# Patient Record
Sex: FEMALE | Race: WHITE | NOT HISPANIC OR LATINO | Employment: OTHER | URBAN - METROPOLITAN AREA
[De-identification: names, ages, dates, MRNs, and addresses within clinical notes are randomized per-mention and may not be internally consistent; named-entity substitution may affect disease eponyms.]

---

## 2017-01-03 ENCOUNTER — TRANSCRIBE ORDERS (OUTPATIENT)
Dept: ADMINISTRATIVE | Facility: HOSPITAL | Age: 77
End: 2017-01-03

## 2017-01-03 DIAGNOSIS — M81.0 OSTEOPOROSIS: Primary | ICD-10-CM

## 2017-01-13 ENCOUNTER — GENERIC CONVERSION - ENCOUNTER (OUTPATIENT)
Dept: OTHER | Facility: OTHER | Age: 77
End: 2017-01-13

## 2017-01-18 ENCOUNTER — HOSPITAL ENCOUNTER (OUTPATIENT)
Dept: RADIOLOGY | Facility: MEDICAL CENTER | Age: 77
Discharge: HOME | End: 2017-01-18
Payer: COMMERCIAL

## 2017-01-18 DIAGNOSIS — Z12.31 ENCOUNTER FOR SCREENING MAMMOGRAM FOR MALIGNANT NEOPLASM OF BREAST: ICD-10-CM

## 2017-01-18 PROCEDURE — G0202 SCR MAMMO BI INCL CAD: HCPCS

## 2017-01-24 ENCOUNTER — HOSPITAL ENCOUNTER (OUTPATIENT)
Dept: RADIOLOGY | Age: 77
Discharge: HOME | End: 2017-01-24
Payer: COMMERCIAL

## 2017-01-24 DIAGNOSIS — M81.0 OSTEOPOROSIS: ICD-10-CM

## 2017-01-24 PROCEDURE — 77080 DXA BONE DENSITY AXIAL: CPT

## 2017-01-25 ENCOUNTER — GENERIC CONVERSION - ENCOUNTER (OUTPATIENT)
Dept: OTHER | Facility: OTHER | Age: 77
End: 2017-01-25

## 2017-03-03 ENCOUNTER — GENERIC CONVERSION - ENCOUNTER (OUTPATIENT)
Dept: OTHER | Facility: OTHER | Age: 77
End: 2017-03-03

## 2017-03-06 ENCOUNTER — ALLSCRIPTS OFFICE VISIT (OUTPATIENT)
Dept: OTHER | Facility: OTHER | Age: 77
End: 2017-03-06

## 2017-03-09 ENCOUNTER — GENERIC CONVERSION - ENCOUNTER (OUTPATIENT)
Dept: OTHER | Facility: OTHER | Age: 77
End: 2017-03-09

## 2017-04-25 ENCOUNTER — ALLSCRIPTS OFFICE VISIT (OUTPATIENT)
Dept: OTHER | Facility: OTHER | Age: 77
End: 2017-04-25

## 2017-04-25 LAB
BILIRUB UR QL STRIP: NEGATIVE
CLARITY UR: NORMAL
COLOR UR: YELLOW
GLUCOSE (HISTORICAL): NEGATIVE
HGB UR QL STRIP.AUTO: NEGATIVE
KETONES UR STRIP-MCNC: NEGATIVE MG/DL
LEUKOCYTE ESTERASE UR QL STRIP: NORMAL
NITRITE UR QL STRIP: NEGATIVE
PH UR STRIP.AUTO: 6.5 [PH]
PROT UR STRIP-MCNC: 15 MG/DL
SP GR UR STRIP.AUTO: 1.01
UROBILINOGEN UR QL STRIP.AUTO: 0.2

## 2017-05-17 ENCOUNTER — LAB CONVERSION - ENCOUNTER (OUTPATIENT)
Dept: OTHER | Facility: OTHER | Age: 77
End: 2017-05-17

## 2017-05-17 LAB
HBA1C MFR BLD HPLC: 5.9 % OF TOTAL HGB
TSH SERPL DL<=0.05 MIU/L-ACNC: 3.14 MIU/L (ref 0.4–4.5)

## 2017-05-19 ENCOUNTER — GENERIC CONVERSION - ENCOUNTER (OUTPATIENT)
Dept: OTHER | Facility: OTHER | Age: 77
End: 2017-05-19

## 2017-05-21 ENCOUNTER — GENERIC CONVERSION - ENCOUNTER (OUTPATIENT)
Dept: OTHER | Facility: OTHER | Age: 77
End: 2017-05-21

## 2017-05-22 ENCOUNTER — ALLSCRIPTS OFFICE VISIT (OUTPATIENT)
Dept: OTHER | Facility: OTHER | Age: 77
End: 2017-05-22

## 2017-06-12 ENCOUNTER — GENERIC CONVERSION - ENCOUNTER (OUTPATIENT)
Dept: OTHER | Facility: OTHER | Age: 77
End: 2017-06-12

## 2017-06-26 ENCOUNTER — GENERIC CONVERSION - ENCOUNTER (OUTPATIENT)
Dept: OTHER | Facility: OTHER | Age: 77
End: 2017-06-26

## 2017-07-01 ENCOUNTER — APPOINTMENT (EMERGENCY)
Dept: RADIOLOGY | Facility: HOSPITAL | Age: 77
End: 2017-07-01
Payer: COMMERCIAL

## 2017-07-01 ENCOUNTER — HOSPITAL ENCOUNTER (OUTPATIENT)
Facility: HOSPITAL | Age: 77
Setting detail: OBSERVATION
Discharge: HOME | End: 2017-07-02
Attending: EMERGENCY MEDICINE | Admitting: FAMILY MEDICINE
Payer: COMMERCIAL

## 2017-07-01 ENCOUNTER — APPOINTMENT (EMERGENCY)
Dept: CT IMAGING | Facility: HOSPITAL | Age: 77
End: 2017-07-01
Payer: COMMERCIAL

## 2017-07-01 DIAGNOSIS — R10.9 ABDOMINAL PAIN: ICD-10-CM

## 2017-07-01 DIAGNOSIS — K52.9 COLITIS: Primary | ICD-10-CM

## 2017-07-01 PROBLEM — K21.9 GERD (GASTROESOPHAGEAL REFLUX DISEASE): Status: ACTIVE | Noted: 2017-07-01

## 2017-07-01 PROBLEM — E78.5 HLD (HYPERLIPIDEMIA): Status: ACTIVE | Noted: 2017-07-01

## 2017-07-01 PROBLEM — K43.9 VENTRAL HERNIA WITHOUT OBSTRUCTION OR GANGRENE: Status: ACTIVE | Noted: 2017-07-01

## 2017-07-01 LAB
ALBUMIN SERPL BCP-MCNC: 3.7 G/DL (ref 3.5–5)
ALP SERPL-CCNC: 59 U/L (ref 46–116)
ALT SERPL W P-5'-P-CCNC: 40 U/L (ref 12–78)
ANION GAP SERPL CALCULATED.3IONS-SCNC: 10 MMOL/L (ref 4–13)
APTT PPP: 25 SECONDS (ref 23–35)
AST SERPL W P-5'-P-CCNC: 26 U/L (ref 5–45)
ATRIAL RATE: 79 BPM
BASOPHILS # BLD MANUAL: 0 THOUSAND/UL (ref 0–0.1)
BASOPHILS NFR MAR MANUAL: 0 % (ref 0–1)
BILIRUB SERPL-MCNC: 0.4 MG/DL (ref 0.2–1)
BILIRUB UR QL STRIP: NEGATIVE
BUN SERPL-MCNC: 32 MG/DL (ref 5–25)
CALCIUM SERPL-MCNC: 8.8 MG/DL (ref 8.3–10.1)
CHLORIDE SERPL-SCNC: 105 MMOL/L (ref 100–108)
CLARITY UR: CLEAR
CO2 SERPL-SCNC: 26 MMOL/L (ref 21–32)
COLOR UR: YELLOW
CREAT SERPL-MCNC: 1.12 MG/DL (ref 0.6–1.3)
EOSINOPHIL # BLD MANUAL: 0 THOUSAND/UL (ref 0–0.4)
EOSINOPHIL NFR BLD MANUAL: 0 % (ref 0–6)
ERYTHROCYTE [DISTWIDTH] IN BLOOD BY AUTOMATED COUNT: 13.8 % (ref 11.6–15.1)
GFR SERPL CREATININE-BSD FRML MDRD: 47.3 ML/MIN/1.73SQ M
GLUCOSE SERPL-MCNC: 175 MG/DL (ref 65–140)
GLUCOSE UR STRIP-MCNC: NEGATIVE MG/DL
HCT VFR BLD AUTO: 40.9 % (ref 34.8–46.1)
HGB BLD-MCNC: 13 G/DL (ref 11.5–15.4)
HGB UR QL STRIP.AUTO: NEGATIVE
INR PPP: 0.87 (ref 0.86–1.16)
KETONES UR STRIP-MCNC: NEGATIVE MG/DL
LACTATE SERPL-SCNC: 1.8 MMOL/L (ref 0.5–2)
LACTATE SERPL-SCNC: 2.8 MMOL/L (ref 0.5–2)
LEUKOCYTE ESTERASE UR QL STRIP: NEGATIVE
LIPASE SERPL-CCNC: 150 U/L (ref 73–393)
LYMPHOCYTES # BLD AUTO: 15 % (ref 14–44)
LYMPHOCYTES # BLD AUTO: 2.88 THOUSAND/UL (ref 0.6–4.47)
MCH RBC QN AUTO: 29.9 PG (ref 26.8–34.3)
MCHC RBC AUTO-ENTMCNC: 31.8 G/DL (ref 31.4–37.4)
MCV RBC AUTO: 94 FL (ref 82–98)
MONOCYTES # BLD AUTO: 0 THOUSAND/UL (ref 0–1.22)
MONOCYTES NFR BLD: 0 % (ref 4–12)
NEUTROPHILS # BLD MANUAL: 16.31 THOUSAND/UL (ref 1.85–7.62)
NEUTS SEG NFR BLD AUTO: 85 % (ref 43–75)
NITRITE UR QL STRIP: NEGATIVE
PH UR STRIP.AUTO: 6.5 [PH] (ref 4.5–8)
PLATELET # BLD AUTO: 259 THOUSANDS/UL (ref 149–390)
PLATELET BLD QL SMEAR: ADEQUATE
PMV BLD AUTO: 10.4 FL (ref 8.9–12.7)
POTASSIUM SERPL-SCNC: 4.7 MMOL/L (ref 3.5–5.3)
PROT SERPL-MCNC: 7 G/DL (ref 6.4–8.2)
PROT UR STRIP-MCNC: NEGATIVE MG/DL
PROTHROMBIN TIME: 12.1 SECONDS (ref 12.1–14.4)
QRS AXIS: 49 DEGREES
QRSD INTERVAL: 72 MS
QT INTERVAL: 368 MS
QTC INTERVAL: 421 MS
RBC # BLD AUTO: 4.35 MILLION/UL (ref 3.81–5.12)
SODIUM SERPL-SCNC: 141 MMOL/L (ref 136–145)
SP GR UR STRIP.AUTO: 1.01 (ref 1–1.03)
T WAVE AXIS: 70 DEGREES
TOTAL CELLS COUNTED SPEC: 100
UROBILINOGEN UR QL STRIP.AUTO: 0.2 E.U./DL
VENTRICULAR RATE: 79 BPM
WBC # BLD AUTO: 19.19 THOUSAND/UL (ref 4.31–10.16)

## 2017-07-01 PROCEDURE — 83690 ASSAY OF LIPASE: CPT | Performed by: EMERGENCY MEDICINE

## 2017-07-01 PROCEDURE — 87040 BLOOD CULTURE FOR BACTERIA: CPT | Performed by: EMERGENCY MEDICINE

## 2017-07-01 PROCEDURE — 87045 FECES CULTURE AEROBIC BACT: CPT | Performed by: PHYSICIAN ASSISTANT

## 2017-07-01 PROCEDURE — 74177 CT ABD & PELVIS W/CONTRAST: CPT

## 2017-07-01 PROCEDURE — 85730 THROMBOPLASTIN TIME PARTIAL: CPT | Performed by: EMERGENCY MEDICINE

## 2017-07-01 PROCEDURE — 87046 STOOL CULTR AEROBIC BACT EA: CPT | Performed by: PHYSICIAN ASSISTANT

## 2017-07-01 PROCEDURE — 85610 PROTHROMBIN TIME: CPT | Performed by: EMERGENCY MEDICINE

## 2017-07-01 PROCEDURE — 71020 HB CHEST X-RAY 2VW FRONTAL&LATL: CPT

## 2017-07-01 PROCEDURE — 93005 ELECTROCARDIOGRAM TRACING: CPT | Performed by: EMERGENCY MEDICINE

## 2017-07-01 PROCEDURE — 99285 EMERGENCY DEPT VISIT HI MDM: CPT

## 2017-07-01 PROCEDURE — 81003 URINALYSIS AUTO W/O SCOPE: CPT | Performed by: EMERGENCY MEDICINE

## 2017-07-01 PROCEDURE — 96361 HYDRATE IV INFUSION ADD-ON: CPT

## 2017-07-01 PROCEDURE — 96375 TX/PRO/DX INJ NEW DRUG ADDON: CPT

## 2017-07-01 PROCEDURE — 87493 C DIFF AMPLIFIED PROBE: CPT | Performed by: PHYSICIAN ASSISTANT

## 2017-07-01 PROCEDURE — 83605 ASSAY OF LACTIC ACID: CPT | Performed by: EMERGENCY MEDICINE

## 2017-07-01 PROCEDURE — 85027 COMPLETE CBC AUTOMATED: CPT | Performed by: EMERGENCY MEDICINE

## 2017-07-01 PROCEDURE — 87015 SPECIMEN INFECT AGNT CONCNTJ: CPT | Performed by: PHYSICIAN ASSISTANT

## 2017-07-01 PROCEDURE — 96365 THER/PROPH/DIAG IV INF INIT: CPT

## 2017-07-01 PROCEDURE — 36415 COLL VENOUS BLD VENIPUNCTURE: CPT | Performed by: EMERGENCY MEDICINE

## 2017-07-01 PROCEDURE — 85007 BL SMEAR W/DIFF WBC COUNT: CPT | Performed by: EMERGENCY MEDICINE

## 2017-07-01 PROCEDURE — 80053 COMPREHEN METABOLIC PANEL: CPT | Performed by: EMERGENCY MEDICINE

## 2017-07-01 RX ORDER — ESTRADIOL 1 MG/1
0.5 TABLET ORAL DAILY
Status: DISCONTINUED | OUTPATIENT
Start: 2017-07-01 | End: 2017-07-02 | Stop reason: HOSPADM

## 2017-07-01 RX ORDER — CHOLECALCIFEROL (VITAMIN D3) 125 MCG
200 CAPSULE ORAL DAILY
Status: DISCONTINUED | OUTPATIENT
Start: 2017-07-01 | End: 2017-07-02 | Stop reason: HOSPADM

## 2017-07-01 RX ORDER — ACETAMINOPHEN 325 MG/1
650 TABLET ORAL EVERY 6 HOURS PRN
Status: DISCONTINUED | OUTPATIENT
Start: 2017-07-01 | End: 2017-07-02 | Stop reason: HOSPADM

## 2017-07-01 RX ORDER — ATORVASTATIN CALCIUM 20 MG/1
40 TABLET, FILM COATED ORAL EVERY EVENING
Status: DISCONTINUED | OUTPATIENT
Start: 2017-07-01 | End: 2017-07-02 | Stop reason: HOSPADM

## 2017-07-01 RX ORDER — ONDANSETRON 2 MG/ML
4 INJECTION INTRAMUSCULAR; INTRAVENOUS EVERY 6 HOURS PRN
Status: DISCONTINUED | OUTPATIENT
Start: 2017-07-01 | End: 2017-07-02 | Stop reason: HOSPADM

## 2017-07-01 RX ORDER — LISINOPRIL 5 MG/1
5 TABLET ORAL DAILY
Status: DISCONTINUED | OUTPATIENT
Start: 2017-07-01 | End: 2017-07-02 | Stop reason: HOSPADM

## 2017-07-01 RX ORDER — LEVOFLOXACIN 5 MG/ML
750 INJECTION, SOLUTION INTRAVENOUS ONCE
Status: COMPLETED | OUTPATIENT
Start: 2017-07-01 | End: 2017-07-01

## 2017-07-01 RX ORDER — SODIUM CHLORIDE 9 MG/ML
75 INJECTION, SOLUTION INTRAVENOUS CONTINUOUS
Status: DISCONTINUED | OUTPATIENT
Start: 2017-07-01 | End: 2017-07-02 | Stop reason: HOSPADM

## 2017-07-01 RX ORDER — ASPIRIN 81 MG/1
81 TABLET, CHEWABLE ORAL EVERY OTHER DAY
Status: DISCONTINUED | OUTPATIENT
Start: 2017-07-01 | End: 2017-07-02 | Stop reason: HOSPADM

## 2017-07-01 RX ORDER — ACETAMINOPHEN 325 MG/1
650 TABLET ORAL ONCE
Status: COMPLETED | OUTPATIENT
Start: 2017-07-01 | End: 2017-07-01

## 2017-07-01 RX ORDER — SODIUM PHOSPHATE,MONO-DIBASIC 19G-7G/118
1000 ENEMA (ML) RECTAL DAILY
Status: DISCONTINUED | OUTPATIENT
Start: 2017-07-01 | End: 2017-07-02 | Stop reason: HOSPADM

## 2017-07-01 RX ORDER — CIPROFLOXACIN 2 MG/ML
400 INJECTION, SOLUTION INTRAVENOUS EVERY 12 HOURS
Status: DISCONTINUED | OUTPATIENT
Start: 2017-07-01 | End: 2017-07-01

## 2017-07-01 RX ORDER — SODIUM CHLORIDE 9 MG/ML
125 INJECTION, SOLUTION INTRAVENOUS CONTINUOUS
Status: DISCONTINUED | OUTPATIENT
Start: 2017-07-01 | End: 2017-07-01

## 2017-07-01 RX ORDER — ONDANSETRON 2 MG/ML
4 INJECTION INTRAMUSCULAR; INTRAVENOUS ONCE
Status: COMPLETED | OUTPATIENT
Start: 2017-07-01 | End: 2017-07-01

## 2017-07-01 RX ORDER — VIT A/VIT C/VIT E/ZINC/COPPER 4296-226
2 CAPSULE ORAL DAILY
COMMUNITY

## 2017-07-01 RX ORDER — PANTOPRAZOLE SODIUM 40 MG/1
40 TABLET, DELAYED RELEASE ORAL
Status: DISCONTINUED | OUTPATIENT
Start: 2017-07-01 | End: 2017-07-02 | Stop reason: HOSPADM

## 2017-07-01 RX ADMIN — SODIUM CHLORIDE 75 ML/HR: 0.9 INJECTION, SOLUTION INTRAVENOUS at 22:11

## 2017-07-01 RX ADMIN — ENOXAPARIN SODIUM 40 MG: 40 INJECTION SUBCUTANEOUS at 10:06

## 2017-07-01 RX ADMIN — IOHEXOL 50 ML: 240 INJECTION, SOLUTION INTRATHECAL; INTRAVASCULAR; INTRAVENOUS; ORAL at 02:30

## 2017-07-01 RX ADMIN — SODIUM CHLORIDE 500 ML: 0.9 INJECTION, SOLUTION INTRAVENOUS at 02:21

## 2017-07-01 RX ADMIN — LEVOFLOXACIN 750 MG: 5 INJECTION, SOLUTION INTRAVENOUS at 05:01

## 2017-07-01 RX ADMIN — ACETAMINOPHEN 650 MG: 325 TABLET, FILM COATED ORAL at 02:22

## 2017-07-01 RX ADMIN — ASPIRIN 81 MG 81 MG: 81 TABLET ORAL at 09:56

## 2017-07-01 RX ADMIN — SODIUM CHLORIDE 125 ML/HR: 0.9 INJECTION, SOLUTION INTRAVENOUS at 04:25

## 2017-07-01 RX ADMIN — ATORVASTATIN CALCIUM 40 MG: 20 TABLET, FILM COATED ORAL at 17:13

## 2017-07-01 RX ADMIN — IOHEXOL 100 ML: 350 INJECTION, SOLUTION INTRAVENOUS at 03:48

## 2017-07-01 RX ADMIN — PANTOPRAZOLE SODIUM 40 MG: 40 TABLET, DELAYED RELEASE ORAL at 09:56

## 2017-07-01 RX ADMIN — LISINOPRIL 5 MG: 5 TABLET ORAL at 09:56

## 2017-07-01 RX ADMIN — ZINC 1 TABLET: TAB ORAL at 09:56

## 2017-07-01 RX ADMIN — SODIUM CHLORIDE 75 ML/HR: 0.9 INJECTION, SOLUTION INTRAVENOUS at 10:05

## 2017-07-01 RX ADMIN — ESTRADIOL 0.5 MG: 1 TABLET ORAL at 09:56

## 2017-07-01 RX ADMIN — Medication 1000 MG: at 09:56

## 2017-07-01 RX ADMIN — ONDANSETRON 4 MG: 2 INJECTION INTRAMUSCULAR; INTRAVENOUS at 02:22

## 2017-07-01 RX ADMIN — Medication 200 MG: at 09:56

## 2017-07-01 RX ADMIN — METRONIDAZOLE 500 MG: 500 INJECTION, SOLUTION INTRAVENOUS at 07:42

## 2017-07-02 VITALS
TEMPERATURE: 98.4 F | OXYGEN SATURATION: 94 % | RESPIRATION RATE: 18 BRPM | BODY MASS INDEX: 35.94 KG/M2 | SYSTOLIC BLOOD PRESSURE: 140 MMHG | WEIGHT: 210.54 LBS | HEIGHT: 64 IN | HEART RATE: 60 BPM | DIASTOLIC BLOOD PRESSURE: 62 MMHG

## 2017-07-02 LAB
ANION GAP SERPL CALCULATED.3IONS-SCNC: 6 MMOL/L (ref 4–13)
BUN SERPL-MCNC: 13 MG/DL (ref 5–25)
C DIFF TOX GENS STL QL NAA+PROBE: NORMAL
CALCIUM SERPL-MCNC: 8.5 MG/DL (ref 8.3–10.1)
CHLORIDE SERPL-SCNC: 111 MMOL/L (ref 100–108)
CO2 SERPL-SCNC: 27 MMOL/L (ref 21–32)
CREAT SERPL-MCNC: 0.95 MG/DL (ref 0.6–1.3)
ERYTHROCYTE [DISTWIDTH] IN BLOOD BY AUTOMATED COUNT: 14 % (ref 11.6–15.1)
GFR SERPL CREATININE-BSD FRML MDRD: 57.2 ML/MIN/1.73SQ M
GLUCOSE SERPL-MCNC: 93 MG/DL (ref 65–140)
HCT VFR BLD AUTO: 42 % (ref 34.8–46.1)
HGB BLD-MCNC: 12.8 G/DL (ref 11.5–15.4)
MAGNESIUM SERPL-MCNC: 2.1 MG/DL (ref 1.6–2.6)
MCH RBC QN AUTO: 29.4 PG (ref 26.8–34.3)
MCHC RBC AUTO-ENTMCNC: 30.5 G/DL (ref 31.4–37.4)
MCV RBC AUTO: 97 FL (ref 82–98)
PHOSPHATE SERPL-MCNC: 3.2 MG/DL (ref 2.3–4.1)
PLATELET # BLD AUTO: 235 THOUSANDS/UL (ref 149–390)
PMV BLD AUTO: 10.7 FL (ref 8.9–12.7)
POTASSIUM SERPL-SCNC: 4.2 MMOL/L (ref 3.5–5.3)
RBC # BLD AUTO: 4.35 MILLION/UL (ref 3.81–5.12)
SODIUM SERPL-SCNC: 144 MMOL/L (ref 136–145)
WBC # BLD AUTO: 5.8 THOUSAND/UL (ref 4.31–10.16)

## 2017-07-02 PROCEDURE — 80048 BASIC METABOLIC PNL TOTAL CA: CPT | Performed by: PHYSICIAN ASSISTANT

## 2017-07-02 PROCEDURE — 84100 ASSAY OF PHOSPHORUS: CPT | Performed by: PHYSICIAN ASSISTANT

## 2017-07-02 PROCEDURE — 85027 COMPLETE CBC AUTOMATED: CPT | Performed by: PHYSICIAN ASSISTANT

## 2017-07-02 PROCEDURE — 83735 ASSAY OF MAGNESIUM: CPT | Performed by: PHYSICIAN ASSISTANT

## 2017-07-02 RX ADMIN — Medication 200 MG: at 08:27

## 2017-07-02 RX ADMIN — PANTOPRAZOLE SODIUM 40 MG: 40 TABLET, DELAYED RELEASE ORAL at 05:24

## 2017-07-02 RX ADMIN — ZINC 1 TABLET: TAB ORAL at 08:27

## 2017-07-02 RX ADMIN — ENOXAPARIN SODIUM 40 MG: 40 INJECTION SUBCUTANEOUS at 08:27

## 2017-07-02 RX ADMIN — ESTRADIOL 0.5 MG: 1 TABLET ORAL at 08:29

## 2017-07-02 RX ADMIN — LISINOPRIL 5 MG: 5 TABLET ORAL at 08:27

## 2017-07-02 RX ADMIN — Medication 1000 MG: at 08:27

## 2017-07-03 LAB
BACTERIA STL CULT: NORMAL
BACTERIA STL CULT: NORMAL

## 2017-07-06 LAB
BACTERIA BLD CULT: NORMAL
BACTERIA BLD CULT: NORMAL

## 2017-11-13 ENCOUNTER — ALLSCRIPTS OFFICE VISIT (OUTPATIENT)
Dept: OTHER | Facility: OTHER | Age: 77
End: 2017-11-13

## 2017-11-29 ENCOUNTER — ANESTHESIA EVENT (OUTPATIENT)
Dept: PERIOP | Facility: HOSPITAL | Age: 77
End: 2017-11-29
Payer: COMMERCIAL

## 2017-11-30 ENCOUNTER — HOSPITAL ENCOUNTER (OUTPATIENT)
Facility: HOSPITAL | Age: 77
Setting detail: OUTPATIENT SURGERY
Discharge: HOME | End: 2017-11-30
Attending: SURGERY | Admitting: SURGERY
Payer: COMMERCIAL

## 2017-11-30 ENCOUNTER — ANESTHESIA (OUTPATIENT)
Dept: PERIOP | Facility: HOSPITAL | Age: 77
End: 2017-11-30
Payer: COMMERCIAL

## 2017-11-30 VITALS
SYSTOLIC BLOOD PRESSURE: 174 MMHG | TEMPERATURE: 97.1 F | OXYGEN SATURATION: 98 % | HEIGHT: 64 IN | HEART RATE: 59 BPM | DIASTOLIC BLOOD PRESSURE: 74 MMHG | BODY MASS INDEX: 34.15 KG/M2 | WEIGHT: 200 LBS | RESPIRATION RATE: 18 BRPM

## 2017-11-30 PROCEDURE — C1781 MESH (IMPLANTABLE): HCPCS | Performed by: SURGERY

## 2017-11-30 DEVICE — BARD MODIFIED KUGEL HERNIA PATCH
Type: IMPLANTABLE DEVICE | Site: UMBILICAL | Status: FUNCTIONAL
Brand: BARD MODIFIED KUGEL HERNIA PATCH

## 2017-11-30 RX ORDER — ALBUTEROL SULFATE 2.5 MG/3ML
2.5 SOLUTION RESPIRATORY (INHALATION) ONCE AS NEEDED
Status: DISCONTINUED | OUTPATIENT
Start: 2017-11-30 | End: 2017-11-30 | Stop reason: HOSPADM

## 2017-11-30 RX ORDER — BUPIVACAINE HYDROCHLORIDE AND EPINEPHRINE 2.5; 5 MG/ML; UG/ML
INJECTION, SOLUTION INFILTRATION; PERINEURAL AS NEEDED
Status: DISCONTINUED | OUTPATIENT
Start: 2017-11-30 | End: 2017-11-30 | Stop reason: HOSPADM

## 2017-11-30 RX ORDER — FENTANYL CITRATE 50 UG/ML
INJECTION, SOLUTION INTRAMUSCULAR; INTRAVENOUS AS NEEDED
Status: DISCONTINUED | OUTPATIENT
Start: 2017-11-30 | End: 2017-11-30 | Stop reason: SURG

## 2017-11-30 RX ORDER — METOCLOPRAMIDE HYDROCHLORIDE 5 MG/ML
10 INJECTION INTRAMUSCULAR; INTRAVENOUS ONCE AS NEEDED
Status: DISCONTINUED | OUTPATIENT
Start: 2017-11-30 | End: 2017-11-30 | Stop reason: HOSPADM

## 2017-11-30 RX ORDER — SODIUM CHLORIDE 9 MG/ML
INJECTION, SOLUTION INTRAVENOUS CONTINUOUS PRN
Status: DISCONTINUED | OUTPATIENT
Start: 2017-11-30 | End: 2017-11-30 | Stop reason: SURG

## 2017-11-30 RX ORDER — PROPOFOL 10 MG/ML
INJECTION, EMULSION INTRAVENOUS AS NEEDED
Status: DISCONTINUED | OUTPATIENT
Start: 2017-11-30 | End: 2017-11-30 | Stop reason: SURG

## 2017-11-30 RX ORDER — LIDOCAINE HYDROCHLORIDE 10 MG/ML
INJECTION, SOLUTION INFILTRATION; PERINEURAL AS NEEDED
Status: DISCONTINUED | OUTPATIENT
Start: 2017-11-30 | End: 2017-11-30 | Stop reason: SURG

## 2017-11-30 RX ORDER — SODIUM CHLORIDE, SODIUM LACTATE, POTASSIUM CHLORIDE, CALCIUM CHLORIDE 600; 310; 30; 20 MG/100ML; MG/100ML; MG/100ML; MG/100ML
125 INJECTION, SOLUTION INTRAVENOUS CONTINUOUS
Status: DISCONTINUED | OUTPATIENT
Start: 2017-11-30 | End: 2017-11-30 | Stop reason: HOSPADM

## 2017-11-30 RX ORDER — FENTANYL CITRATE/PF 50 MCG/ML
25 SYRINGE (ML) INJECTION
Status: DISCONTINUED | OUTPATIENT
Start: 2017-11-30 | End: 2017-11-30 | Stop reason: HOSPADM

## 2017-11-30 RX ORDER — GLYCOPYRROLATE 0.2 MG/ML
INJECTION INTRAMUSCULAR; INTRAVENOUS AS NEEDED
Status: DISCONTINUED | OUTPATIENT
Start: 2017-11-30 | End: 2017-11-30 | Stop reason: SURG

## 2017-11-30 RX ORDER — SODIUM CHLORIDE 9 MG/ML
75 INJECTION, SOLUTION INTRAVENOUS CONTINUOUS
Status: DISCONTINUED | OUTPATIENT
Start: 2017-11-30 | End: 2017-11-30 | Stop reason: HOSPADM

## 2017-11-30 RX ORDER — ONDANSETRON 2 MG/ML
INJECTION INTRAMUSCULAR; INTRAVENOUS AS NEEDED
Status: DISCONTINUED | OUTPATIENT
Start: 2017-11-30 | End: 2017-11-30 | Stop reason: SURG

## 2017-11-30 RX ORDER — ONDANSETRON 2 MG/ML
4 INJECTION INTRAMUSCULAR; INTRAVENOUS ONCE AS NEEDED
Status: DISCONTINUED | OUTPATIENT
Start: 2017-11-30 | End: 2017-11-30 | Stop reason: HOSPADM

## 2017-11-30 RX ADMIN — PROPOFOL 200 MG: 10 INJECTION, EMULSION INTRAVENOUS at 15:05

## 2017-11-30 RX ADMIN — SODIUM CHLORIDE: 0.9 INJECTION, SOLUTION INTRAVENOUS at 15:53

## 2017-11-30 RX ADMIN — CEFAZOLIN SODIUM 2000 MG: 2 SOLUTION INTRAVENOUS at 15:00

## 2017-11-30 RX ADMIN — GLYCOPYRROLATE 0.2 MG: 0.2 INJECTION, SOLUTION INTRAMUSCULAR; INTRAVENOUS at 15:05

## 2017-11-30 RX ADMIN — ONDANSETRON 4 MG: 2 INJECTION INTRAMUSCULAR; INTRAVENOUS at 15:37

## 2017-11-30 RX ADMIN — FENTANYL CITRATE 50 MCG: 50 INJECTION INTRAMUSCULAR; INTRAVENOUS at 15:20

## 2017-11-30 RX ADMIN — SODIUM CHLORIDE: 0.9 INJECTION, SOLUTION INTRAVENOUS at 13:15

## 2017-11-30 RX ADMIN — FENTANYL CITRATE 50 MCG: 50 INJECTION INTRAMUSCULAR; INTRAVENOUS at 15:31

## 2017-11-30 RX ADMIN — DEXAMETHASONE SODIUM PHOSPHATE 10 MG: 10 INJECTION INTRAMUSCULAR; INTRAVENOUS at 15:07

## 2017-11-30 RX ADMIN — LIDOCAINE HYDROCHLORIDE 50 MG: 10 INJECTION, SOLUTION INFILTRATION; PERINEURAL at 15:05

## 2017-11-30 NOTE — ANESTHESIA POSTPROCEDURE EVALUATION
Post-Op Assessment Note      CV Status:  Stable    Hydration Status:  Euvolemic    PONV Controlled:  Controlled    Airway Patency:  Patent    Post Op Vitals Reviewed: Yes          Staff: CRNA       Comments: SLEEPING VSS REPORT RN          BP     Temp      Pulse     Resp      SpO2

## 2017-11-30 NOTE — OP NOTE
OPERATIVE REPORT  PATIENT NAME: Sangeeta Acuña    :  1940  MRN: [id number]  Pt Location: AN OR ROOM 01    SURGERY DATE: 2017    Surgeon(s) and Role:     Tasneem Nelson MD - Primary    Preop Diagnosis:  Incisional hernia [K43 2]    Post-Op Diagnosis Codes:     * Incisional hernia [K43 2]    Procedure(s) (LRB):  INCISIONAL HERNIA REPAIR (N/A)    Specimen(s):  * No specimens in log *    Estimated Blood Loss:   Minimal    Drains:       Anesthesia Type:   General    Operative Indications:  Incisional hernia [K43 2]        Operative Findings:  Independent, nonsmoker, ASA 2, wound class 1, BMI 34, weight 200, height 64    Cardiovascular  Hyperlipidemia, Hypertension ,   Comment: Neg stress echo with normal LVEF ,  Pulmonary  Negative pulmonary ROS ,    GI/Hepatic  GERD ,    Negative  ROS    Endo/Other  Negative endo/other ROS  GYN  Negative gynecology ROS       Hematology  Negative hematology ROS     Musculoskeletal  Obesity (bmi 34) ,    Neurology  Negative neurology ROS     Psychology   Negative psychology ROS           Complications:   None    Procedure and Technique:  Patient was identified visually and via armband  Placed in the supine position  After general anesthesia the abdomen was prepped and draped in a sterile fashion  0 25% Marcaine with epinephrine was utilized  Incision was made at the umbilicus this carried down through skin subcutaneous tissue  Two hernias were noted at the umbilicus and more superiorly  This is compatible with incisional herniations x2  The hernia sac was dissected free  It was retracted into the abdomen  A 3 inch polypropylene mesh was then inserted  This was interposed between the omentum to protect bowel  Fascia was then closed with interrupted figure-of-eight 1  Vicryl suture Mrs  followed by 3 0 Vicryl subcutaneous and 4 Monocryl sutures  Dermabond was applied  The patient tolerated the procedure well  Sponge and instrument count correct     I was present for the entire procedure    Patient Disposition:  PACU        ###########################    L  Archana Chaves PA-C assisted with surgery as no qualified surgical resident was available  Her assistance was needed for visualization and retraction      SIGNATURE: Alessandra Collier MD  DATE: November 30, 2017  TIME: 4:28 PM

## 2017-11-30 NOTE — ANESTHESIA PREPROCEDURE EVALUATION
Review of Systems/Medical History  Patient summary reviewed  Chart reviewed  History of anesthetic complications (no problem with cholecystectomy) PONV    Cardiovascular  Hyperlipidemia, Hypertension ,   Comment: Neg stress echo with normal LVEF 2016,  Pulmonary  Negative pulmonary ROS ,        GI/Hepatic    GERD ,        Negative  ROS        Endo/Other  Negative endo/other ROS      GYN  Negative gynecology ROS          Hematology  Negative hematology ROS      Musculoskeletal  Obesity (bmi 34) ,        Neurology  Negative neurology ROS      Psychology   Negative psychology ROS          Physical Exam    Airway    Mallampati score: II  TM Distance: >3 FB  Neck ROM: full     Dental   Comment: Upper/lower partials,     Cardiovascular      Pulmonary      Other Findings       CBC, BMP 11/13/17 unremarkable  Hgb 13 4, plt 268, Cr 0 87    Anesthesia Plan  ASA Score- 2       Anesthesia Type- general with ASA Monitors  Additional Monitors:   Airway Plan:     Comment: ETT vs LMA  PONV pps with decadron/zofran  Induction- intravenous  Informed Consent- Anesthetic plan and risks discussed with patient (granddaughter)  I personally reviewed this patient with the CRNA  Discussed and agreed on the Anesthesia Plan with the CRNA  Patricia Bragg

## 2017-11-30 NOTE — DISCHARGE INSTRUCTIONS
Diet and activity as tolerated  May shower  May drive when not taking pain medication      Please call [phone] for a follow-up office visit for 2 weeks

## 2017-12-12 DIAGNOSIS — I10 ESSENTIAL (PRIMARY) HYPERTENSION: ICD-10-CM

## 2017-12-12 DIAGNOSIS — M85.80 OTHER SPECIFIED DISORDERS OF BONE DENSITY AND STRUCTURE, UNSPECIFIED SITE: ICD-10-CM

## 2017-12-12 DIAGNOSIS — E78.5 HYPERLIPIDEMIA: ICD-10-CM

## 2017-12-15 ENCOUNTER — LAB CONVERSION - ENCOUNTER (OUTPATIENT)
Dept: OTHER | Facility: OTHER | Age: 77
End: 2017-12-15

## 2017-12-15 LAB
25(OH)D3 SERPL-MCNC: 39 NG/ML (ref 30–100)
A/G RATIO (HISTORICAL): 1.7 (CALC) (ref 1–2.5)
ALBUMIN SERPL BCP-MCNC: 4.3 G/DL (ref 3.6–5.1)
ALP SERPL-CCNC: 76 U/L (ref 33–130)
ALT SERPL W P-5'-P-CCNC: 30 U/L (ref 6–29)
AST SERPL W P-5'-P-CCNC: 24 U/L (ref 10–35)
BILIRUB SERPL-MCNC: 0.5 MG/DL (ref 0.2–1.2)
BUN SERPL-MCNC: 19 MG/DL (ref 7–25)
BUN/CREA RATIO (HISTORICAL): ABNORMAL (CALC) (ref 6–22)
CALCIUM SERPL-MCNC: 9.8 MG/DL (ref 8.6–10.4)
CHLORIDE SERPL-SCNC: 105 MMOL/L (ref 98–110)
CHOLEST SERPL-MCNC: 201 MG/DL
CHOLEST/HDLC SERPL: 3.5 (CALC)
CO2 SERPL-SCNC: 27 MMOL/L (ref 20–31)
CREAT SERPL-MCNC: 0.9 MG/DL (ref 0.6–0.93)
EGFR AFRICAN AMERICAN (HISTORICAL): 72 ML/MIN/1.73M2
EGFR-AMERICAN CALC (HISTORICAL): 62 ML/MIN/1.73M2
GAMMA GLOBULIN (HISTORICAL): 2.5 G/DL (CALC) (ref 1.9–3.7)
GLUCOSE (HISTORICAL): 96 MG/DL (ref 65–99)
HDLC SERPL-MCNC: 57 MG/DL
LDL CHOLESTEROL (HISTORICAL): 120 MG/DL (CALC)
NON-HDL-CHOL (CHOL-HDL) (HISTORICAL): 144 MG/DL (CALC)
POTASSIUM SERPL-SCNC: 4.5 MMOL/L (ref 3.5–5.3)
SODIUM SERPL-SCNC: 139 MMOL/L (ref 135–146)
TOTAL PROTEIN (HISTORICAL): 6.8 G/DL (ref 6.1–8.1)
TRIGL SERPL-MCNC: 129 MG/DL

## 2017-12-17 ENCOUNTER — GENERIC CONVERSION - ENCOUNTER (OUTPATIENT)
Dept: OTHER | Facility: OTHER | Age: 77
End: 2017-12-17

## 2017-12-18 ENCOUNTER — ALLSCRIPTS OFFICE VISIT (OUTPATIENT)
Dept: OTHER | Facility: OTHER | Age: 77
End: 2017-12-18

## 2018-01-09 NOTE — PROGRESS NOTES
Assessment    1  Encounter for preventive health examination (V70 0) (Z00 00)   2  Essential hypertension (401 9) (I10)    Plan  Encounter for screening mammogram for breast cancer    · * MAMMO SCREENING BILATERAL W CAD; Status:Hold For - Scheduling; Requested  for:41Gtw6365;   Essential hypertension    · Lisinopril 10 MG Oral Tablet; TAKE 1 TABLET BY MOUTH EVERY DAY   · (1) COMPREHENSIVE METABOLIC PANEL; Status:Active; Requested for:81Pco5245;    · (1) LIPID PANEL, FASTING; Status:Active; Requested for:33Ocb8674;    · (1) TSH; Status:Active; Requested for:12Jha8878;   Essential hypertension, Hyperglycemia    · (1) HEMOGLOBIN A1C; Status:Active; Requested for:91Iak8822;     Discussion/Summary    OBTAIN MAMMO  OBTAIN DEXA  IMM UP TO DATE  FLU SHOT TODAY  LABS ARE STABLE- REPEAT IN 6 MONTHS  LIPIDS ARE STABLE;  Impression: Subsequent Annual Wellness Visit, with preventive exam as well as age and risk appropriate counseling completed  Cardiovascular screening and counseling: Dx - V81 2 Screen for CV Disorder  Diabetes screening and counseling: Dx - V77 1 Screen for DM  Colorectal cancer screening and counseling: Dx - V76 51 Screen for CRC  Cervical cancer screening and counseling: screening is current and SEES GYN  Osteoporosis screening and counseling: screening is current, counseling was given on obtaining adequate amounts of calcium and vitamin D on a daily basis, counseling was given on the importance of regular weightbearing exercise and REPEAT DUE 12/2016  Abdominal aortic aneurysm screening and counseling: Dx - V81 2 Screen for CV Disorder  Glaucoma screening and counseling: Dx - V80 1 Screen for Glaucoma  Immunizations: influenza vaccine is due today, the lifetime pneumococcal vaccine has been completed, hepatitis B vaccination series is not indicated at this time due to the patient's low risk of aysha the disease, Zostavax vaccination up to date and Td vaccination up to date  Advance Directive Planning: complete and up to date  Patient Discussion: plan discussed with the patient, follow-up visit needed in one year  Chief Complaint  PATIENT HERE FOR AMW; SHE FEELS WELL  SHE HAS HAD 2 UTI SINCE HAVING [facility]LEY; SHE FEELS WELL  SHE IS DUE FOR MAMMOGRAM IN JANUARY  SHE IS ON PREMARIN AND SHE WOULD LIKE TO WEAN OFF; History of Present Illness  HPI: PATIENT FEELS WELL   SHE OFFERS NO COMPLAINTS   Welcome to Medicare and Wellness Visits: The patient is being seen for the subsequent annual wellness visit  Medicare Screening and Risk Factors   Hospitalizations: no previous hospitalizations  Medicare Screening Tests Risk Questions   Abdominal aortic aneurysm risk assessment: none indicated  Osteoporosis risk assessment:  and female gender  HIV risk assessment: none indicated  Drug and Alcohol Use: The patient has never smoked cigarettes  The patient reports never drinking alcohol  Diet and Physical Activity: Current diet includes well balanced meals  She exercises daily  Exercise: walking  Mood Disorder and Cognitive Impairment Screening: She denies feeling down, depressed, or hopeless over the past two weeks  She denies feeling little interest or pleasure in doing things over the past two weeks  Cognitive impairment screening: denies difficulty learning/retaining new information, denies difficulty handling complex tasks, denies difficulty with reasoning, denies difficulty with spatial ability and orientation, denies difficulty with language and denies difficulty with behavior  Functional Ability/Level of Safety: Hearing is normal bilaterally  The patient is currently able to do activities of daily living without limitations, able to do instrumental activities of daily living without limitations, able to participate in social activities without limitations and able to drive without limitations   Activities of daily living details: does not need help using the phone, no transportation help needed, does not need help shopping, no meal preparation help needed, does not need help doing housework, does not need help doing laundry, does not need help managing medications and does not need help managing money  Fall risk factors:  no polypharmacy, no alcohol use, no mobility impairment, no antidepressant use, no deconditioning, no postural hypotension, no visual impairment, no urinary incontinence, no cognitive impairment, up and go test was normal and no previous fall  Home safety risk factors:  2 RUGS THAT ARE LOOSE - THEY ARE TAPED DOWN, but no unfamiliar surroundings, no loose rugs, no poor household lighting, no uneven floors, no household clutter, grab bars in the bathroom and handrails on the stairs  Advance Directives: Advance directives: living will, durable power of  for health care directives and advance directives  I agree with the patient's decisions  Co-Managers and Medical Equipment/Suppliers: See Patient Care Team   Reviewed Updated [facility]:   Last Medicare Wellness Visit Information was reviewed, patient interviewed, no change since last AW  Preventive Quality Program 65 and Older: Falls Risk: The patient fell 0 times in the past 12 months  The patient is currently asymptomatic Symptoms Include: The patient currently has no urinary incontinence symptoms  Urinary Incontinence Symptoms includes: no urinary incontinence and no incomplete bladder emptying    Date of last glaucoma screen was 2016   HM, Adult Female: The patient is being seen for a health maintenance evaluation  The last health maintenance visit was 12 month(s) ago  Social History: She is   Work status: RETIRED  The patient has never smoked cigarettes  She reports never drinking alcohol  General Health: The patient's health since the last visit is described as good  She has regular dental visits  She denies vision problems  She denies hearing loss   Immunizations status: up to date  Lifestyle:  She consumes a diverse and healthy diet  She does not have any weight concerns  She exercises regularly  She does not use tobacco  She denies alcohol use  She denies drug use  Reproductive health: the patient is postmenopausal    Screening: cancer screening reviewed and current  metabolic screening reviewed and current  risk screening reviewed and current  Welcome to [employer] and Wellness Visits: The patient is being seen for a subsequent annual wellness visit  The patient reports her health to be good  Tobacco use: non-user  Alcohol use: non-user  Illicit drug use: non-user  Current diet includes well balanced meals  She exercises daily  Patient Care Team    Care Team Member Role Specialty Office Number   Jean Juarez MD  Obstetrics/Gynecology [phone]     Review of Systems    Constitutional: negative  Head and Face: no facial pain and no facial pressure  Eyes: WEARS GLASSES, but negative, no eye pain, eyes not red, no watery discharge from the eyes and no purulent discharge from the eyes  ENT: negative, no earache, no hearing loss, no nasal congestion, no nasal discharge, no sore throat, no scratchy throat and no hoarseness  Cardiovascular: negative, the heart is not racing and no lightheadedness  Respiratory: negative, no shortness of breath, no wheezing, not sleeping upright or with extra pillows, no cough, no dry cough, no productive cough, no clear sputum and no colored sputum  Gastrointestinal: negative, no abdominal pain, no abdominal bloating, no abdominal cramps, no nausea, no vomiting, no diarrhea, no constipation and no bright red blood per rectum  Genitourinary: negative, no dysuria, no urinary frequency, no urinary urgency, no flank pain, no suprapubic pain, no pelvic pain and no foul-smelling vaginal discharge     Musculoskeletal: negative, no diffuse joint pain, no generalized muscle aches, no back pain, no joint swelling, no joint stiffness, no pain in other joints and no limping  Integumentary and Breasts: negative, no rashes, no skin lesions, no skin wound and no itching  Neurological: negative, no headache, no confusion, no dizziness, no leg numbness, no leg weakness, no tingling and no difficulty walking  Psychiatric: negative, no insomnia, no anxiety and no depression  Endocrine: negative and no muscle weakness  Hematologic and Lymphatic: a tendency for easy bruising, but negative, as noted in HPI, no swollen glands, no swollen glands in the neck and no tendency for easy bleeding  Yes, the patient is satisfied with Her life  No, the patient has not dropped any activities or interests  No, the patient does not feel that their life is empty  No, the patient does not often get bored  Yes, the patient is hopeful about the future  No, the patient is not bothered by thoughts in their head  Yes, the patient is in good spirits most of the time  No, the patient is not afraid something bad is going to happen to them  Yes, the patient feels happy most of the time  No, the patient does not often feel helpless  No, the patient holder not often get restless and fidgety  Normal 0 - 9, Mild Depression 10 - 19, Severe Depression 20 - 30      Active Problems    1  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)   2  Encounter for preventive health examination (V70 0) (Z00 00)   3  Essential hypertension (401 9) (I10)   4  GERD without esophagitis (530 81) (K21 9)   5  Greater trochanteric bursitis, left (726 5) (M70 62)   6  Hyperglycemia (790 29) (R73 9)   7  Hyperlipidemia (272 4) (E78 5)   8  Osteopenia (733 90) (M85 80)   9  Polyp of sigmoid colon (211 3) (D12 5)    Past Medical History    The active problems and past medical history were reviewed and updated today        Surgical History    · History of Hemorrhoidectomy   · History of Hysterectomy   · History of Nose Surgery   · History of Rotator Cuff Repair    Family History  Mother    · No pertinent family history  Father    · No pertinent family history    The family history was reviewed and updated today  Social History    · Being A Social Drinker   · Caffeine use (V49 89) (F15 90)   · Denied: History of Drug Use   · Moderate Exercising Less Than 3 Times A Week   · Never A Smoker   · Two children  The social history was reviewed and updated today  Current Meds   1  Aspirin 81 MG Oral Tablet Chewable; Therapy: (Recorded:11Apr2013) to Recorded   2  Atorvastatin Calcium 40 MG Oral Tablet; 1/2 tab on a Monday  Requested for:   90IRM9805; Last Rx:53Uvh6116 Ordered   3  B Complex 50 Oral Tablet Recorded   4  CoQ10 CAPS; TAKE 1 CAPSULE DAILY WITH A MEAL Recorded   5  Estradiol 0 5 MG Oral Tablet; TAKE 1 TABLET DAILY; Therapy: 15XVR1556 to (72 414 011)  Requested for: 57Vbq0614; Last   Rx:47Uln7063 Ordered   6  Glucosamine Chondr Complex CAPS; Therapy: (Recorded:11Apr2013) to Recorded   7  Lisinopril 5 MG Oral Tablet; TAKE 1 TABLET TWICE DAILY  Requested for: 27FAK9357;   Last Rx:33Atr3303 Ordered    Allergies    1  Codeine Sulfate TABS   2  Darvocet-N 50 TABS    Immunizations   ** Printed in Appendix #1 below  Vitals  Signs    Systolic: 377  Diastolic: 74  Heart Rate: 76  Respiration: 16  Temperature: 98 2 F  Height: 5 ft 4 in  Weight: 198 lb   BMI Calculated: 33 99  BSA Calculated: 1 96    Physical Exam    Constitutional   General appearance: No acute distress, well appearing and well nourished  Eyes   Conjunctiva and lids: No swelling, erythema or discharge  Pupils and irises: Equal, round, reactive to light  Ophthalmoscopic examination: Normal fundi and optic discs  Ears, Nose, Mouth, and Throat   External inspection of ears and nose: Normal     Otoscopic examination: Tympanic membranes translucent with normal light reflex  Canals patent without erythema      Hearing: Normal     Nasal mucosa, septum, and turbinates: Normal without edema or erythema  Lips, teeth, and gums: Normal, good dentition  Oropharynx: Normal with no erythema, edema, exudate or lesions  Inspection of the oropharynx showed visible hard and soft palate, upper portion of tonsils and uvula (Mallampati class 2)  Oral mucosa was moist, but was normal  The palate examination showed no abnormalities  The tongue was normal  The tonsils were normal    Neck   Neck: Supple, symmetric, trachea midline, no masses  Thyroid: Normal, no thyromegaly  Pulmonary   Respiratory effort: No increased work of breathing or signs of respiratory distress  Percussion of chest: Normal     Auscultation of lungs: Clear to auscultation  Cardiovascular   Palpation of heart: Normal PMI, no thrills  Auscultation of heart: Normal rate and rhythm, normal S1 and S2, no murmurs  The heart rate was normal  The rhythm was regular  Heart sounds: normal S1, normal S2, no S3 and no S4  no murmurs were heard  Carotid pulses: 2+ bilaterally  Abdominal aorta: Normal     Pedal pulses: 2+ bilaterally  Examination of extremities for edema and/or varicosities: Normal     Abdomen   Abdomen: Non-tender, no masses  Bowel sounds were normal  The abdomen was soft and nontender  no masses palpated  Liver and spleen: No hepatomegaly or splenomegaly  Lymphatic   Palpation of lymph nodes in neck: No lymphadenopathy  Musculoskeletal   Gait and station: Normal     Digits and nails: Normal without clubbing or cyanosis  Range of motion: Normal     Muscle strength/tone: Normal     Skin   Skin and subcutaneous tissue: Normal without rashes or lesions  Palpation of skin and subcutaneous tissue: Normal turgor  Neurologic   Cranial nerves: Cranial nerves II-XII intact  Reflexes: 2+ and symmetric  Sensation: No sensory loss      Psychiatric   Judgment and insight: Normal     Orientation to person, place, and time: Normal     Mood and affect: Normal        Results/Data  PHQ-2 Adult Depression Screening 37FKR8595 09:59AM User, Ahs     Test Name Result Flag Reference   PHQ-2 Adult Depression Score 0     Over the last two weeks, how often have you been bothered by any of the following problems? Little interest or pleasure in doing things: Not at all - 0  Feeling down, depressed, or hopeless: Not at all - 0   PHQ-2 Adult Depression Screening Negative         Health Management  Encounter for preventive health examination   Medicare Annual Wellness Visit; every 1 year; Next Due: 38NHU0571; Overdue  History of Other screening mammogram   Digital Bilateral Screening Mammogram With CAD; every 1 year; Last 40BJT1036; Next  Due: 2017; Near Due  History of Screen for colon cancer   COLONOSCOPY; every 5 years; Last 80VID5509; Next Due: 47HWE4333; Active    Future Appointments    Date/Time Provider Specialty Site   2017 09:00 AM Idalmis Howell DO Internal Medicine [address]   2016 11:30 AM Dami Abad MD Obstetrics/Gynecology [facility]     Signatures   Electronically signed by :  Julieta Hobbs DO; 2016  8:08AM EST                       (Author)    Appendix #1     Patient: Bridgette Lin ; : 1940; MRN: [id number]      1 2 3 4 5    Influenza  21-Oct-2011  (71y) 05-Oct-2012  (72y) 14-Oct-2013  (73y) 16-Oct-2014  (74y) 22-Oct-2015  (75y)    PCV  2015  (74y)        PPSV  05-Oct-2012  (72y)        Td/DT  2006  (66y)        Varicella  2012

## 2018-01-10 NOTE — PROGRESS NOTES
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)   7  Hyperglycemia (790 29) (R73 9)   8  Hyperlipidemia (272 4) (E78 5)   9  Osteopenia (733 90) (M85 80)   10  Post-menopausal atrophic vaginitis (627 3) (N95 2)   11  Primary open-angle glaucoma (365 11,365 70) (H40 1190)   12  Screening for genitourinary condition (V81 6) (Z13 89)   13  Screening for neurological condition (V80 09) (Z13 89)   14  Ventral hernia without obstruction or gangrene (553 20) (K43 9)    Current Meds   1  Aspirin 81 MG Oral Tablet Chewable; Therapy: (Recorded:11Apr2013) to Recorded   2  Atorvastatin Calcium 40 MG Oral Tablet; 1/2 tab on a Monday  Requested for:   03DIO4758; Last Rx:03Jan2017 Ordered   3  B Complex 50 Oral Tablet Recorded   4  CoQ10 CAPS; TAKE 1 CAPSULE DAILY WITH A MEAL Recorded   5  Cranberry CAPS; Therapy: (Ruddy Jackson) to Recorded   6  Glucosamine Chondr Complex CAPS; Therapy: (Recorded:84Fnw9633) to Recorded   7  Lisinopril 10 MG Oral Tablet; TAKE 1 TABLET BY MOUTH EVERY DAY  Requested for:   47Cqf2268; Last Rx:00Ikv3656 Ordered   8  Omeprazole 40 MG Oral Capsule Delayed Release; TAKE 1 CAPSULE Daily; Therapy: 32HSZ1294 to (Evaluate:04Jun2017)  Requested for: 17DPB7566; Last   Rx:06Mar2017 Ordered   9  PreserVision AREDS Oral Capsule; Therapy: (Recorded:03Qoi3215) to Recorded   10  Vitamin D3 35960 UNIT Oral Capsule; Therapy: (Recorded:06Mar2017) to Recorded    Allergies    1  Codeine Sulfate TABS   2   Darvocet-N 50 TABS    Vitals  Signs Temperature: 98 F  Heart Rate: 72  Respiration: 16  Systolic: 138  Diastolic: 68  Height: 5 ft 4 in  Weight: 202 lb   BMI Calculated: 34 67  BSA Calculated: 1 96    Future Appointments    Date/Time Provider Specialty Site   12/18/2017 09:00 AM Del Dinh DO Internal Medicine [address]   12/13/2017 11:30 AM Yajaira Sherwood MD Obstetrics/Gynecology [facility] SURGICAL [facility]     Signatures   Electronically signed by : Gely Daniels, ; Nov 13 2017  1:19PM EST                       (Author)    Electronically signed by :  Julieta Hobbs DO; Nov 13 2017  2:32PM EST                       (Author)

## 2018-01-11 NOTE — RESULT NOTES
Verified Results  * MAMMO SCREENING BILATERAL W CAD 42CUF0805 04:08PM Porfirio Holcomb   PRIOR MAMMO 12/23/14 AT [facility]     Test Name Result Flag Reference   MAMMO SCREENING BILATERAL W CAD (Report)     Patient History:   Patient is postmenopausal    No known family history of cancer  Taking estrogen for 26 years  Patient has never smoked  Patient's BMI is 33 5  Reason for exam: screening (asymptomatic)  Mammo Screening Bilateral W CAD: January 11, 2016 - Check In #:    [de-identified]   Bilateral CC and MLO view(s) were taken  Technologist: HARINDER Dean (HARINDER)(M)   Prior study comparison: December 23, 2014, bilateral [facility] digtl    scrn mammo w/CAD performed at [address]  November 27, 2013, bilateral [facility] digtl scrn mammo w/CAD    performed at [address]  The breast tissue is almost entirely fat  No dominant soft tissue mass or suspicious calcifications are    noted  The skin and nipple contours are within normal limits  No mammographic evidence of malignancy  No significant changes when compared with prior studies  ASSESSMENT: BiRad:1 - Negative     Recommendation:   Routine screening mammogram of both breasts in 1 year  A    reminder letter will be scheduled  Analyzed by CAD     8-10% of cancers will be missed on mammography  Management of a    palpable abnormality must be based on clinical grounds  Patients   will be notified of their results via letter from our facility  Accredited by [employer] of Rad[employer] and FDA       Transcription Location: HARINDER Nunez 98: [id number]     Risk Value(s):   Tyrer-Cuzick 10 Year: 2 448%, Tyrer-Cuzick Lifetime: 2 713%,    Myriad Table: 1 5%, ALAYNA 5 Year: 1 4%, NCI Lifetime: 3 2%

## 2018-01-11 NOTE — RESULT NOTES
Verified Results  * MAMMO SCREENING BILATERAL W CAD [id number] 09:49AM Nicole Espinoza Order Number: [id number]    - Patient Instructions: To schedule this appointment, please contact Central Scheduling at [phone]  Do not wear any perfume, powder, lotion or deodorant on breast or underarm area  Please bring your doctors order, referral (if needed) and insurance information with you on the day of the test  Failure to bring this information may result in this test being rescheduled  Arrive 15 minutes prior to your appointment time to register  On the day of your test, please bring any prior mammogram or breast studies with you that were not performed at a [facility]  Failure to bring prior exams may result in your test needing to be rescheduled  Test Name Result Flag Reference   MAMMO SCREENING BILATERAL W CAD (Report)     Patient History:   Patient is postmenopausal    No known family history of cancer  Taking estrogen for 26 years  Patient has never smoked  Patient's BMI is 33 5  Reason for exam: screening (asymptomatic)  Mammo Screening Bilateral W CAD: January 18, 2017 - Check In #:    [de-identified]   Bilateral CC and MLO view(s) were taken  Technologist: HARINDER Rodriguez (HARINDER)(M)   Prior study comparison: January 11, 2016, mammo screening    bilateral W CAD performed at [address] [facility]  December 23, 2014, bilateral [facility] mammo w/CAD    performed at [address] [facility]  The breast tissue is almost entirely fat  No dominant soft tissue mass or suspicious calcifications are    noted  The skin and nipple contours are within normal limits  No mammographic evidence of malignancy  No significant changes when compared with prior studies  ASSESSMENT: BiRad:1 - Negative     Recommendation:   Routine screening mammogram of both breasts in 1 year  A    reminder letter will be scheduled     Analyzed by CAD     8-10% of cancers will be missed on mammography  Management of a    palpable abnormality must be based on clinical grounds  Patients   will be notified of their results via letter from our facility  Accredited by [employer] and FDA       Transcription Location: HARINDER Nunez 98: [id number]     Risk Value(s):   Giovannier-Cuzick 10 Year: 2 500%, Tyrer-Cuzick Lifetime: 2 500%,    Myriad Table: 1 5%, ALAYNA 5 Year: 1 4%, NCI Lifetime: 3 0%   Signed by:   Irving Horta MD   1/18/17

## 2018-01-11 NOTE — RESULT NOTES
osteoporotic fracture being 9 6%, as calculated by the WHO fracture risk assessment tool (FRAX)  The current NOF guidelines recommend treating patients with FRAX 10 year risk score   of >3% for hip fracture and >20% for major osteoporotic fracture        WHO CLASSIFICATION:   Normal (a T-score of -1 0 or higher)   Low bone mineral density (a T-score of less than -1 0 but higher than -2 5)   Osteoporosis (a T-score of -2 5 or less)   Severe osteoporosis (a T-score of -2 5 or less with a fragility fracture)             Workstation performed: [id number]     Signed by:   Mercy Baldwin MD   1/24/17

## 2018-01-11 NOTE — RESULT NOTES
Verified Results  (1) BASIC METABOLIC PROFILE [id number] 07:42AM Chet Mindy     Test Name Result Flag Reference   GLUCOSE 100 mg/dL H 65-99   Fasting reference interval   UREA NITROGEN (BUN) 18 mg/dL  7-25   CREATININE 1 00 mg/dL H 0 60-0 93   For patients >52years of age, the reference limit  for Creatinine is approximately 13% higher for people  identified as -American  eGFR NON-AFR  AMERICAN 55 mL/min/1 73m2 L > OR = 60   eGFR AFRICAN AMERICAN 64 mL/min/1 73m2  > OR = 60   BUN/CREATININE RATIO 18 (calc)  6-22   SODIUM 140 mmol/L  135-146   POTASSIUM 4 9 mmol/L  3 5-5 3   CHLORIDE 103 mmol/L     CARBON DIOXIDE 25 mmol/L  19-30   CALCIUM 9 3 mg/dL  8 6-10 4     (Q) HEMOGLOBIN A1c [id number] 07:42AM Chet Guillen   REPORT COMMENT:  FASTING:YES     Test Name Result Flag Reference   HEMOGLOBIN A1c 6 3 % of total Hgb H <5 7   According to ADA guidelines, hemoglobin A1c <7 0%  represents optimal control in non-pregnant diabetic  patients  Different metrics may apply to specific  patient populations  Standards of Medical Care in    Diabetes Care  2013;36:s11-s66     For the purpose of screening for the presence of  diabetes  <5 7%       Consistent with the absence of diabetes  5 7-6 4%    Consistent with increased risk for diabetes              (prediabetes)  >or=6 5%    Consistent with diabetes     This assay result is consistent with a higher risk  of diabetes  Currently, no consensus exists for use of hemoglobin  A1c for diagnosis of diabetes for children  Plan  Abnormal glucose measurement    · (1) COMPREHENSIVE METABOLIC PANEL; Status:Active; Requested for:02Oct2016;     Discussion/Summary   Blood sugar is 100;  hemoglobin A1C is at 6 3- in diabetic range;  I want you to limit your carbohydrates, limit sweets, get some exercise daily, repeat the level in 2 months and follow up

## 2018-01-12 VITALS
RESPIRATION RATE: 16 BRPM | SYSTOLIC BLOOD PRESSURE: 130 MMHG | HEART RATE: 80 BPM | WEIGHT: 199.8 LBS | BODY MASS INDEX: 34.11 KG/M2 | HEIGHT: 64 IN | TEMPERATURE: 97.3 F | DIASTOLIC BLOOD PRESSURE: 72 MMHG

## 2018-01-12 NOTE — RESULT NOTES
Verified Results  * MAMMO SCREENING BILATERAL W CAD [id number] 04:08PM Carolina Yanes   PRIOR MAMMO 12/23/14 AT [facility]     Test Name Result Flag Reference   MAMMO SCREENING BILATERAL W CAD (Report)     Patient History:   Patient is postmenopausal    No known family history of cancer  Taking estrogen for 26 years  Patient has never smoked  Patient's BMI is 33 5  Reason for exam: screening (asymptomatic)  Mammo Screening Bilateral W CAD: January 11, 2016 - Check In #:    [de-identified]   Bilateral CC and MLO view(s) were taken  Technologist: HARINDER Stein (R)(M)   Prior study comparison: December 23, 2014, bilateral [facility] digtl    scrn mammo w/CAD performed at [address] [facility],[address]  November 27, 2013, bilateral [facility] digtl scrn mammo w/CAD    performed at [address] [facility]  The breast tissue is almost entirely fat  No dominant soft tissue mass or suspicious calcifications are    noted  The skin and nipple contours are within normal limits  No mammographic evidence of malignancy  No significant changes when compared with prior studies  ASSESSMENT: BiRad:1 - Negative     Recommendation:   Routine screening mammogram of both breasts in 1 year  A    reminder letter will be scheduled  Analyzed by CAD     8-10% of cancers will be missed on mammography  Management of a    palpable abnormality must be based on clinical grounds  Patients   will be notified of their results via letter from our facility  Accredited by [employer] and FDA       Transcription Location: HARINDER Nunez 98: [id number]     Risk Value(s):   Tyrer-Cuzick 10 Year: 2 448%, Tyrer-Cuzick Lifetime: 2 713%,    Myriad Table: 1 5%, ALAYNA 5 Year: 1 4%, NCI Lifetime: 3 2%

## 2018-01-12 NOTE — RESULT NOTES
Verified Results  ECHO STRESS TEST W CONTRAST IF INDICATED [id number] 08:20AM Karley Briones Order Number: [id number]    - Patient Instructions: To schedule this appointment, please contact Central Scheduling at [phone]  Test Name Result Flag Reference   ECHO STRESS TEST W CONTRAST IF INDICATED (Report)     [address] [facility]   [phone]     Exercise Stress Echocardiography     Study date: 22-Aug-2016     Patient: Kaur Pratt   MR number: [id number]   Account number: [de-identified]   : 1940   Age: 77 years   Gender: Female   Study date: 22-Aug-2016   Status: Outpatient   Location: Stress lab   Height: 64 in   Weight: 204 lb   BP: 162// 82 mmHg     Indications: Detection of coronary artery disease  Assessment of left   ventricular function  Diagnosis: I10  - Essential (primary) hypertension     Sonographer: FAMILIA Cedeño   Primary Physician: Lavonia Lesch, DO   Group: [facility]   Cardiology Fellow: Rahat Razo MD   Interpreting Physician: Amor Wiggins MD     IMPRESSIONS:   Normal study after maximal exercise without reproduction of symptoms  SUMMARY     STRESS RESULTS:   Duration of exercise was 4 min and 46 sec  Maximal work rate was 6 7 METs  Maximal heart rate during stress was 148 bpm ( 102 % of maximal predicted heart   rate)  Target heart rate was achieved  The heart rate response to stress was exaggerated  There was resting hypertension with a hypertensive blood pressure response to   stress  There was no chest pain during stress  ECG CONCLUSIONS:   The stress ECG was negative for ischemia  BASELINE:   Estimated left ventricular ejection fraction was 60 %   PEAK STRESS:   There were no regional wall motion abnormalities  HISTORY: The patient is a 77year old  female  Chest pain status: no   chest pain   Coronary artery disease risk factors: dyslipidemia and   hypertension  Cardiovascular history: none significant  Co-morbidity: obesity  Medications: aspirin, a lipid lowering agent, and an antihypertensive agent  REST ECG: Normal sinus rhythm with significant baseline artifact  Q waves were   present in leads II, III, and aVF  PROCEDURE: The study was performed in the stress lab  The procedure was   explained to the patient and informed consent was obtained  Treadmill exercise   testing was performed, using the Yoni protocol  Stress and rest   echocardiographic evaluation with 2D imaging, spectral Doppler, and color   Doppler was performed from multiple acoustic windows for evaluation of   ventricular function  YONI PROTOCOL:   HR bpm SBP mmHg DBP mmHg Symptoms   Baseline 78 162 82 none   Stage 1 131 162 82 --   Stage 2 148 170 90 --   Recovery 1 83 150 80 --     STRESS RESULTS: Duration of exercise was 4 min and 46 sec  The patient   exercised to protocol stage 2  Maximal work rate was 6 7 METs  Maximal heart   rate during stress was 148 bpm ( 102 % of maximal predicted heart rate)  Target   heart rate was achieved  The heart rate response to stress was exaggerated  Maximal systolic blood pressure during stress was 170 mmHg  There was resting   hypertension with a hypertensive blood pressure response to stress  The   rate-pressure product for the peak heart rate and blood pressure was 77314  There was no chest pain during stress  The stress test was terminated due to   achievement of target heart rate  After the procedure, the patient was   discharged to home  ECG CONCLUSIONS: The stress ECG was negative for ischemia  Arrhythmia during   stress: isolated atrial premature beats  STRESS 2D ECHO RESULTS:     BASELINE: Left ventricular size was normal  Overall left ventricular systolic   function was normal  Estimated left ventricular ejection fraction was 60 %        PEAK STRESS: There were no regional wall motion abnormalities  There was an   appropriate reduction in left ventricular size  There was an appropriate   augmentation in LV function  SYSTEM MEASUREMENT TABLES     PW   AVC: 421 44 ms   LVOT Env  Ti: 331 11 ms   LVOT VTI: 24 5 cm   LVOT Vmax: 0 98 m/s   LVOT Vmean: 0 74 m/s   LVOT maxPG: 3 87 mmHg   LVOT meanP 37 mmHg     Prepared and electronically signed by     Berna Doran MD   Signed [id number] 13:43:11

## 2018-01-13 VITALS
HEIGHT: 64 IN | RESPIRATION RATE: 16 BRPM | SYSTOLIC BLOOD PRESSURE: 122 MMHG | TEMPERATURE: 98 F | HEART RATE: 72 BPM | WEIGHT: 202 LBS | DIASTOLIC BLOOD PRESSURE: 68 MMHG | BODY MASS INDEX: 34.49 KG/M2

## 2018-01-13 VITALS
SYSTOLIC BLOOD PRESSURE: 134 MMHG | HEART RATE: 60 BPM | TEMPERATURE: 97.7 F | RESPIRATION RATE: 18 BRPM | BODY MASS INDEX: 34.08 KG/M2 | WEIGHT: 199.6 LBS | HEIGHT: 64 IN | DIASTOLIC BLOOD PRESSURE: 64 MMHG

## 2018-01-14 VITALS
TEMPERATURE: 97.4 F | HEIGHT: 64 IN | SYSTOLIC BLOOD PRESSURE: 130 MMHG | WEIGHT: 200 LBS | RESPIRATION RATE: 16 BRPM | DIASTOLIC BLOOD PRESSURE: 64 MMHG | BODY MASS INDEX: 34.15 KG/M2 | HEART RATE: 74 BPM

## 2018-01-15 NOTE — RESULT NOTES
Verified Results  * US ABDOMEN COMPLETE [id number] 08:12AM Lucie Scherer Order Number: [id number]    Order Number: [id number]     Test Name Result Flag Reference   US ABDOMEN COMPLETE (Report)     ABDOMEN ULTRASOUND, COMPLETE     INDICATION: Upper abdominal pain  COMPARISON: None  TECHNIQUE:  Real-time ultrasound of the abdomen was performed with a curvilinear transducer with both volumetric sweeps and still imaging techniques  FINDINGS:     PANCREAS: Visualized portions of the pancreas are within normal limits  AORTA AND IVC: Visualized portions are normal for patient age  LIVER:   Size: Within normal range  The liver measures 15 2 cm in the midclavicular line  Contour: Surface contour is smooth  Parenchyma: There is moderate diffuse increased echogenicity with smooth echotexture and acoustic beam attenuation  Most consistent with moderate hepatic steatosis  No evidence of suspicious mass  The main portal vein is patent and hepatopetal       BILIARY:   No wall thickening or pericholecystic fluid  Gallbladder is completely full of stones  Sonographic Lori Couch sign is negative  No intrahepatic biliary dilatation  CBD measures 7 mm  No choledocholithiasis  KIDNEY:    Right kidney measures 11 6 x 4 5 cm  Within normal limits  Left kidney measures 10 0 x 5 2 cm  Within normal limits  SPLEEN:    Measures 11 1 cm  Within normal limits  ASCITES: None  IMPRESSION:     Gallbladder full of stones  Negative Hewitt's sign  Fatty liver  ##sigslh##sigslh       Workstation performed: [id number]     Signed by:    Belkys Key MD   7/11/16       Plan  Gallbladder calculus without cholecystitis and no obstruction    · 2 - Jessica HODGSON, Matt Smyth  (General Surgery) Physician Referral  Consult  Status: Hold For -  Scheduling  Requested for: 66GXR9257  Care Summary provided  : Yes

## 2018-01-15 NOTE — PROGRESS NOTES
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)   5  Essential hypertension (401 9) (I10)   6  Gallbladder calculus without cholecystitis and no obstruction (574 20) (K80 20)   7  GERD without esophagitis (530 81) (K21 9)   8  Greater trochanteric bursitis, left (726 5) (M70 62)   9  Hyperglycemia (790 29) (R73 9)   10  Hyperlipidemia (272 4) (E78 5)   11  Osteopenia (733 90) (M85 80)   12  Polyp of sigmoid colon (211 3) (D12 5)    Current Meds   1  Aspirin 81 MG Oral Tablet Chewable; Therapy: (Recorded:11Apr2013) to Recorded   2  Atorvastatin Calcium 40 MG Oral Tablet; 1/2 tab on a Monday  Requested for:   02BEE2426; Last Rx:10Xrf7004 Ordered   3  B Complex 50 Oral Tablet Recorded   4  CoQ10 CAPS; TAKE 1 CAPSULE DAILY WITH A MEAL Recorded   5  Estradiol 0 5 MG Oral Tablet; TAKE 1 TABLET DAILY; Therapy: 50HSU0288 to (Panfilo Mckoy)  Requested for: 08Apr2016; Last   Rx:08Apr2016 Ordered   6  Glucosamine Chondr Complex CAPS; Therapy: (Recorded:68Ecg8043) to Recorded   7  Lisinopril 5 MG Oral Tablet; TAKE 1 TABLET TWICE DAILY  Requested for: 62GES5997;   Last Rx:32Zfs9019 Ordered   8  Omeprazole 40 MG Oral Capsule Delayed Release; TAKE 1 CAPSULE Daily; Therapy: 94HJF1760 to (Shawn Ochoa)  Requested for: 30Jun2016; Last   Rx:30Jun2016 Ordered    Allergies    1  Codeine Sulfate TABS   2   Darvocet-N 50 TABS    Vitals  Signs    Systolic: 930  Diastolic: 62  Heart Rate: 80  Respiration: 16  Temperature: 97 7 F  Height: 5 ft 4 in  Weight: 202 lb 2 08 oz  BMI Calculated: 34 7  BSA Calculated: 1 97    Plan   EKG/ECG- POC; Status:Resulted - Requires Verification;   Done: 02VDG5961 12:00AM  TPQ:30YOK5049;EBENXIU; For:Pre-operative clearance; Ordered By:Tegan Cowan;       Future Appointments    Date/Time Provider Specialty Site   11/15/2016 10:00 AM Garcia Howell,  Internal Medicine [address] [facility]   12/07/2016 11:30 AM Emily Nevarez MD Obstetrics/Gynecology [facility]     Signatures   Electronically signed by : Daniel Summers, ; Aug  9 2016  1:39PM EST                       (Author)    Electronically signed by : CHECO Fairbanks ; Aug  9 2016  6:17PM EST                       (Author)

## 2018-01-18 NOTE — PROGRESS NOTES
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)  4  Essential hypertension (401 9) (I10)  5  GERD without esophagitis (530 81) (K21 9)  6  Greater trochanteric bursitis, left (726 5) (M70 62)  7  Hyperglycemia (790 29) (R73 9)  8  Hyperlipidemia (272 4) (E78 5)  9  Osteopenia (733 90) (M85 80)  10  Polyp of sigmoid colon (211 3) (D12 5)  11  Urination frequency (788 41) (R35 0)    Current Meds  1  Aspirin 81 MG Oral Tablet Chewable; Therapy: (Recorded:14Ewc1194) to Recorded  2  Atorvastatin Calcium 40 MG Oral Tablet; 1/2 tab on a Monday  Requested for:   85OZK3125; Last Rx:15Pky7055 Ordered  3  B Complex 50 Oral Tablet Recorded  4  CoQ10 CAPS; TAKE 1 CAPSULE DAILY WITH A MEAL Recorded  5  Estradiol 0 5 MG Oral Tablet; TAKE 1 TABLET DAILY; Therapy: 88XRQ7810 to ([facility])  Requested for: 08Apr2016; Last   Rx:29Dfd2831 Ordered  6  Glucosamine Chondr Complex CAPS; Therapy: (Recorded:46Gzo3375) to Recorded  7  Lisinopril 5 MG Oral Tablet; TAKE 1 TABLET TWICE DAILY  Requested for: 88HQQ8847;   Last Rx:62Fzd5865 Ordered  8  Omeprazole 40 MG Oral Capsule Delayed Release; TAKE 1 CAPSULE Daily; Therapy: 35XDH7817 to (Lorayne Manual)  Requested for: 30Jun2016; Last   Rx:30Jun2016 Ordered    Allergies   1  Codeine Sulfate TABS  2   Darvocet-N 50 TABS    Vitals  Signs    Systolic: 320  Diastolic: 68  Heart Rate: 80  Respiration: 16  Temperature: 97 4 F  Height: 5 ft 4 in  Weight: 201 lb 4 00 oz  BMI Calculated: 34 54  BSA Calculated: 1 97    Results/Data  Urine Dip Non-Automated- POC 04VFU1985 10:12AM Elfrieda Other     Test Name Result Flag Reference   Color Yellow     Clarity Hazy     Leukocytes 70+     Nitrite -     Blood 50     Bilirubin -     Urobilinogen 0 2     Protein 100++     Ph 6 0     Specific Gravity 1 000     Ketone -     Glucose - Plan  Urination frequency    · (Q) CULTURE, VIRAL, BODY FLUIDS, TISSUES; Status:Active; Requested  for:01Nov2016 10:14AM;   Client Bill : No   · Urine Dip Non-Automated- POC; Status:Resulted - Requires Verification;   Done:  72SFW9657 10:12AM    Future Appointments    Date/Time Provider Specialty Site   11/15/2016 10:00 AM Brionna Luis, DO Internal Medicine [address]   12/07/2016 11:30 AM Dione Ramsey MD Obstetrics/Gynecology [facility]     Message   Recorded as Task   Date: 11/01/2016 08:27 AM, Created By: Nick Sheets   Task Name: Medical Complaint Callback   Assigned To: Loetta Claude   Regarding Patient: Niru George, Status: Active   CommentOlive Tallapoosa - 01 Nov 2016 8:27 AM     TASK CREATED  Patient is coming at 10am for nurse visit urine dip  Xs 4 days she has cloudy urine,freaquency and pressure  Wesley Basstha - 01 Nov 2016 10:18 AM     TASK EDITED    Patient arrived for urine dip and reults reviewed by Dr Wally Tidwell  Patient informed, per Dr Wally Tidwell, specimen will be sent out for culture and will not treat until we receive results       Signatures   Electronically signed by : Mohamud Stanley, ; Nov 1 2016 10:19AM EST                       (Author)    Electronically signed by : CHECO Velez ; Nov 1 2016  3:38PM EST                       (Author)

## 2018-01-18 NOTE — RESULT NOTES
Verified Results  * MAMMO SCREENING BILATERAL W CAD [id number] 04:08PM Alicia Esparza   PRIOR MAMMO 12/23/14 AT [facility]     Test Name Result Flag Reference   MAMMO SCREENING BILATERAL W CAD (Report)     Patient History:   Patient is postmenopausal    No known family history of cancer  Taking estrogen for 26 years  Patient has never smoked  Patient's BMI is 33 5  Reason for exam: screening (asymptomatic)  Mammo Screening Bilateral W CAD: January 11, 2016 - Check In #:    [de-identified]   Bilateral CC and MLO view(s) were taken  Technologist: HARINDER Banda (HARINDER)(M)   Prior study comparison: December 23, 2014, bilateral [facility] digtl    scrn mammo w/CAD performed at [address] [facility]  November 27, 2013, bilateral [facility] digtl scrn mammo w/CAD    performed at [address] [facility]  The breast tissue is almost entirely fat  No dominant soft tissue mass or suspicious calcifications are    noted  The skin and nipple contours are within normal limits  No mammographic evidence of malignancy  No significant changes when compared with prior studies  ASSESSMENT: BiRad:1 - Negative     Recommendation:   Routine screening mammogram of both breasts in 1 year  A    reminder letter will be scheduled  Analyzed by CAD     8-10% of cancers will be missed on mammography  Management of a    palpable abnormality must be based on clinical grounds  Patients   will be notified of their results via letter from our facility  Accredited by [employer] and FDA       Transcription Location: HARINDER Nunez 98: [id number]     Risk Value(s):   Tyrer-Cuzick 10 Year: 2 448%, Tyrer-Cuzick Lifetime: 2 713%,    Myriad Table: 1 5%, ALAYNA 5 Year: 1 4%, NCI Lifetime: 3 2%

## 2018-01-18 NOTE — RESULT NOTES
Discussion/Summary   Labs are very good!!!!     Verified Results  (1) COMPREHENSIVE METABOLIC PANEL 14EDO2857 90:12CE Bernadette Guevara     Test Name Result Flag Reference   GLUCOSE 100 mg/dL H 65-99   Fasting reference interval     For someone without known diabetes, a glucose value  between 100 and 125 mg/dL is consistent with  prediabetes and should be confirmed with a  follow-up test    UREA NITROGEN (BUN) 20 mg/dL  7-25   CREATININE 0 92 mg/dL  0 60-0 93   For patients >52years of age, the reference limit  for Creatinine is approximately 13% higher for people  identified as -American  eGFR NON-AFR  AMERICAN 60 mL/min/1 73m2  > OR = 60   eGFR AFRICAN AMERICAN 70 mL/min/1 73m2  > OR = 60   BUN/CREATININE RATIO   6-18   NOT APPLICABLE (calc)   SODIUM 142 mmol/L  135-146   POTASSIUM 5 3 mmol/L  3 5-5 3   CHLORIDE 106 mmol/L     CARBON DIOXIDE 28 mmol/L  20-31   CALCIUM 9 5 mg/dL  8 6-10 4   PROTEIN, TOTAL 6 5 g/dL  6 1-8 1   ALBUMIN 4 0 g/dL  3 6-5 1   GLOBULIN 2 5 g/dL (calc)  1 9-3 7   ALBUMIN/GLOBULIN RATIO 1 6 (calc)  1 0-2 5   BILIRUBIN, TOTAL 0 4 mg/dL  0 2-1 2   ALKALINE PHOSPHATASE 61 U/L     AST 24 U/L  10-35   ALT 26 U/L  6-29     (1) LIPID PANEL, FASTING 82MMW5901 07:18AM Maedineshphyllis Mannzeina     Test Name Result Flag Reference   CHOLESTEROL, TOTAL 179 mg/dL  125-200   HDL CHOLESTEROL 58 mg/dL  > OR = 46   TRIGLICERIDES 870 mg/dL H <150   LDL-CHOLESTEROL 82 mg/dL (calc)  <130   Desirable range <100 mg/dL for patients with CHD or  diabetes and <70 mg/dL for diabetic patients with  known heart disease  CHOL/HDLC RATIO 3 1 (calc)  < OR = 5 0   NON HDL CHOLESTEROL 121 mg/dL (calc)     Target for non-HDL cholesterol is 30 mg/dL higher than   LDL cholesterol target       (Q) TSH, 3RD GENERATION 52PEP9352 07:18AM Bernadette Guevara     Test Name Result Flag Reference   TSH 3 14 mIU/L  0 40-4 50     (Q) HEMOGLOBIN A1c 17RPQ6033 07:18AM Bernadette Guevara   REPORT COMMENT:  FASTING:YES     Test Name Result Flag Reference   HEMOGLOBIN A1c 5 9 % of total Hgb H <5 7   For someone without known diabetes, a hemoglobin   A1c value between 5 7% and 6 4% is consistent with  prediabetes and should be confirmed with a   follow-up test      For someone with known diabetes, a value <7%  indicates that their diabetes is well controlled  A1c  targets should be individualized based on duration of  diabetes, age, comorbid conditions, and other  considerations  This assay result is consistent with an increased risk  of diabetes  Currently, no consensus exists regarding use of  hemoglobin A1c for diagnosis of diabetes for children

## 2018-01-19 DIAGNOSIS — Z12.31 ENCOUNTER FOR SCREENING MAMMOGRAM FOR MALIGNANT NEOPLASM OF BREAST: ICD-10-CM

## 2018-01-23 VITALS
HEIGHT: 64 IN | DIASTOLIC BLOOD PRESSURE: 68 MMHG | WEIGHT: 199 LBS | HEART RATE: 68 BPM | TEMPERATURE: 97.1 F | SYSTOLIC BLOOD PRESSURE: 126 MMHG | RESPIRATION RATE: 16 BRPM | BODY MASS INDEX: 33.97 KG/M2

## 2018-01-23 NOTE — RESULT NOTES
Discussion/Summary   LABS ARE VERY GOOD!! Verified Results  (1) COMPREHENSIVE METABOLIC PANEL [id number] 88:98ET Rufino Roa     Test Name Result Flag Reference   GLUCOSE 96 mg/dL  65-99   Fasting reference interval   UREA NITROGEN (BUN) 19 mg/dL  7-25   CREATININE 0 90 mg/dL  0 60-0 93   For patients >52years of age, the reference limit  for Creatinine is approximately 13% higher for people  identified as -American  eGFR NON-AFR  AMERICAN 62 mL/min/1 73m2  > OR = 60   eGFR AFRICAN AMERICAN 72 mL/min/1 73m2  > OR = 60   BUN/CREATININE RATIO   1-59   NOT APPLICABLE (calc)   SODIUM 139 mmol/L  135-146   POTASSIUM 4 5 mmol/L  3 5-5 3   CHLORIDE 105 mmol/L     CARBON DIOXIDE 27 mmol/L  20-31   CALCIUM 9 8 mg/dL  8 6-10 4   PROTEIN, TOTAL 6 8 g/dL  6 1-8 1   ALBUMIN 4 3 g/dL  3 6-5 1   GLOBULIN 2 5 g/dL (calc)  1 9-3 7   ALBUMIN/GLOBULIN RATIO 1 7 (calc)  1 0-2 5   BILIRUBIN, TOTAL 0 5 mg/dL  0 2-1 2   ALKALINE PHOSPHATASE 76 U/L     AST 24 U/L  10-35   ALT 30 U/L H 6-29     (1) LIPID PANEL, FASTING [id number] 10:53AM Rufino Roa     Test Name Result Flag Reference   CHOLESTEROL, TOTAL 201 mg/dL H <200   HDL CHOLESTEROL 57 mg/dL  >25   TRIGLICERIDES 640 mg/dL  <150   LDL-CHOLESTEROL 120 mg/dL (calc) H    Reference range: <100     Desirable range <100 mg/dL for patients with CHD or  diabetes and <70 mg/dL for diabetic patients with  known heart disease  LDL-C is now calculated using the Chapo-Trevizo   calculation, which is a validated novel method providing   better accuracy than the Friedewald equation in the   estimation of LDL-C  Justo Dickerson  [facility] Darlene  [phone]([phone]   (http://[employer]/faq/KAU192)   CHOL/HDLC RATIO 3 5 (calc)  <5 0   NON HDL CHOLESTEROL 144 mg/dL (calc) H <130   For patients with diabetes plus 1 major ASCVD risk   factor, treating to a non-HDL-C goal of <100 mg/dL   (LDL-C of <70 mg/dL) is considered a therapeutic option  *(Q) VITAMIN D, 25-HYDROXY, LC/MS/MS [id number] 10:53AM Octavio Howell   REPORT COMMENT:  FASTING:YES     Test Name Result Flag Reference   VITAMIN D, 25-OH, TOTAL 39 ng/mL     Vitamin D Status         25-OH Vitamin D:     Deficiency:                    <20 ng/mL  Insufficiency:             20 - 29 ng/mL  Optimal:                 > or = 30 ng/mL     For 25-OH Vitamin D testing on patients on   D2-supplementation and patients for whom quantitation   of D2 and D3 fractions is required, the QuestAssureD(TM)  25-OH VIT D, (D2,D3), LC/MS/MS is recommended: order   code 06473 (patients >2yrs)  For more information on this test, go to:  http://[employer]/faq/HDA373  (This link is being provided for   informational/educational purposes only )

## 2018-01-23 NOTE — PROGRESS NOTES
Assessment    1  Medicare annual wellness visit, subsequent (V70 0) (Z00 00)   2  Never smoked cigarettes (V49 89) (Z78 9)   3  Encounter for screening mammogram for breast cancer (V76 12) (Z12 31)   4  Trigger finger of right thumb (727 03) (M65 311)   5  Osteoarthritis of right thumb (715 34) (M18 11)    Plan  Encounter for screening mammogram for breast cancer    · * MAMMO SCREENING BILATERAL W CAD; Status:Hold For - Scheduling; Requested  for:19Jan2018; Discussion/Summary    LABS STABLE  PT TO SEE DR BAUER- TOMER FIT TEST   INJECTION RIGHT THUMB X 2- IF NO BETTER REFER TO ORTHO FOR SURGERY FOR TRIGGER THUMB RIGHT;   ICE OT AREA  Impression: Subsequent Annual Wellness Visit, with preventive exam as well as age and risk appropriate counseling completed  Cardiovascular screening and counseling: counseling was given on maintaining a healthy diet, counseling was given on maintaining a healthy weight and Dx - V81 2 Screen for CV Disorder  Diabetes screening and counseling: screening is current and Dx - V77 1 Screen for DM  Colorectal cancer screening and counseling: screening is current and Dx - V76 51 Screen for CRC  Abdominal aortic aneurysm screening and counseling: Dx - V81 2 Screen for CV Disorder  Glaucoma screening and counseling: Dx - V80 1 Screen for Glaucoma  Hepatitis C Screening:  The patient declines Hepatitis C screening  Immunizations: influenza vaccine is due today, the lifetime pneumococcal vaccine has been completed, hepatitis B vaccination series is not indicated at this time due to the patient's low risk of aysha the disease, Zostavax vaccination up to date and Td vaccination up to date  Advance Directive Planning: complete and up to date  Patient Discussion: plan discussed with the patient, follow-up visit needed in one year        Chief Complaint  PATIENT HERE FOR AMW  SHE FEELS WELL       History of Present Illness  HPI: PATIENT HERE FOR FOLLOW UP;   SHE HAS RIGHT TRIGGER THUMB;   HER BP IS STABLE;   SHE IS ON ASA AND A STATIN  HER BP IS STABLE OFF OF MEDS    Welcome to Medicare and Wellness Visits: The patient is being seen for the subsequent annual wellness visit  Medicare Screening and Risk Factors   Hospitalizations: no previous hospitalizations  Medicare Screening Tests Risk Questions   Abdominal aortic aneurysm risk assessment: none indicated  Osteoporosis risk assessment:  and female gender  HIV risk assessment: none indicated  Drug and Alcohol Use: The patient has never smoked cigarettes  The patient reports never drinking alcohol  Diet and Physical Activity: Current diet includes well balanced meals  She exercises daily  Exercise: walking  Mood Disorder and Cognitive Impairment Screening: She denies feeling down, depressed, or hopeless over the past two weeks  She denies feeling little interest or pleasure in doing things over the past two weeks  Cognitive impairment screening: denies difficulty learning/retaining new information, denies difficulty handling complex tasks, denies difficulty with reasoning, denies difficulty with spatial ability and orientation, denies difficulty with language and denies difficulty with behavior  Advance Directives: Advance directives: living will, durable power of  for health care directives and advance directives  I agree with the patient's decisions  Co-Managers and Medical Equipment/Suppliers: See Patient Care Team   Reviewed Updated [facility]:   Last Medicare Wellness Visit Information was reviewed, patient interviewed, no change since last AWV  Preventive Quality Program 65 and Older: Falls Risk: The patient fell 0 times in the past 12 months  The patient is currently asymptomatic Symptoms Include: no confusion, no lightheadedness, no vertigo and no dizziness  Associated symptoms:  No associated symptoms are reported  The patient currently has no urinary incontinence symptoms   Urinary Incontinence Symptoms includes: no urinary incontinence, no incomplete bladder emptying and no urinary frequency    [facility]      Patient Care Team    Care Team Member Role Specialty Office Number   Jane Camelia HODGSON  Obstetrics/Gynecology [phone]   Jayne Smallwood MD  Ophthalmology [phone]     Review of Systems    Constitutional: negative  Head and Face: no facial pain and no facial pressure  Eyes: WEARS GLASSES, but negative, no eye pain, eyes not red, no watery discharge from the eyes and no purulent discharge from the eyes  ENT: negative, no earache, no hearing loss, no nasal congestion, no nasal discharge, no sore throat, no scratchy throat and no hoarseness  Cardiovascular: negative, the heart is not racing and no lightheadedness  Respiratory: negative, no shortness of breath, no wheezing, not sleeping upright or with extra pillows, no cough, no dry cough, no productive cough, no clear sputum and no colored sputum  Gastrointestinal: negative, no abdominal pain, no abdominal bloating, no abdominal cramps, no nausea, no vomiting, no diarrhea, no constipation and no bright red blood per rectum  Genitourinary: negative, no dysuria, no urinary frequency, no urinary urgency, no flank pain, no suprapubic pain, no pelvic pain and no foul-smelling vaginal discharge  Musculoskeletal: RIGHT THUMB TRIGGER FINGER;, but negative, as noted in HPI, no diffuse joint pain, no generalized muscle aches, no back pain, no joint swelling, no joint stiffness, no pain in other joints and no limping  Integumentary and Breasts: negative, no rashes, no skin lesions, no skin wound and no itching  Neurological: negative, no headache, no confusion, no dizziness, no leg numbness, no leg weakness, no tingling and no difficulty walking  Psychiatric: negative, no insomnia, no anxiety and no depression  Endocrine: negative and no muscle weakness     Hematologic and Lymphatic: a tendency for easy bruising, but negative, as noted in HPI, no swollen glands, no swollen glands in the neck and no tendency for easy bleeding  Over the past 2 weeks, how often have you been bothered by the following problems? 1 ) Little interest or pleasure in doing things? Not at all    2 ) Feeling down, depressed or hopeless? Not at all    3 ) Trouble falling asleep or sleeping too much? Not at all    4 ) Feeling tired or having little energy? Not at all    5 ) Poor appetite or overeating? Not at all    6 ) Feeling bad about yourself, or that you are a failure, or have let yourself or your family down? Not at all    7 ) Trouble concentrating on things, such as reading a newspaper or watching television? Not at all    8 ) Moving or speaking so slowly that other people could have noticed, or the opposite, moving or speaking faster than usual? Not at all  How difficult have these problems made it for you to do your work, take care of things at home, or get along with people? 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)   7  Hyperglycemia (790 29) (R73 9)   8  Hyperlipidemia (272 4) (E78 5)   9  Medicare annual wellness visit, subsequent (V70 0) (Z00 00)   10  Osteopenia (733 90) (M85 80)   11  Post-menopausal atrophic vaginitis (627 3) (N95 2)   12  Primary open-angle glaucoma (365 11,365 70) (H40 1190)   13  Ventral hernia without obstruction or gangrene (553 20) (K43 9)    Past Medical History    · History of Screening for genitourinary condition (V81 6) (Z13 89)   · History of Screening for neurological condition (V80 09) (Z13 89)    The active problems and past medical history were reviewed and updated today  Surgical History    · History of Hemorrhoidectomy   · History of Hysterectomy   · History of Nose Surgery   · History of Rotator Cuff Repair    The surgical history was reviewed and updated today  Family History  Mother    · No pertinent family history  Father    · No pertinent family history    The family history was reviewed and updated today  Social History    · Being A Social Drinker   · Caffeine use (V49 89) (F15 90)   · Denied: History of Drug Use   · Moderate Exercising Less Than 3 Times A Week   · Never A Smoker   · Never smoked cigarettes (V49 89) (Z78 9)   · Two children  The social history was reviewed and updated today  Current Meds   1  Aspirin 81 MG Oral Tablet Chewable; Therapy: (Recorded:11Apr2013) to Recorded   2  Atorvastatin Calcium 40 MG Oral Tablet; 1/2 tab on a Monday  Requested for:   24Mqs8845; Last Rx:[id number] Ordered   3  B Complex 50 Oral Tablet Recorded   4  CoQ10 CAPS; TAKE 1 CAPSULE DAILY WITH A MEAL Recorded   5  Cranberry CAPS; Therapy: (Loly Purinegro) to Recorded   6  Glucosamine Chondr Complex CAPS; Therapy: (Recorded:11Apr2013) to Recorded   7  Omeprazole 40 MG Oral Capsule Delayed Release; TAKE 1 CAPSULE Daily PRN; Therapy: 64VTK4034 to (Evaluate:18Mar2018)  Requested for: 84GFS5477 Recorded   8  PreserVision AREDS Oral Capsule; Therapy: (Loly Braden) to Recorded   9  Vitamin D3 78932 UNIT Oral Capsule; Therapy: (Recorded:06Mar2017) to Recorded    The medication list was reviewed and updated today  Allergies    1  Codeine Sulfate TABS   2  Darvocet-N 50 TABS    Immunizations   ** Printed in Appendix #1 below  Vitals  Signs    Temperature: 97 1 F  Heart Rate: 68  Respiration: 16  Systolic: 198  Diastolic: 68  Height: 5 ft 4 in  Weight: 199 lb   BMI Calculated: 34 16  BSA Calculated: 1 95    Physical Exam    Constitutional   General appearance: No acute distress, well appearing and well nourished      Eyes   Conjunctiva and lids: No swelling, erythema or discharge  Pupils and irises: Equal, round, reactive to light  Ophthalmoscopic examination: Normal fundi and optic discs  Ears, Nose, Mouth, and Throat   External inspection of ears and nose: Normal     Otoscopic examination: Tympanic membranes translucent with normal light reflex  Canals patent without erythema  Hearing: Normal     Nasal mucosa, septum, and turbinates: Normal without edema or erythema  Lips, teeth, and gums: Normal, good dentition  Oropharynx: Normal with no erythema, edema, exudate or lesions  Inspection of the oropharynx showed visible hard and soft palate, upper portion of tonsils and uvula (Mallampati class 2)  Oral mucosa was moist, but was normal  The palate examination showed no abnormalities  The tongue was normal  The tonsils were normal    Neck   Neck: Supple, symmetric, trachea midline, no masses  Thyroid: Normal, no thyromegaly  Pulmonary   Respiratory effort: No increased work of breathing or signs of respiratory distress  Percussion of chest: Normal     Auscultation of lungs: Clear to auscultation  Cardiovascular   Palpation of heart: Normal PMI, no thrills  Auscultation of heart: Normal rate and rhythm, normal S1 and S2, no murmurs  The heart rate was normal  The rhythm was regular  Heart sounds: normal S1, normal S2, no S3 and no S4  no murmurs were heard  Carotid pulses: 2+ bilaterally  Abdominal aorta: Normal     Pedal pulses: 2+ bilaterally  Examination of extremities for edema and/or varicosities: Normal     Abdomen   Abdomen: Non-tender, no masses  Bowel sounds were normal  The abdomen was soft and nontender  no masses palpated  Liver and spleen: No hepatomegaly or splenomegaly  Lymphatic   Palpation of lymph nodes in neck: No lymphadenopathy      Musculoskeletal   Gait and station: Normal     Digits and nails: Abnormal   TRIGGER THUMB RIGHT HAND;    Range of motion: Normal     Muscle strength/tone: Normal  Skin   Skin and subcutaneous tissue: Normal without rashes or lesions  Palpation of skin and subcutaneous tissue: Normal turgor  Neurologic   Cranial nerves: Cranial nerves II-XII intact  Reflexes: 2+ and symmetric  Sensation: No sensory loss  Psychiatric   Judgment and insight: Normal     Orientation to person, place, and time: Normal     Mood and affect: Normal        Results/Data  PHQ-2 Adult Depression Screening 53Kle3544 09:07AM User, Ahs     Test Name Result Flag Reference   PHQ-2 Adult Depression Score 0     Over the last two weeks, how often have you been bothered by any of the following problems? Little interest or pleasure in doing things: Not at all - 0  Feeling down, depressed, or hopeless: Not at all - 0   PHQ-2 Adult Depression Screening Negative         Procedure    Procedure: Injection of the THUMB PIP JOINT AND DIP JOINT joint on the right   Indication: inflammation, osteoarthritis and diagnostic  Risk, benefits, bleeding risk and infection risk were discussed with the patient  Verbal consent was obtained prior to the procedure  Preparation: alcohol was used to prep the area and betadine was used to prep the area  ethyl chloride spray was used as a topical anesthetic  Procedure Note: A 25-gauge was used to inject 0 2 mL 0 25% Bupivacaine, 10 MG TOTAL mL 40mg/mL methylprednisolone and INJECTED DIP AND PIP JOINT DUE TO PAIN AND DISTAL TRIGGER FINGER;  A bandage was applied  Post-Procedure: the patient tolerated the procedure well  Complications: None  Health Management  History of Encounter for preventive health examination   Medicare Annual Wellness Visit; every 1 year; Next Due: 70ULZ2878; Overdue  History of Other screening mammogram   Digital Bilateral Screening Mammogram With CAD; every 1 year; Last 69JWR1401; Next  Due: 20VCX3735; Overdue  History of Screen for colon cancer   COLONOSCOPY; every 5 years; Last 85AAN6872; Next Due: 00LGR2299;  Overdue    Future Appointments    Date/Time Provider Specialty Site   2018 02:30 PM Aliza Madrid MD Obstetrics/Gynecology [facility]     Signatures   Electronically signed by :  Julieta [address] [place] [address]; Dec 18 2017  9:58AM EST                       (Author)    Appendix #1     Patient: Tasha Nascimento ; : 1940; MRN: [id number]      1 2 3 4 5 6    Influenza  21-Oct-2011  (71y) 05-Oct-2012  (72y) 14-Oct-2013  (73y) 16-Oct-2014  (74y) 22-Oct-2015  (75y) 15-Nov-2016  (76y)    PCV  2015  (74y)         PPSV  05-Oct-2012  (72y)         Td/DT  2006  (66y)         Varicella  2012

## 2018-03-14 ENCOUNTER — OFFICE VISIT (OUTPATIENT)
Dept: OBGYN CLINIC | Facility: MEDICAL CENTER | Age: 78
End: 2018-03-14
Payer: COMMERCIAL

## 2018-03-14 VITALS
SYSTOLIC BLOOD PRESSURE: 148 MMHG | WEIGHT: 205 LBS | DIASTOLIC BLOOD PRESSURE: 78 MMHG | HEIGHT: 64 IN | BODY MASS INDEX: 35 KG/M2

## 2018-03-14 DIAGNOSIS — Z12.31 ENCOUNTER FOR SCREENING MAMMOGRAM FOR MALIGNANT NEOPLASM OF BREAST: ICD-10-CM

## 2018-03-14 DIAGNOSIS — Z01.419 ENCOUNTER FOR GYNECOLOGICAL EXAMINATION (GENERAL) (ROUTINE) WITHOUT ABNORMAL FINDINGS: Primary | ICD-10-CM

## 2018-03-14 PROCEDURE — G0145 SCR C/V CYTO,THINLAYER,RESCR: HCPCS | Performed by: OBSTETRICS & GYNECOLOGY

## 2018-03-14 PROCEDURE — S0612 ANNUAL GYNECOLOGICAL EXAMINA: HCPCS | Performed by: OBSTETRICS & GYNECOLOGY

## 2018-03-14 NOTE — PATIENT INSTRUCTIONS
The patient was told that she has normal pelvic and bowel breast exam    She will call if she sees any evidence oft vaginal infection over the next year

## 2018-03-14 NOTE — PROGRESS NOTES
Assessment/Plan: This 21-year-old patient is seen today for gyn evaluation  She is no longer on hormone therapy  She has no complaints at this time  No problem-specific Assessment & Plan notes found for this encounter  Subjective:      Patient ID: Charlee Garrido is a 69 y o  female  This 21-year-old patient has had no vaginal bleeding or episodes of vaginitis over the past year  She has normal bowel bladder function  She did discontinue her hormone therapy over the past year and has been noticed tolerable hot flashes since then  A she did have a DEXA scan January 24, 2017 which showed osteopenia  Mammogram January 18, 2017 was normal  She has had some discomfort and movement of her neck over the past 3 weeks  I did advise to  report this to her PCP  Review of Systems   Constitutional: Negative  HENT: Negative  Eyes: Negative  Respiratory: Negative  Cardiovascular: Negative  Gastrointestinal: Negative  Endocrine: Negative  Genitourinary: Negative  Musculoskeletal: Negative  Skin: Negative  Allergic/Immunologic: Negative  Neurological: Negative  Hematological: Negative  Psychiatric/Behavioral: Negative  Objective:      /78 (BP Location: Left arm, Patient Position: Sitting, Cuff Size: Standard)   Ht 5' 4" (1 626 m)   Wt 93 kg (205 lb)   BMI 35 19 kg/m²          Physical Exam   Constitutional: She is oriented to person, place, and time  She appears well-developed and well-nourished  HENT:   Head: Normocephalic  Neck: Normal range of motion  Neck supple  Cardiovascular: Normal rate, regular rhythm, normal heart sounds and intact distal pulses  Pulmonary/Chest: Effort normal and breath sounds normal    Abdominal: Soft  Bowel sounds are normal    Genitourinary: Uterus normal    Musculoskeletal: Normal range of motion  Neurological: She is alert and oriented to person, place, and time  Skin: Skin is warm and dry  Psychiatric: She has a normal mood and affect  Nursing note and vitals reviewed  pelvic exam reveals no masses in the pelvis  The vaginal cuff is well supported  There is no blood or discharge in the vagina  The vulva is normal  Rectal exam shows no bladder masses in the rectum and no nodularity in the cul-de-sac   Breast exam is normal

## 2018-03-19 LAB
LAB AP GYN PRIMARY INTERPRETATION: NORMAL
Lab: NORMAL

## 2018-04-10 ENCOUNTER — HOSPITAL ENCOUNTER (OUTPATIENT)
Dept: RADIOLOGY | Facility: MEDICAL CENTER | Age: 78
Discharge: HOME | End: 2018-04-10
Payer: COMMERCIAL

## 2018-04-10 DIAGNOSIS — Z12.31 ENCOUNTER FOR SCREENING MAMMOGRAM FOR MALIGNANT NEOPLASM OF BREAST: ICD-10-CM

## 2018-04-10 PROCEDURE — 77067 SCR MAMMO BI INCL CAD: CPT

## 2018-05-16 DIAGNOSIS — I10 ESSENTIAL HYPERTENSION: Primary | ICD-10-CM

## 2018-05-17 RX ORDER — LISINOPRIL 10 MG/1
10 TABLET ORAL DAILY
Qty: 90 TABLET | Refills: 1 | Status: SHIPPED | OUTPATIENT
Start: 2018-05-17 | End: 2018-11-14 | Stop reason: SDUPTHER

## 2018-06-04 ENCOUNTER — TELEPHONE (OUTPATIENT)
Dept: FAMILY MEDICINE CLINIC | Facility: CLINIC | Age: 78
End: 2018-06-04

## 2018-06-04 DIAGNOSIS — K21.9 CHRONIC GERD: ICD-10-CM

## 2018-06-04 DIAGNOSIS — I10 ESSENTIAL HYPERTENSION: Primary | ICD-10-CM

## 2018-06-04 DIAGNOSIS — E78.01 FAMILIAL HYPERCHOLESTEROLEMIA: ICD-10-CM

## 2018-06-13 LAB
ALBUMIN SERPL-MCNC: 4.1 G/DL (ref 3.6–5.1)
ALBUMIN/GLOB SERPL: 1.5 (CALC) (ref 1–2.5)
ALP SERPL-CCNC: 68 U/L (ref 33–130)
ALT SERPL-CCNC: 23 U/L (ref 6–29)
AST SERPL-CCNC: 20 U/L (ref 10–35)
BASOPHILS # BLD AUTO: 41 CELLS/UL (ref 0–200)
BASOPHILS NFR BLD AUTO: 0.7 %
BILIRUB SERPL-MCNC: 0.5 MG/DL (ref 0.2–1.2)
BUN SERPL-MCNC: 18 MG/DL (ref 7–25)
BUN/CREAT SERPL: 19 (CALC) (ref 6–22)
CALCIUM SERPL-MCNC: 9.6 MG/DL (ref 8.6–10.4)
CHLORIDE SERPL-SCNC: 102 MMOL/L (ref 98–110)
CHOLEST SERPL-MCNC: 199 MG/DL
CHOLEST/HDLC SERPL: 3.6 (CALC)
CO2 SERPL-SCNC: 27 MMOL/L (ref 20–31)
CREAT SERPL-MCNC: 0.96 MG/DL (ref 0.6–0.93)
EOSINOPHIL # BLD AUTO: 151 CELLS/UL (ref 15–500)
EOSINOPHIL NFR BLD AUTO: 2.6 %
ERYTHROCYTE [DISTWIDTH] IN BLOOD BY AUTOMATED COUNT: 12.3 % (ref 11–15)
GLOBULIN SER CALC-MCNC: 2.8 G/DL (CALC) (ref 1.9–3.7)
GLUCOSE SERPL-MCNC: 92 MG/DL (ref 65–99)
HCT VFR BLD AUTO: 39.3 % (ref 35–45)
HDLC SERPL-MCNC: 56 MG/DL
HGB BLD-MCNC: 13.4 G/DL (ref 11.7–15.5)
LDLC SERPL CALC-MCNC: 113 MG/DL (CALC)
LYMPHOCYTES # BLD AUTO: 1873 CELLS/UL (ref 850–3900)
LYMPHOCYTES NFR BLD AUTO: 32.3 %
MCH RBC QN AUTO: 31.4 PG (ref 27–33)
MCHC RBC AUTO-ENTMCNC: 34.1 G/DL (ref 32–36)
MCV RBC AUTO: 92 FL (ref 80–100)
MONOCYTES # BLD AUTO: 516 CELLS/UL (ref 200–950)
MONOCYTES NFR BLD AUTO: 8.9 %
NEUTROPHILS # BLD AUTO: 3219 CELLS/UL (ref 1500–7800)
NEUTROPHILS NFR BLD AUTO: 55.5 %
NONHDLC SERPL-MCNC: 143 MG/DL (CALC)
PLATELET # BLD AUTO: 263 THOUSAND/UL (ref 140–400)
PMV BLD REES-ECKER: 11.3 FL (ref 7.5–12.5)
POTASSIUM SERPL-SCNC: 4.5 MMOL/L (ref 3.5–5.3)
PROT SERPL-MCNC: 6.9 G/DL (ref 6.1–8.1)
RBC # BLD AUTO: 4.27 MILLION/UL (ref 3.8–5.1)
SL AMB EGFR AFRICAN AMERICAN: 66 ML/MIN/1.73M2
SL AMB EGFR NON AFRICAN AMERICAN: 57 ML/MIN/1.73M2
SODIUM SERPL-SCNC: 139 MMOL/L (ref 135–146)
TRIGL SERPL-MCNC: 188 MG/DL
WBC # BLD AUTO: 5.8 THOUSAND/UL (ref 3.8–10.8)

## 2018-06-18 ENCOUNTER — OFFICE VISIT (OUTPATIENT)
Dept: FAMILY MEDICINE CLINIC | Facility: CLINIC | Age: 78
End: 2018-06-18
Payer: COMMERCIAL

## 2018-06-18 VITALS
DIASTOLIC BLOOD PRESSURE: 76 MMHG | TEMPERATURE: 97.1 F | HEART RATE: 64 BPM | SYSTOLIC BLOOD PRESSURE: 132 MMHG | BODY MASS INDEX: 33.97 KG/M2 | WEIGHT: 199 LBS | HEIGHT: 64 IN | RESPIRATION RATE: 16 BRPM

## 2018-06-18 DIAGNOSIS — Z23 NEED FOR SHINGLES VACCINE: ICD-10-CM

## 2018-06-18 DIAGNOSIS — K21.9 GASTROESOPHAGEAL REFLUX DISEASE WITHOUT ESOPHAGITIS: ICD-10-CM

## 2018-06-18 DIAGNOSIS — E78.01 FAMILIAL HYPERCHOLESTEROLEMIA: ICD-10-CM

## 2018-06-18 DIAGNOSIS — I10 ESSENTIAL HYPERTENSION: Primary | ICD-10-CM

## 2018-06-18 DIAGNOSIS — Z12.11 SCREEN FOR COLON CANCER: ICD-10-CM

## 2018-06-18 PROCEDURE — 3075F SYST BP GE 130 - 139MM HG: CPT | Performed by: INTERNAL MEDICINE

## 2018-06-18 PROCEDURE — 3725F SCREEN DEPRESSION PERFORMED: CPT | Performed by: INTERNAL MEDICINE

## 2018-06-18 PROCEDURE — 99214 OFFICE O/P EST MOD 30 MIN: CPT | Performed by: INTERNAL MEDICINE

## 2018-06-18 PROCEDURE — 3078F DIAST BP <80 MM HG: CPT | Performed by: INTERNAL MEDICINE

## 2018-06-18 PROCEDURE — 1036F TOBACCO NON-USER: CPT | Performed by: INTERNAL MEDICINE

## 2018-06-18 PROCEDURE — 4040F PNEUMOC VAC/ADMIN/RCVD: CPT | Performed by: INTERNAL MEDICINE

## 2018-06-18 PROCEDURE — 1101F PT FALLS ASSESS-DOCD LE1/YR: CPT | Performed by: INTERNAL MEDICINE

## 2018-06-18 RX ORDER — LATANOPROST 50 UG/ML
1 SOLUTION/ DROPS OPHTHALMIC DAILY
Refills: 3 | COMMUNITY
Start: 2018-04-19

## 2018-06-18 RX ORDER — MELATONIN
2000 DAILY
COMMUNITY

## 2018-06-18 NOTE — PROGRESS NOTES
ASSESSMENT and PLAN:  Marilu Sanders is a 69 y o  female with:   Problem List Items Addressed This Visit     Hypertension - Primary     Stable blood pressure   Currently on lisinopirl 10 mg  Check labs and follow up in 6 months         Relevant Orders    Comprehensive metabolic panel    Lipid panel    GERD (gastroesophageal reflux disease)     Pt uses omeprazole 40 mg prn only   currenly taking it approximately 1-2 x per month         HLD (hyperlipidemia)     Stable  Repeat labs in 6 months  Continue lipitor 40 mg daily          Relevant Orders    Comprehensive metabolic panel    Lipid panel    Screen for colon cancer    Relevant Orders    Cologuard    Need for shingles vaccine    Relevant Medications    Zoster Vac Recomb Adjuvanted 48 MCG SUSR        Pt is due for colon cancer screening; she has seen dr Alfie Wheatley  For her hernia  She will do cologuard- quest     SUBJECTIVE:  Marilu Sanders is a 69 y o  female who presents today with a chief complaint of Follow-up (blood work done)    Patient here for follow up; she had labs done  Review of Systems   Constitutional: Negative  HENT: Negative  Eyes: Negative  Respiratory: Negative  Cardiovascular: Negative  Gastrointestinal: Negative  Endocrine: Negative  Genitourinary: Negative  Musculoskeletal: Negative  Skin: Negative  Allergic/Immunologic: Negative  Neurological: Negative  Psychiatric/Behavioral: Negative        Recent Results (from the past 1008 hour(s))   Lipid panel    Collection Time: 06/12/18  9:27 AM   Result Value Ref Range    Total Cholesterol 199 <200 mg/dL    SL AMB HDL CHOLESTEROL 56 >50 mg/dL    Triglycerides 188 (H) <150 mg/dL    SL AMB LDL-CHOLESTEROL 113 (H) mg/dL (calc)    SL AMB CHOL/HDLC RATIO 3 6 <5 0 (calc)    SL AMB NON HDL CHOLESTEROL 143 (H) <130 mg/dL (calc)   Comprehensive metabolic panel    Collection Time: 06/12/18  9:27 AM   Result Value Ref Range    SL AMB GLUCOSE 92 65 - 99 mg/dL    BUN 18 7 - 25 mg/dL    Creatinine, Serum 0 96 (H) 0 60 - 0 93 mg/dL    eGFR Non  57 (L) > OR = 60 mL/min/1 73m2    SL AMB EGFR  66 > OR = 60 mL/min/1 73m2    SL AMB BUN/CREATININE RATIO 19 6 - 22 (calc)    SL AMB SODIUM 139 135 - 146 mmol/L    SL AMB POTASSIUM 4 5 3 5 - 5 3 mmol/L    SL AMB CHLORIDE 102 98 - 110 mmol/L    SL AMB CARBON DIOXIDE 27 20 - 31 mmol/L    SL AMB CALCIUM 9 6 8 6 - 10 4 mg/dL    SL AMB PROTEIN, TOTAL 6 9 6 1 - 8 1 g/dL    Serum Albumin 4 1 3 6 - 5 1 g/dL    SL AMB GLOBULIN 2 8 1 9 - 3 7 g/dL (calc)    SL AMB ALBUMIN/GLOBULIN RATIO 1 5 1 0 - 2 5 (calc)    SL AMB BILIRUBIN, TOTAL 0 5 0 2 - 1 2 mg/dL    SL AMB ALKALINE PHOSPHATASE 68 33 - 130 U/L    SL AMB AST 20 10 - 35 U/L    SL AMB ALT 23 6 - 29 U/L   CBC and differential    Collection Time: 06/12/18  9:27 AM   Result Value Ref Range    SL AMB LAB WHITE BLOOD CELL COUNT 5 8 3 8 - 10 8 Thousand/uL    SL AMB LAB RED BLOOD CELLS 4 27 3 80 - 5 10 Million/uL    Hemoglobin 13 4 11 7 - 15 5 g/dL    Hematocrit 39 3 35 0 - 45 0 %    MCV 92 0 80 0 - 100 0 fL    MCH 31 4 27 0 - 33 0 pg    MCHC 34 1 32 0 - 36 0 g/dL    RDW 12 3 11 0 - 15 0 %    Platelet Count 194 979 - 400 Thousand/uL    SL AMB MPV 11 3 7 5 - 12 5 fL    Neutrophils (Absolute) 3,219 1,500 - 7,800 cells/uL    Lymphocytes (Absolute) 1,873 850 - 3,900 cells/uL    Monocytes (Absolute) 516 200 - 950 cells/uL    Eosinophils (Absolute) 151 15 - 500 cells/uL    Basophils (Absolute) 41 0 - 200 cells/uL    Neutrophils 55 5 %    Lymphocytes 32 3 %    Monocytes 8 9 %    Eosinophils 2 6 %    Basophils 0 7 %         I have reviewed the patient's PMH, Social History, Medication List and Allergies  OBJECTIVE:  /76   Pulse 64   Temp (!) 97 1 °F (36 2 °C)   Resp 16   Ht 5' 3 5" (1 613 m)   Wt 90 3 kg (199 lb)   BMI 34 70 kg/m²   Physical Exam   Constitutional: She is oriented to person, place, and time  She appears well-developed and well-nourished     HENT:   Head: Normocephalic and atraumatic  Right Ear: External ear normal    Left Ear: External ear normal    Nose: Nose normal    Mouth/Throat: Oropharynx is clear and moist    Eyes: Conjunctivae and EOM are normal  Pupils are equal, round, and reactive to light  Neck: Normal range of motion  Neck supple  No JVD present  No tracheal deviation present  No thyromegaly present  Cardiovascular: Normal rate, regular rhythm, normal heart sounds and intact distal pulses  Pulmonary/Chest: Effort normal and breath sounds normal  No respiratory distress  She has no wheezes  She has no rales  Abdominal: Soft  Bowel sounds are normal  There is no tenderness  Musculoskeletal: Normal range of motion  She exhibits no edema  Neurological: She is alert and oriented to person, place, and time  No cranial nerve deficit  Skin: Skin is warm and dry  Psychiatric: She has a normal mood and affect  Her behavior is normal  Judgment and thought content normal    Nursing note and vitals reviewed

## 2018-07-29 ENCOUNTER — TELEPHONE (OUTPATIENT)
Dept: FAMILY MEDICINE CLINIC | Facility: CLINIC | Age: 78
End: 2018-07-29

## 2018-07-30 ENCOUNTER — TELEPHONE (OUTPATIENT)
Dept: FAMILY MEDICINE CLINIC | Facility: CLINIC | Age: 78
End: 2018-07-30

## 2018-07-30 NOTE — TELEPHONE ENCOUNTER
----- Message from Kat Munguia DO sent at 7/29/2018  9:35 AM EDT -----  Regarding: cologard    Please call pt-  Her cologard was positive- I would like her to see either gi  Or colo rectal for further evaluation-  Who does she prefer to see? ? Just le tme know  And I can facilitate the referral- ty

## 2018-11-01 DIAGNOSIS — E78.5 HYPERLIPIDEMIA: Primary | ICD-10-CM

## 2018-11-01 RX ORDER — ATORVASTATIN CALCIUM 40 MG/1
TABLET, FILM COATED ORAL
Qty: 30 TABLET | Refills: 1 | Status: SHIPPED | OUTPATIENT
Start: 2018-11-01 | End: 2019-01-08 | Stop reason: SDUPTHER

## 2018-11-14 DIAGNOSIS — I10 ESSENTIAL HYPERTENSION: ICD-10-CM

## 2018-11-14 RX ORDER — LISINOPRIL 10 MG/1
10 TABLET ORAL DAILY
Qty: 90 TABLET | Refills: 1 | Status: SHIPPED | OUTPATIENT
Start: 2018-11-14 | End: 2019-04-23 | Stop reason: SDUPTHER

## 2019-01-04 ENCOUNTER — OFFICE VISIT (OUTPATIENT)
Dept: FAMILY MEDICINE CLINIC | Facility: CLINIC | Age: 79
End: 2019-01-04
Payer: COMMERCIAL

## 2019-01-04 VITALS
TEMPERATURE: 97.8 F | HEART RATE: 76 BPM | RESPIRATION RATE: 15 BRPM | SYSTOLIC BLOOD PRESSURE: 140 MMHG | WEIGHT: 204 LBS | DIASTOLIC BLOOD PRESSURE: 62 MMHG | HEIGHT: 64 IN | BODY MASS INDEX: 34.83 KG/M2

## 2019-01-04 DIAGNOSIS — M81.0 AGE-RELATED OSTEOPOROSIS WITHOUT CURRENT PATHOLOGICAL FRACTURE: ICD-10-CM

## 2019-01-04 DIAGNOSIS — E55.9 VITAMIN D DEFICIENCY: ICD-10-CM

## 2019-01-04 DIAGNOSIS — E66.01 CLASS 2 SEVERE OBESITY DUE TO EXCESS CALORIES WITH SERIOUS COMORBIDITY AND BODY MASS INDEX (BMI) OF 35.0 TO 35.9 IN ADULT (HCC): ICD-10-CM

## 2019-01-04 DIAGNOSIS — Z00.00 MEDICARE ANNUAL WELLNESS VISIT, SUBSEQUENT: Primary | ICD-10-CM

## 2019-01-04 DIAGNOSIS — I10 ESSENTIAL HYPERTENSION: ICD-10-CM

## 2019-01-04 DIAGNOSIS — K21.9 GERD WITHOUT ESOPHAGITIS: ICD-10-CM

## 2019-01-04 DIAGNOSIS — E78.49 OTHER HYPERLIPIDEMIA: ICD-10-CM

## 2019-01-04 PROBLEM — K52.9 GASTROENTERITIS: Status: RESOLVED | Noted: 2017-07-01 | Resolved: 2019-01-04

## 2019-01-04 PROBLEM — K43.9 VENTRAL HERNIA WITHOUT OBSTRUCTION OR GANGRENE: Status: RESOLVED | Noted: 2017-07-01 | Resolved: 2019-01-04

## 2019-01-04 PROBLEM — Z23 NEED FOR SHINGLES VACCINE: Status: RESOLVED | Noted: 2018-06-18 | Resolved: 2019-01-04

## 2019-01-04 PROBLEM — Z12.11 SCREEN FOR COLON CANCER: Status: RESOLVED | Noted: 2018-06-18 | Resolved: 2019-01-04

## 2019-01-04 PROBLEM — E66.812 CLASS 2 SEVERE OBESITY DUE TO EXCESS CALORIES WITH SERIOUS COMORBIDITY AND BODY MASS INDEX (BMI) OF 35.0 TO 35.9 IN ADULT (HCC): Status: ACTIVE | Noted: 2019-01-04

## 2019-01-04 PROCEDURE — G0439 PPPS, SUBSEQ VISIT: HCPCS | Performed by: FAMILY MEDICINE

## 2019-01-04 PROCEDURE — 1160F RVW MEDS BY RX/DR IN RCRD: CPT | Performed by: FAMILY MEDICINE

## 2019-01-04 PROCEDURE — 1170F FXNL STATUS ASSESSED: CPT | Performed by: FAMILY MEDICINE

## 2019-01-04 PROCEDURE — 1036F TOBACCO NON-USER: CPT | Performed by: FAMILY MEDICINE

## 2019-01-04 PROCEDURE — 1125F AMNT PAIN NOTED PAIN PRSNT: CPT | Performed by: FAMILY MEDICINE

## 2019-01-04 PROCEDURE — 99214 OFFICE O/P EST MOD 30 MIN: CPT | Performed by: FAMILY MEDICINE

## 2019-01-04 RX ORDER — FAMOTIDINE 20 MG/1
20 TABLET, FILM COATED ORAL 2 TIMES DAILY PRN
Qty: 60 TABLET | Refills: 0 | Status: SHIPPED | OUTPATIENT
Start: 2019-01-04 | End: 2019-07-08

## 2019-01-04 NOTE — PATIENT INSTRUCTIONS
I recommend that you take either Ergocalciferol (Vit D) 800-1000 units daily with Calcium carbonate 1200mg daily or Calcium citrate [id number] daily    Calcium and Osteoporosis   WHAT YOU NEED TO KNOW:   Calcium is important for osteoporosis because calcium helps build bone mass  Osteoporosis is a long-term medical condition that causes your body to break down more bone than it makes  Your bones become weak, brittle, and more likely to fracture  DISCHARGE INSTRUCTIONS:   Follow up with your healthcare provider or dietitian as directed:  Write down your questions so you remember to ask them during your visits  Your calcium needs:   · Women:      ¨ 19 to 50 years: 1,000 mg    ¨ Over 50: 1,200 mg    ¨ Pregnant or breastfeeding, 19 to 50 years: 1,000 mg    · Men:      ¨ 19 to 70: 1,000 mg    ¨ Over 70: 1,200 mg  Foods that are high in calcium: The following list shows the number of calcium milligrams (mg) per serving  Your dietitian or healthcare provider can help you create a balanced meal plan for your calcium needs  · Dairy:      ¨ 1 cup of low-fat plain yogurt (415 mg) or low-fat fruit yogurt (245 to 384 mg)    ¨ 1½ ounces of shredded cheddar cheese (306 mg) or part skim mozzarella cheese (275 mg)    ¨ 1 cup of skim, 2%, or whole milk (300 mg)    ¨ 1 cup of cottage cheese made with 2% milk fat (138 mg)    ¨ ½ cup of frozen yogurt (103 mg)    · Other foods:      ¨ 1 cup of calcium-fortified orange juice (300 mg)    ¨ ½ cup of cooked abimael greens (220 mg)    ¨ 4 canned sardines, with bones (242 mg)    ¨ ½ cup of tofu (with added calcium) (204 mg)  How to get extra calcium:   · Add powdered milk to puddings, cocoa, custard, or hot cereal     · Sift powdered milk into flour when you make cakes, cookies, or breads  · Use low-fat or fat-free milk instead of water in pancake mix, mashed potatoes, pudding, or hot breakfast cereal     · Add low-fat or fat-free cheese to salad, soup, or pasta      · Add tofu (with added calcium) to vegetable stir-fleming  · Take calcium supplements if you cannot get enough calcium from the foods you eat  Your body can absorb the most calcium from supplements when you take 500 mg or less at one time  Do not take more than 2,500 mg of calcium supplements each day  © 2017 [address] Information is for End User's use only and may not be sold, redistributed or otherwise used for commercial purposes  All illustrations and images included in CareNotes® are the copyrighted property of [employer] , [employer]  or Javed Pearson  The above information is an  only  It is not intended as medical advice for individual conditions or treatments  Talk to your doctor, nurse or pharmacist before following any medical regimen to see if it is safe and effective for you  Obesity   AMBULATORY CARE:   Obesity  is when your body mass index (BMI) is greater than 30  Your healthcare provider will use your height and weight to measure your BMI  The risks of obesity include  many health problems, such as injuries or physical disability  You may need tests to check for the following:  · Diabetes     · High blood pressure or high cholesterol     · Heart disease     · Gallbladder or liver disease     · Cancer of the colon, breast, prostate, liver, or kidney     · Sleep apnea     · Arthritis or gout  Seek care immediately if:   · You have a severe headache, confusion, or difficulty speaking  · You have weakness on one side of your body  · You have chest pain, sweating, or shortness of breath  Contact your healthcare provider if:   · You have symptoms of gallbladder or liver disease, such as pain in your upper abdomen  · You have knee or hip pain and discomfort while walking  · You have symptoms of diabetes, such as intense hunger and thirst, and frequent urination  · You have symptoms of sleep apnea, such as snoring or daytime sleepiness       · You have questions or concerns about your condition or care  Treatment for obesity  focuses on helping you lose weight to improve your health  Even a small decrease in BMI can reduce the risk for many health problems  Your healthcare provider will help you set a weight-loss goal   · Lifestyle changes  are the first step in treating obesity  These include making healthy food choices and getting regular physical activity  Your healthcare provider may suggest a weight-loss program that involves coaching, education, and therapy  · Medicine  may help you lose weight when it is used with a healthy diet and physical activity  · Surgery  can help you lose weight if you are very obese and have other health problems  There are several types of weight-loss surgery  Ask your healthcare provider for more information  Be successful losing weight:   · Set small, realistic goals  An example of a small goal is to walk for 20 minutes 5 days a week  Anther goal is to lose 5% of your body weight  · Tell friends, family members, and coworkers about your goals  and ask for their support  Ask a friend to lose weight with you, or join a weight-loss support group  · Identify foods or triggers that may cause you to overeat , and find ways to avoid them  Remove tempting high-calorie foods from your home and workplace  Place a bowl of fresh fruit on your kitchen counter  If stress causes you to eat, then find other ways to cope with stress  · Keep a diary to track what you eat and drink  Also write down how many minutes of physical activity you do each day  Weigh yourself once a week and record it in your diary  Eating changes: You will need to eat 500 to 1,000 fewer calories each day than you currently eat to lose 1 to 2 pounds a week  The following changes will help you cut calories:  · Eat smaller portions  Use small plates, no larger than 9 inches in diameter  Fill your plate half full of fruits and vegetables   Measure your food using measuring cups until you know what a serving size looks like  · Eat 3 meals and 1 or 2 snacks each day  Plan your meals in advance  [employer] and eat at home most of the time  Eat slowly  · Eat fruits and vegetables at every meal   They are low in calories and high in fiber, which makes you feel full  Do not add butter, margarine, or cream sauce to vegetables  Use herbs to season steamed vegetables  · Eat less fat and fewer fried foods  Eat more baked or grilled chicken and fish  These protein sources are lower in calories and fat than red meat  Limit fast food  Dress your salads with olive oil and vinegar instead of bottled dressing  · Limit the amount of sugar you eat  Do not drink sugary beverages  Limit alcohol  Activity changes:  Physical activity is good for your body in many ways  It helps you burn calories and build strong muscles  It decreases stress and depression, and improves your mood  It can also help you sleep better  Talk to your healthcare provider before you begin an exercise program   · Exercise for at least 30 minutes 5 days a week  Start slowly  Set aside time each day for physical activity that you enjoy and that is convenient for you  It is best to do both weight training and an activity that increases your heart rate, such as walking, bicycling, or swimming  · Find ways to be more active  Do yard work and housecleaning  Walk up the stairs instead of using elevators  Spend your leisure time going to events that require walking, such as outdoor festivals or fairs  This extra physical activity can help you lose weight and keep it off  Follow up with your healthcare provider as directed: You may need to meet with a dietitian  Write down your questions so you remember to ask them during your visits  © 2017 [address] Information is for End User's use only and may not be sold, redistributed or otherwise used for commercial purposes   All illustrations and images included in CareNotes® are the copyrighted property of A D A M [employer]  or Javed Pearson  The above information is an  only  It is not intended as medical advice for individual conditions or treatments  Talk to your doctor, nurse or pharmacist before following any medical regimen to see if it is safe and effective for you  Urinary Incontinence   WHAT YOU NEED TO KNOW:   What is urinary incontinence? Urinary incontinence (UI) is when you lose control of your bladder  What causes UI? UI occurs because your bladder cannot store or empty urine properly  The following are the most common types of UI:  · Stress incontinence  is when you leak urine due to increased bladder pressure  This may happen when you cough, sneeze, or exercise  · Urge incontinence  is when you feel the need to urinate right away and leak urine accidentally  · Mixed incontinence  is when you have both stress and urge UI  What are the signs and symptoms of UI?   · You feel like your bladder does not empty completely when you urinate  · You urinate often and need to urinate immediately  · You leak urine when you sleep, or you wake up with the urge to urinate  · You leak urine when you cough, sneeze, exercise, or laugh  How is UI diagnosed? Your healthcare provider will ask how often you leak urine and whether you have stress or urge symptoms  Tell him which medicines you take, how often you urinate, and how much liquid you drink each day  You may need any of the following tests:  · Urine tests  may show infection or kidney function  · A pelvic exam  may be done to check for blockages  A pelvic exam will also show if your bladder, uterus, or other organs have moved out of place  · An x-ray, ultrasound, or CT  may show problems with parts of your urinary system  You may be given contrast liquid to help your organs show up better in the pictures   Tell the healthcare provider if you have ever had an allergic reaction to contrast liquid  Do not enter the MRI room with anything metal  Metal can cause serious injury  Tell the healthcare provider if you have any metal in or on your body  · A bladder scan  will show how much urine is left in your bladder after you urinate  You will be asked to urinate and then healthcare providers will use a small ultrasound machine to check the urine left in your bladder  · Cystometry  is used to check the function of your urinary system  Your healthcare provider checks the pressure in your bladder while filling it with fluid  Your bladder pressure may also be tested when your bladder is full and while you urinate  How is UI treated? · Medicines  can help strengthen your bladder control  · Electrical stimulation  is used to send a small amount of electrical energy to your pelvic floor muscles  This helps control your bladder function  Electrodes may be placed outside your body or in your rectum  For women, the electrodes may be placed in the vagina  · A bulking agent  may be injected into the wall of your urethra to make it thicker  This helps keep your urethra closed and decreases urine leakage  · Devices  such as a clamp, pessary, or tampon may help stop urine leaks  Ask your healthcare provider for more information about these and other devices  · Surgery  may be needed if other treatments do not work  Several types of surgery can help improve your bladder control  Ask your healthcare provider for more information about the surgery you may need  How can I manage my symptoms? · Do pelvic muscle exercises often  Your pelvic muscles help you stop urinating  Squeeze these muscles tight for 5 seconds, then relax for 5 seconds  Gradually work up to squeezing for 10 seconds  Do 3 sets of 15 repetitions a day, or as directed  This will help strengthen your pelvic muscles and improve bladder control      · A catheter  may be used to help empty your bladder  A catheter is a tiny, plastic tube that is put into your bladder to drain your urine  Your healthcare provider may tell you to use a catheter to prevent your bladder from getting too full and leaking urine  · Keep a UI record  Write down how often you leak urine and how much you leak  Make a note of what you were doing when you leaked urine  · Train your bladder  Go to the bathroom at set times, such as every 2 hours, even if you do not feel the urge to go  You can also try to hold your urine when you feel the urge to go  For example, hold your urine for 5 minutes when you feel the urge to go  As that becomes easier, hold your urine for 10 minutes  · Drink liquids as directed  Ask your healthcare provider how much liquid to drink each day and which liquids are best for you  You may need to limit the amount of liquid you drink to help control your urine leakage  Limit or do not have drinks that contain caffeine or alcohol  Do not drink any liquid right before you go to bed  · Prevent constipation  Eat a variety of high-fiber foods  Good examples are high-fiber cereals, beans, vegetables, and whole-grain breads  Prune juice may help make your bowel movement softer  Walking is the best way to trigger your intestines to have a bowel movement  · Exercise regularly and maintain a healthy weight  Ask your healthcare provider how much you should weigh and about the best exercise plan for you  Weight loss and exercise will decrease pressure on your bladder and help you control your leakage  Ask him to help you create a weight loss plan if you are overweight  When should I seek immediate care? · You have severe pain  · You are confused or cannot think clearly  When should I contact my healthcare provider? · You have a fever  · You see blood in your urine  · You have pain when you urinate  · You have new or worse pain, even after treatment      · Your mouth feels dry or you have vision changes  · Your urine is cloudy or smells bad  · You have questions or concerns about your condition or care  CARE AGREEMENT:   You have the right to help plan your care  Learn about your health condition and how it may be treated  Discuss treatment options with your caregivers to decide what care you want to receive  You always have the right to refuse treatment  The above information is an  only  It is not intended as medical advice for individual conditions or treatments  Talk to your doctor, nurse or pharmacist before following any medical regimen to see if it is safe and effective for you  © 2017 [address] Information is for End User's use only and may not be sold, redistributed or otherwise used for commercial purposes  All illustrations and images included in CareNotes® are the copyrighted property of A D A M [employer]  or Javed Pearson  Cigarette Smoking and Your Health   AMBULATORY CARE:   Risks to your health if you smoke:  Nicotine and other chemicals found in tobacco damage every cell in your body  Even if you are a light smoker, you have an increased risk for cancer, heart disease, and lung disease  If you are pregnant or have diabetes, smoking increases your risk for complications  Benefits to your health if you stop smoking:   · You decrease respiratory symptoms such as coughing, wheezing, and shortness of breath  · You reduce your risk for cancers of the lung, mouth, throat, kidney, bladder, pancreas, stomach, and cervix  If you already have cancer, you increase the benefits of chemotherapy  You also reduce your risk for cancer returning or a second cancer from developing  · You reduce your risk for heart disease, blood clots, heart attack, and stroke  · You reduce your risk for lung infections, and diseases such as pneumonia, asthma, chronic bronchitis, and emphysema  · Your circulation improves   More oxygen can be delivered to your body  If you have diabetes, you lower your risk for complications, such as kidney, artery, and eye diseases  You also lower your risk for nerve damage  Nerve damage can lead to amputations, poor vision, and blindness  · You improve your body's ability to heal and to fight infections  Benefits to the health of others if you stop smoking:  Tobacco is harmful to nonsmokers who breathe in your secondhand smoke  The following are ways the health of others around you may improve when you stop smoking:  · You lower the risks for lung cancer and heart disease in nonsmoking adults  · If you are pregnant, you lower the risk for miscarriage, early delivery, low birth weight, and stillbirth  You also lower your baby's risk for SIDS, obesity, developmental delay, and neurobehavioral problems, such as ADHD  · If you have children, you lower their risk for ear infections, colds, pneumonia, bronchitis, and asthma  For more information and support to stop smoking:   · Smokefree  gov  Phone: 4- 880 - 485-9395  Web Address: www [employer]  Follow up with your healthcare provider as directed:  Write down your questions so you remember to ask them during your visits  © 2017 [address] Information is for End User's use only and may not be sold, redistributed or otherwise used for commercial purposes  All illustrations and images included in CareNotes® are the copyrighted property of [employer] , [employer]  or Javed Pearson  The above information is an  only  It is not intended as medical advice for individual conditions or treatments  Talk to your doctor, nurse or pharmacist before following any medical regimen to see if it is safe and effective for you  Fall Prevention   AMBULATORY CARE:   Fall prevention  includes ways to make your home and other areas safer  It also includes ways you can move more carefully to prevent a fall   Health conditions that cause changes in your blood pressure, vision, or muscle strength and coordination may increase your risk for falls  Medicines may also increase your risk for falls if they make you dizzy, weak, or sleepy  Call 911 or have someone else call if:   · You have fallen and are unconscious  · You have fallen and cannot move part of your body  Contact your healthcare provider if:   · You have fallen and have pain or a headache  · You have questions or concerns about your condition or care  Fall prevention tips:   · Stand or sit up slowly  This may help you keep your balance and prevent falls  · Use assistive devices as directed  Your healthcare provider may suggest that you use a cane or walker to help you keep your balance  You may need to have grab bars put in your bathroom near the toilet or in the shower  · Wear shoes that fit well and have soles that   Wear shoes both inside and outside  Use slippers with good   Do not wear shoes with high heels  · Wear a personal alarm  This is a device that allows you to call 911 if you fall and need help  Ask your healthcare provider for more information  · Stay active  Exercise can help strengthen your muscles and improve your balance  Your healthcare provider may recommend water aerobics or walking  He or she may also recommend physical therapy to improve your coordination  Never start an exercise program without talking to your healthcare provider first      · Manage your medical conditions  Keep all appointments with your healthcare providers  Visit your eye doctor as directed  Home safety tips:   · Add items to prevent falls in the bathroom  Put nonslip strips on your bath or shower floor to prevent you from slipping  Use a bath mat if you do not have carpet in the bathroom  This will prevent you from falling when you step out of the bath or shower  Use a shower seat so you do not need to stand while you shower   Sit on the toilet or a chair in your bathroom to dry yourself and put on clothing  This will prevent you from losing your balance from drying or dressing yourself while you are standing  · Keep paths clear  Remove books, shoes, and other objects from walkways and stairs  Place cords for telephones and lamps out of the way so that you do not need to walk over them  Tape them down if you cannot move them  Remove small rugs  If you cannot remove a rug, secure it with double-sided tape  This will prevent you from tripping  · Install bright lights in your home  Use night lights to help light paths to the bathroom or kitchen  Always turn on the light before you start walking  · Keep items you use often on shelves within reach  Do not use a step stool to help you reach an item  · Paint or place reflective tape on the edges of your stairs  This will help you see the stairs better  Follow up with your healthcare provider as directed:  Write down your questions so you remember to ask them during your visits  © 2017 [address] [facility] Information is for End User's use only and may not be sold, redistributed or otherwise used for commercial purposes  All illustrations and images included in CareNotes® are the copyrighted property of A D A M [employer] Inc  or [employer]  The above information is an  only  It is not intended as medical advice for individual conditions or treatments  Talk to your doctor, nurse or pharmacist before following any medical regimen to see if it is safe and effective for you  Advance Directives   WHAT YOU NEED TO KNOW:   What are advance directives? Advance directives are legal documents that state your wishes and plans for medical care  These plans are made ahead of time in case you lose your ability to make decisions for yourself  Advance directives can apply to any medical decision, such as the treatments you want, and if you want to donate organs     What are the types of advance directives? There are many types of advance directives, and each state has rules about how to use them  You may choose a combination of any of the following:  · Living will: This is a written record of the treatment you want  You can also choose which treatments you do not want, which to limit, and which to stop at a certain time  This includes surgery, medicine, IV fluid, and tube feedings  · Durable power of  for healthcare [facility] [place]): This is a written record that states who you want to make healthcare choices for you when you are unable to make them for yourself  This person, called a proxy, is usually a family member or a friend  You may choose more than 1 proxy  · Do not resuscitate (DNR) order:  A DNR order is used in case your heart stops beating or you stop breathing  It is a request not to have certain forms of treatment, such as CPR  A DNR order may be included in other types of advance directives  · Medical directive: This covers the care that you want if you are in a coma, near death, or unable to make decisions for yourself  You can list the treatments you want for each condition  Treatment may include pain medicine, surgery, blood transfusions, dialysis, IV or tube feedings, and a ventilator (breathing machine)  · Values history: This document has questions about your views, beliefs, and how you feel and think about life  This information can help others choose the care that you would choose  Why are advance directives important? An advance directive helps you control your care  Although spoken wishes may be used, it is better to have your wishes written down  Spoken wishes can be misunderstood, or not followed  Treatments may be given even if you do not want them  An advance directive may make it easier for your family to make difficult choices about your care  How do I decide what to put in my advance directives?    · Make informed decisions:  Make sure you fully understand treatments or care you may receive  Think about the benefits and problems your decisions could cause for you or your family  Talk to healthcare providers if you have concerns or questions before you write down your wishes  You may also want to talk with your Zoroastrianism or , or a   Check your state laws to make sure that what you put in your advance directive is legal      · Sign all forms:  Sign and date your advance directive when you have finished  You may also need 2 witnesses to sign the forms  Witnesses cannot be your doctor or his staff, your spouse, heirs or beneficiaries, people you owe money to, or your chosen proxy  Talk to your family, proxy, and healthcare providers about your advance directive  Give each person a copy, and keep one for yourself in a place you can get to easily  Do not keep it hidden or locked away  · Review and revise your plans: You can revise your advance directive at any time, as long as you are able to make decisions  Review your plan every year, and when there are changes in your life, or your health  When you make changes, let your family, proxy, and healthcare providers know  Give each a new copy  Where can I find more information? · [employer]  [address] [place] , [address]  Phone: [phone]  Phone: [phone]  Web Address: http://www  aafp org  · [address] [facility])  [address] [facility], [address]  Phone: [phone]  Phone: [phone]  Web Address: Mahi reyes  CARE AGREEMENT:   You have the right to help plan your care  To help with this plan, you must learn about your health condition and treatment options  You must also learn about advance directives and how they are used  Work with your healthcare providers to decide what care will be used to treat you   You always have the right to refuse treatment  The above information is an  only  It is not intended as medical advice for individual conditions or treatments  Talk to your doctor, nurse or pharmacist before following any medical regimen to see if it is safe and effective for you  © 2017 [facility] Information is for End User's use only and may not be sold, redistributed or otherwise used for commercial purposes  All illustrations and images included in CareNotes® are the copyrighted property of A D A M , Inc  or Javed Pearson

## 2019-01-04 NOTE — ASSESSMENT & PLAN NOTE
Last Lipid Panel:  Lab Results   Component Value Date    CHOLESTEROL 199 06/12/2018    HDL 56 06/12/2018    LDLC 113 (H) 06/12/2018    TRIG 188 (H) 06/12/2018    NONHDLC 144 (H) 12/14/2017     The 10-year ASCVD risk score (Vanesa Velazquez et al , 2013) is: 30%    Values used to calculate the score:      Age: 69 years      Sex: Female      Is Non- : No      Diabetic: No      Tobacco smoker: No      Systolic Blood Pressure: 285 mmHg      Is BP treated: Yes      HDL Cholesterol: 56 mg/dL      Total Cholesterol: 199 mg/dL    Continue Atorvastatin 40mg, will do it every day instead of weekly

## 2019-01-04 NOTE — PROGRESS NOTES
FAMILY MEDICINE PROGRESS NOTE  Sujit Puckett 69 y o  female   DATE: January 4, 2019     ASSESSMENT and PLAN:  Sujit Puckett is a 69 y o  female with:     Age-related osteoporosis without current pathological fracture  DEXA in January 2017- T score of 2 6, no fractures  Cont Vit D/Calcium  Check DEXA    GERD without esophagitis  Only uses Omeprazole PRN, will try Pepcid PRN    Other hyperlipidemia  Last Lipid Panel:  Lab Results   Component Value Date    CHOLESTEROL 199 06/12/2018    HDL 56 06/12/2018    LDLC 113 (H) 06/12/2018    TRIG 188 (H) 06/12/2018    NONHDLC 144 (H) 12/14/2017     The 10-year ASCVD risk score (Mitchell Larson et al , 2013) is: 30%    Values used to calculate the score:      Age: 69 years      Sex: Female      Is Non- : No      Diabetic: No      Tobacco smoker: No      Systolic Blood Pressure: 366 mmHg      Is BP treated: Yes      HDL Cholesterol: 56 mg/dL      Total Cholesterol: 199 mg/dL    Continue Atorvastatin 40mg, will do it every day instead of weekly      Essential hypertension  BP Readings from Last 3 Encounters:   01/04/19 140/62   06/18/18 132/76   03/14/18 148/78   Controlled on current regimen:  -Lisinopril 10mg    Recheck BMP    Vitamin D deficiency  Currently on Vit D3 1000 IU daily, recheck level    Class 2 severe obesity due to excess calories with serious comorbidity and body mass index (BMI) of 35 0 to 35 9 in adult ([facility] )  Wt Readings from Last 3 Encounters:   01/04/19 92 5 kg (204 lb)   06/18/18 90 3 kg (199 lb)   03/14/18 93 kg (205 lb)     BMI Counseling: Body mass index is 35 02 kg/m²  Discussed the patient's BMI with her  The BMI is above average  BMI counseling and education was provided to the patient   Nutrition recommendations include reducing portion sizes, decreasing overall calorie intake, 3-5 servings of fruits/vegetables daily, consuming healthier snacks, decreasing soda and/or juice intake, moderation in carbohydrate intake and reducing intake of cholesterol  Exercise recommendations include moderate aerobic physical activity for 150 minutes/week and exercising 3-5 times per week  SUBJECTIVE:  Fernie Sousa is a 69 y o  female who presents today with a chief complaint of Medicare Wellness Visit and Hypertension (6 month follow up)  Fernie Sousa is here for a 6 month follow-up and to establish with me as a new PCP  She is also here for a MAWV  The active chronic medical problems and medications are as below:   1  HTN- controlled on Lisinopril  2  HLD- on statin, but only taking 1/2 tab once/week  3  GERD- only does Omeprazole PRN, knows her trigger foods  4  Osteoporosis- never been on Rx meds, f/w Gyn  5  Glaucoma- f/w Dr Arlene Alcaraz      Review of Systems   Constitutional: Negative for fatigue  Respiratory: Negative for shortness of breath  Cardiovascular: Negative for chest pain and palpitations  Gastrointestinal: Negative for bowel incontinence  Endocrine: Negative for polyuria  Genitourinary: Negative for difficulty urinating  Musculoskeletal: Negative for gait problem  Psychiatric/Behavioral: The patient is not nervous/anxious  I have reviewed the patient's PMH, Social History, Medication List and Allergies as appropriate  OBJECTIVE:  /62 (BP Location: Left arm, Patient Position: Sitting, Cuff Size: Standard)   Pulse 76   Temp 97 8 °F (36 6 °C)   Resp 15   Ht 5' 4" (1 626 m)   Wt 92 5 kg (204 lb)   Breastfeeding? No   BMI 35 02 kg/m²    Physical Exam   Constitutional: She appears well-developed and well-nourished  No distress  obese   Cardiovascular: Normal rate, regular rhythm and normal heart sounds  Pulmonary/Chest: Effort normal and breath sounds normal  No respiratory distress  She has no wheezes  She has no rales  Skin: She is not diaphoretic  Vitals reviewed  Karyn Grace MD    Note: Portions of the record may have been created with voice recognition software    Occasional wrong word or "sound a like" substitutions may have occurred due to the inherent limitations of voice recognition software  Read the chart carefully and recognize, using context, where substitutions have occurred    Assessment and Plan:    Problem List Items Addressed This Visit        Digestive    GERD without esophagitis     Only uses Omeprazole PRN, will try Pepcid PRN         Relevant Medications    famotidine (PEPCID) 20 mg tablet       Cardiovascular and Mediastinum    Essential hypertension     BP Readings from Last 3 Encounters:   01/04/19 140/62   06/18/18 132/76   03/14/18 148/78   Controlled on current regimen:  -Lisinopril 10mg    Recheck BMP         Relevant Orders    Basic metabolic panel       Musculoskeletal and Integument    Age-related osteoporosis without current pathological fracture     DEXA in January 2017- T score of 2 6, no fractures  Cont Vit D/Calcium  Check DEXA            Other    Other hyperlipidemia     Last Lipid Panel:  Lab Results   Component Value Date    CHOLESTEROL 199 06/12/2018    HDL 56 06/12/2018    LDLC 113 (H) 06/12/2018    TRIG 188 (H) 06/12/2018    NONHDLC 144 (H) 12/14/2017     The 10-year ASCVD risk score (Scott Boo et al , 2013) is: 30%    Values used to calculate the score:      Age: 69 years      Sex: Female      Is Non- : No      Diabetic: No      Tobacco smoker: No      Systolic Blood Pressure: 319 mmHg      Is BP treated: Yes      HDL Cholesterol: 56 mg/dL      Total Cholesterol: 199 mg/dL    Continue Atorvastatin 40mg, will do it every day instead of weekly           Relevant Orders    Lipid Panel with Direct LDL reflex    Vitamin D deficiency     Currently on Vit D3 1000 IU daily, recheck level         Relevant Orders    Vitamin D 25 hydroxy    Class 2 severe obesity due to excess calories with serious comorbidity and body mass index (BMI) of 35 0 to 35 9 in adult (HCC)     Wt Readings from Last 3 Encounters:   01/04/19 92 5 kg (204 lb) 06/18/18 90 3 kg (199 lb)   03/14/18 93 kg (205 lb)     BMI Counseling: Body mass index is 35 02 kg/m²  Discussed the patient's BMI with her  The BMI is above average  BMI counseling and education was provided to the patient  Nutrition recommendations include reducing portion sizes, decreasing overall calorie intake, 3-5 servings of fruits/vegetables daily, consuming healthier snacks, decreasing soda and/or juice intake, moderation in carbohydrate intake and reducing intake of cholesterol  Exercise recommendations include moderate aerobic physical activity for 150 minutes/week and exercising 3-5 times per week  Other Visit Diagnoses     Medicare annual wellness visit, subsequent    -  Primary        Health Maintenance Due   Topic Date Due    Medicare Annual Wellness Visit (AWV)  1940    SLP PLAN OF CARE  1940       HPI:  Shawn Quach is a 69 y o  female here for her Subsequent Wellness Visit      Patient Active Problem List   Diagnosis    Chronic calculous cholecystitis    Essential hypertension    GERD without esophagitis    Other hyperlipidemia    Age-related osteoporosis without current pathological fracture    Vitamin D deficiency    Class 2 severe obesity due to excess calories with serious comorbidity and body mass index (BMI) of 35 0 to 35 9 in [facility])     Past Medical History:   Diagnosis Date    GERD (gastroesophageal reflux disease) 7/1/2017    HLD (hyperlipidemia) 7/1/2017    Hypertension     Ventral hernia without obstruction or gangrene 7/1/2017     Past Surgical History:   Procedure Laterality Date    GALLBLADDER SURGERY      HEMORRHOID SURGERY      HYSTERECTOMY      NOSE SURGERY      basal cell    SD LAP,CHOLECYSTECTOMY N/A 8/25/2016    Procedure: LAPAROSCOPIC CHOLECYSTECTOMY ;  Surgeon: Janet Adan MD;  [facility];  Service: [address] N/A 11/30/2017    Procedure: INCISIONAL HERNIA REPAIR;  Surgeon: Angelika Pressley MD Jessica;  [facility];  Service: General    ROTATOR CUFF REPAIR Right      Family History   Problem Relation Age of Onset    No Known Problems Mother     No Known Problems Father      History   Smoking Status    Never Smoker   Smokeless Tobacco    Never Used     History   Alcohol Use    Yes     Comment: socially      History   Drug Use No       Current Outpatient Prescriptions   Medication Sig Dispense Refill    aspirin 81 MG tablet Take 81 mg by mouth every other day   atorvastatin (LIPITOR) 40 mg tablet Takes 1/2 tablet once a week 30 tablet 1    b complex vitamins tablet Take 1 tablet by mouth daily   cholecalciferol (VITAMIN D3) 1,000 units tablet Take 1,000 Units by mouth daily      Co-Enzyme Q-10 100 MG CAPS Take 200 mg by mouth daily   latanoprost (XALATAN) 0 005 % ophthalmic solution Administer 1 drop to both eyes daily  3    lisinopril (ZESTRIL) 10 mg tablet Take 1 tablet (10 mg total) by mouth daily 90 tablet 1    Multiple Vitamins-Minerals (PRESERVISION AREDS) CAPS Take 2 capsules by mouth daily      PX GLUCOSAMINE-CHONDROITIN PO Take 2 tablets by mouth daily   famotidine (PEPCID) 20 mg tablet Take 1 tablet (20 mg total) by mouth 2 (two) times a day as needed for heartburn for up to 30 days 60 tablet 0    Zoster Vac Recomb Adjuvanted 50 MCG SUSR Inject 50 mcg into the shoulder, thigh, or buttocks as needed (imm) for up to 1 dose 1 each 0     No current facility-administered medications for this visit        Allergies   Allergen Reactions    Codeine      Reaction Date: 19Jul2011;     Other      darvocet     Immunization History   Administered Date(s) Administered    Influenza 10/05/2017, 10/24/2018    Influenza Split High Dose Preservative Free IM 10/05/2012, 10/14/2013, 10/16/2014, 10/22/2015, 11/15/2016    Influenza TIV (IM) 10/21/2011, 10/05/2017    Pneumococcal Conjugate 13-Valent 04/22/2015    Pneumococcal Polysaccharide PPV23 10/05/2012    Td (adult), adsorbed 11/01/2006    Varicella 01/01/2012     Patient Care Team:  Ramin Bernstein MD as PCP - General (Family Medicine)  Maynor Wong MD    Medicare Screening Tests and Risk Assessments:  Jeffy Perez is here for her Subsequent Wellness visit  Last Medicare Wellness visit information reviewed, patient interviewed, no change since last AWV  Health Risk Assessment:  Patient rates overall health as very good  Patient feels that their physical health rating is Same  Eyesight was rated as Slightly worse  Hearing was rated as Same  Patient feels that their emotional and mental health rating is Same  Pain experienced by patient in the last 7 days has been Some  Patient's pain rating has been 6/10  Patient states that she has experienced no weight loss or gain in last 6 months  Emotional/Mental Health:  Patient has been feeling nervous/anxious  PHQ-9 Depression Screening:    Frequency of the following problems over the past two weeks:      1  Little interest or pleasure in doing things: 0 - not at all      2  Feeling down, depressed, or hopeless: 0 - not at all  PHQ-2 Score: 0          Broken Bones/Falls: Fall Risk Assessment:    In the past year, patient has experienced: No history of falling in past year          Bladder/Bowel:  Patient has not leaked urine accidently in the last six months  Patient reports no loss of bowel control  Immunizations:  Patient has had a flu vaccination within the last year  Patient has received a pneumonia shot  Patient has received a shingles shot  Patient has received tetanus/diphtheria shot  Home Safety:  Patient does not have trouble with stairs inside or outside of their home  Patient currently reports that there are no safety hazards present in home, working smoke alarms, working carbon monoxide detectors        Preventative Screenings:   Breast cancer screening performed, colon cancer screen completed, cholesterol screen completed, glaucoma eye exam completed,     Nutrition:  Current diet: Regular, Limited junk food and No Added Salt with servings of the following:    Medications:  Patient is currently taking over-the-counter supplements  Patient is able to manage medications  Lifestyle Choices:  Patient reports no tobacco use  Patient has not smoked or used tobacco in the past   Patient reports alcohol use  Alcohol use per week: social  Patient drives a vehicle  Patient wears seat belt  Current level of exercise of physical activity described by patient as: walk  Activities of Daily Living:  Can get out of bed by his or her self, able to dress self, able to make own meals, able to do own shopping, able to bathe self, can do own laundry/housekeeping, can manage own money, pay bills and track expenses    Previous Hospitalizations:  No hospitalization or ED visit in past 12 months        Advanced Directives:  Patient has decided on a power of   Patient has spoken to designated power of   Patient has completed advanced directive    Additional Comments: Chata Hammond ([facility], [address] [place] [address]) [phone]    Preventative Screening/Counseling:      Cardiovascular:      General: Risks and Benefits Discussed and Screening Current      Counseling: Healthy Diet and Healthy Weight          Diabetes:      General: Risks and Benefits Discussed and Screening Current      Counseling: Healthy Diet and Healthy Weight          Colorectal Cancer:      General: Risks and Benefits Discussed, Screening Not Indicated and Screening Current      Comments: cologuard in July 2018 was positive, f/u colonoscopy with Dr Whit Barrera in September 2018 was normal, no more needed        Breast Cancer:      General: Risks and Benefits Discussed and Screening Current      Comments: Seeronaldo Jerez, normal in April 2018        Cervical Cancer:      General: Risks and Benefits Discussed and Screening Not Indicated      Comments: Dallas Jerez Osteoporosis:      General: Risks and Benefits Discussed      Counseling: Calcium and Vitamin D Intake and Regular Weightbearing Exercise      Due for studies: Bone Density CT Axial      Comments: Osteoporosis in January 2017        AAA:      General: Screening Not Indicated          Glaucoma:      General: Risks and Benefits Discussed and Screening Current      Comments: Dr Pepito Cheng and Dr Myers Speak for glaucoma        HIV:      General: Screening Not Indicated          Hepatitis C:      General: Screening Not Indicated        Advanced Directives:   Patient has living will for healthcare, has durable POA for healthcare, patient has an advanced directive  End of life assessment reviewed with patient  Provider agrees with end of life decisions   Additional Comments: Daughter and son- POA    Immunizations:  Patient reviewed and up to date      Influenza: Risks & Benefits Discussed and Influenza UTD This Year      Pneumococcal: Risks & Benefits Discussed and Lifetime Vaccine Completed      Shingrix: Shingrix Vaccine Needed Today      Zostavax: Zostavax Vaccine UTD      TD: Td Vaccine Needed Today      Other Preventative Counseling (Non-Medicare):   Fall Prevention and Increase physical activity

## 2019-01-04 NOTE — ASSESSMENT & PLAN NOTE
BP Readings from Last 3 Encounters:   01/04/19 140/62   06/18/18 132/76   03/14/18 148/78   Controlled on current regimen:  -Lisinopril 10mg    Recheck BMP

## 2019-01-04 NOTE — ASSESSMENT & PLAN NOTE
Wt Readings from Last 3 Encounters:   01/04/19 92 5 kg (204 lb)   06/18/18 90 3 kg (199 lb)   03/14/18 93 kg (205 lb)     BMI Counseling: Body mass index is 35 02 kg/m²  Discussed the patient's BMI with her  The BMI is above average  BMI counseling and education was provided to the patient  Nutrition recommendations include reducing portion sizes, decreasing overall calorie intake, 3-5 servings of fruits/vegetables daily, consuming healthier snacks, decreasing soda and/or juice intake, moderation in carbohydrate intake and reducing intake of cholesterol  Exercise recommendations include moderate aerobic physical activity for 150 minutes/week and exercising 3-5 times per week

## 2019-01-08 DIAGNOSIS — E78.5 HYPERLIPIDEMIA: ICD-10-CM

## 2019-01-08 RX ORDER — ATORVASTATIN CALCIUM 40 MG/1
40 TABLET, FILM COATED ORAL DAILY
Qty: 90 TABLET | Refills: 1 | Status: SHIPPED | OUTPATIENT
Start: 2019-01-08 | End: 2019-07-08 | Stop reason: SDUPTHER

## 2019-01-11 ENCOUNTER — TELEPHONE (OUTPATIENT)
Dept: FAMILY MEDICINE CLINIC | Facility: CLINIC | Age: 79
End: 2019-01-11

## 2019-01-11 PROBLEM — N18.30 CKD (CHRONIC KIDNEY DISEASE) STAGE 3, GFR 30-59 ML/MIN (HCC): Status: ACTIVE | Noted: 2019-01-11

## 2019-01-11 LAB
25(OH)D3 SERPL-MCNC: 35 NG/ML (ref 30–100)
BUN SERPL-MCNC: 24 MG/DL (ref 7–25)
BUN/CREAT SERPL: 23 (CALC) (ref 6–22)
CALCIUM SERPL-MCNC: 9.9 MG/DL (ref 8.6–10.4)
CHLORIDE SERPL-SCNC: 104 MMOL/L (ref 98–110)
CHOLEST SERPL-MCNC: 200 MG/DL
CHOLEST/HDLC SERPL: 3.1 (CALC)
CO2 SERPL-SCNC: 29 MMOL/L (ref 20–32)
CREAT SERPL-MCNC: 1.06 MG/DL (ref 0.6–0.93)
GLUCOSE SERPL-MCNC: 111 MG/DL (ref 65–99)
HDLC SERPL-MCNC: 64 MG/DL
LDLC SERPL CALC-MCNC: 108 MG/DL (CALC)
NONHDLC SERPL-MCNC: 136 MG/DL (CALC)
POTASSIUM SERPL-SCNC: 4.7 MMOL/L (ref 3.5–5.3)
SL AMB EGFR AFRICAN AMERICAN: 59 ML/MIN/1.73M2
SL AMB EGFR NON AFRICAN AMERICAN: 51 ML/MIN/1.73M2
SODIUM SERPL-SCNC: 142 MMOL/L (ref 135–146)
TRIGL SERPL-MCNC: 164 MG/DL

## 2019-01-11 NOTE — TELEPHONE ENCOUNTER
Please call Jaqueline Alcala and let her know that her labs showed: her labs were all normal, except her cholesterol is just slightly high, she can continue her cholesterol medication and try to follow a healthier diet, if it is still high when we recheck it, we may consider increasing it at that time  Her kidney numbers were slightly lower this time, it puts her in the range of CKD, this can be due to the BP, her age, dehydration or some her medications  We'll just repeat labs before her 6 month f/u and discuss at that time  Thank you! Orders Only on 01/10/2019   Component Date Value Ref Range Status    Total Cholesterol 01/10/2019 200* <200 mg/dL Final    HDL 01/10/2019 64  >50 mg/dL Final    Triglycerides 01/10/2019 164* <150 mg/dL Final    LDL Direct 01/10/2019 108* mg/dL (calc) Final    Comment: Reference range: <100     Desirable range <100 mg/dL for primary prevention;    <70 mg/dL for patients with CHD or diabetic patients   with > or = 2 CHD risk factors  LDL-C is now calculated using the Chapo-Trevizo   calculation, which is a validated novel method providing   better accuracy than the Friedewald equation in the   estimation of LDL-C  Alley Grayson  Adrianne Mcginnis  1219;996(81): [phone]   (http://BuyPlayWin/faq/ZEK386)      Chol HDLC Ratio 01/10/2019 3 1  <5 0 (calc) Final    Non-HDL Cholesterol 01/10/2019 136* <130 mg/dL (calc) Final    Comment: For patients with diabetes plus 1 major ASCVD risk   factor, treating to a non-HDL-C goal of <100 mg/dL   (LDL-C of <70 mg/dL) is considered a therapeutic   option        Glucose, Random 01/10/2019 111* 65 - 99 mg/dL Final    Comment:               Fasting reference interval     For someone without known diabetes, a glucose value  between 100 and 125 mg/dL is consistent with  prediabetes and should be confirmed with a  follow-up test          BUN 01/10/2019 24  7 - 25 mg/dL Final    Creatinine 01/10/2019 1 06* 0 60 - 0 93 mg/dL Final    Comment: For patients >52years of age, the reference limit  for Creatinine is approximately 13% higher for people  identified as -American   eGFR Non African American 01/10/2019 51* > OR = 60 mL/min/1 73m2 Final    SL AMB EGFR  01/10/2019 59* > OR = 60 mL/min/1 73m2 Final    SL AMB BUN/CREATININE RATIO 01/10/2019 23* 6 - 22 (calc) Final    Sodium 01/10/2019 142  135 - 146 mmol/L Final    Potassium 01/10/2019 4 7  3 5 - 5 3 mmol/L Final    Chloride 01/10/2019 104  98 - 110 mmol/L Final    CO2 01/10/2019 29  20 - 32 mmol/L Final    SL AMB CALCIUM 01/10/2019 9 9  8 6 - 10 4 mg/dL Final    Vitamin D, 25-Hydroxy, Serum 01/10/2019 35  30 - 100 ng/mL Final    Comment: Vitamin D Status         25-OH Vitamin D:     Deficiency:                    <20 ng/mL  Insufficiency:             20 - 29 ng/mL  Optimal:                 > or = 30 ng/mL     For 25-OH Vitamin D testing on patients on   D2-supplementation and patients for whom quantitation   of D2 and D3 fractions is required, the QuestAssureD(TM)  25-OH VIT D, (D2,D3), LC/MS/MS is recommended: order   code 81561 (patients >2yrs)  For more information on this test, go to:  http://[place]/faq/PCR446  (This link is being provided for   informational/educational purposes only )

## 2019-04-03 ENCOUNTER — TELEPHONE (OUTPATIENT)
Dept: FAMILY MEDICINE CLINIC | Facility: CLINIC | Age: 79
End: 2019-04-03

## 2019-04-03 DIAGNOSIS — N39.0 ACUTE UTI: Primary | ICD-10-CM

## 2019-04-03 RX ORDER — NITROFURANTOIN 25; 75 MG/1; MG/1
100 CAPSULE ORAL 2 TIMES DAILY
Qty: 10 CAPSULE | Refills: 0 | Status: SHIPPED | OUTPATIENT
Start: 2019-04-03 | End: 2019-04-08

## 2019-04-04 ENCOUNTER — APPOINTMENT (OUTPATIENT)
Dept: LAB | Facility: MEDICAL CENTER | Age: 79
End: 2019-04-04
Payer: COMMERCIAL

## 2019-04-04 PROCEDURE — 87086 URINE CULTURE/COLONY COUNT: CPT

## 2019-04-05 ENCOUNTER — TELEPHONE (OUTPATIENT)
Dept: FAMILY MEDICINE CLINIC | Facility: CLINIC | Age: 79
End: 2019-04-05

## 2019-04-05 LAB — BACTERIA UR CULT: NORMAL

## 2019-04-23 DIAGNOSIS — I10 ESSENTIAL HYPERTENSION: ICD-10-CM

## 2019-04-23 RX ORDER — LISINOPRIL 10 MG/1
10 TABLET ORAL DAILY
Qty: 90 TABLET | Refills: 1 | Status: SHIPPED | OUTPATIENT
Start: 2019-04-23 | End: 2019-07-08 | Stop reason: SDUPTHER

## 2019-06-03 ENCOUNTER — TRANSCRIBE ORDERS (OUTPATIENT)
Dept: ADMINISTRATIVE | Facility: HOSPITAL | Age: 79
End: 2019-06-03

## 2019-06-03 DIAGNOSIS — Z12.39 BREAST SCREENING: Primary | ICD-10-CM

## 2019-07-01 ENCOUNTER — TELEPHONE (OUTPATIENT)
Dept: FAMILY MEDICINE CLINIC | Facility: CLINIC | Age: 79
End: 2019-07-01

## 2019-07-01 ENCOUNTER — HOSPITAL ENCOUNTER (OUTPATIENT)
Dept: RADIOLOGY | Age: 79
Discharge: HOME | End: 2019-07-01
Payer: COMMERCIAL

## 2019-07-01 VITALS — BODY MASS INDEX: 36.19 KG/M2 | HEIGHT: 64 IN | WEIGHT: 212 LBS

## 2019-07-01 DIAGNOSIS — M81.0 AGE-RELATED OSTEOPOROSIS WITHOUT CURRENT PATHOLOGICAL FRACTURE: ICD-10-CM

## 2019-07-01 DIAGNOSIS — Z12.39 BREAST SCREENING: ICD-10-CM

## 2019-07-01 PROCEDURE — 77080 DXA BONE DENSITY AXIAL: CPT

## 2019-07-01 PROCEDURE — 77067 SCR MAMMO BI INCL CAD: CPT

## 2019-07-01 NOTE — TELEPHONE ENCOUNTER
There are labs already ordered in chart from her last visit 01/04/2019  Please print for patient    Thank you

## 2019-07-01 NOTE — TELEPHONE ENCOUNTER
Patient notified and will check with insurance to confirm labs will be covered prior to testing  Patient will  scripts in box

## 2019-07-01 NOTE — TELEPHONE ENCOUNTER
Patient is scheduled for FUP on 7/8/19 & is wondering is she needs to compelete any labs prior to appointment? If so print out orders   Patient goes to [facility] [phone]

## 2019-07-03 ENCOUNTER — ANNUAL EXAM (OUTPATIENT)
Dept: OBGYN CLINIC | Facility: MEDICAL CENTER | Age: 79
End: 2019-07-03
Payer: COMMERCIAL

## 2019-07-03 VITALS
SYSTOLIC BLOOD PRESSURE: 140 MMHG | BODY MASS INDEX: 36.02 KG/M2 | HEIGHT: 64 IN | WEIGHT: 211 LBS | DIASTOLIC BLOOD PRESSURE: 66 MMHG

## 2019-07-03 DIAGNOSIS — Z01.419 ENCOUNTER FOR GYNECOLOGICAL EXAMINATION (GENERAL) (ROUTINE) WITHOUT ABNORMAL FINDINGS: Primary | ICD-10-CM

## 2019-07-03 DIAGNOSIS — Z12.31 ENCOUNTER FOR SCREENING MAMMOGRAM FOR MALIGNANT NEOPLASM OF BREAST: ICD-10-CM

## 2019-07-03 PROCEDURE — G0101 CA SCREEN;PELVIC/BREAST EXAM: HCPCS | Performed by: OBSTETRICS & GYNECOLOGY

## 2019-07-03 NOTE — PROGRESS NOTES
Assessment/Plan: This 67-year-old patient is seen for her gyn evaluation  She has been off the oral or moans for about a year  Her hot flashes have increased since ending her oral hormone medication  Subjective:      Patient ID: Kendal Soulier is a 67 y o  female  This 67-year-old patient has had no vaginal bleeding or episodes of vaginitis over the past year  Since she has discontinued her oral hormone medication her hot flashes have flared up but only in the daytime  She sleeps about 7 hours at night  She has no chronic headaches or fainting spells  Her bowel function is normal and she does not use laxatives routinely or bleed with the bowels  She does not wear liners or pads due to urine stress incontinence and has had no urinary tract infections over the past year  Review of Systems   Constitutional: Negative  HENT: Negative  Eyes: Negative  Respiratory: Negative  Cardiovascular: Negative  Gastrointestinal: Negative  Endocrine: Negative  Genitourinary: Negative  Musculoskeletal: Negative  Skin: Negative  Allergic/Immunologic: Negative  Neurological: Negative  Hematological: Negative  Psychiatric/Behavioral: Negative  Objective:      /66 (BP Location: Left arm, Patient Position: Sitting, Cuff Size: Standard)   Ht 5' 4" (1 626 m)   Wt 95 7 kg (211 lb)   BMI 36 22 kg/m²          Physical Exam   Constitutional: She is oriented to person, place, and time  She appears well-developed and well-nourished  HENT:   Head: Normocephalic  Neck: Normal range of motion  Neck supple  Cardiovascular: Normal rate, regular rhythm, normal heart sounds and intact distal pulses  Pulmonary/Chest: Effort normal and breath sounds normal    Abdominal: Soft  Bowel sounds are normal    Genitourinary:   Genitourinary Comments: There is no uterus due to previous hysterectomy  Musculoskeletal: Normal range of motion     Neurological: She is alert and oriented to person, place, and time  Skin: Skin is warm and dry  Psychiatric: She has a normal mood and affect  Nursing note and vitals reviewed  breast exam is normal   Pelvic exam reveals the vaginal cuff to be well supported  No masses in the pelvis are identified  There is no blood or discharge in the vagina  The vulva is normal  Rectal exam shows no masses or blood in the rectum and no nodularity in the cul-de-sac

## 2019-07-03 NOTE — PATIENT INSTRUCTIONS
This 52-year-old patient was told that her breast and pelvic exam are normal  If hot flashes become overwhelming she will call about restarting oral arm own medication

## 2019-07-05 ENCOUNTER — APPOINTMENT (OUTPATIENT)
Dept: LAB | Facility: MEDICAL CENTER | Age: 79
End: 2019-07-05
Payer: COMMERCIAL

## 2019-07-05 DIAGNOSIS — E78.49 OTHER HYPERLIPIDEMIA: ICD-10-CM

## 2019-07-05 DIAGNOSIS — E55.9 VITAMIN D DEFICIENCY: ICD-10-CM

## 2019-07-05 DIAGNOSIS — I10 ESSENTIAL HYPERTENSION: ICD-10-CM

## 2019-07-05 LAB
25(OH)D3 SERPL-MCNC: 27.4 NG/ML (ref 30–100)
ANION GAP SERPL CALCULATED.3IONS-SCNC: 7 MMOL/L (ref 4–13)
BUN SERPL-MCNC: 20 MG/DL (ref 5–25)
CALCIUM SERPL-MCNC: 9.4 MG/DL (ref 8.3–10.1)
CHLORIDE SERPL-SCNC: 105 MMOL/L (ref 100–108)
CHOLEST SERPL-MCNC: 148 MG/DL (ref 50–200)
CO2 SERPL-SCNC: 24 MMOL/L (ref 21–32)
CREAT SERPL-MCNC: 0.96 MG/DL (ref 0.6–1.3)
GFR SERPL CREATININE-BSD FRML MDRD: 57 ML/MIN/1.73SQ M
GLUCOSE SERPL-MCNC: 97 MG/DL (ref 65–140)
HDLC SERPL-MCNC: 58 MG/DL (ref 40–60)
LDLC SERPL CALC-MCNC: 60 MG/DL (ref 0–100)
POTASSIUM SERPL-SCNC: 4.6 MMOL/L (ref 3.5–5.3)
SODIUM SERPL-SCNC: 136 MMOL/L (ref 136–145)
TRIGL SERPL-MCNC: 148 MG/DL

## 2019-07-05 PROCEDURE — 36415 COLL VENOUS BLD VENIPUNCTURE: CPT

## 2019-07-05 PROCEDURE — 80048 BASIC METABOLIC PNL TOTAL CA: CPT

## 2019-07-05 PROCEDURE — 80061 LIPID PANEL: CPT

## 2019-07-05 PROCEDURE — 82306 VITAMIN D 25 HYDROXY: CPT

## 2019-07-08 ENCOUNTER — OFFICE VISIT (OUTPATIENT)
Dept: FAMILY MEDICINE CLINIC | Facility: CLINIC | Age: 79
End: 2019-07-08
Payer: COMMERCIAL

## 2019-07-08 VITALS
HEART RATE: 77 BPM | OXYGEN SATURATION: 95 % | RESPIRATION RATE: 16 BRPM | TEMPERATURE: 98.2 F | DIASTOLIC BLOOD PRESSURE: 60 MMHG | HEIGHT: 65 IN | BODY MASS INDEX: 34.99 KG/M2 | WEIGHT: 210 LBS | SYSTOLIC BLOOD PRESSURE: 142 MMHG

## 2019-07-08 DIAGNOSIS — M81.0 AGE-RELATED OSTEOPOROSIS WITHOUT CURRENT PATHOLOGICAL FRACTURE: ICD-10-CM

## 2019-07-08 DIAGNOSIS — K21.9 GERD WITHOUT ESOPHAGITIS: ICD-10-CM

## 2019-07-08 DIAGNOSIS — E66.01 CLASS 2 SEVERE OBESITY DUE TO EXCESS CALORIES WITH SERIOUS COMORBIDITY AND BODY MASS INDEX (BMI) OF 35.0 TO 35.9 IN ADULT (HCC): ICD-10-CM

## 2019-07-08 DIAGNOSIS — I10 ESSENTIAL HYPERTENSION: Primary | ICD-10-CM

## 2019-07-08 DIAGNOSIS — M70.62 TROCHANTERIC BURSITIS OF LEFT HIP: ICD-10-CM

## 2019-07-08 DIAGNOSIS — N18.30 CKD (CHRONIC KIDNEY DISEASE) STAGE 3, GFR 30-59 ML/MIN (HCC): ICD-10-CM

## 2019-07-08 DIAGNOSIS — E55.9 VITAMIN D DEFICIENCY: ICD-10-CM

## 2019-07-08 DIAGNOSIS — E78.49 OTHER HYPERLIPIDEMIA: ICD-10-CM

## 2019-07-08 PROCEDURE — 99214 OFFICE O/P EST MOD 30 MIN: CPT | Performed by: FAMILY MEDICINE

## 2019-07-08 PROCEDURE — 20610 DRAIN/INJ JOINT/BURSA W/O US: CPT | Performed by: FAMILY MEDICINE

## 2019-07-08 RX ORDER — LISINOPRIL 20 MG/1
20 TABLET ORAL DAILY
Qty: 90 TABLET | Refills: 1 | Status: SHIPPED | OUTPATIENT
Start: 2019-07-08 | End: 2020-02-07

## 2019-07-08 RX ORDER — METHYLPREDNISOLONE ACETATE 40 MG/ML
40 INJECTION, SUSPENSION INTRA-ARTICULAR; INTRALESIONAL; INTRAMUSCULAR; SOFT TISSUE ONCE
Status: COMPLETED | OUTPATIENT
Start: 2019-07-08 | End: 2019-07-08

## 2019-07-08 RX ORDER — RANITIDINE 300 MG/1
300 CAPSULE ORAL EVERY EVENING
Qty: 90 CAPSULE | Refills: 0 | Status: SHIPPED | OUTPATIENT
Start: 2019-07-08 | End: 2019-10-01

## 2019-07-08 RX ORDER — ATORVASTATIN CALCIUM 40 MG/1
40 TABLET, FILM COATED ORAL DAILY
Qty: 90 TABLET | Refills: 1 | Status: SHIPPED | OUTPATIENT
Start: 2019-07-08 | End: 2020-01-13 | Stop reason: SDUPTHER

## 2019-07-08 RX ADMIN — METHYLPREDNISOLONE ACETATE 40 MG: 40 INJECTION, SUSPENSION INTRA-ARTICULAR; INTRALESIONAL; INTRAMUSCULAR; SOFT TISSUE at 10:02

## 2019-07-08 NOTE — PROGRESS NOTES
FAMILY MEDICINE PROGRESS NOTE  Jacklyn Daley 67 y o  female   DATE: July 8, 2019     ASSESSMENT and PLAN:  Jacklyn Dlaey is a 67 y o  female with:     Essential hypertension  BP Readings from Last 3 Encounters:   07/08/19 142/60   07/03/19 140/66   01/04/19 140/62     Lab Results   Component Value Date    CREATININE 0 96 07/05/2019    EGFR 57 07/05/2019     Controlled on current regimen: Lisinopril 10mg, increase to 20mg given borderline control and CKD3    CKD (chronic kidney disease) stage 3, GFR 30-59 ml/min ([facility])  Results from last 6 Months   Lab Units 07/05/19  0814 01/10/19  0813   BUN mg/dL 20 24   CREATININE mg/dL 0 96 1 06*   EGFR ml/min/1 73sq m 57  --      Avoid nephrotoxins, increase Lisinopril to 20mg    Other hyperlipidemia  Lab Results   Component Value Date    CHOLESTEROL 148 07/05/2019    HDL 58 07/05/2019    LDLC 108 (H) 01/10/2019    LDLCALC 60 07/05/2019    TRIG 148 07/05/2019     The 10-year ASCVD risk score (Efren Locke et al , 2013) is: 34%    Values used to calculate the score:      Age: 67 years      Sex: Female      Is Non- : No      Diabetic: No      Tobacco smoker: No      Systolic Blood Pressure: 638 mmHg      Is BP treated: Yes      HDL Cholesterol: 58 mg/dL      Total Cholesterol: 148 mg/dL  Reviewed healthy, low cholesterol diet, improved with daily statin  Continue Atorvastatin 40mg daily and ASA 81mg every other day    GERD without esophagitis  Only uses Omeprazole PRN, didn't do well with Pepcid 20mg BID PRN    Class 2 severe obesity due to excess calories with serious comorbidity and body mass index (BMI) of 35 0 to 35 9 in adult ([facility] [address] )  Wt Readings from Last 3 Encounters:   07/08/19 95 3 kg (210 lb)   07/03/19 95 7 kg (211 lb)   07/01/19 96 2 kg (212 lb)     BMI Counseling: Body mass index is 35 49 kg/m²  Discussed the patient's BMI with her  The BMI is above average  BMI counseling and education was provided to the patient   Nutrition recommendations include reducing portion sizes and consuming healthier snacks  Vitamin D deficiency  Vit D, 25-Hydroxy   Date Value Ref Range Status   07/05/2019 27 4 (L) 30 0 - 100 0 ng/mL Final     Increase from 1000 IU to 2000 IU      Age-related osteoporosis without current pathological fracture  7/1/19- normal DEXA, does not have osteoporosis    Trochanteric bursitis of left hip  CS injection as per procedure note below  Likely bursitis, but if doesn't improve, then check hip Xray    Large joint arthrocentesis: L greater trochanteric bursa  Date/Time: 7/8/2019 8:24 AM  Consent given by: patient  Site marked: site marked  Timeout: Immediately prior to procedure a time out was called to verify the correct patient, procedure, equipment, support staff and site/side marked as required   Supporting Documentation  Indications: joint swelling   Procedure Details  Location: hip - L greater trochanteric bursa  Preparation: Patient was prepped and draped in the usual sterile fashion  Needle size: 22 G  Ultrasound guidance: no  Approach: lateral    Patient tolerance: patient tolerated the procedure well with no immediate complications        RTC 4 months for lab and BP f/u    SUBJECTIVE:  Paula Akers is a 67 y o  female who presents today with a chief complaint of Follow-up  Paula Akers is here for a 6 month follow-up and to review labs    The active chronic medical problems and medications are as below:   1  HLD- started on statin every day 6 months ago and LDL and cholesterol have improved as below  No NOAH with daily dosing  2  Osteoporosis- normal DEXA recently  3  HTN- borderline controlled, borderline kidney function  4  Vit D def- low, currently taking PO supplements  5  GERD- didn't do well with pepcid PRN, still gets rebound symptoms    Acute concerns: left hip pain, mostly in her left thigh, she feels it more when she lays on it  It feels like bad toothache, but improves with movement      Review of Systems   Eyes: Negative for visual disturbance  Respiratory: Negative for cough and shortness of breath  Cardiovascular: Negative for chest pain and palpitations  Neurological: Negative for dizziness and light-headedness  I have reviewed the patient's Past Medical History  OBJECTIVE:  /60   Pulse 77   Temp 98 2 °F (36 8 °C)   Resp 16   Ht 5' 4 5" (1 638 m)   Wt 95 3 kg (210 lb)   SpO2 95%   BMI 35 49 kg/m²    Physical Exam   Constitutional: She appears well-developed and well-nourished  No distress  obese   Cardiovascular: Normal rate, regular rhythm and normal heart sounds  Pulmonary/Chest: Effort normal and breath sounds normal  No respiratory distress  She has no wheezes  She has no rales  Musculoskeletal: She exhibits tenderness  She exhibits no edema  Left hip: She exhibits tenderness  She exhibits normal range of motion and normal strength  Legs:  Skin: She is not diaphoretic  Vitals reviewed  Appointment on 07/05/2019   Component Date Value Ref Range Status    Sodium 07/05/2019 136  136 - 145 mmol/L Final    Potassium 07/05/2019 4 6  3 5 - 5 3 mmol/L Final    Chloride 07/05/2019 105  100 - 108 mmol/L Final    CO2 07/05/2019 24  21 - 32 mmol/L Final    ANION GAP 07/05/2019 7  4 - 13 mmol/L Final    BUN 07/05/2019 20  5 - 25 mg/dL Final    Creatinine 07/05/2019 0 96  0 60 - 1 30 mg/dL Final    Standardized to IDMS reference method    Glucose 07/05/2019 97  65 - 140 mg/dL Final      If the patient is fasting, the ADA then defines impaired fasting glucose as > 100 mg/dL and diabetes as > or equal to 123 mg/dL  Specimen collection should occur prior to Sulfasalazine administration due to the potential for falsely depressed results  Specimen collection should occur prior to Sulfapyridine administration due to the potential for falsely elevated results      Calcium 07/05/2019 9 4  8 3 - 10 1 mg/dL Final    eGFR 07/05/2019 57  ml/min/1 73sq m Final    Cholesterol 07/05/2019 148  50 - 200 mg/dL Final      Cholesterol:       Desirable         <200 mg/dl       Borderline         200-239 mg/dl       High              >239           Triglycerides 07/05/2019 148  <=150 mg/dL Final      Triglyceride:     Normal          <150 mg/dl     Borderline High 150-199 mg/dl     High            200-499 mg/dl        Very High       >499 mg/dl    Specimen collection should occur prior to N-Acetylcysteine or Metamizole administration due to the potential for falsely depressed results   HDL, Direct 07/05/2019 58  40 - 60 mg/dL Final      HDL Cholesterol:       High    >60 mg/dL       Low     <41 mg/dL  Specimen collection should occur prior to Metamizole administration due to the potential for falsley depressed results   LDL Calculated 07/05/2019 60  0 - 100 mg/dL Final      LDL Cholesterol:     Optimal           <100 mg/dl     Near Optimal      100-129 mg/dl     Above Optimal       Borderline High 130-159 mg/dl       High            160-189 mg/dl       Very High       >189 mg/dl         This screening LDL is a calculated result  It does not have the accuracy of the Direct Measured LDL in the monitoring of patients with hyperlipidemia and/or statin therapy  Direct Measure LDL (XHR794) must be ordered separately in these patients   Vit D, 25-Hydroxy 07/05/2019 27 4* 30 0 - 100 0 ng/mL Final       Yung Luu MD    Note: Portions of the record have been created with voice recognition software  Occasional wrong word or "sound a like" substitutions may have occurred due to the inherent limitations of voice recognition software  Read the chart carefully and recognize, using context, where substitutions have occurred

## 2019-07-08 NOTE — ASSESSMENT & PLAN NOTE
Wt Readings from Last 3 Encounters:   07/08/19 95 3 kg (210 lb)   07/03/19 95 7 kg (211 lb)   07/01/19 96 2 kg (212 lb)     BMI Counseling: Body mass index is 35 49 kg/m²  Discussed the patient's BMI with her  The BMI is above average  BMI counseling and education was provided to the patient  Nutrition recommendations include reducing portion sizes and consuming healthier snacks

## 2019-07-08 NOTE — ASSESSMENT & PLAN NOTE
Vit D, 25-Hydroxy   Date Value Ref Range Status   07/05/2019 27 4 (L) 30 0 - 100 0 ng/mL Final     Increase from 1000 IU to 2000 IU

## 2019-07-08 NOTE — ASSESSMENT & PLAN NOTE
Lab Results   Component Value Date    CHOLESTEROL 148 07/05/2019    HDL 58 07/05/2019    LDLC 108 (H) 01/10/2019    LDLCALC 60 07/05/2019    TRIG 148 07/05/2019     The 10-year ASCVD risk score (Romel Taveras et al , 2013) is: 34%    Values used to calculate the score:      Age: 67 years      Sex: Female      Is Non- : No      Diabetic: No      Tobacco smoker: No      Systolic Blood Pressure: 771 mmHg      Is BP treated: Yes      HDL Cholesterol: 58 mg/dL      Total Cholesterol: 148 mg/dL  Reviewed healthy, low cholesterol diet, improved with daily statin  Continue Atorvastatin 40mg daily and ASA 81mg every other day

## 2019-07-08 NOTE — PATIENT INSTRUCTIONS
I recommend that you take either Ergocalciferol (Vit D) 2000 units daily with Calcium carbonate 1200mg daily or Calcium citrate 1200mg daily

## 2019-07-08 NOTE — ASSESSMENT & PLAN NOTE
Results from last 6 Months   Lab Units 07/05/19  0814 01/10/19  0813   BUN mg/dL 20 24   CREATININE mg/dL 0 96 1 06*   EGFR ml/min/1 73sq m 57  --      Avoid nephrotoxins, increase Lisinopril to 20mg

## 2019-07-09 ENCOUNTER — TELEPHONE (OUTPATIENT)
Dept: FAMILY MEDICINE CLINIC | Facility: CLINIC | Age: 79
End: 2019-07-09

## 2019-07-09 NOTE — TELEPHONE ENCOUNTER
Patient was seen yesterday and got a steroid injection in her hip  She is scheduled to give platelets on Thursday  She is wondering if her getting the injection will effect her platelets?  Please advise    Grecia Gold [phone]

## 2019-07-25 ENCOUNTER — OFFICE VISIT (OUTPATIENT)
Dept: FAMILY MEDICINE CLINIC | Facility: CLINIC | Age: 79
End: 2019-07-25
Payer: COMMERCIAL

## 2019-07-25 VITALS
OXYGEN SATURATION: 97 % | RESPIRATION RATE: 16 BRPM | DIASTOLIC BLOOD PRESSURE: 64 MMHG | SYSTOLIC BLOOD PRESSURE: 130 MMHG | BODY MASS INDEX: 35.32 KG/M2 | HEART RATE: 70 BPM | TEMPERATURE: 98.1 F | WEIGHT: 209 LBS

## 2019-07-25 DIAGNOSIS — R07.89 CHEST WALL DISCOMFORT: Primary | ICD-10-CM

## 2019-07-25 DIAGNOSIS — L91.8 SKIN TAG: ICD-10-CM

## 2019-07-25 PROCEDURE — 99214 OFFICE O/P EST MOD 30 MIN: CPT | Performed by: FAMILY MEDICINE

## 2019-07-25 PROCEDURE — 17110 DESTRUCTION B9 LES UP TO 14: CPT | Performed by: FAMILY MEDICINE

## 2019-07-25 NOTE — PROGRESS NOTES
FAMILY MEDICINE PROGRESS NOTE  Rick Gonsalves 67 y o  female   DATE: July 25, 2019     ASSESSMENT and PLAN:  Rick Gonsalves is a 67 y o  female with:     1  Chest wall discomfort  Likely MSK in origin, reviewed supportive care  But given family history of CAD and possible angina, check NM Stress test  Pt unable to do treadmill stress test    - NM myocardial perfusion spect (rx stress and/or rest); Future    2  Skin tag  Lesion Destruction  Date/Time: 7/25/2019 2:57 PM  Performed by: Semaj Ibanez MD  Authorized by: Tony Ibanez MD     Procedure Details - Lesion Destruction:     Number of Lesions:  6    7 through 14    Lesion 1:     Body area:  Head/neck    Head/neck location:  Neck    Initial size (mm):  2    Final defect size (mm):  2    Malignancy: benign lesion      Destruction method: cryotherapy    Lesion 2:     Body area:  Head/neck    Head/neck location:  Neck    Initial size (mm):  1    Final defect size (mm):  1    Malignancy: benign lesion      Destruction method: cryotherapy    Lesion 3:     Body area:  Head/neck    Head/neck location:  Neck    Initial size (mm):  1    Final defect size (mm):  1    Malignancy: benign lesion      Destruction method: cryotherapy    Lesion 4:     Body area:  Head/neck    Head/neck location:  Neck    Initial size (mm):  2    Final defect size (mm):  2    Malignancy: benign lesion      Destruction method: cryotherapy    Lesion 5:     Body area:  Head/neck    Head/neck location:  Neck    Inital size (mm):  1    Final defect size (mm):  1    Malignancy: benign lesion      Destruction method: cryotherapy    Lesion 6:     Body area:  Head/neck    Head/neck location:  Neck    Initial size (mm):  1    Final defect size (mm):  1    Malignancy: benign lesion      Destruction method: cryotherapy       Lesion 7 Body Area: neck  Initial Size (mm): 1  Final Defect size (mm): 1  Malignancy type: benign  Skin cancer type: benign  Destruction Method: Cryotherapy (liquid nitrogen)  Cosmetic: No     Lesion 8 Body Area: neck  Initial Size (mm): 1  Final Defect size (mm): 1  Malignancy type: benign  Skin cancer type: benign  Destruction Method: Cryotherapy (liquid nitrogen)  Cosmetic: No     Lesion 9 Body Area: neck  Initial Size (mm): 1  Final Defect size (mm): 1  Malignancy type: benign  Skin cancer type: benign  Destruction Method: Cryotherapy (liquid nitrogen)  Cosmetic: No     Lesion 10 Body Area: neck  Initial Size (mm): 1  Final Defect size (mm): 1  Malignancy type: benign  Skin cancer type: benign  Destruction Method: Cryotherapy (liquid nitrogen)  Cosmetic: No     Lesion 11 Body Area: neck  Initial Size (mm): 1  Final Defect size (mm): 1  Malignancy type: benign  Skin cancer type: benign  Destruction Method: Cryotherapy (liquid nitrogen)  Cosmetic: No     Lesion 12 Body Area: neck  Initial Size (mm): 1  Final Defect size (mm): 1  Malignancy type: benign  Skin cancer type: benign  Destruction Method: Cryotherapy (liquid nitrogen)  Cosmetic: No           Patient agreeable with the plan and expressed understanding  I discucssed signs and symptoms for which to RTC, go to ER or seek urgent medical care  SUBJECTIVE:  Sangeeta Acuña is a 67 y o  female who presents today with a chief complaint of Chest Pain (no pain, is a burning sensation)  Yesterday, pt had an episode of "burning" in her chest when she was picking up sticks in her yard  The sensation was in her midsternum, non-radiating  She says it is not a pain or pressure  It lasted 15 minutes  It resolves with sitting and a glass of water  Then again, it happened a few times today when she made her bed and then again when she went up one flight of stairs  These episodes lasted <5 minutes and self-resolved  Denies LH, dizziness, HA, blurry vision, SOB, GI complaints  Normal stress test in 2016  She also has irritated skin lesions on her neck that keep getting caught on her necklace and her clothes and it gets sore  Review of Systems   Constitutional: Negative for diaphoresis, fatigue and fever  Respiratory: Positive for chest tightness  Negative for cough and shortness of breath  Cardiovascular: Negative for chest pain and palpitations  I have reviewed the patient's Past Medical History  OBJECTIVE:  /64   Pulse 70   Temp 98 1 °F (36 7 °C)   Resp 16   Wt 94 8 kg (209 lb)   SpO2 97%   BMI 35 32 kg/m²    Physical Exam   Constitutional: She appears well-developed and well-nourished  No distress  obese   Neck:       Cardiovascular: Normal rate, regular rhythm and normal heart sounds  Pulmonary/Chest: Effort normal and breath sounds normal  No respiratory distress  She has no wheezes  She has no rales  Skin: She is not diaphoretic  Vitals reviewed  Keven Emily Gurrola MD    Note: Portions of the record have been created with voice recognition software  Occasional wrong word or "sound a like" substitutions may have occurred due to the inherent limitations of voice recognition software  Read the chart carefully and recognize, using context, where substitutions have occurred

## 2019-07-31 ENCOUNTER — APPOINTMENT (EMERGENCY)
Dept: RADIOLOGY | Facility: HOSPITAL | Age: 79
DRG: 281 | End: 2019-07-31
Payer: COMMERCIAL

## 2019-07-31 ENCOUNTER — HOSPITAL ENCOUNTER (INPATIENT)
Facility: HOSPITAL | Age: 79
LOS: 1 days | DRG: 281 | End: 2019-08-01
Attending: EMERGENCY MEDICINE | Admitting: INTERNAL MEDICINE
Payer: COMMERCIAL

## 2019-07-31 ENCOUNTER — APPOINTMENT (OUTPATIENT)
Dept: NON INVASIVE DIAGNOSTICS | Facility: HOSPITAL | Age: 79
DRG: 281 | End: 2019-07-31
Payer: COMMERCIAL

## 2019-07-31 DIAGNOSIS — R77.8 ELEVATED TROPONIN: Primary | ICD-10-CM

## 2019-07-31 DIAGNOSIS — R07.9 CHEST PAIN: ICD-10-CM

## 2019-07-31 PROBLEM — I21.4 NSTEMI, INITIAL EPISODE OF CARE (HCC): Status: ACTIVE | Noted: 2019-07-31

## 2019-07-31 PROBLEM — I16.1 HYPERTENSIVE EMERGENCY: Status: ACTIVE | Noted: 2019-07-31

## 2019-07-31 LAB
ALBUMIN SERPL BCP-MCNC: 3.7 G/DL (ref 3.5–5)
ALP SERPL-CCNC: 65 U/L (ref 46–116)
ALT SERPL W P-5'-P-CCNC: 36 U/L (ref 12–78)
ANION GAP SERPL CALCULATED.3IONS-SCNC: 10 MMOL/L (ref 4–13)
APTT PPP: 31 SECONDS (ref 23–37)
APTT PPP: 75 SECONDS (ref 23–37)
AST SERPL W P-5'-P-CCNC: 28 U/L (ref 5–45)
ATRIAL RATE: 72 BPM
BASOPHILS # BLD AUTO: 0.04 THOUSANDS/ΜL (ref 0–0.1)
BASOPHILS NFR BLD AUTO: 1 % (ref 0–1)
BILIRUB SERPL-MCNC: 0.6 MG/DL (ref 0.2–1)
BUN SERPL-MCNC: 19 MG/DL (ref 5–25)
CALCIUM SERPL-MCNC: 9 MG/DL (ref 8.3–10.1)
CHLORIDE SERPL-SCNC: 106 MMOL/L (ref 100–108)
CO2 SERPL-SCNC: 26 MMOL/L (ref 21–32)
CREAT SERPL-MCNC: 0.95 MG/DL (ref 0.6–1.3)
EOSINOPHIL # BLD AUTO: 0.16 THOUSAND/ΜL (ref 0–0.61)
EOSINOPHIL NFR BLD AUTO: 2 % (ref 0–6)
ERYTHROCYTE [DISTWIDTH] IN BLOOD BY AUTOMATED COUNT: 14 % (ref 11.6–15.1)
GFR SERPL CREATININE-BSD FRML MDRD: 58 ML/MIN/1.73SQ M
GLUCOSE SERPL-MCNC: 113 MG/DL (ref 65–140)
HCT VFR BLD AUTO: 37.5 % (ref 34.8–46.1)
HGB BLD-MCNC: 11.9 G/DL (ref 11.5–15.4)
IMM GRANULOCYTES # BLD AUTO: 0.02 THOUSAND/UL (ref 0–0.2)
IMM GRANULOCYTES NFR BLD AUTO: 0 % (ref 0–2)
INR PPP: 1.03 (ref 0.84–1.19)
LIPASE SERPL-CCNC: 110 U/L (ref 73–393)
LYMPHOCYTES # BLD AUTO: 1.67 THOUSANDS/ΜL (ref 0.6–4.47)
LYMPHOCYTES NFR BLD AUTO: 25 % (ref 14–44)
MCH RBC QN AUTO: 30.3 PG (ref 26.8–34.3)
MCHC RBC AUTO-ENTMCNC: 31.7 G/DL (ref 31.4–37.4)
MCV RBC AUTO: 95 FL (ref 82–98)
MONOCYTES # BLD AUTO: 0.84 THOUSAND/ΜL (ref 0.17–1.22)
MONOCYTES NFR BLD AUTO: 12 % (ref 4–12)
NEUTROPHILS # BLD AUTO: 4.03 THOUSANDS/ΜL (ref 1.85–7.62)
NEUTS SEG NFR BLD AUTO: 60 % (ref 43–75)
NRBC BLD AUTO-RTO: 0 /100 WBCS
P AXIS: 53 DEGREES
PLATELET # BLD AUTO: 228 THOUSANDS/UL (ref 149–390)
PMV BLD AUTO: 10.6 FL (ref 8.9–12.7)
POTASSIUM SERPL-SCNC: 4.5 MMOL/L (ref 3.5–5.3)
PR INTERVAL: 272 MS
PROT SERPL-MCNC: 7.1 G/DL (ref 6.4–8.2)
PROTHROMBIN TIME: 12.9 SECONDS (ref 11.6–14.5)
QRS AXIS: -5 DEGREES
QRSD INTERVAL: 86 MS
QT INTERVAL: 370 MS
QTC INTERVAL: 405 MS
RBC # BLD AUTO: 3.93 MILLION/UL (ref 3.81–5.12)
SODIUM SERPL-SCNC: 142 MMOL/L (ref 136–145)
T WAVE AXIS: 65 DEGREES
TROPONIN I SERPL-MCNC: 0.22 NG/ML
TROPONIN I SERPL-MCNC: 0.27 NG/ML
TROPONIN I SERPL-MCNC: 0.28 NG/ML
TROPONIN I SERPL-MCNC: 0.32 NG/ML
TROPONIN I SERPL-MCNC: 0.35 NG/ML
TSH SERPL DL<=0.05 MIU/L-ACNC: 1.92 UIU/ML (ref 0.36–3.74)
VENTRICULAR RATE: 72 BPM
WBC # BLD AUTO: 6.76 THOUSAND/UL (ref 4.31–10.16)

## 2019-07-31 PROCEDURE — 80053 COMPREHEN METABOLIC PANEL: CPT | Performed by: EMERGENCY MEDICINE

## 2019-07-31 PROCEDURE — 99203 OFFICE O/P NEW LOW 30 MIN: CPT | Performed by: INTERNAL MEDICINE

## 2019-07-31 PROCEDURE — 84484 ASSAY OF TROPONIN QUANT: CPT | Performed by: EMERGENCY MEDICINE

## 2019-07-31 PROCEDURE — 85730 THROMBOPLASTIN TIME PARTIAL: CPT | Performed by: INTERNAL MEDICINE

## 2019-07-31 PROCEDURE — 99285 EMERGENCY DEPT VISIT HI MDM: CPT

## 2019-07-31 PROCEDURE — 99226 PR SBSQ OBSERVATION CARE/DAY 35 MINUTES: CPT | Performed by: INTERNAL MEDICINE

## 2019-07-31 PROCEDURE — 93306 TTE W/DOPPLER COMPLETE: CPT | Performed by: INTERNAL MEDICINE

## 2019-07-31 PROCEDURE — 84443 ASSAY THYROID STIM HORMONE: CPT | Performed by: EMERGENCY MEDICINE

## 2019-07-31 PROCEDURE — 36415 COLL VENOUS BLD VENIPUNCTURE: CPT | Performed by: EMERGENCY MEDICINE

## 2019-07-31 PROCEDURE — 93005 ELECTROCARDIOGRAM TRACING: CPT

## 2019-07-31 PROCEDURE — 84484 ASSAY OF TROPONIN QUANT: CPT | Performed by: INTERNAL MEDICINE

## 2019-07-31 PROCEDURE — 71046 X-RAY EXAM CHEST 2 VIEWS: CPT

## 2019-07-31 PROCEDURE — 85025 COMPLETE CBC W/AUTO DIFF WBC: CPT | Performed by: EMERGENCY MEDICINE

## 2019-07-31 PROCEDURE — 83690 ASSAY OF LIPASE: CPT | Performed by: EMERGENCY MEDICINE

## 2019-07-31 PROCEDURE — 93306 TTE W/DOPPLER COMPLETE: CPT

## 2019-07-31 PROCEDURE — 93010 ELECTROCARDIOGRAM REPORT: CPT | Performed by: INTERNAL MEDICINE

## 2019-07-31 PROCEDURE — 85610 PROTHROMBIN TIME: CPT | Performed by: INTERNAL MEDICINE

## 2019-07-31 PROCEDURE — 99285 EMERGENCY DEPT VISIT HI MDM: CPT | Performed by: EMERGENCY MEDICINE

## 2019-07-31 RX ORDER — HEPARIN SODIUM 10000 [USP'U]/100ML
3-20 INJECTION, SOLUTION INTRAVENOUS
Status: DISCONTINUED | OUTPATIENT
Start: 2019-07-31 | End: 2019-08-01 | Stop reason: HOSPADM

## 2019-07-31 RX ORDER — FAMOTIDINE 20 MG/1
40 TABLET, FILM COATED ORAL
Status: DISCONTINUED | OUTPATIENT
Start: 2019-07-31 | End: 2019-08-01 | Stop reason: HOSPADM

## 2019-07-31 RX ORDER — ATORVASTATIN CALCIUM 40 MG/1
40 TABLET, FILM COATED ORAL EVERY EVENING
Status: DISCONTINUED | OUTPATIENT
Start: 2019-07-31 | End: 2019-07-31

## 2019-07-31 RX ORDER — LATANOPROST 50 UG/ML
1 SOLUTION/ DROPS OPHTHALMIC DAILY
Status: DISCONTINUED | OUTPATIENT
Start: 2019-07-31 | End: 2019-08-01 | Stop reason: HOSPADM

## 2019-07-31 RX ORDER — HEPARIN SODIUM 1000 [USP'U]/ML
4000 INJECTION, SOLUTION INTRAVENOUS; SUBCUTANEOUS AS NEEDED
Status: DISCONTINUED | OUTPATIENT
Start: 2019-07-31 | End: 2019-07-31 | Stop reason: SDUPTHER

## 2019-07-31 RX ORDER — HEPARIN SODIUM 1000 [USP'U]/ML
4000 INJECTION, SOLUTION INTRAVENOUS; SUBCUTANEOUS ONCE
Status: DISCONTINUED | OUTPATIENT
Start: 2019-07-31 | End: 2019-07-31 | Stop reason: SDUPTHER

## 2019-07-31 RX ORDER — MELATONIN
1000 DAILY
Status: DISCONTINUED | OUTPATIENT
Start: 2019-07-31 | End: 2019-08-01 | Stop reason: HOSPADM

## 2019-07-31 RX ORDER — ASPIRIN 81 MG/1
81 TABLET ORAL EVERY OTHER DAY
Status: DISCONTINUED | OUTPATIENT
Start: 2019-07-31 | End: 2019-08-01 | Stop reason: HOSPADM

## 2019-07-31 RX ORDER — HEPARIN SODIUM 1000 [USP'U]/ML
2000 INJECTION, SOLUTION INTRAVENOUS; SUBCUTANEOUS AS NEEDED
Status: DISCONTINUED | OUTPATIENT
Start: 2019-07-31 | End: 2019-08-01 | Stop reason: HOSPADM

## 2019-07-31 RX ORDER — LISINOPRIL 20 MG/1
20 TABLET ORAL DAILY
Status: DISCONTINUED | OUTPATIENT
Start: 2019-07-31 | End: 2019-08-01 | Stop reason: HOSPADM

## 2019-07-31 RX ORDER — HEPARIN SODIUM 1000 [USP'U]/ML
4000 INJECTION, SOLUTION INTRAVENOUS; SUBCUTANEOUS AS NEEDED
Status: DISCONTINUED | OUTPATIENT
Start: 2019-07-31 | End: 2019-08-01 | Stop reason: HOSPADM

## 2019-07-31 RX ORDER — CLOPIDOGREL BISULFATE 75 MG/1
600 TABLET ORAL ONCE
Status: COMPLETED | OUTPATIENT
Start: 2019-07-31 | End: 2019-07-31

## 2019-07-31 RX ORDER — HEPARIN SODIUM 1000 [USP'U]/ML
2000 INJECTION, SOLUTION INTRAVENOUS; SUBCUTANEOUS AS NEEDED
Status: DISCONTINUED | OUTPATIENT
Start: 2019-07-31 | End: 2019-07-31 | Stop reason: SDUPTHER

## 2019-07-31 RX ORDER — HEPARIN SODIUM 10000 [USP'U]/100ML
3-20 INJECTION, SOLUTION INTRAVENOUS
Status: DISCONTINUED | OUTPATIENT
Start: 2019-07-31 | End: 2019-07-31 | Stop reason: SDUPTHER

## 2019-07-31 RX ORDER — ATORVASTATIN CALCIUM 40 MG/1
40 TABLET, FILM COATED ORAL
Status: DISCONTINUED | OUTPATIENT
Start: 2019-07-31 | End: 2019-08-01 | Stop reason: HOSPADM

## 2019-07-31 RX ORDER — ATORVASTATIN CALCIUM 40 MG/1
40 TABLET, FILM COATED ORAL DAILY
Status: DISCONTINUED | OUTPATIENT
Start: 2019-07-31 | End: 2019-07-31

## 2019-07-31 RX ORDER — VIT A/VIT C/VIT E/ZINC/COPPER 4296-226
2 CAPSULE ORAL DAILY
Status: DISCONTINUED | OUTPATIENT
Start: 2019-07-31 | End: 2019-07-31

## 2019-07-31 RX ORDER — CLOPIDOGREL BISULFATE 75 MG/1
75 TABLET ORAL DAILY
Status: DISCONTINUED | OUTPATIENT
Start: 2019-08-01 | End: 2019-08-01 | Stop reason: HOSPADM

## 2019-07-31 RX ORDER — LABETALOL 20 MG/4 ML (5 MG/ML) INTRAVENOUS SYRINGE
10 EVERY 6 HOURS PRN
Status: DISCONTINUED | OUTPATIENT
Start: 2019-07-31 | End: 2019-08-01 | Stop reason: HOSPADM

## 2019-07-31 RX ORDER — ASPIRIN 81 MG/1
324 TABLET, CHEWABLE ORAL ONCE
Status: COMPLETED | OUTPATIENT
Start: 2019-07-31 | End: 2019-07-31

## 2019-07-31 RX ADMIN — ASPIRIN 81 MG 324 MG: 81 TABLET ORAL at 09:27

## 2019-07-31 RX ADMIN — METOPROLOL TARTRATE 25 MG: 25 TABLET, FILM COATED ORAL at 21:36

## 2019-07-31 RX ADMIN — FAMOTIDINE 40 MG: 20 TABLET ORAL at 21:36

## 2019-07-31 RX ADMIN — CLOPIDOGREL BISULFATE 600 MG: 75 TABLET ORAL at 13:22

## 2019-07-31 RX ADMIN — HEPARIN SODIUM AND DEXTROSE 11.1 UNITS/KG/HR: 10000; 5 INJECTION INTRAVENOUS at 15:08

## 2019-07-31 RX ADMIN — ENOXAPARIN SODIUM 40 MG: 40 INJECTION SUBCUTANEOUS at 13:35

## 2019-07-31 RX ADMIN — METOPROLOL TARTRATE 25 MG: 25 TABLET, FILM COATED ORAL at 13:22

## 2019-07-31 NOTE — PLAN OF CARE
Problem: CARDIOVASCULAR - ADULT  Goal: Maintains optimal cardiac output and hemodynamic stability  Description  INTERVENTIONS:  - Monitor I/O, vital signs and rhythm  - Monitor for S/S and trends of decreased cardiac output i e  bleeding, hypotension  - Administer and titrate ordered vasoactive medications to optimize hemodynamic stability  - Assess quality of pulses, skin color and temperature  - Assess for signs of decreased coronary artery perfusion - ex  Angina  - Instruct patient to report change in severity of symptoms  Outcome: Progressing  Goal: Absence of cardiac dysrhythmias or at baseline rhythm  Description  INTERVENTIONS:  - Continuous cardiac monitoring, monitor vital signs, obtain 12 lead EKG if indicated  - Administer antiarrhythmic and heart rate control medications as ordered  - Monitor electrolytes and administer replacement therapy as ordered  Outcome: Progressing     Problem: PAIN - ADULT  Goal: Verbalizes/displays adequate comfort level or baseline comfort level  Description  Interventions:  - Encourage patient to monitor pain and request assistance  - Assess pain using appropriate pain scale  - Administer analgesics based on type and severity of pain and evaluate response  - Implement non-pharmacological measures as appropriate and evaluate response  - Consider cultural and social influences on pain and pain management  - Notify physician/advanced practitioner if interventions unsuccessful or patient reports new pain  Outcome: Progressing     Problem: SAFETY ADULT  Goal: Patient will remain free of falls  Description  INTERVENTIONS:  - Assess patient frequently for physical needs  -  Identify cognitive and physical deficits and behaviors that affect risk of falls    -  [place] fall precautions as indicated by assessment   - Educate patient/family on patient safety including physical limitations  - Instruct patient to call for assistance with activity based on assessment  - Modify environment to reduce risk of injury  - Consider OT/PT consult to assist with strengthening/mobility  Outcome: Progressing  Goal: Maintain or return to baseline ADL function  Description  INTERVENTIONS:  -  Assess patient's ability to carry out ADLs; assess patient's baseline for ADL function and identify physical deficits which impact ability to perform ADLs (bathing, care of mouth/teeth, toileting, grooming, dressing, etc )  - Assess/evaluate cause of self-care deficits   - Assess range of motion  - Assess patient's mobility; develop plan if impaired  - Assess patient's need for assistive devices and provide as appropriate  - Encourage maximum independence but intervene and supervise when necessary  ¯ Involve family in performance of ADLs  ¯ Assess for home care needs following discharge   ¯ Request OT consult to assist with ADL evaluation and planning for discharge  ¯ Provide patient education as appropriate  Outcome: Progressing  Goal: Maintain or return mobility status to optimal level  Description  INTERVENTIONS:  - Assess patient's baseline mobility status (ambulation, transfers, stairs, etc )    - Identify cognitive and physical deficits and behaviors that affect mobility  - Identify mobility aids required to assist with transfers and/or ambulation (gait belt, sit-to-stand, lift, walker, cane, etc )  - [place] fall precautions as indicated by assessment  - Record patient progress and toleration of activity level on Mobility SBAR; progress patient to next Phase/Stage  - Instruct patient to call for assistance with activity based on assessment  - Request Rehabilitation consult to assist with strengthening/weightbearing, etc   Outcome: Progressing     Problem: DISCHARGE PLANNING  Goal: Discharge to home or other facility with appropriate resources  Description  INTERVENTIONS:  - Identify barriers to discharge w/patient and caregiver  - Arrange for needed discharge resources and transportation as appropriate  - Identify discharge learning needs (meds, wound care, etc )  - Arrange for interpretive services to assist at discharge as needed  - Refer to Case Management Department for coordinating discharge planning if the patient needs post-hospital services based on physician/advanced practitioner order or complex needs related to functional status, cognitive ability, or social support system  Outcome: Progressing     Problem: Knowledge Deficit  Goal: Patient/family/caregiver demonstrates understanding of disease process, treatment plan, medications, and discharge instructions  Description  Complete learning assessment and assess knowledge base    Interventions:  - Provide teaching at level of understanding  - Provide teaching via preferred learning methods  Outcome: Progressing

## 2019-07-31 NOTE — ASSESSMENT & PLAN NOTE
· POA, several weeks history intermittent episodes of exertional burning chest sensation  · Relieved with rest troponin elevated on arrival at 0 22 -> 0 32  · No known cardiac history  Scheduled as outpatient for stress test on 8/7/19 due to symptoms  · Admit telemetry, continue serial troponin, Plavix load ordered, heparin infusion, continue statin, add metoprolol, continue lisinopril for blood pressure  · Patient will likely require cardiac catheterization    Cardiology consulted, discussed with Dr Paige Forbes  · Echocardiogram ordered, check hemoglobin A1c

## 2019-07-31 NOTE — H&P
H&P- Donald Mcfarland 1940, 67 y o  female MRN: [id number]    Unit/Bed#: -01 Encounter: [id number]    Primary Care Provider: [place] DEDRA Cárdenas MD   Date and time admitted to hospital: 7/31/2019  8:09 AM        * NSTEMI, initial episode of care [facility])  Assessment & Plan  · POA, several weeks history intermittent episodes of exertional burning chest sensation  · Relieved with rest troponin elevated on arrival at 0 22 -> 0 32  · No known cardiac history  Scheduled as outpatient for stress test on 8/7/19 due to symptoms  · Admit telemetry, continue serial troponin, Plavix load ordered, heparin infusion, continue statin, add metoprolol, continue lisinopril for blood pressure  · Patient will likely require cardiac catheterization  Cardiology consulted, discussed with Dr Baldemar Carrasco  · Echocardiogram ordered, check hemoglobin A1c    Hypertensive emergency  Assessment & Plan  · Known history of essential hypertension on lisinopril at home  Elevated blood pressure also likely contributing to above  · Continue home dose lisinopril, add metoprolol    Class 2 severe obesity due to excess calories with serious comorbidity and body mass index (BMI) of 35 0 to 35 9 in adult [facility])  Assessment & Plan  · Recommend therapeutic lifestyle changes to promote weight loss      VTE Prophylaxis: Heparin Drip      Code Status:  Full code    POLST: POLST form is not discussed and not completed at this time  Anticipated Length of Stay:  Patient will be admitted on an Observation basis with an anticipated length of stay of  > 2 midnights  Justification for Hospital Stay:  Non ST elevation MI    Chief Complaint:     Burning sensation in the chest    History of Present Illness:  Donald Mcfarland is a 67 y o  female who presents with what she describes as burning in her upper chest   Patient reports over the last several weeks she has had episodes of burning in the upper anterior chest area    Symptoms typically occur on exertion and is relieved with rest   She saw her primary care physician and was told symptoms were possibly musculoskeletal in nature  Patient has continued to have symptoms  She denies any radiation, no associated shortness of breath, no palpitations, no nausea, vomiting or diaphoresis  She was scheduled to have an outpatient stress test done on 8/7/19  Symptoms however got worse today during routine activity  This prompted her presentation to the ED  In the ED, initial blood work showed mildly elevated troponin at 0 22  At the time of my evaluation, she denies any chest pain  She denies any prior history of heart disease, no diabetes  She is on lisinopril for essential hypertension  Review of Systems   Constitutional: Negative  HENT: Negative  Respiratory: Negative  Cardiovascular: Positive for chest pain  Negative for palpitations and leg swelling  Gastrointestinal: Negative  Genitourinary: Negative  Musculoskeletal: Negative  Neurological: Negative for dizziness, syncope and light-headedness  Psychiatric/Behavioral: Negative  All other systems reviewed and are negative        Past Medical History:   Diagnosis Date    GERD (gastroesophageal reflux disease) 7/1/2017    HLD (hyperlipidemia) 7/1/2017    Hypertension     Ventral hernia without obstruction or gangrene 7/1/2017       Past Surgical History:   Procedure Laterality Date    GALLBLADDER SURGERY      HEMORRHOID SURGERY      HYSTERECTOMY  1987    age 56 w/ left oopherectomy    NOSE SURGERY      basal cell    OOPHORECTOMY Left 1987    age 56    OR LAP,CHOLECYSTECTOMY N/A 8/25/2016    Procedure: LAPAROSCOPIC CHOLECYSTECTOMY ;  Surgeon: Fay Samuel MD;  [facility];  Service: General    [address] [place] [address] N/A 11/30/2017    Procedure: INCISIONAL HERNIA REPAIR;  Surgeon: Fay Samuel MD;  [facility];  Service: General    ROTATOR CUFF REPAIR Right        Family History:  Family History   Problem Relation Age of Onset    No Known Problems Mother     No Known Problems Father     No Known Problems Daughter     No Known Problems Maternal Grandmother     No Known Problems Maternal Grandfather     No Known Problems Paternal Grandmother     No Known Problems Paternal Grandfather     No Known Problems Half-Sister     No Known Problems Maternal Aunt     Squamous cell carcinoma Brother     No Known Problems Son        Home Meds:  all medications and allergies reviewed    Allergies: Allergies   Allergen Reactions    Codeine      Reaction Date: 19Jul2011;     Other      darvocet         Marital Status:      Social History     Substance and Sexual Activity   Alcohol Use Yes    Comment: socially     Social History     Tobacco Use   Smoking Status Never Smoker   Smokeless Tobacco Never Used     Social History     Substance and Sexual Activity   Drug Use No           Physical Exam:   Vitals:   Blood Pressure: 160/68 (07/31/19 1432)  Pulse: 60 (07/31/19 1214)  Temperature: 98 1 °F (36 7 °C) (07/31/19 1214)  Temp Source: Oral (07/31/19 1214)  Respirations: 18 (07/31/19 1214)  Height: 5' 4" (162 6 cm) (07/31/19 1214)  Weight - Scale: 94 9 kg (209 lb 3 2 oz) (07/31/19 1214)  SpO2: 98 % (07/31/19 1214)    Physical Exam   Constitutional: She is oriented to person, place, and time  She appears well-developed and well-nourished  No distress  HENT:   Head: Normocephalic and atraumatic  Eyes: EOM are normal  Right eye exhibits no discharge  Left eye exhibits no discharge  No scleral icterus  Neck: Normal range of motion  Neck supple  Cardiovascular: Normal rate, regular rhythm and normal heart sounds  No murmur heard  Pulmonary/Chest: Effort normal and breath sounds normal  No respiratory distress  She has no wheezes  She has no rales  Abdominal: Soft  Bowel sounds are normal  She exhibits no distension and no mass  There is no tenderness  Musculoskeletal: Normal range of motion   She exhibits no edema, tenderness or deformity  Neurological: She is alert and oriented to person, place, and time  Skin: Skin is warm and dry  She is not diaphoretic  Psychiatric: She has a normal mood and affect  Her behavior is normal    Vitals reviewed  Lab Results: I have personally reviewed pertinent reports  Results from last 7 days   Lab Units 07/31/19  0829   WBC Thousand/uL 6 76   HEMOGLOBIN g/dL 11 9   HEMATOCRIT % 37 5   PLATELETS Thousands/uL 228   NEUTROS PCT % 60   LYMPHS PCT % 25   MONOS PCT % 12   EOS PCT % 2     Results from last 7 days   Lab Units 07/31/19  0829   POTASSIUM mmol/L 4 5   CHLORIDE mmol/L 106   CO2 mmol/L 26   BUN mg/dL 19   CREATININE mg/dL 0 95   CALCIUM mg/dL 9 0   ALK PHOS U/L 65   ALT U/L 36   AST U/L 28           Imaging: I have personally reviewed pertinent reports  Xr Chest 2 Views    Result Date: 7/31/2019  Narrative: CHEST INDICATION:   Chest pain  COMPARISON:  Chest x-ray 7/1/2017 EXAM PERFORMED/VIEWS:  XR CHEST PA & LATERAL FINDINGS: Cardiomediastinal silhouette appears unremarkable  There is aortic knob calcification  The lungs are clear  No pneumothorax or pleural effusion  There are degenerative changes of the spine  Impression: No acute cardiopulmonary disease  Workstation performed: [id number]       EKG, Pathology, and Other Studies Reviewed on Admission:   · Normal sinus rhythm, nonspecific ST changes inferior leads    ** Please Note: Dragon 360 Dictation voice to text software may have been used in the creation of this document   **

## 2019-07-31 NOTE — PLAN OF CARE
Problem: CARDIOVASCULAR - ADULT  Goal: Maintains optimal cardiac output and hemodynamic stability  Description  INTERVENTIONS:  - Monitor I/O, vital signs and rhythm  - Monitor for S/S and trends of decreased cardiac output i e  bleeding, hypotension  - Administer and titrate ordered vasoactive medications to optimize hemodynamic stability  - Assess quality of pulses, skin color and temperature  - Assess for signs of decreased coronary artery perfusion - ex  Angina  - Instruct patient to report change in severity of symptoms  Outcome: Progressing  Goal: Absence of cardiac dysrhythmias or at baseline rhythm  Description  INTERVENTIONS:  - Continuous cardiac monitoring, monitor vital signs, obtain 12 lead EKG if indicated  - Administer antiarrhythmic and heart rate control medications as ordered  - Monitor electrolytes and administer replacement therapy as ordered  Outcome: Progressing     Problem: PAIN - ADULT  Goal: Verbalizes/displays adequate comfort level or baseline comfort level  Description  Interventions:  - Encourage patient to monitor pain and request assistance  - Assess pain using appropriate pain scale  - Administer analgesics based on type and severity of pain and evaluate response  - Implement non-pharmacological measures as appropriate and evaluate response  - Consider cultural and social influences on pain and pain management  - Notify physician/advanced practitioner if interventions unsuccessful or patient reports new pain  Outcome: Progressing     Problem: SAFETY ADULT  Goal: Patient will remain free of falls  Description  INTERVENTIONS:  - Assess patient frequently for physical needs  -  Identify cognitive and physical deficits and behaviors that affect risk of falls    -  [place] fall precautions as indicated by assessment   - Educate patient/family on patient safety including physical limitations  - Instruct patient to call for assistance with activity based on assessment  - Modify environment to reduce risk of injury  - Consider OT/PT consult to assist with strengthening/mobility  Outcome: Progressing  Goal: Maintain or return to baseline ADL function  Description  INTERVENTIONS:  -  Assess patient's ability to carry out ADLs; assess patient's baseline for ADL function and identify physical deficits which impact ability to perform ADLs (bathing, care of mouth/teeth, toileting, grooming, dressing, etc )  - Assess/evaluate cause of self-care deficits   - Assess range of motion  - Assess patient's mobility; develop plan if impaired  - Assess patient's need for assistive devices and provide as appropriate  - Encourage maximum independence but intervene and supervise when necessary  ¯ Involve family in performance of ADLs  ¯ Assess for home care needs following discharge   ¯ Request OT consult to assist with ADL evaluation and planning for discharge  ¯ Provide patient education as appropriate  Outcome: Progressing  Goal: Maintain or return mobility status to optimal level  Description  INTERVENTIONS:  - Assess patient's baseline mobility status (ambulation, transfers, stairs, etc )    - Identify cognitive and physical deficits and behaviors that affect mobility  - Identify mobility aids required to assist with transfers and/or ambulation (gait belt, sit-to-stand, lift, walker, cane, etc )  - [place] fall precautions as indicated by assessment  - Record patient progress and toleration of activity level on Mobility SBAR; progress patient to next Phase/Stage  - Instruct patient to call for assistance with activity based on assessment  - Request Rehabilitation consult to assist with strengthening/weightbearing, etc   Outcome: Progressing     Problem: DISCHARGE PLANNING  Goal: Discharge to home or other facility with appropriate resources  Description  INTERVENTIONS:  - Identify barriers to discharge w/patient and caregiver  - Arrange for needed discharge resources and transportation as appropriate  - Identify discharge learning needs (meds, wound care, etc )  - Arrange for interpretive services to assist at discharge as needed  - Refer to Case Management Department for coordinating discharge planning if the patient needs post-hospital services based on physician/advanced practitioner order or complex needs related to functional status, cognitive ability, or social support system  Outcome: Progressing     Problem: Knowledge Deficit  Goal: Patient/family/caregiver demonstrates understanding of disease process, treatment plan, medications, and discharge instructions  Description  Complete learning assessment and assess knowledge base    Interventions:  - Provide teaching at level of understanding  - Provide teaching via preferred learning methods  Outcome: Progressing

## 2019-07-31 NOTE — DISCHARGE INSTRUCTIONS
Do not lift more than 10 lbs for one week after your catheterization  No tub baths for one week after your catheterization  You may shower, keeping your incision site clean and dry

## 2019-07-31 NOTE — ASSESSMENT & PLAN NOTE
· Known history of essential hypertension on lisinopril at home    Elevated blood pressure also likely contributing to above  · Continue home dose lisinopril, add metoprolol

## 2019-07-31 NOTE — ED PROVIDER NOTES
History  Chief Complaint   Patient presents with    Chest Pain       History provided by:  Patient   used: No    Chest Pain   Pain location:  R chest and L chest  Pain quality: burning    Pain radiates to:  Does not radiate  Pain radiates to the back: no    Pain severity:  Moderate  Onset quality:  Gradual  Timing:  Intermittent  Progression:  Resolved  Chronicity:  Recurrent  Context: movement and at rest    Relieved by:  None tried  Worsened by:  Nothing tried  Ineffective treatments:  None tried  Associated symptoms: no abdominal pain, no back pain, no diaphoresis, no dizziness, no dysphagia, no fatigue, no fever, no nausea, no palpitations, no shortness of breath, not vomiting and no weakness    Risk factors: high cholesterol and hypertension        Prior to Admission Medications   Prescriptions Last Dose Informant Patient Reported? Taking? Co-Enzyme Q-10 100 MG CAPS 7/31/2019 at Unknown time Self Yes Yes   Sig: Take 200 mg by mouth daily  Multiple Vitamins-Minerals (PRESERVISION AREDS) CAPS 7/31/2019 at Unknown time Self Yes Yes   Sig: Take 2 capsules by mouth daily   PX GLUCOSAMINE-CHONDROITIN PO 7/31/2019 at Unknown time Self Yes Yes   Sig: Take 2 tablets by mouth daily  Zoster Vac Recomb Adjuvanted 50 MCG SUSR  Self No No   Sig: Inject 50 mcg into the shoulder, thigh, or buttocks as needed (imm) for up to 1 dose   aspirin 81 MG tablet 7/31/2019 at Unknown time Self Yes Yes   Sig: Take 81 mg by mouth every other day  atorvastatin (LIPITOR) 40 mg tablet 7/31/2019 at Unknown time  No Yes   Sig: Take 1 tablet (40 mg total) by mouth daily   b complex vitamins tablet 7/31/2019 at Unknown time Self Yes Yes   Sig: Take 1 tablet by mouth daily     cholecalciferol (VITAMIN D3) 1,000 units tablet 7/31/2019 at Unknown time Self Yes Yes   Sig: Take 1,000 Units by mouth daily   latanoprost (XALATAN) 0 005 % ophthalmic solution 7/31/2019 at Unknown time Self Yes Yes   Sig: Administer 1 drop to both eyes daily   lisinopril (ZESTRIL) 20 mg tablet 7/31/2019 at Unknown time  No Yes   Sig: Take 1 tablet (20 mg total) by mouth daily   ranitidine (ZANTAC) 300 MG capsule 7/31/2019 at Unknown time  No Yes   Sig: Take 1 capsule (300 mg total) by mouth every evening      Facility-Administered Medications: None       Past Medical History:   Diagnosis Date    GERD (gastroesophageal reflux disease) 7/1/2017    HLD (hyperlipidemia) 7/1/2017    Hypertension     Ventral hernia without obstruction or gangrene 7/1/2017       Past Surgical History:   Procedure Laterality Date    GALLBLADDER SURGERY      HEMORRHOID SURGERY      HYSTERECTOMY  1987    age 56 w/ left oopherectomy    NOSE SURGERY      basal cell    OOPHORECTOMY Left 1987    age 56    [place] LAP,CHOLECYSTECTOMY N/A 8/25/2016    Procedure: LAPAROSCOPIC CHOLECYSTECTOMY ;  Surgeon: Connie Zhou MD;  [facility];  Service: General    [address] [place] [address] N/A 11/30/2017    Procedure: Janna Britton;  Surgeon: Connie Zhou MD;  [facility];  Service: General    ROTATOR CUFF REPAIR Right        Family History   Problem Relation Age of Onset    No Known Problems Mother     No Known Problems Father     No Known Problems Daughter     No Known Problems Maternal Grandmother     No Known Problems Maternal Grandfather     No Known Problems Paternal Grandmother     No Known Problems Paternal Grandfather     No Known Problems Half-Sister     No Known Problems Maternal Aunt     Squamous cell carcinoma Brother     No Known Problems Son      I have reviewed and agree with the history as documented  Social History     Tobacco Use    Smoking status: Never Smoker    Smokeless tobacco: Never Used   Substance Use Topics    Alcohol use: Yes     Comment: socially    Drug use: No        Review of Systems   Constitutional: Negative for activity change, appetite change, chills, diaphoresis, fatigue and fever     HENT: Negative for congestion, dental problem, ear discharge, facial swelling, nosebleeds, rhinorrhea, sinus pressure and trouble swallowing  Eyes: Negative for photophobia, discharge, itching and visual disturbance  Respiratory: Negative for choking, chest tightness and shortness of breath  Cardiovascular: Positive for chest pain  Negative for palpitations and leg swelling  Gastrointestinal: Negative for abdominal distention, abdominal pain, constipation, diarrhea, nausea and vomiting  Endocrine: Negative for polydipsia and polyphagia  Genitourinary: Negative for decreased urine volume, difficulty urinating, dysuria, flank pain, frequency and hematuria  Musculoskeletal: Negative for back pain, gait problem, joint swelling, neck pain and neck stiffness  Skin: Negative for color change and rash  Neurological: Negative for dizziness, facial asymmetry, speech difficulty, weakness and light-headedness  Psychiatric/Behavioral: Negative for agitation and behavioral problems  The patient is not nervous/anxious and is not hyperactive  All other systems reviewed and are negative  Physical Exam  Physical Exam   Constitutional: She is oriented to person, place, and time  She appears well-developed and well-nourished  No distress  HENT:   Head: Normocephalic and atraumatic  Eyes: Pupils are equal, round, and reactive to light  EOM are normal    Neck: Normal range of motion  Neck supple  Cardiovascular: Normal rate, regular rhythm and normal heart sounds  No murmur heard  Pulmonary/Chest: Effort normal and breath sounds normal  No respiratory distress  She has no wheezes  She has no rales  Abdominal: Soft  Bowel sounds are normal  She exhibits no distension  There is no tenderness  There is no rebound and no guarding  Musculoskeletal: Normal range of motion  She exhibits no edema or deformity  Lymphadenopathy:     She has no cervical adenopathy     Neurological: She is alert and oriented to person, place, and time  No cranial nerve deficit  She exhibits normal muscle tone  Coordination normal    Skin: Capillary refill takes less than 2 seconds  No rash noted  No erythema  Psychiatric: She has a normal mood and affect  Her behavior is normal    Nursing note and vitals reviewed        Vital Signs  ED Triage Vitals   Temperature Pulse Respirations Blood Pressure SpO2   07/31/19 0813 07/31/19 0810 07/31/19 0810 07/31/19 0810 07/31/19 0810   98 1 °F (36 7 °C) 77 16 (!) 184/80 99 %      Temp Source Heart Rate Source Patient Position - Orthostatic VS BP Location FiO2 (%)   07/31/19 0813 07/31/19 0810 07/31/19 0810 07/31/19 0810 --   Oral Monitor Sitting Right arm       Pain Score       07/31/19 0835       7           Vitals:    07/31/19 1152 07/31/19 1214 07/31/19 1432 07/31/19 1453   BP: (!) 177/77 (!) 216/76 160/68 152/50   Pulse: 62 60  59   Patient Position - Orthostatic VS: Lying Sitting Lying Lying         Visual Acuity      ED Medications  Medications   aspirin (ECOTRIN LOW STRENGTH) EC tablet 81 mg (81 mg Oral Refused 7/31/19 1249)   latanoprost (XALATAN) 0 005 % ophthalmic solution 1 drop (0 drops Both Eyes Hold 7/31/19 1251)   cholecalciferol (VITAMIN D3) tablet 1,000 Units (1,000 Units Oral Refused 7/31/19 1250)   famotidine (PEPCID) tablet 40 mg (has no administration in time range)   lisinopril (ZESTRIL) tablet 20 mg (20 mg Oral Refused 7/31/19 1251)   clopidogrel (PLAVIX) tablet 600 mg (600 mg Oral Given 7/31/19 1322)     And   clopidogrel (PLAVIX) tablet 75 mg (has no administration in time range)   metoprolol tartrate (LOPRESSOR) tablet 25 mg (25 mg Oral Given 7/31/19 1322)   Labetalol HCl (NORMODYNE) injection 10 mg (has no administration in time range)   atorvastatin (LIPITOR) tablet 40 mg (has no administration in time range)   multivitamin stress formula tablet 1 tablet (1 tablet Oral Refused 7/31/19 1336)   heparin (porcine) 25,000 units in 250 mL infusion (premix) (11 1 Units/kg/hr × 90 kg (Order-Specific) Intravenous New Bag 7/31/19 1508)   heparin (porcine) injection 4,000 Units (has no administration in time range)   heparin (porcine) injection 2,000 Units (has no administration in time range)   aspirin chewable tablet 324 mg (324 mg Oral Given 7/31/19 0927)       Diagnostic Studies  Results Reviewed     Procedure Component Value Units Date/Time    Troponin I [[id number]]  (Abnormal) Collected:  07/31/19 1140    Lab Status:  Final result Specimen:  Blood from Arm, Right Updated:  07/31/19 1206     Troponin I 0 32 ng/mL     TSH [[id number]]  (Normal) Collected:  07/31/19 0829    Lab Status:  Final result Specimen:  Blood from Hand, Right Updated:  07/31/19 0903     TSH 3RD GENERATON 1 919 uIU/mL     Narrative:       Patients undergoing fluorescein dye angiography may retain small amounts of fluorescein in the body for 48-72 hours post procedure  Samples containing fluorescein can produce falsely depressed TSH values  If the patient had this procedure,a specimen should be resubmitted post fluorescein clearance        Lipase [[id number]]  (Normal) Collected:  07/31/19 0829    Lab Status:  Final result Specimen:  Blood from Hand, Right Updated:  07/31/19 0903     Lipase 110 u/L     Troponin I [[id number]]  (Abnormal) Collected:  07/31/19 0829    Lab Status:  Final result Specimen:  Blood from Arm, Right Updated:  07/31/19 0856     Troponin I 0 22 ng/mL     Comprehensive metabolic panel [[id number]] Collected:  07/31/19 0829    Lab Status:  Final result Specimen:  Blood from Hand, Right Updated:  07/31/19 0855     Sodium 142 mmol/L      Potassium 4 5 mmol/L      Chloride 106 mmol/L      CO2 26 mmol/L      ANION GAP 10 mmol/L      BUN 19 mg/dL      Creatinine 0 95 mg/dL      Glucose 113 mg/dL      Calcium 9 0 mg/dL      AST 28 U/L      ALT 36 U/L      Alkaline Phosphatase 65 U/L      Total Protein 7 1 g/dL      Albumin 3 7 g/dL      Total Bilirubin 0 60 mg/dL      eGFR 58 ml/min/1 73sq m     Narrative: National Kid[employer] Foundation guidelines for Chronic Kidney Disease (CKD):     Stage 1 with normal or high GFR (GFR > 90 mL/min/1 73 square meters)    Stage 2 Mild CKD (GFR = 60-89 mL/min/1 73 square meters)    Stage 3A Moderate CKD (GFR = 45-59 mL/min/1 73 square meters)    Stage 3B Moderate CKD (GFR = 30-44 mL/min/1 73 square meters)    Stage 4 Severe CKD (GFR = 15-29 mL/min/1 73 square meters)    Stage 5 End Stage CKD (GFR <15 mL/min/1 73 square meters)  Note: GFR calculation is accurate only with a steady state creatinine    CBC and differential [[id number]] Collected:  07/31/19 0829    Lab Status:  Final result Specimen:  Blood from Hand, Right Updated:  07/31/19 0839     WBC 6 76 Thousand/uL      RBC 3 93 Million/uL      Hemoglobin 11 9 g/dL      Hematocrit 37 5 %      MCV 95 fL      MCH 30 3 pg      MCHC 31 7 g/dL      RDW 14 0 %      MPV 10 6 fL      Platelets 027 Thousands/uL      nRBC 0 /100 WBCs      Neutrophils Relative 60 %      Immat GRANS % 0 %      Lymphocytes Relative 25 %      Monocytes Relative 12 %      Eosinophils Relative 2 %      Basophils Relative 1 %      Neutrophils Absolute 4 03 Thousands/µL      Immature Grans Absolute 0 02 Thousand/uL      Lymphocytes Absolute 1 67 Thousands/µL      Monocytes Absolute 0 84 Thousand/µL      Eosinophils Absolute 0 16 Thousand/µL      Basophils Absolute 0 04 Thousands/µL                  XR chest 2 views   ED Interpretation by Etienne Santos MD (07/31 6055)   No acute disease      Final Result by Ina Ardon MD (07/31 3305)      No acute cardiopulmonary disease              Workstation performed: [id number]                    Procedures  ECG 12 Lead Documentation Only  Date/Time: 7/31/2019 8:13 AM  Performed by: Etienne Santos MD  Authorized by: Etienne Santos MD     Indications / Diagnosis:  Chest pain  Patient location:  ED  Previous ECG:     Previous ECG:  Compared to current    Comparison to cardiac monitor: No    Interpretation: Interpretation: non-specific    Rate:     ECG rate:  72  Rhythm:     Rhythm: sinus rhythm    Ectopy:     Ectopy: none    QRS:     QRS axis:  Left    QRS intervals:  Normal  Conduction:     Conduction: abnormal      Abnormal conduction: 1st degree    ST segments:     ST segments:  Normal  T waves:     T waves: flattening      Flattening:  V2 and V3           ED Course                               MDM  Number of Diagnoses or Management Options  Chest pain: new and requires workup  Elevated troponin: new and requires workup  Diagnosis management comments: Patient with intermittent burning chest pain which has been present over past several days  Saw her PCP and was scheduled to undergo a stress test in roughly 1 week  Burning chest pain lasted for longer today the patient came to the ER  Upon arrival patient mildly hypertensive  She overall appears well  No diaphoresis  EKG with normal sinus rhythm  T-wave flattening in V2/V3 as compared to previous  Initial troponin 0 22  Patient re-evaluated  No additional chest pain  Aspirin given  No indication for heparin at this time as patient has no ongoing ischemic chest pain  Chest x-ray with no acute findings  Patient admitted to hospitalist service for chest pain observation in setting of chest pain and elevated troponin         Amount and/or Complexity of Data Reviewed  Clinical lab tests: ordered and reviewed  Tests in the radiology section of CPT®: ordered and reviewed  Tests in the medicine section of CPT®: reviewed and ordered  Discuss the patient with other providers: yes    Risk of Complications, Morbidity, and/or Mortality  Presenting problems: high  Diagnostic procedures: moderate  Management options: high    Patient Progress  Patient progress: improved      Disposition  Final diagnoses:   Elevated troponin   Chest pain     Time reflects when diagnosis was documented in both MDM as applicable and the Disposition within this note     Time User Action Codes Description Comment    7/31/2019 10:58 AM Gamaliel Carr Add [R74 8] Elevated troponin     7/31/2019 10:58 AM Gamaliel Carr Add [R07 9] Chest pain     7/31/2019 12:37 PM Titus Burn A Modify [R07 9] Chest pain       ED Disposition     ED Disposition Condition Date/Time Comment    Admit Stable Wed Jul 31, 2019 10:58 AM Case was discussed with Dr Fleet Jeans and the patient's admission status was agreed to be Admission Status: observation status to the service of Dr Fleet Jeans   Follow-up Information    None         Current Discharge Medication List      CONTINUE these medications which have NOT CHANGED    Details   aspirin 81 MG tablet Take 81 mg by mouth every other day  atorvastatin (LIPITOR) 40 mg tablet Take 1 tablet (40 mg total) by mouth daily  Qty: 90 tablet, Refills: 1    Associated Diagnoses: Other hyperlipidemia      b complex vitamins tablet Take 1 tablet by mouth daily  cholecalciferol (VITAMIN D3) 1,000 units tablet Take 1,000 Units by mouth daily      Co-Enzyme Q-10 100 MG CAPS Take 200 mg by mouth daily  latanoprost (XALATAN) 0 005 % ophthalmic solution Administer 1 drop to both eyes daily  Refills: 3      lisinopril (ZESTRIL) 20 mg tablet Take 1 tablet (20 mg total) by mouth daily  Qty: 90 tablet, Refills: 1    Associated Diagnoses: Essential hypertension      Multiple Vitamins-Minerals (PRESERVISION AREDS) CAPS Take 2 capsules by mouth daily      PX GLUCOSAMINE-CHONDROITIN PO Take 2 tablets by mouth daily  ranitidine (ZANTAC) 300 MG capsule Take 1 capsule (300 mg total) by mouth every evening  Qty: 90 capsule, Refills: 0    Associated Diagnoses: GERD without esophagitis      Zoster Vac Recomb Adjuvanted 50 MCG SUSR Inject 50 mcg into the shoulder, thigh, or buttocks as needed (imm) for up to 1 dose  Qty: 1 each, Refills: 0    Associated Diagnoses: Need for shingles vaccine           No discharge procedures on file      ED Provider  Electronically Signed by           Shaista Pulido Noelle Monte MD  07/31/19 0894

## 2019-07-31 NOTE — CONSULTS
Consultation - Cardiology Team One  Ariel Saxena 67 y o  female MRN: [id number]  Unit/Bed#: -01 Encounter: [id number]    Inpatient consult to Cardiology  Consult performed by: KYLE Short  Consult ordered by: Masha Horn MD      Physician Requesting Consult: Masha Horn MD  Reason for Consult / Principal Problem:  Chest pain      Assessment/ Plan    1  Chest pain with elevated troponin likely type I MI   Troponin 0 22/0 32, continue to trend  Reviewed ECG showing normal sinus rhythm with no acute ischemic changes  Echocardiogram pending  Start heparin drip  Patient was loaded Plavix 600 mg  Continue aspirin, beta-blocker and statin  Plan for cardiac catheterization tomorrow    2  Accelerated hypertension-  BP as elevated as 216/76  BP taken manually in room and was 160/72  Patient on lisinopril 20 mg PO daily and metoprolol 25 mg PO BID at home  Will continue  Continue to monitor  If BP elevated can change metoprolol to coreg    History of Present Illness   HPI: Ariel Saxena is a 67y o  year old female who has a history of hypertension, hyperlipidemia and GERD  She does not follow with a cardiologist   She has no history known CAD, heart failure or dysrhythmias  She presents to [facility] 2019 with complaint chest pain  Patient reports for the past several weeks having intermittent chest discomfort described as burning sensation in her anterior chest wall without radiation or associated symptoms  She saw her PCP last week and was scheduled for an outpatient nuclear stress test  Patient reports this morning her symptoms worsened  She reports increased burning in her chest with minimal exertion  Again no associated symptoms  She denies recent illness  No fever or chills  She lives at home independently  She denies tobacco abuse or alcohol use  She is fairly active   She has a family history of father  from sudden cardiac arrest in his 62s, brother had CAD s/p CABG in his early 76s and mother had hypertension  EKG reviewed personally:  Normal sinus rhythm  Ventricular rate 72 beats per minute  RI interval 272 milliseconds  QRS D interval 86 milliseconds  QT interval 370 milliseconds  QTC interval 405 milliseconds    Telemetry reviewed personally:   Normal sinus rhythm HR 70s    Review of Systems   Constitution: Negative for chills and fever  Cardiovascular: Negative for chest pain, dyspnea on exertion, leg swelling, orthopnea and palpitations  Respiratory: Negative for shortness of breath  Musculoskeletal: Negative for falls  Gastrointestinal: Negative for bloating, nausea and vomiting  Neurological: Negative for dizziness and light-headedness  Psychiatric/Behavioral: Negative for altered mental status       Historical Information   Past Medical History:   Diagnosis Date    GERD (gastroesophageal reflux disease) 7/1/2017    HLD (hyperlipidemia) 7/1/2017    Hypertension     Ventral hernia without obstruction or gangrene 7/1/2017     Past Surgical History:   Procedure Laterality Date    GALLBLADDER SURGERY      HEMORRHOID SURGERY      HYSTERECTOMY  1987    age 56 w/ left oopherectomy    NOSE SURGERY      basal cell    OOPHORECTOMY Left 1987    age 56    RI LAP,CHOLECYSTECTOMY N/A 8/25/2016    Procedure: LAPAROSCOPIC CHOLECYSTECTOMY ;  Surgeon: Brigido Goldman MD;  [facility];  Service: General    RI REPAIR INCISIONAL HERNIA,REDUCIBLE N/A 11/30/2017    Procedure: Heather Natarajan;  Surgeon: Brigido Goldman MD;  [facility];  Service: General    ROTATOR CUFF REPAIR Right      Social History     Substance and Sexual Activity   Alcohol Use Yes    Comment: socially     Social History     Substance and Sexual Activity   Drug Use No     Social History     Tobacco Use   Smoking Status Never Smoker   Smokeless Tobacco Never Used     Family History:   Family History   Problem Relation Age of Onset    No Known Problems Mother     No Known Problems Father     No Known Problems Daughter     No Known Problems Maternal Grandmother     No Known Problems Maternal Grandfather     No Known Problems Paternal Grandmother     No Known Problems Paternal Grandfather     No Known Problems Half-Sister     No Known Problems Maternal Aunt     Squamous cell carcinoma Brother     No Known Problems Son        Meds/Allergies   all current active meds have been reviewed and current meds:   Current Facility-Administered Medications   Medication Dose Route Frequency    aspirin (ECOTRIN LOW STRENGTH) EC tablet 81 mg  81 mg Oral Every Other Day    atorvastatin (LIPITOR) tablet 40 mg  40 mg Oral Daily With Dinner    cholecalciferol (VITAMIN D3) tablet 1,000 Units  1,000 Units Oral Daily    [START ON 2019] clopidogrel (PLAVIX) tablet 75 mg  75 mg Oral Daily    enoxaparin (LOVENOX) subcutaneous injection 40 mg  40 mg Subcutaneous Daily    famotidine (PEPCID) tablet 40 mg  40 mg Oral HS    Labetalol HCl (NORMODYNE) injection 10 mg  10 mg Intravenous Q6H PRN    latanoprost (XALATAN) 0 005 % ophthalmic solution 1 drop  1 drop Both Eyes Daily    lisinopril (ZESTRIL) tablet 20 mg  20 mg Oral Daily    metoprolol tartrate (LOPRESSOR) tablet 25 mg  25 mg Oral Q12H [address]    multivitamin stress formula tablet 1 tablet  1 tablet Oral Daily          Allergies   Allergen Reactions    Codeine      Reaction Date: 2011;     Other      darvocet       Objective   Vitals: Blood pressure (!) 216/76, pulse 60, temperature 98 1 °F (36 7 °C), temperature source Oral, resp  rate 18, height 5' 4" (1 626 m), weight 94 9 kg (209 lb 3 2 oz), SpO2 98 %, not currently breastfeeding ,     Body mass index is 35 91 kg/m²  ,     Systolic (62VQG), UOB:348 , Min:174 , IS     Diastolic (98KKD), EQU:94, Min:72, Max:80    No intake or output data in the 24 hours ending 19 1350  Weight (last 2 days)     Date/Time   Weight    19 1214   94 9 (209 2)    19 0810   95 8 (211 2) Invasive Devices     Peripheral Intravenous Line            Peripheral IV 07/31/19 Right Hand less than 1 day              Physical Exam   Constitutional: She is oriented to person, place, and time  No distress  HENT:   Head: Normocephalic  Neck: Neck supple  Cardiovascular: Normal rate, regular rhythm, normal heart sounds and intact distal pulses  Pulmonary/Chest: Effort normal and breath sounds normal  No stridor  No respiratory distress  She exhibits no tenderness  Abdominal: Soft  Bowel sounds are normal    Musculoskeletal: She exhibits no edema  Neurological: She is alert and oriented to person, place, and time  Skin: Skin is warm and dry  She is not diaphoretic  Psychiatric: She has a normal mood and affect   Her behavior is normal        LABORATORY RESULTS:  Results from last 7 days   Lab Units 07/31/19  1140 07/31/19  0829   TROPONIN I ng/mL 0 32* 0 22*     CBC with diff:   Results from last 7 days   Lab Units 07/31/19  0829   WBC Thousand/uL 6 76   HEMOGLOBIN g/dL 11 9   HEMATOCRIT % 37 5   MCV fL 95   PLATELETS Thousands/uL 228   MCH pg 30 3   MCHC g/dL 31 7   RDW % 14 0   MPV fL 10 6   NRBC AUTO /100 WBCs 0       CMP:  Results from last 7 days   Lab Units 07/31/19  0829   POTASSIUM mmol/L 4 5   CHLORIDE mmol/L 106   CO2 mmol/L 26   BUN mg/dL 19   CREATININE mg/dL 0 95   CALCIUM mg/dL 9 0   AST U/L 28   ALT U/L 36   ALK PHOS U/L 65   EGFR ml/min/1 73sq m 58     BMP:  Results from last 7 days   Lab Units 07/31/19  0829   POTASSIUM mmol/L 4 5   CHLORIDE mmol/L 106   CO2 mmol/L 26   BUN mg/dL 19   CREATININE mg/dL 0 95   CALCIUM mg/dL 9 0     Results from last 7 days   Lab Units 07/31/19  0829   TSH 3RD GENERATON uIU/mL 1 919     Lipid Profile:   Lab Results   Component Value Date    CHOL 201 (H) 12/14/2017    CHOL 179 05/16/2017    CHOL 168 11/10/2016     Lab Results   Component Value Date    HDL 58 07/05/2019    HDL 64 01/10/2019    HDL 56 06/12/2018     Lab Results   Component Value Date    LDLCALC 60 07/05/2019     Lab Results   Component Value Date    TRIG 148 07/05/2019    TRIG 164 (H) 01/10/2019    TRIG 188 (H) 06/12/2018     Cardiac testing:   No results found for this or any previous visit  No results found for this or any previous visit  No procedure found  No results found for this or any previous visit  Imaging:   Xr Chest 2 Views    Result Date: 7/31/2019  Narrative: CHEST INDICATION:   Chest pain  COMPARISON:  Chest x-ray 7/1/2017 EXAM PERFORMED/VIEWS:  XR CHEST PA & LATERAL FINDINGS: Cardiomediastinal silhouette appears unremarkable  There is aortic knob calcification  The lungs are clear  No pneumothorax or pleural effusion  There are degenerative changes of the spine  Impression: No acute cardiopulmonary disease  Workstation performed: [id number]     Thank you for allowing us to participate in this patient's care  Counseling / Coordination of Care  Total floor / unit time spent today 45 minutes  Greater than 50% of total time was spent with the patient and / or family counseling and / or coordination of care  A description of the counseling / coordination of care: Review of history, current assessment, development of a plan  Code Status: Level 1 - Full Code    ** Please Note: Dragon 360 Dictation voice to text software may have been used in the creation of this document   **

## 2019-08-01 ENCOUNTER — APPOINTMENT (OUTPATIENT)
Dept: NON INVASIVE DIAGNOSTICS | Facility: HOSPITAL | Age: 79
DRG: 281 | End: 2019-08-01
Payer: COMMERCIAL

## 2019-08-01 ENCOUNTER — HOSPITAL ENCOUNTER (INPATIENT)
Facility: HOSPITAL | Age: 79
LOS: 2 days | Discharge: HOME | DRG: 247 | End: 2019-08-03
Attending: FAMILY MEDICINE | Admitting: FAMILY MEDICINE
Payer: COMMERCIAL

## 2019-08-01 VITALS
WEIGHT: 209.2 LBS | SYSTOLIC BLOOD PRESSURE: 149 MMHG | OXYGEN SATURATION: 97 % | RESPIRATION RATE: 18 BRPM | TEMPERATURE: 98.2 F | BODY MASS INDEX: 35.71 KG/M2 | HEIGHT: 64 IN | DIASTOLIC BLOOD PRESSURE: 65 MMHG | HEART RATE: 60 BPM

## 2019-08-01 DIAGNOSIS — I21.4 NSTEMI, INITIAL EPISODE OF CARE (HCC): Primary | ICD-10-CM

## 2019-08-01 LAB
ANION GAP SERPL CALCULATED.3IONS-SCNC: 12 MMOL/L (ref 4–13)
APTT PPP: 105 SECONDS (ref 23–37)
APTT PPP: 78 SECONDS (ref 23–37)
APTT PPP: 92 SECONDS (ref 23–37)
ATRIAL RATE: 54 BPM
BUN SERPL-MCNC: 22 MG/DL (ref 5–25)
CALCIUM SERPL-MCNC: 9.4 MG/DL (ref 8.3–10.1)
CHLORIDE SERPL-SCNC: 107 MMOL/L (ref 100–108)
CHOLEST SERPL-MCNC: 137 MG/DL (ref 50–200)
CO2 SERPL-SCNC: 25 MMOL/L (ref 21–32)
CREAT SERPL-MCNC: 1.01 MG/DL (ref 0.6–1.3)
ERYTHROCYTE [DISTWIDTH] IN BLOOD BY AUTOMATED COUNT: 14.1 % (ref 11.6–15.1)
GFR SERPL CREATININE-BSD FRML MDRD: 53 ML/MIN/1.73SQ M
GLUCOSE P FAST SERPL-MCNC: 101 MG/DL (ref 65–99)
GLUCOSE SERPL-MCNC: 101 MG/DL (ref 65–140)
HCT VFR BLD AUTO: 42.7 % (ref 34.8–46.1)
HDLC SERPL-MCNC: 61 MG/DL (ref 40–60)
HGB BLD-MCNC: 12.8 G/DL (ref 11.5–15.4)
LDLC SERPL CALC-MCNC: 64 MG/DL (ref 0–100)
MCH RBC QN AUTO: 29.5 PG (ref 26.8–34.3)
MCHC RBC AUTO-ENTMCNC: 30 G/DL (ref 31.4–37.4)
MCV RBC AUTO: 98 FL (ref 82–98)
P AXIS: 51 DEGREES
PLATELET # BLD AUTO: 244 THOUSANDS/UL (ref 149–390)
PMV BLD AUTO: 10.3 FL (ref 8.9–12.7)
POTASSIUM SERPL-SCNC: 5 MMOL/L (ref 3.5–5.3)
PR INTERVAL: 260 MS
QRS AXIS: 18 DEGREES
QRSD INTERVAL: 88 MS
QT INTERVAL: 438 MS
QTC INTERVAL: 415 MS
RBC # BLD AUTO: 4.34 MILLION/UL (ref 3.81–5.12)
SODIUM SERPL-SCNC: 144 MMOL/L (ref 136–145)
T WAVE AXIS: 60 DEGREES
TRIGL SERPL-MCNC: 60 MG/DL
TROPONIN I SERPL-MCNC: 0.11 NG/ML
VENTRICULAR RATE: 54 BPM
WBC # BLD AUTO: 7.92 THOUSAND/UL (ref 4.31–10.16)

## 2019-08-01 PROCEDURE — 93454 CORONARY ARTERY ANGIO S&I: CPT | Performed by: INTERNAL MEDICINE

## 2019-08-01 PROCEDURE — C1894 INTRO/SHEATH, NON-LASER: HCPCS | Performed by: NURSE PRACTITIONER

## 2019-08-01 PROCEDURE — 85027 COMPLETE CBC AUTOMATED: CPT | Performed by: NURSE PRACTITIONER

## 2019-08-01 PROCEDURE — 85730 THROMBOPLASTIN TIME PARTIAL: CPT | Performed by: FAMILY MEDICINE

## 2019-08-01 PROCEDURE — 99232 SBSQ HOSP IP/OBS MODERATE 35: CPT | Performed by: INTERNAL MEDICINE

## 2019-08-01 PROCEDURE — 93454 CORONARY ARTERY ANGIO S&I: CPT | Performed by: NURSE PRACTITIONER

## 2019-08-01 PROCEDURE — 93005 ELECTROCARDIOGRAM TRACING: CPT

## 2019-08-01 PROCEDURE — 99152 MOD SED SAME PHYS/QHP 5/>YRS: CPT | Performed by: NURSE PRACTITIONER

## 2019-08-01 PROCEDURE — 80061 LIPID PANEL: CPT | Performed by: INTERNAL MEDICINE

## 2019-08-01 PROCEDURE — NC001 PR NO CHARGE: Performed by: INTERNAL MEDICINE

## 2019-08-01 PROCEDURE — 84484 ASSAY OF TROPONIN QUANT: CPT | Performed by: FAMILY MEDICINE

## 2019-08-01 PROCEDURE — 99239 HOSP IP/OBS DSCHRG MGMT >30: CPT | Performed by: INTERNAL MEDICINE

## 2019-08-01 PROCEDURE — C1769 GUIDE WIRE: HCPCS | Performed by: NURSE PRACTITIONER

## 2019-08-01 PROCEDURE — 85730 THROMBOPLASTIN TIME PARTIAL: CPT | Performed by: INTERNAL MEDICINE

## 2019-08-01 PROCEDURE — B2111ZZ FLUOROSCOPY OF MULTIPLE CORONARY ARTERIES USING LOW OSMOLAR CONTRAST: ICD-10-PCS | Performed by: INTERNAL MEDICINE

## 2019-08-01 PROCEDURE — 93010 ELECTROCARDIOGRAM REPORT: CPT | Performed by: INTERNAL MEDICINE

## 2019-08-01 PROCEDURE — 99152 MOD SED SAME PHYS/QHP 5/>YRS: CPT | Performed by: INTERNAL MEDICINE

## 2019-08-01 PROCEDURE — 80048 BASIC METABOLIC PNL TOTAL CA: CPT | Performed by: NURSE PRACTITIONER

## 2019-08-01 PROCEDURE — 99223 1ST HOSP IP/OBS HIGH 75: CPT | Performed by: FAMILY MEDICINE

## 2019-08-01 RX ORDER — LISINOPRIL 20 MG/1
20 TABLET ORAL DAILY
Status: DISCONTINUED | OUTPATIENT
Start: 2019-08-02 | End: 2019-08-03 | Stop reason: HOSPADM

## 2019-08-01 RX ORDER — LATANOPROST 50 UG/ML
1 SOLUTION/ DROPS OPHTHALMIC DAILY
Status: DISCONTINUED | OUTPATIENT
Start: 2019-08-02 | End: 2019-08-03 | Stop reason: HOSPADM

## 2019-08-01 RX ORDER — NITROGLYCERIN 20 MG/100ML
INJECTION INTRAVENOUS CODE/TRAUMA/SEDATION MEDICATION
Status: COMPLETED | OUTPATIENT
Start: 2019-08-01 | End: 2019-08-01

## 2019-08-01 RX ORDER — SODIUM CHLORIDE 9 MG/ML
100 INJECTION, SOLUTION INTRAVENOUS CONTINUOUS
Status: DISCONTINUED | OUTPATIENT
Start: 2019-08-01 | End: 2019-08-01 | Stop reason: HOSPADM

## 2019-08-01 RX ORDER — CARVEDILOL 6.25 MG/1
6.25 TABLET ORAL 2 TIMES DAILY WITH MEALS
Status: DISCONTINUED | OUTPATIENT
Start: 2019-08-01 | End: 2019-08-01 | Stop reason: HOSPADM

## 2019-08-01 RX ORDER — HEPARIN SODIUM 1000 [USP'U]/ML
2000 INJECTION, SOLUTION INTRAVENOUS; SUBCUTANEOUS AS NEEDED
Status: CANCELLED | OUTPATIENT
Start: 2019-08-01

## 2019-08-01 RX ORDER — MELATONIN
1000 DAILY
Status: DISCONTINUED | OUTPATIENT
Start: 2019-08-02 | End: 2019-08-03 | Stop reason: HOSPADM

## 2019-08-01 RX ORDER — FAMOTIDINE 20 MG/1
40 TABLET, FILM COATED ORAL
Status: DISCONTINUED | OUTPATIENT
Start: 2019-08-01 | End: 2019-08-03 | Stop reason: HOSPADM

## 2019-08-01 RX ORDER — SODIUM CHLORIDE 9 MG/ML
100 INJECTION, SOLUTION INTRAVENOUS CONTINUOUS
Status: CANCELLED | OUTPATIENT
Start: 2019-08-01

## 2019-08-01 RX ORDER — ATORVASTATIN CALCIUM 40 MG/1
40 TABLET, FILM COATED ORAL
Status: DISCONTINUED | OUTPATIENT
Start: 2019-08-02 | End: 2019-08-03 | Stop reason: HOSPADM

## 2019-08-01 RX ORDER — FAMOTIDINE 20 MG/1
40 TABLET, FILM COATED ORAL
Status: CANCELLED | OUTPATIENT
Start: 2019-08-01

## 2019-08-01 RX ORDER — MIDAZOLAM HYDROCHLORIDE 1 MG/ML
INJECTION INTRAMUSCULAR; INTRAVENOUS CODE/TRAUMA/SEDATION MEDICATION
Status: COMPLETED | OUTPATIENT
Start: 2019-08-01 | End: 2019-08-01

## 2019-08-01 RX ORDER — LABETALOL 20 MG/4 ML (5 MG/ML) INTRAVENOUS SYRINGE
10 EVERY 6 HOURS PRN
Status: CANCELLED | OUTPATIENT
Start: 2019-08-01

## 2019-08-01 RX ORDER — ASPIRIN 81 MG/1
81 TABLET ORAL EVERY OTHER DAY
Status: DISCONTINUED | OUTPATIENT
Start: 2019-08-02 | End: 2019-08-03 | Stop reason: HOSPADM

## 2019-08-01 RX ORDER — ATORVASTATIN CALCIUM 40 MG/1
40 TABLET, FILM COATED ORAL
Status: CANCELLED | OUTPATIENT
Start: 2019-08-01

## 2019-08-01 RX ORDER — CLOPIDOGREL BISULFATE 75 MG/1
75 TABLET ORAL DAILY
Status: CANCELLED | OUTPATIENT
Start: 2019-08-02

## 2019-08-01 RX ORDER — CARVEDILOL 6.25 MG/1
6.25 TABLET ORAL 2 TIMES DAILY WITH MEALS
Status: CANCELLED | OUTPATIENT
Start: 2019-08-01

## 2019-08-01 RX ORDER — HEPARIN SODIUM 1000 [USP'U]/ML
4000 INJECTION, SOLUTION INTRAVENOUS; SUBCUTANEOUS AS NEEDED
Status: CANCELLED | OUTPATIENT
Start: 2019-08-01

## 2019-08-01 RX ORDER — CARVEDILOL 6.25 MG/1
6.25 TABLET ORAL 2 TIMES DAILY WITH MEALS
Status: DISCONTINUED | OUTPATIENT
Start: 2019-08-02 | End: 2019-08-03 | Stop reason: HOSPADM

## 2019-08-01 RX ORDER — LISINOPRIL 20 MG/1
20 TABLET ORAL DAILY
Status: CANCELLED | OUTPATIENT
Start: 2019-08-02

## 2019-08-01 RX ORDER — MELATONIN
1000 DAILY
Status: CANCELLED | OUTPATIENT
Start: 2019-08-02

## 2019-08-01 RX ORDER — HEPARIN SODIUM 1000 [USP'U]/ML
4000 INJECTION, SOLUTION INTRAVENOUS; SUBCUTANEOUS AS NEEDED
Status: DISCONTINUED | OUTPATIENT
Start: 2019-08-01 | End: 2019-08-03

## 2019-08-01 RX ORDER — SODIUM CHLORIDE 9 MG/ML
100 INJECTION, SOLUTION INTRAVENOUS CONTINUOUS
Status: DISCONTINUED | OUTPATIENT
Start: 2019-08-01 | End: 2019-08-02

## 2019-08-01 RX ORDER — ASPIRIN 81 MG/1
324 TABLET, CHEWABLE ORAL ONCE
Status: COMPLETED | OUTPATIENT
Start: 2019-08-01 | End: 2019-08-01

## 2019-08-01 RX ORDER — LIDOCAINE HYDROCHLORIDE 10 MG/ML
INJECTION, SOLUTION INFILTRATION; PERINEURAL CODE/TRAUMA/SEDATION MEDICATION
Status: COMPLETED | OUTPATIENT
Start: 2019-08-01 | End: 2019-08-01

## 2019-08-01 RX ORDER — SODIUM CHLORIDE 9 MG/ML
100 INJECTION, SOLUTION INTRAVENOUS CONTINUOUS
Status: CANCELLED | OUTPATIENT
Start: 2019-08-01 | End: 2019-08-01

## 2019-08-01 RX ORDER — SODIUM CHLORIDE 9 MG/ML
75 INJECTION, SOLUTION INTRAVENOUS CONTINUOUS
Status: DISCONTINUED | OUTPATIENT
Start: 2019-08-01 | End: 2019-08-02

## 2019-08-01 RX ORDER — ASPIRIN 81 MG/1
81 TABLET ORAL EVERY OTHER DAY
Status: CANCELLED | OUTPATIENT
Start: 2019-08-02

## 2019-08-01 RX ORDER — CLOPIDOGREL BISULFATE 75 MG/1
75 TABLET ORAL DAILY
Status: DISCONTINUED | OUTPATIENT
Start: 2019-08-02 | End: 2019-08-02

## 2019-08-01 RX ORDER — SODIUM CHLORIDE 9 MG/ML
100 INJECTION, SOLUTION INTRAVENOUS CONTINUOUS
Status: DISCONTINUED | OUTPATIENT
Start: 2019-08-01 | End: 2019-08-01

## 2019-08-01 RX ORDER — HEPARIN SODIUM 1000 [USP'U]/ML
INJECTION, SOLUTION INTRAVENOUS; SUBCUTANEOUS CODE/TRAUMA/SEDATION MEDICATION
Status: COMPLETED | OUTPATIENT
Start: 2019-08-01 | End: 2019-08-01

## 2019-08-01 RX ORDER — LABETALOL 20 MG/4 ML (5 MG/ML) INTRAVENOUS SYRINGE
10 EVERY 6 HOURS PRN
Status: DISCONTINUED | OUTPATIENT
Start: 2019-08-01 | End: 2019-08-03 | Stop reason: HOSPADM

## 2019-08-01 RX ORDER — HEPARIN SODIUM 10000 [USP'U]/100ML
3-20 INJECTION, SOLUTION INTRAVENOUS
Status: CANCELLED | OUTPATIENT
Start: 2019-08-01

## 2019-08-01 RX ORDER — NITROGLYCERIN 0.4 MG/1
0.4 TABLET SUBLINGUAL
Status: DISCONTINUED | OUTPATIENT
Start: 2019-08-01 | End: 2019-08-03 | Stop reason: HOSPADM

## 2019-08-01 RX ORDER — LATANOPROST 50 UG/ML
1 SOLUTION/ DROPS OPHTHALMIC DAILY
Status: CANCELLED | OUTPATIENT
Start: 2019-08-02

## 2019-08-01 RX ORDER — FENTANYL CITRATE 50 UG/ML
INJECTION, SOLUTION INTRAMUSCULAR; INTRAVENOUS CODE/TRAUMA/SEDATION MEDICATION
Status: COMPLETED | OUTPATIENT
Start: 2019-08-01 | End: 2019-08-01

## 2019-08-01 RX ORDER — HEPARIN SODIUM 1000 [USP'U]/ML
2000 INJECTION, SOLUTION INTRAVENOUS; SUBCUTANEOUS AS NEEDED
Status: DISCONTINUED | OUTPATIENT
Start: 2019-08-01 | End: 2019-08-03

## 2019-08-01 RX ORDER — HEPARIN SODIUM 10000 [USP'U]/100ML
3-20 INJECTION, SOLUTION INTRAVENOUS
Status: DISCONTINUED | OUTPATIENT
Start: 2019-08-01 | End: 2019-08-03

## 2019-08-01 RX ADMIN — HEPARIN SODIUM 4000 UNITS: 1000 INJECTION INTRAVENOUS; SUBCUTANEOUS at 11:32

## 2019-08-01 RX ADMIN — NITROGLYCERIN 200 MCG: 20 INJECTION INTRAVENOUS at 11:32

## 2019-08-01 RX ADMIN — ASPIRIN 81 MG 324 MG: 81 TABLET ORAL at 05:07

## 2019-08-01 RX ADMIN — METOPROLOL TARTRATE 25 MG: 25 TABLET, FILM COATED ORAL at 08:48

## 2019-08-01 RX ADMIN — ATORVASTATIN CALCIUM 40 MG: 40 TABLET, FILM COATED ORAL at 16:15

## 2019-08-01 RX ADMIN — FAMOTIDINE 40 MG: 20 TABLET ORAL at 22:52

## 2019-08-01 RX ADMIN — VITAMIN D, TAB 1000IU (100/BT) 1000 UNITS: 25 TAB at 08:48

## 2019-08-01 RX ADMIN — SODIUM CHLORIDE 100 ML/HR: 0.9 INJECTION, SOLUTION INTRAVENOUS at 12:20

## 2019-08-01 RX ADMIN — MIDAZOLAM HYDROCHLORIDE 2 MG: 1 INJECTION, SOLUTION INTRAMUSCULAR; INTRAVENOUS at 11:25

## 2019-08-01 RX ADMIN — FENTANYL CITRATE 25 MCG: 50 INJECTION INTRAMUSCULAR; INTRAVENOUS at 11:25

## 2019-08-01 RX ADMIN — LIDOCAINE HYDROCHLORIDE ANHYDROUS 1 ML: 10 INJECTION, SOLUTION INFILTRATION at 11:30

## 2019-08-01 RX ADMIN — CARVEDILOL 6.25 MG: 6.25 TABLET, FILM COATED ORAL at 16:15

## 2019-08-01 RX ADMIN — CLOPIDOGREL BISULFATE 75 MG: 75 TABLET ORAL at 08:48

## 2019-08-01 RX ADMIN — LISINOPRIL 20 MG: 20 TABLET ORAL at 08:48

## 2019-08-01 RX ADMIN — B-COMPLEX W/ C & FOLIC ACID TAB 1 TABLET: TAB at 08:48

## 2019-08-01 RX ADMIN — SODIUM CHLORIDE 100 ML/HR: 0.9 INJECTION, SOLUTION INTRAVENOUS at 05:04

## 2019-08-01 RX ADMIN — IOHEXOL 45 ML: 350 INJECTION, SOLUTION INTRAVENOUS at 11:39

## 2019-08-01 RX ADMIN — SODIUM CHLORIDE 75 ML/HR: 0.9 INJECTION, SOLUTION INTRAVENOUS at 20:45

## 2019-08-01 RX ADMIN — HEPARIN SODIUM AND DEXTROSE 11.1 UNITS/KG/HR: 10000; 5 INJECTION INTRAVENOUS at 22:04

## 2019-08-01 NOTE — PLAN OF CARE
Problem: CARDIOVASCULAR - ADULT  Goal: Maintains optimal cardiac output and hemodynamic stability  Description  INTERVENTIONS:  - Monitor I/O, vital signs and rhythm  - Monitor for S/S and trends of decreased cardiac output i e  bleeding, hypotension  - Administer and titrate ordered vasoactive medications to optimize hemodynamic stability  - Assess quality of pulses, skin color and temperature  - Assess for signs of decreased coronary artery perfusion - ex  Angina  - Instruct patient to report change in severity of symptoms  Outcome: Progressing  Goal: Absence of cardiac dysrhythmias or at baseline rhythm  Description  INTERVENTIONS:  - Continuous cardiac monitoring, monitor vital signs, obtain 12 lead EKG if indicated  - Administer antiarrhythmic and heart rate control medications as ordered  - Monitor electrolytes and administer replacement therapy as ordered  Outcome: Progressing     Problem: PAIN - ADULT  Goal: Verbalizes/displays adequate comfort level or baseline comfort level  Description  Interventions:  - Encourage patient to monitor pain and request assistance  - Assess pain using appropriate pain scale  - Administer analgesics based on type and severity of pain and evaluate response  - Implement non-pharmacological measures as appropriate and evaluate response  - Consider cultural and social influences on pain and pain management  - Notify physician/advanced practitioner if interventions unsuccessful or patient reports new pain  Outcome: Progressing     Problem: SAFETY ADULT  Goal: Patient will remain free of falls  Description  INTERVENTIONS:  - Assess patient frequently for physical needs  -  Identify cognitive and physical deficits and behaviors that affect risk of falls    -  [place] fall precautions as indicated by assessment   - Educate patient/family on patient safety including physical limitations  - Instruct patient to call for assistance with activity based on assessment  - Modify environment to reduce risk of injury  - Consider OT/PT consult to assist with strengthening/mobility  Outcome: Progressing  Goal: Maintain or return to baseline ADL function  Description  INTERVENTIONS:  -  Assess patient's ability to carry out ADLs; assess patient's baseline for ADL function and identify physical deficits which impact ability to perform ADLs (bathing, care of mouth/teeth, toileting, grooming, dressing, etc )  - Assess/evaluate cause of self-care deficits   - Assess range of motion  - Assess patient's mobility; develop plan if impaired  - Assess patient's need for assistive devices and provide as appropriate  - Encourage maximum independence but intervene and supervise when necessary  ¯ Involve family in performance of ADLs  ¯ Assess for home care needs following discharge   ¯ Request OT consult to assist with ADL evaluation and planning for discharge  ¯ Provide patient education as appropriate  Outcome: Progressing  Goal: Maintain or return mobility status to optimal level  Description  INTERVENTIONS:  - Assess patient's baseline mobility status (ambulation, transfers, stairs, etc )    - Identify cognitive and physical deficits and behaviors that affect mobility  - Identify mobility aids required to assist with transfers and/or ambulation (gait belt, sit-to-stand, lift, walker, cane, etc )  - [place] fall precautions as indicated by assessment  - Record patient progress and toleration of activity level on Mobility SBAR; progress patient to next Phase/Stage  - Instruct patient to call for assistance with activity based on assessment  - Request Rehabilitation consult to assist with strengthening/weightbearing, etc   Outcome: Progressing     Problem: DISCHARGE PLANNING  Goal: Discharge to home or other facility with appropriate resources  Description  INTERVENTIONS:  - Identify barriers to discharge w/patient and caregiver  - Arrange for needed discharge resources and transportation as appropriate  - Identify discharge learning needs (meds, wound care, etc )  - Arrange for interpretive services to assist at discharge as needed  - Refer to Case Management Department for coordinating discharge planning if the patient needs post-hospital services based on physician/advanced practitioner order or complex needs related to functional status, cognitive ability, or social support system  Outcome: Progressing     Problem: Knowledge Deficit  Goal: Patient/family/caregiver demonstrates understanding of disease process, treatment plan, medications, and discharge instructions  Description  Complete learning assessment and assess knowledge base    Interventions:  - Provide teaching at level of understanding  - Provide teaching via preferred learning methods  Outcome: Progressing

## 2019-08-01 NOTE — PROGRESS NOTES
Progress Note - Cardiology   Shania Schulz 67 y o  female MRN: [id number]  Encounter: [id number]  08/01/19  9:20 AM        Assessment/Plan:    1  Accelerating/unstable angina  For cardiac cath today  Echo showed preserved LV function  On aspirin, Plavix, statin, beta blocker, and IV heparin  2  HTN  BP slightly above goal with Metoprolol 25 bid and Lisinopril 20  Will switch Metoprolol to Coreg 6 25 bid  Was on only Lisinopril 20 at home  3  HL  Was on Atorvastatin 40 mg daily prior to admission, lipid panel 8/1/19 shows total cholesterol 137, TG 60, HDL 61, LDL 64  LDL at goal on current regimen  4  IFG  Hgb A1c 5 9% 5/17, currently diet controlled  Subjective/Objective   Chief Complaint:   Chief Complaint   Patient presents with    Chest Pain         Subjective: 67 y o  woman with a history of HTN, HL, IFG, admitted with exertional CP that has been worsening over last 1-2 weeks  Troponins peaked at 0 35, EKG unremarkable for ischemia  Symptoms concerning for accelerating angina, so referred for cardiac cath for 8/1  Echo showed preserved LV function, LVH, without any significant RWMA  She denies any current CP or SOB at rest  No dizziness or lightheadedness  No LE edema  Awaiting cath later today         Patient Active Problem List   Diagnosis    Chronic calculous cholecystitis    Essential hypertension    GERD without esophagitis    Other hyperlipidemia    Vitamin D deficiency    Class 2 severe obesity due to excess calories with serious comorbidity and body mass index (BMI) of 35 0 to 35 9 in adult [facility])    CKD (chronic kidney disease) stage 3, GFR 30-59 ml/min ([facility])    NSTEMI, initial episode of care [facility])    Hypertensive emergency     Past Medical History:   Diagnosis Date    GERD (gastroesophageal reflux disease) 7/1/2017    HLD (hyperlipidemia) 7/1/2017    Hypertension     Ventral hernia without obstruction or gangrene 7/1/2017       Allergies   Allergen Reactions    Codeine Reaction Date: 19Jul2011;     Other      darvocet       Current Facility-Administered Medications   Medication Dose Route Frequency Provider Last Rate Last Dose    aspirin (ECOTRIN LOW STRENGTH) EC tablet 81 mg  81 mg Oral Every Other Day Kassie Amato MD        atorvastatin (LIPITOR) tablet 40 mg  40 mg Oral Daily With Jb Smith MD        cholecalciferol (VITAMIN D3) tablet 1,000 Units  1,000 Units Oral Daily Kassie Amato MD   1,000 Units at 08/01/19 0848    clopidogrel (PLAVIX) tablet 75 mg  75 mg Oral Daily Kassie Amato MD   75 mg at 08/01/19 0848    famotidine (PEPCID) tablet 40 mg  40 mg Oral HS Kassie Amato MD   40 mg at 07/31/19 2136    heparin (porcine) 25,000 units in 250 mL infusion (premix)  3-20 Units/kg/hr (Order-Specific) Intravenous Titrated KYLE Werner 10 mL/hr at 07/31/19 1508 11 1 Units/kg/hr at 07/31/19 1508    heparin (porcine) injection 2,000 Units  2,000 Units Intravenous PRN KYLE Werner        heparin (porcine) injection 4,000 Units  4,000 Units Intravenous PRN KYLE Werner        Labetalol HCl (NORMODYNE) injection 10 mg  10 mg Intravenous Q6H PRN Kassie Amato MD        latanoprost (XALATAN) 0 005 % ophthalmic solution 1 drop  1 drop Both Eyes Daily Kassie Amato MD   Stopped at 07/31/19 1251    lisinopril (ZESTRIL) tablet 20 mg  20 mg Oral Daily Kassie Amato MD   20 mg at 08/01/19 0848    metoprolol tartrate (LOPRESSOR) tablet 25 mg  25 mg Oral Q12H [facility] Kassie Amato MD   25 mg at 08/01/19 0848    multivitamin stress formula tablet 1 tablet  1 tablet Oral Daily Kassie Amato MD   1 tablet at 08/01/19 0848    sodium chloride 0 9 % infusion  100 mL/hr Intravenous Continuous KYLE Werner 100 mL/hr at 08/01/19 0504 100 mL/hr at 08/01/19 0504       Vitals: /74 (BP Location: Right arm)   Pulse (!) 51   Temp 98 2 °F (36 8 °C) (Oral)   Resp 18   Ht 5' 4" (1 626 m)   Wt 94 9 kg (209 lb 3 2 oz)   SpO2 97%   BMI 35 91 kg/m²     Intake/Output Summary (Last 24 hours) at 8/1/2019 0920  Last data filed at 7/31/2019 1900  Gross per 24 hour   Intake 150 ml   Output    Net 150 ml     Wt Readings from Last 3 Encounters:   07/31/19 94 9 kg (209 lb 3 2 oz)   07/25/19 94 8 kg (209 lb)   07/08/19 95 3 kg (210 lb)       Body mass index is 35 91 kg/m²  ,     Vitals:    07/31/19 1900 07/31/19 2300 08/01/19 0300 08/01/19 0700   BP: 147/65 150/56 150/60 168/74   Pulse: 64 60 59 (!) 51   Patient Position - Orthostatic VS: Lying Lying Lying Lying       Physical Exam:     GEN: Alert and oriented x 3, in no acute distress  HEENT: Sclera anicteric, conjunctivae pink, mucous membranes moist   NECK: Supple, no carotid bruits, no significant JVD  HEART: Regular rhythm, normal S1 and S2, no murmurs, clicks, gallops or rubs  LUNGS: Clear to auscultation bilaterally; no wheezes, rales, or rhonchi   ABDOMEN: Soft, nontender, nondistended, normoactive bowel sounds  EXTREMITIES: Skin warm and well perfused, no clubbing, cyanosis, or edema  NEURO: No focal findings  SKIN: Normal without suspicious lesions on exposed skin        Lab Results:     BMP:  Results from last 7 days   Lab Units 08/01/19  0635 07/31/19  0829   POTASSIUM mmol/L 5 0 4 5   CHLORIDE mmol/L 107 106   CO2 mmol/L 25 26   BUN mg/dL 22 19   CREATININE mg/dL 1 01 0 95   CALCIUM mg/dL 9 4 9 0       CBC:   Results from last 7 days   Lab Units 08/01/19  0635 07/31/19  0829   WBC Thousand/uL 7 92 6 76   HEMOGLOBIN g/dL 12 8 11 9   HEMATOCRIT % 42 7 37 5   MCV fL 98 95   PLATELETS Thousands/uL 244 228   MCH pg 29 5 30 3   MCHC g/dL 30 0* 31 7   RDW % 14 1 14 0   MPV fL 10 3 10 6   NRBC AUTO /100 WBCs  --  0       Results from last 7 days   Lab Units 07/31/19  2124 07/31/19  1804 07/31/19  1506   TROPONIN I ng/mL 0 28* 0 35* 0 27*     INR:   Results from last 7 days   Lab Units 07/31/19  1506   INR  1 03       Lipid Profile:   Lab Results   Component Value Date    CHOL 201 (H) 12/14/2017 CHOL 179 05/16/2017    CHOL 168 11/10/2016     Lab Results   Component Value Date    HDL 61 (H) 08/01/2019    HDL 58 07/05/2019    HDL 64 01/10/2019     Lab Results   Component Value Date    LDLCALC 64 08/01/2019    LDLCALC 60 07/05/2019     Lab Results   Component Value Date    TRIG 60 08/01/2019    TRIG 148 07/05/2019    TRIG 164 (H) 01/10/2019     Hgb A1c:         Telemetry: personally reviewed, sinus rhythm and sinus bradycardia to 45 BPM overnight, no significant arrhythmias

## 2019-08-01 NOTE — ASSESSMENT & PLAN NOTE
Intermittent episodes of worsening chest pain at rest with an onset of 1 week prior to admission  PCP scheduled outpatient stress test on 08/07/2019  No previous cardiac history  · Continue troponins-0 22, 0 32, 0 27, 0 35 and 0 28  · Continue telemetry  · Continue IV heparin infusion  · Metoprolol initiated in the ED  · Cardiology consult appreciated-cardiac catheterization demonstrated right coronary artery blockage 99%; transfer the [place] for likely CABG

## 2019-08-01 NOTE — UTILIZATION REVIEW
Initial Clinical Review    Admission: Date/Time/Statement: obs 07/31/19 1058 converted to Inpatient admission 8/1/2019 1109 due to further cardiac workup of NSTEMI    08/01/19 1110  Inpatient Admission Once     Transfer Service: General Medicine    Expected Discharge Date: 08/02/19       Question Answer Comment   Admitting Physician DINORAH GUSMAN    Level of Care Med Surg    Estimated length of stay More than 2 Midnights    Certification I certify that inpatient services are medically necessary for this patient for a duration of greater than two midnights  See H&P and MD Progress Notes for additional information about the patient's course of treatment  08/01/19 1109       ED Arrival Information     Expected Arrival Acuity Means of Arrival Escorted By Service Admission Type    - 7/31/2019 08:04 Urgent Walk-In Family Member Hospitalist Urgent    Arrival Complaint    chest burning        Chief Complaint   Patient presents with    Chest Pain     Assessment/Plan: 67 y o  Female presents to ED from home admitted as Observation for burning in Upper chest  She discloses over last several weeks she has episodes of burning in the upper anterior chest area typically on exertion is relieved with rest  Sought care with PCP-advised symptoms were musculoskeletal  Scheduled for outpatient stress 8/7 however symptoms worsened  In ED, trops elevated & peaked=0 35, EKG done & unremarkable for ischemia  So far no chest pain while in ED  Trend telemetry, heparin drip/add beta blocker, continue Lisinopril; obtain ECHO; consult Cardiology    8/1 Cardiology AM note:  Assessment/Plan:  1  Accelerating/unstable angina  For cardiac cath today  Echo showed preserved LV function  On aspirin, Plavix, statin, beta blocker, and IV heparin  2  HTN  BP slightly above goal with Metoprolol 25 bid and Lisinopril 20  Will switch Metoprolol to Coreg 6 25 bid  Was on only Lisinopril 20 at home  3  HL   Was on Atorvastatin 40 mg daily prior to admission, lipid panel 8/1/19 shows total cholesterol 137, TG 60, HDL 61, LDL 64  LDL at goal on current regimen  4  IFG  Hgb A1c 5 9% 5/17, currently diet controlled  8/1 Cardiology note:  Cardiac cath performed via the R radial artery  99% ostial RCA stenosis  The rest of the coronary anatomy is patent  High risk PCI    Transfer to [facility] for PCI / consideration for CABG    ED Triage Vitals   Temperature Pulse Respirations Blood Pressure SpO2   07/31/19 0813 07/31/19 0810 07/31/19 0810 07/31/19 0810 07/31/19 0810   98 1 °F (36 7 °C) 77 16 (!) 184/80 99 %      Temp Source Heart Rate Source Patient Position - Orthostatic VS BP Location FiO2 (%)   07/31/19 0813 07/31/19 0810 07/31/19 0810 07/31/19 0810 --   Oral Monitor Sitting Right arm       Pain Score       07/31/19 0835       7        Wt Readings from Last 1 Encounters:   07/31/19 94 9 kg (209 lb 3 2 oz)     Additional Vital Signs:   08/01/19 0700  98 2 °F (36 8 °C)  51Abnormal   18  168/74  97 %  None (Room air)  Lying   08/01/19 0300    59  18  150/60  99 %  None (Room air)  Lying   07/31/19 2300  98 1 °F (36 7 °C)  60  18  150/56  96 %  None (Room air)  Lying   07/31/19 1900  98 3 °F (36 8 °C)  64  18  147/65  96 %  None (Room air)  Lying   07/31/19 1453  98 2 °F (36 8 °C)  59  18  152/50  92 %  None (Room air)  Lying   07/31/19 1432        160/68      Lying   07/31/19 1214  98 1 °F (36 7 °C)  60  18  216/76Abnormal   98 %  None (Room air)  Sitting   07/31/19 1152    62  18  177/77Abnormal   98 %  None (Room air)  Lying   07/31/19 0929    67  16  174/72Abnormal   98 %  None (Room air)  Sitting   07/31/19 0810    77  16  184/80Abnormal   99 %  None (Room air)  Sitting      Weights (last 14 days)     Date/Time  Weight  Height   07/31/19 1214  94 9 kg (209 lb 3 2 oz)  5' 4" (1 626 m)   07/31/19 0810  95 8 kg (211 lb 3 2 oz)         Pertinent Labs/Diagnostic Test Results:   7/31 Normal sinus rhythm, nonspecific ST changes inferior leads  7/31 ECHO LEFT VENTRICLE:  Systolic function was normal  Ejection fraction was estimated to be 65 %    There were no regional wall motion abnormalities  CXR: no acute abn   Results from last 7 days   Lab Units 08/01/19  0635 07/31/19  0829   WBC Thousand/uL 7 92 6 76   HEMOGLOBIN g/dL 12 8 11 9   HEMATOCRIT % 42 7 37 5   PLATELETS Thousands/uL 244 228   NEUTROS ABS Thousands/µL  --  4 03         Results from last 7 days   Lab Units 08/01/19  0635 07/31/19  0829   SODIUM mmol/L 144 142   POTASSIUM mmol/L 5 0 4 5   CHLORIDE mmol/L 107 106   CO2 mmol/L 25 26   ANION GAP mmol/L 12 10   BUN mg/dL 22 19   CREATININE mg/dL 1 01 0 95   EGFR ml/min/1 73sq m 53 58   CALCIUM mg/dL 9 4 9 0     Results from last 7 days   Lab Units 07/31/19  0829   AST U/L 28   ALT U/L 36   ALK PHOS U/L 65   TOTAL PROTEIN g/dL 7 1   ALBUMIN g/dL 3 7   TOTAL BILIRUBIN mg/dL 0 60         Results from last 7 days   Lab Units 08/01/19  0635 07/31/19  0829   GLUCOSE RANDOM mg/dL 101 113     Results from last 7 days   Lab Units 07/31/19  2124 07/31/19  1804 07/31/19  1506 07/31/19  1140 07/31/19  0829   TROPONIN I ng/mL 0 28* 0 35* 0 27* 0 32* 0 22*         Results from last 7 days   Lab Units 08/01/19  0932 08/01/19  0307 07/31/19  2124 07/31/19  1506   PROTIME seconds  --   --   --  12 9   INR   --   --   --  1 03   PTT seconds 92* 105* 75* 31     Results from last 7 days   Lab Units 07/31/19  0829   TSH 3RD GENERATON uIU/mL 1 919       Results from last 7 days   Lab Units 07/31/19  0829   LIPASE u/L 110     ED Treatment:   Medication Administration from 07/31/2019 0803 to 07/31/2019 1206       Date/Time Order Dose Route Action     07/31/2019 0927 aspirin chewable tablet 324 mg 324 mg Oral Given        Past Medical History:   Diagnosis Date    GERD (gastroesophageal reflux disease) 7/1/2017    HLD (hyperlipidemia) 7/1/2017    Hypertension     Ventral hernia without obstruction or gangrene 7/1/2017     Present on Admission:   Hypertensive emergency    Admitting Diagnosis: Chest pain [R07 9]  Burning chest pain [R07 89]  Elevated troponin [R74 8]  Age/Sex: 67 y o  female  Admission Orders:  48 hr telemetry  Cardiac cath  npo  Echo  IP CONSULT TO CARDIOLOGY  Current Facility-Administered Medications:  aspirin 81 mg Oral Every Other Day   atorvastatin 40 mg Oral Daily With Dinner   carvedilol 6 25 mg Oral BID With Meals   cholecalciferol 1,000 Units Oral Daily   clopidogrel 75 mg Oral Daily   famotidine 40 mg Oral HS   heparin (porcine) 3-20 Units/kg/hr (Order-Specific) Intravenous Titrated   heparin (porcine) 2,000 Units Intravenous PRN   heparin (porcine) 4,000 Units Intravenous PRN   Labetalol HCl 10 mg Intravenous Q6H PRN   latanoprost 1 drop Both Eyes Daily   lisinopril 20 mg Oral Daily   multivitamin stress formula 1 tablet Oral Daily   sodium chloride 100 mL/hr Intravenous Continuous     Network Utilization Review Department  Phone: [phone]; Fax [phone]  [email or web address]  ATTENTION: Please call with any questions or concerns to [phone]  and carefully listen to the prompts so that you are directed to the right person  Send all requests for admission clinical reviews, approved or denied determinations and any other requests to fax [phone]   All voicemails are confidential

## 2019-08-01 NOTE — ASSESSMENT & PLAN NOTE
· Patient initially presented to the hospital with burning chest pain for 2-3 weeks mainly with exertion  Elevated troponin  Had a cardiac catheterization done in [facility]  Found to have RCA occlusion  Transferred over here for high-risk PCI versus CABG  · Continue with the heparin  · Consult Cardiology  · Monitor creatinine  · Continue with aspirin and Plavix  · Patient reported that she had chest pain right before she was transferred over here after going to the bathroom  Will repeat the troponin x1    If the patient continues to have recurrent pain may need to start on the nitro drip

## 2019-08-01 NOTE — ASSESSMENT & PLAN NOTE
History significant for essential hypertension  Initial blood pressure in the ED of 184/80  · Continue previously prescribed lisinopril in addition to initiated metoprolol

## 2019-08-01 NOTE — H&P
H&P- Niko Rosado 1940, 67 y o  female MRN: [id number]    Unit/Bed#: [facility] 428-01 Encounter: [id number]    Primary Care Provider: Ngozi Jacinto MD   Date and time admitted to hospital: 8/1/2019  7:35 PM        * NSTEMI, initial episode of care [facility])  Assessment & Plan  · Patient initially presented to the hospital with burning chest pain for 2-3 weeks mainly with exertion  Elevated troponin  Had a cardiac catheterization done in [facility]  Found to have RCA occlusion  Transferred over here for high-risk PCI versus CABG  · Continue with the heparin  · Consult Cardiology  · Monitor creatinine  · Continue with aspirin and Plavix  · Patient reported that she had chest pain right before she was transferred over here after going to the bathroom  Will repeat the troponin x1  If the patient continues to have recurrent pain may need to start on the nitro drip    CKD (chronic kidney disease) stage 3, GFR 30-59 ml/min ([facility])  Assessment & Plan  · Status post cardiac catheterization  · Currently patient is on lisinopril  · Monitor creatinine dye exposure    Other hyperlipidemia  Assessment & Plan  · Continue statin    Essential hypertension  Assessment & Plan  · Patient was on lisinopril as an outpatient  · Started on Coreg  · Monitor creatinine    VTE Prophylaxis: Heparin Drip  / sequential compression device   Code Status: full code  POLST: POLST form is not discussed and not completed at this time  Discussion with family:  Family in the room    Anticipated Length of Stay:  Patient will be admitted on an Inpatient basis with an anticipated length of stay of  >2 midnights  Justification for Hospital Stay:     Total Time for Visit, including Counseling / Coordination of Care: 70 minutes  Greater than 50% of this total time spent on direct patient counseling and coordination of care      Chief Complaint:   Chest pain    History of Present Illness:    Niko Rosado is a 67 y o  female who presents with intermittent exertional chest pain to [facility]  Her troponin was elevated at the time of presentation which was trended and Cardiology was consulted  Patient with NSTEMI and had cardiac catheterization done today  Patient found to have 99 percent ostial RCA stenosis and the rest of the coronary anatomy is patent  It appears that the patient is a high-risk PCI and transferred over here for PCI or consideration of CABG  Patient reported that right before she was transferred over here she went to the bathroom and she had a minor episode of chest pain which was very transient  Currently patient is chest pain-free  Family had questions regarding when she is going for the catheterization tomorrow  Tried to explain to them that she needs cardiology evaluation and will make the plan after that  Will keep NPO after midnight chest in case    Review of Systems:    Review of Systems   Constitutional: Negative for activity change and appetite change  HENT: Negative  Cardiovascular: Positive for chest pain  Gastrointestinal: Negative  Endocrine: Negative  Genitourinary: Negative  Musculoskeletal: Negative  Allergic/Immunologic: Negative  Neurological: Negative  Hematological: Negative  Psychiatric/Behavioral: Negative          Past Medical and Surgical History:     Past Medical History:   Diagnosis Date    AVB (atrioventricular block)     first degree    CAD (coronary artery disease)     GERD (gastroesophageal reflux disease) 7/1/2017    HLD (hyperlipidemia) 7/1/2017    Hypertension     LVH (left ventricular hypertrophy)     Osteopenia     Ventral hernia without obstruction or gangrene 7/1/2017       Past Surgical History:   Procedure Laterality Date    CARDIAC CATHETERIZATION      GALLBLADDER SURGERY      HEMORRHOID SURGERY      HYSTERECTOMY  1987    age 56 w/ left oopherectomy    NOSE SURGERY      basal cell    OOPHORECTOMY Left 1987    age 56   Kiersten Kamara [place] LAP,CHOLECYSTECTOMY N/A 8/25/2016    Procedure: LAPAROSCOPIC CHOLECYSTECTOMY ;  Surgeon: Brittany Morley MD;  [facility];  Service: [address] [place] [address] N/A 11/30/2017    Procedure: INCISIONAL HERNIA REPAIR;  Surgeon: Brittany Morley MD;  [facility];  Service: General    ROTATOR CUFF REPAIR Right        Meds/Allergies:    Prior to Admission medications    Medication Sig Start Date End Date Taking? Authorizing Provider   aspirin 81 MG tablet Take 81 mg by mouth every other day  Historical Provider, MD   atorvastatin (LIPITOR) 40 mg tablet Take 1 tablet (40 mg total) by mouth daily 7/8/19   Nat Butler MD   b complex vitamins tablet Take 1 tablet by mouth daily  Historical Provider, MD   cholecalciferol (VITAMIN D3) 1,000 units tablet Take 1,000 Units by mouth daily    Historical Provider, MD   Co-Enzyme Q-10 100 MG CAPS Take 200 mg by mouth daily  Historical Provider, MD   latanoprost (XALATAN) 0 005 % ophthalmic solution Administer 1 drop to both eyes daily 4/19/18   Historical Provider, MD   lisinopril (ZESTRIL) 20 mg tablet Take 1 tablet (20 mg total) by mouth daily 7/8/19   Nat Butler MD   Multiple Vitamins-Minerals (PRESERVISION AREDS) CAPS Take 2 capsules by mouth daily    Historical Provider, MD   PX GLUCOSAMINE-CHONDROITIN PO Take 2 tablets by mouth daily  Historical Provider, MD   ranitidine (ZANTAC) 300 MG capsule Take 1 capsule (300 mg total) by mouth every evening 7/8/19   Nat Butler MD   Zoster Vac Recomb Adjuvanted 50 MCG SUSR Inject 50 mcg into the shoulder, thigh, or buttocks as needed (imm) for up to 1 dose 6/18/18   Mustapha Bolivar DO     I have reviewed home medications with patient personally  Allergies: Allergies   Allergen Reactions    Codeine      Reaction Date: 19Jul2011;     Other      darvocet       Social History:     Marital Status:     Occupation:   Patient Pre-hospital Living Situation:  Lives at home  Patient Pre-hospital Level of Mobility:   Patient Pre-hospital Diet Restrictions:   Substance Use History:   Social History     Substance and Sexual Activity   Alcohol Use Yes    Comment: socially     Social History     Tobacco Use   Smoking Status Never Smoker   Smokeless Tobacco Never Used     Social History     Substance and Sexual Activity   Drug Use No       Family History:    Family History   Problem Relation Age of Onset    No Known Problems Mother     No Known Problems Father     No Known Problems Daughter     No Known Problems Maternal Grandmother     No Known Problems Maternal Grandfather     No Known Problems Paternal Grandmother     No Known Problems Paternal Grandfather     No Known Problems Half-Sister     No Known Problems Maternal Aunt     Squamous cell carcinoma Brother     No Known Problems Son        Physical Exam:     Vitals:        Physical Exam   Constitutional: She appears well-developed  No distress  HENT:   Head: Normocephalic and atraumatic  Eyes: Pupils are equal, round, and reactive to light  Neck: Normal range of motion  Cardiovascular: Normal rate and regular rhythm  No murmur heard  Pulmonary/Chest: Effort normal  No stridor  No respiratory distress  Abdominal: Soft  Bowel sounds are normal  She exhibits no mass  Neurological: She is alert  No cranial nerve deficit  Coordination normal            Additional Data:     Lab Results: I have personally reviewed pertinent reports        Results from last 7 days   Lab Units 08/01/19  0635 07/31/19  0829   WBC Thousand/uL 7 92 6 76   HEMOGLOBIN g/dL 12 8 11 9   HEMATOCRIT % 42 7 37 5   PLATELETS Thousands/uL 244 228   NEUTROS PCT %  --  60   LYMPHS PCT %  --  25   MONOS PCT %  --  12   EOS PCT %  --  2     Results from last 7 days   Lab Units 08/01/19  0635 07/31/19  0829   SODIUM mmol/L 144 142   POTASSIUM mmol/L 5 0 4 5   CHLORIDE mmol/L 107 106   CO2 mmol/L 25 26   BUN mg/dL 22 19   CREATININE mg/dL 1 01 0 95 ANION GAP mmol/L 12 10   CALCIUM mg/dL 9 4 9 0   ALBUMIN g/dL  --  3 7   TOTAL BILIRUBIN mg/dL  --  0 60   ALK PHOS U/L  --  65   ALT U/L  --  36   AST U/L  --  28   GLUCOSE RANDOM mg/dL 101 113     Results from last 7 days   Lab Units 07/31/19  1506   INR  1 03                   Imaging: I have personally reviewed pertinent reports  No orders to display       EKG, Pathology, and Other Studies Reviewed on Admission:   · EKG:  First-degree AV block    Allscripts / Epic Records Reviewed: Yes     ** Please Note: This note has been constructed using a voice recognition system   **

## 2019-08-01 NOTE — ASSESSMENT & PLAN NOTE
· Status post cardiac catheterization  · Currently patient is on lisinopril  · Monitor creatinine dye exposure

## 2019-08-01 NOTE — DISCHARGE SUMMARY
Discharge- Yolande Blackwell 1940, 67 y o  female MRN: [id number]    Unit/Bed#: -01 Encounter: [id number]    Primary Care Provider: John Obando MD   Date and time admitted to hospital: 7/31/2019  8:09 AM        Hypertensive emergency  Assessment & Plan  History significant for essential hypertension  Initial blood pressure in the ED of 184/80  · Continue previously prescribed lisinopril in addition to initiated metoprolol  Class 2 severe obesity due to excess calories with serious comorbidity and body mass index (BMI) of 35 0 to 35 9 in adult [facility])  Assessment & Plan  · Lifestyle modification recommended  * NSTEMI, initial episode of care [facility])  Assessment & Plan  Intermittent episodes of worsening chest pain at rest with an onset of 1 week prior to admission  PCP scheduled outpatient stress test on 08/07/2019  No previous cardiac history  · Continue troponins-0 22, 0 32, 0 27, 0 35 and 0 28  · Continue telemetry  · Continue IV heparin infusion  · Metoprolol initiated in the ED  · Cardiology consult appreciated-cardiac catheterization demonstrated right coronary artery blockage 99%; transfer the [place] for likely CABG  Discharging Physician / Practitioner: Desiree Coles DO  PCP: John Obando MD  Admission Date:   Admission Orders (From admission, onward)     Ordered        08/01/19 1109  Inpatient Admission  Once         07/31/19 1058  Place in Observation  Once                   Discharge Date: 08/01/19    Resolved Problems  Date Reviewed: 8/1/2019    None          Consultations During Hospital Stay:  · Cardiology  Procedures Performed:   · Cardiac catheterization  Significant Findings / Test Results:   · Cardiac catheterization-99% ostial right coronary artery stenosis; high risk PCI  · Troponin I-See above  · XR chest-no acute cardiopulmonary disease  · EKG-normal sinus rhythm heart rate of 72 with left axis deviation and a first-degree AV block      Incidental Findings:   · None  Test Results Pending at Discharge (will require follow up): · None  Outpatient Tests Requested:  · None  Complications:  Right coronary artery stenosis noted on cardiac catheterization  Reason for Admission:  Chest pain  Hospital Course:     Jacklyn Daley is a 67 y o  female patient that originally presented [address] [facility]note 3[facility] ED on 7/31/2019 due to intermittent, nonradiating chest pain with an onset of approximately 1 week prior to admission  Initial vitals in the ED were blood pressure 184/80, heart rate 77, respirations 16 and SpO2 99%  Initial troponin elevation at 0 22  See above for trending troponin levels  X-ray chest unremarkable  In the ED, patient received 325 mg aspirin, Plavix 600 mg, metoprolol 25 mg, labetalol 10 mg, heparin injection 4000 units  Heparin infusion initiated  Patient admitted [address] inpatient Medicine  Cardiology consulted  Troponin levels trended  Vitals monitored  CBC and CMP were unremarkable  Cardiology recommended echocardiogram and cardiac catheter[facility] in the [facility] [place] See above for cardiac catheterization findings  Plavix continued  Lisinopril as needed for blood pressure  Patient subsequently transferred to [facility] for possible CABG due to high risk PCI  Discussed patient admission with inpatient Medicine physician at [address] [place] [address]  The patient, initially admitted to the hospital as inpatient, was discharged earlier than expected given the following: Transfer to [address] [place] [address] for PCI  [facility] Please see above list of diagnoses and related plan for additional information  Condition at Discharge: stable     Discharge Day Visit / Exam:     Subjective:  No complaints stated by the patient time of exam   Patient transferred the [address] [place] [address] due to cardiology recommendation on the basis of cardiac catheterization findings    Patient amenable to transfer  Vitals: Blood Pressure: 154/67 (08/01/19 1445)  Pulse: 55 (08/01/19 1445)  Temperature: 98 2 °F (36 8 °C) (08/01/19 0700)  Temp Source: Oral (08/01/19 0700)  Respirations: 18 (08/01/19 0700)  Height: 5' 4" (162 6 cm) (07/31/19 1214)  Weight - Scale: 94 9 kg (209 lb 3 2 oz) (07/31/19 1214)  SpO2: 97 % (08/01/19 0700)  Exam:   Physical Exam   Constitutional: She is oriented to person, place, and time  Vital signs are normal  She appears well-developed and well-nourished  She is cooperative  She does not appear ill  No distress  HENT:   Head: Normocephalic and atraumatic  Eyes: Pupils are equal, round, and reactive to light  EOM are normal    Neck: Normal range of motion  Neck supple  Cardiovascular: Normal rate, regular rhythm, normal heart sounds and intact distal pulses  Exam reveals no friction rub  No murmur heard  Pulmonary/Chest: Effort normal and breath sounds normal  No stridor  No respiratory distress  Abdominal: Soft  Bowel sounds are normal  She exhibits no distension  There is no tenderness  Musculoskeletal: Normal range of motion  Neurological: She is alert and oriented to person, place, and time  Skin: Skin is warm and dry  She is not diaphoretic  Psychiatric: She has a normal mood and affect  Her behavior is normal  Judgment and thought content normal    Vitals reviewed  Discussion with Family:  None  Discharge instructions/Information to patient and family:   See after visit summary for information provided to patient and family  Provisions for Follow-Up Care:  See after visit summary for information related to follow-up care and any pertinent home health orders  Disposition:     [address] Transfer to [facility]  For Discharges to [address]:   · Not Applicable to this Patient - Not Applicable to this Patient    Planned Readmission:  None       Discharge Statement:  I spent 30 minutes discharging the patient  This time was spent on the day of discharge  I had direct contact with the patient on the day of discharge  Greater than 50% of the total time was spent examining patient, answering all patient questions, arranging and discussing plan of care with patient as well as directly providing post-discharge instructions  Additional time then spent on discharge activities  Discharge Medications:  See after visit summary for reconciled discharge medications provided to patient and family        ** Please Note: This note has been constructed using a voice recognition system **

## 2019-08-01 NOTE — PROGRESS NOTES
Cardiac cath performed via the R radial artery  99% ostial RCA stenosis  The rest of the coronary anatomy is patent  High risk PCI  Transfer to [facility] for PCI / consideration for CABG

## 2019-08-02 ENCOUNTER — TRANSITIONAL CARE MANAGEMENT (OUTPATIENT)
Dept: FAMILY MEDICINE CLINIC | Facility: CLINIC | Age: 79
End: 2019-08-02

## 2019-08-02 ENCOUNTER — APPOINTMENT (INPATIENT)
Dept: NON INVASIVE DIAGNOSTICS | Facility: HOSPITAL | Age: 79
DRG: 247 | End: 2019-08-02
Payer: COMMERCIAL

## 2019-08-02 LAB
ANION GAP SERPL CALCULATED.3IONS-SCNC: 7 MMOL/L (ref 4–13)
APTT PPP: 105 SECONDS (ref 23–37)
BUN SERPL-MCNC: 20 MG/DL (ref 5–25)
CALCIUM SERPL-MCNC: 8.5 MG/DL (ref 8.3–10.1)
CHLORIDE SERPL-SCNC: 114 MMOL/L (ref 100–108)
CO2 SERPL-SCNC: 24 MMOL/L (ref 21–32)
CREAT SERPL-MCNC: 0.83 MG/DL (ref 0.6–1.3)
GFR SERPL CREATININE-BSD FRML MDRD: 68 ML/MIN/1.73SQ M
GLUCOSE SERPL-MCNC: 103 MG/DL (ref 65–140)
KCT BLD-ACNC: 297 SEC (ref 89–137)
POTASSIUM SERPL-SCNC: 4.3 MMOL/L (ref 3.5–5.3)
SODIUM SERPL-SCNC: 145 MMOL/L (ref 136–145)
SPECIMEN SOURCE: ABNORMAL
TROPONIN I SERPL-MCNC: 0.08 NG/ML

## 2019-08-02 PROCEDURE — 92978 ENDOLUMINL IVUS OCT C 1ST: CPT | Performed by: INTERNAL MEDICINE

## 2019-08-02 PROCEDURE — C1769 GUIDE WIRE: HCPCS | Performed by: INTERNAL MEDICINE

## 2019-08-02 PROCEDURE — NC001 PR NO CHARGE: Performed by: INTERNAL MEDICINE

## 2019-08-02 PROCEDURE — B2101ZZ FLUOROSCOPY OF SINGLE CORONARY ARTERY USING LOW OSMOLAR CONTRAST: ICD-10-PCS | Performed by: INTERNAL MEDICINE

## 2019-08-02 PROCEDURE — 93571 IV DOP VEL&/PRESS C FLO 1ST: CPT | Performed by: INTERNAL MEDICINE

## 2019-08-02 PROCEDURE — C1753 CATH, INTRAVAS ULTRASOUND: HCPCS | Performed by: INTERNAL MEDICINE

## 2019-08-02 PROCEDURE — C1887 CATHETER, GUIDING: HCPCS | Performed by: INTERNAL MEDICINE

## 2019-08-02 PROCEDURE — 92928 PRQ TCAT PLMT NTRAC ST 1 LES: CPT | Performed by: INTERNAL MEDICINE

## 2019-08-02 PROCEDURE — C1760 CLOSURE DEV, VASC: HCPCS | Performed by: INTERNAL MEDICINE

## 2019-08-02 PROCEDURE — 85347 COAGULATION TIME ACTIVATED: CPT

## 2019-08-02 PROCEDURE — 99152 MOD SED SAME PHYS/QHP 5/>YRS: CPT | Performed by: INTERNAL MEDICINE

## 2019-08-02 PROCEDURE — 84484 ASSAY OF TROPONIN QUANT: CPT | Performed by: FAMILY MEDICINE

## 2019-08-02 PROCEDURE — C9600 PERC DRUG-EL COR STENT SING: HCPCS | Performed by: INTERNAL MEDICINE

## 2019-08-02 PROCEDURE — C1894 INTRO/SHEATH, NON-LASER: HCPCS | Performed by: INTERNAL MEDICINE

## 2019-08-02 PROCEDURE — C1874 STENT, COATED/COV W/DEL SYS: HCPCS

## 2019-08-02 PROCEDURE — C1725 CATH, TRANSLUMIN NON-LASER: HCPCS | Performed by: INTERNAL MEDICINE

## 2019-08-02 PROCEDURE — 85730 THROMBOPLASTIN TIME PARTIAL: CPT | Performed by: FAMILY MEDICINE

## 2019-08-02 PROCEDURE — 99232 SBSQ HOSP IP/OBS MODERATE 35: CPT | Performed by: INTERNAL MEDICINE

## 2019-08-02 PROCEDURE — 027034Z DILATION OF CORONARY ARTERY, ONE ARTERY WITH DRUG-ELUTING INTRALUMINAL DEVICE, PERCUTANEOUS APPROACH: ICD-10-PCS | Performed by: INTERNAL MEDICINE

## 2019-08-02 PROCEDURE — 92979 ENDOLUMINL IVUS OCT C EA: CPT | Performed by: INTERNAL MEDICINE

## 2019-08-02 PROCEDURE — 80048 BASIC METABOLIC PNL TOTAL CA: CPT | Performed by: FAMILY MEDICINE

## 2019-08-02 PROCEDURE — 93454 CORONARY ARTERY ANGIO S&I: CPT | Performed by: INTERNAL MEDICINE

## 2019-08-02 PROCEDURE — 99153 MOD SED SAME PHYS/QHP EA: CPT | Performed by: INTERNAL MEDICINE

## 2019-08-02 RX ORDER — HEPARIN SODIUM 1000 [USP'U]/ML
INJECTION, SOLUTION INTRAVENOUS; SUBCUTANEOUS CODE/TRAUMA/SEDATION MEDICATION
Status: COMPLETED | OUTPATIENT
Start: 2019-08-02 | End: 2019-08-02

## 2019-08-02 RX ORDER — SODIUM CHLORIDE 9 MG/ML
125 INJECTION, SOLUTION INTRAVENOUS CONTINUOUS
Status: CANCELLED | OUTPATIENT
Start: 2019-08-02

## 2019-08-02 RX ORDER — MIDAZOLAM HYDROCHLORIDE 1 MG/ML
INJECTION INTRAMUSCULAR; INTRAVENOUS CODE/TRAUMA/SEDATION MEDICATION
Status: COMPLETED | OUTPATIENT
Start: 2019-08-02 | End: 2019-08-02

## 2019-08-02 RX ORDER — SODIUM CHLORIDE 9 MG/ML
125 INJECTION, SOLUTION INTRAVENOUS CONTINUOUS
Status: DISPENSED | OUTPATIENT
Start: 2019-08-02 | End: 2019-08-02

## 2019-08-02 RX ORDER — ACETAMINOPHEN 325 MG/1
650 TABLET ORAL EVERY 4 HOURS PRN
Status: DISCONTINUED | OUTPATIENT
Start: 2019-08-02 | End: 2019-08-03 | Stop reason: HOSPADM

## 2019-08-02 RX ORDER — LIDOCAINE HYDROCHLORIDE 10 MG/ML
INJECTION, SOLUTION INFILTRATION; PERINEURAL CODE/TRAUMA/SEDATION MEDICATION
Status: COMPLETED | OUTPATIENT
Start: 2019-08-02 | End: 2019-08-02

## 2019-08-02 RX ORDER — ONDANSETRON 2 MG/ML
4 INJECTION INTRAMUSCULAR; INTRAVENOUS EVERY 6 HOURS PRN
Status: DISCONTINUED | OUTPATIENT
Start: 2019-08-02 | End: 2019-08-03 | Stop reason: HOSPADM

## 2019-08-02 RX ORDER — ASPIRIN 81 MG/1
324 TABLET, CHEWABLE ORAL ONCE
Status: CANCELLED | OUTPATIENT
Start: 2019-08-02 | End: 2019-08-02

## 2019-08-02 RX ORDER — FENTANYL CITRATE 50 UG/ML
INJECTION, SOLUTION INTRAMUSCULAR; INTRAVENOUS CODE/TRAUMA/SEDATION MEDICATION
Status: COMPLETED | OUTPATIENT
Start: 2019-08-02 | End: 2019-08-02

## 2019-08-02 RX ADMIN — VITAMIN D, TAB 1000IU (100/BT) 1000 UNITS: 25 TAB at 08:32

## 2019-08-02 RX ADMIN — CARVEDILOL 6.25 MG: 6.25 TABLET, FILM COATED ORAL at 17:11

## 2019-08-02 RX ADMIN — IOHEXOL 35 ML: 350 INJECTION, SOLUTION INTRAVENOUS at 14:54

## 2019-08-02 RX ADMIN — MIDAZOLAM 1 MG: 1 INJECTION INTRAMUSCULAR; INTRAVENOUS at 14:35

## 2019-08-02 RX ADMIN — IOHEXOL 150 ML: 350 INJECTION, SOLUTION INTRAVENOUS at 14:26

## 2019-08-02 RX ADMIN — TICAGRELOR 180 MG: 90 TABLET ORAL at 21:03

## 2019-08-02 RX ADMIN — MIDAZOLAM 1 MG: 1 INJECTION INTRAMUSCULAR; INTRAVENOUS at 14:08

## 2019-08-02 RX ADMIN — HEPARIN SODIUM 10000 UNITS: 1000 INJECTION INTRAVENOUS; SUBCUTANEOUS at 13:50

## 2019-08-02 RX ADMIN — ATORVASTATIN CALCIUM 40 MG: 40 TABLET, FILM COATED ORAL at 17:11

## 2019-08-02 RX ADMIN — FENTANYL CITRATE 50 MCG: 50 INJECTION INTRAMUSCULAR; INTRAVENOUS at 13:41

## 2019-08-02 RX ADMIN — CARVEDILOL 6.25 MG: 6.25 TABLET, FILM COATED ORAL at 08:32

## 2019-08-02 RX ADMIN — LISINOPRIL 20 MG: 20 TABLET ORAL at 08:32

## 2019-08-02 RX ADMIN — MIDAZOLAM 2 MG: 1 INJECTION INTRAMUSCULAR; INTRAVENOUS at 13:42

## 2019-08-02 RX ADMIN — FENTANYL CITRATE 50 MCG: 50 INJECTION INTRAMUSCULAR; INTRAVENOUS at 14:08

## 2019-08-02 RX ADMIN — LIDOCAINE HYDROCHLORIDE 7 ML: 10 INJECTION, SOLUTION INFILTRATION; PERINEURAL at 13:46

## 2019-08-02 RX ADMIN — FAMOTIDINE 40 MG: 20 TABLET ORAL at 21:03

## 2019-08-02 RX ADMIN — FENTANYL CITRATE 50 MCG: 50 INJECTION INTRAMUSCULAR; INTRAVENOUS at 14:35

## 2019-08-02 RX ADMIN — ZINC 1 TABLET: TAB ORAL at 08:32

## 2019-08-02 RX ADMIN — NITROGLYCERIN 1 INCH: 20 OINTMENT TOPICAL at 13:54

## 2019-08-02 RX ADMIN — ASPIRIN 81 MG: 81 TABLET, COATED ORAL at 08:32

## 2019-08-02 RX ADMIN — CLOPIDOGREL BISULFATE 75 MG: 75 TABLET ORAL at 08:32

## 2019-08-02 RX ADMIN — SODIUM CHLORIDE 125 ML/HR: 0.9 INJECTION, SOLUTION INTRAVENOUS at 15:50

## 2019-08-02 NOTE — ASSESSMENT & PLAN NOTE
· Patient initially presented to the hospital with burning chest pain for 2-3 weeks mainly with exertion  Elevated troponin  Had a cardiac catheterization done in [facility]  Found to have  critical lesion of ostial LAD   Transferred over here for high-risk PCI versus CABG  · Continue with aspirin, Plavix and beta-blocker  · Continue heparin drip  · Awaiting cardiology evaluation

## 2019-08-02 NOTE — PROGRESS NOTES
Cardiology   Claribel Oneill 67 y o  female MRN: [id number]  Unit/Bed#: [facility] 428-01 Encounter: [id number]        Assessment/Plan:    >> Proximal LAD stenosis NOT RCA presenting as non ST elevation MI type 1    >> NSTEMI Type 1  >> HTN      Plan:      Continue dual anti-platelet therapy with aspirin and Plavix, continue lisinopril and carvedilol for blood pressure  Consult CT surgery for possible CABG versus high-risk PCI  Continue atorvastatin 40 mg daily  Continue heparin drip for now      Subjective: No complaints, no events  Did have an episode of CP prior to transfer from OSH   For complex PCI today    Denies ongoing chest pain, shortness of breath, palpitations, orthopnea, PND, pedal edema, syncope, presyncope, diaphoresis, nausea/vomiting     Remainder of ROS done and negative            Historical Information   Past Medical History:   Diagnosis Date    AVB (atrioventricular block)     first degree    CAD (coronary artery disease)     GERD (gastroesophageal reflux disease) 7/1/2017    HLD (hyperlipidemia) 7/1/2017    Hypertension     LVH (left ventricular hypertrophy)     Osteopenia     Ventral hernia without obstruction or gangrene 7/1/2017     Past Surgical History:   Procedure Laterality Date    CARDIAC CATHETERIZATION      GALLBLADDER SURGERY      HEMORRHOID SURGERY      HYSTERECTOMY  1987    age 56 w/ left oopherectomy    NOSE SURGERY      basal cell    OOPHORECTOMY Left 1987    age 56    VA LAP,CHOLECYSTECTOMY N/A 8/25/2016    Procedure: LAPAROSCOPIC CHOLECYSTECTOMY ;  Surgeon: Faustino Canales MD;  [facility];  Service: General    VA REPAIR INCISIONAL HERNIA,REDUCIBLE N/A 11/30/2017    Procedure: INCISIONAL HERNIA REPAIR;  Surgeon: Faustino Canales MD;  [facility];  Service: General    ROTATOR CUFF REPAIR Right      Social History   Social History     Substance and Sexual Activity   Alcohol Use Yes    Frequency: Monthly or less    Comment: socially     Social History Substance and Sexual Activity   Drug Use No     Social History     Tobacco Use   Smoking Status Never Smoker   Smokeless Tobacco Never Used     Family History:   Family History   Problem Relation Age of Onset    No Known Problems Mother     No Known Problems Father     No Known Problems Daughter     No Known Problems Maternal Grandmother     No Known Problems Maternal Grandfather     No Known Problems Paternal Grandmother     No Known Problems Paternal Grandfather     No Known Problems Half-Sister     No Known Problems Maternal Aunt     Squamous cell carcinoma Brother     No Known Problems Son            Scheduled Meds:  Current Facility-Administered Medications:  aspirin 81 mg Oral Every Other Day Alan Zapata MD    atorvastatin 40 mg Oral Daily With Roserahel Vega MD    carvedilol 6 25 mg Oral BID With Meals Alan Zapata MD    cholecalciferol 1,000 Units Oral Daily Alan Zapata MD    clopidogrel 75 mg Oral Daily Alan Zapata MD    famotidine 40 mg Oral HS Alan Zapata MD    heparin (porcine) 3-20 Units/kg/hr (Order-Specific) Intravenous Titrated Alan Zapata MD Last Rate: 9 1 Units/kg/hr (08/02/19 0603)   heparin (porcine) 2,000 Units Intravenous PRN Alan Zapata MD    heparin (porcine) 4,000 Units Intravenous PRN Alan Zapata MD    Labetalol HCl 10 mg Intravenous Q6H PRN Alan Zapata MD    latanoprost 1 drop Both Eyes Daily Alan Zapata MD    lisinopril 20 mg Oral Daily Alan Zapata MD    morphine injection 1 mg Intravenous Q4H PRN Alan Zapata MD    multivitamin stress formula 1 tablet Oral Daily Alan Zapata MD    nitroglycerin 0 4 mg Sublingual Q5 Min PRN Alan Zapata MD    sodium chloride 75 mL/hr Intravenous Continuous Alan Zapata MD Last Rate: 75 mL/hr (08/01/19 2200)     Continuous Infusions:  heparin (porcine) 3-20 Units/kg/hr (Order-Specific) Last Rate: 9 1 Units/kg/hr (08/02/19 0603)   sodium chloride 75 mL/hr Last Rate: 75 mL/hr (08/01/19 2200)     PRN Meds: heparin (porcine)   heparin (porcine)    Labetalol HCl    morphine injection    nitroglycerin  current meds:   Current Facility-Administered Medications   Medication Dose Route Frequency    aspirin (ECOTRIN LOW STRENGTH) EC tablet 81 mg  81 mg Oral Every Other Day    atorvastatin (LIPITOR) tablet 40 mg  40 mg Oral Daily With Dinner    carvedilol (COREG) tablet 6 25 mg  6 25 mg Oral BID With Meals    cholecalciferol (VITAMIN D3) tablet 1,000 Units  1,000 Units Oral Daily    clopidogrel (PLAVIX) tablet 75 mg  75 mg Oral Daily    famotidine (PEPCID) tablet 40 mg  40 mg Oral HS    heparin (porcine) 25,000 units in 250 mL infusion (premix)  3-20 Units/kg/hr (Order-Specific) Intravenous Titrated    heparin (porcine) injection 2,000 Units  2,000 Units Intravenous PRN    heparin (porcine) injection 4,000 Units  4,000 Units Intravenous PRN    Labetalol HCl (NORMODYNE) injection 10 mg  10 mg Intravenous Q6H PRN    latanoprost (XALATAN) 0 005 % ophthalmic solution 1 drop  1 drop Both Eyes Daily    lisinopril (ZESTRIL) tablet 20 mg  20 mg Oral Daily    morphine injection 1 mg  1 mg Intravenous Q4H PRN    multivitamin stress formula tablet 1 tablet  1 tablet Oral Daily    nitroglycerin (NITROSTAT) SL tablet 0 4 mg  0 4 mg Sublingual Q5 Min PRN    sodium chloride 0 9 % infusion  75 mL/hr Intravenous Continuous    and PTA meds:   Prior to Admission Medications   Prescriptions Last Dose Informant Patient Reported? Taking? Co-Enzyme Q-10 100 MG CAPS  Self Yes No   Sig: Take 200 mg by mouth daily  Multiple Vitamins-Minerals (PRESERVISION AREDS) CAPS  Self Yes No   Sig: Take 2 capsules by mouth daily   PX GLUCOSAMINE-CHONDROITIN PO  Self Yes No   Sig: Take 2 tablets by mouth daily  Zoster Vac Recomb Adjuvanted 50 MCG SUSR  Self No No   Sig: Inject 50 mcg into the shoulder, thigh, or buttocks as needed (imm) for up to 1 dose   aspirin 81 MG tablet  Self Yes No   Sig: Take 81 mg by mouth every other day     atorvastatin (LIPITOR) 40 mg tablet   No No   Sig: Take 1 tablet (40 mg total) by mouth daily   b complex vitamins tablet  Self Yes No   Sig: Take 1 tablet by mouth daily  cholecalciferol (VITAMIN D3) 1,000 units tablet  Self Yes No   Sig: Take 1,000 Units by mouth daily   latanoprost (XALATAN) 0 005 % ophthalmic solution  Self Yes No   Sig: Administer 1 drop to both eyes daily   lisinopril (ZESTRIL) 20 mg tablet   No No   Sig: Take 1 tablet (20 mg total) by mouth daily   ranitidine (ZANTAC) 300 MG capsule   No No   Sig: Take 1 capsule (300 mg total) by mouth every evening      Facility-Administered Medications: None       Allergies   Allergen Reactions    Codeine      Reaction Date: 19Jul2011;     Other      darvocet       Objective   Vitals: Blood pressure 170/78, pulse 55, temperature 98 °F (36 7 °C), temperature source Oral, resp  rate 20, height 5' 4" (1 626 m), weight 95 8 kg (211 lb 3 2 oz), SpO2 97 %, not currently breastfeeding , Body mass index is 36 25 kg/m² , Orthostatic Blood Pressures      Most Recent Value   Blood Pressure  170/78 filed at 08/02/2019 0700   Patient Position - Orthostatic VS  Sitting filed at 08/02/2019 0700            Intake/Output Summary (Last 24 hours) at 8/2/2019 0856  Last data filed at 8/2/2019 1213  Gross per 24 hour   Intake 93 75 ml   Output 850 ml   Net -756 25 ml       Invasive Devices     Peripheral Intravenous Line            Peripheral IV 07/31/19 Right Hand 2 days                Physical Exam:    General:  AO x3, no acute distress  Cardiac:  S1-S2 normal  No murmurs, rubs or gallops, JVP:not seen  Lungs:  Clear to auscultation bilaterally, no wheezing or crackles    Abdomen:  Soft nontender nondistended, positive bowel sounds  Extremities:  Warm, well perfused, pulses palpable, no ulcers or rashes  Neuro: Grossly nonfocal  Psych:  Normal affect      Lab Results:   Recent Results (from the past 24 hour(s))   APTT    Collection Time: 08/01/19  9:32 AM   Result Value Ref Range    PTT 92 (H) 23 - 37 seconds   Troponin I    Collection Time: 08/01/19 10:31 PM   Result Value Ref Range    Troponin I 0 11 (H) <=0 04 ng/mL   APTT    Collection Time: 08/01/19 10:31 PM   Result Value Ref Range    PTT 78 (H) 23 - 37 seconds   Troponin I    Collection Time: 08/02/19  4:31 AM   Result Value Ref Range    Troponin I 0 08 (H) <=0 04 ng/mL   Basic metabolic panel    Collection Time: 08/02/19  5:25 AM   Result Value Ref Range    Sodium 145 136 - 145 mmol/L    Potassium 4 3 3 5 - 5 3 mmol/L    Chloride 114 (H) 100 - 108 mmol/L    CO2 24 21 - 32 mmol/L    ANION GAP 7 4 - 13 mmol/L    BUN 20 5 - 25 mg/dL    Creatinine 0 83 0 60 - 1 30 mg/dL    Glucose 103 65 - 140 mg/dL    Calcium 8 5 8 3 - 10 1 mg/dL    eGFR 68 ml/min/1 73sq m   APTT    Collection Time: 08/02/19  5:30 AM   Result Value Ref Range     (H) 23 - 37 seconds       Imaging: I have personally reviewed pertinent reports

## 2019-08-02 NOTE — CONSULTS
Consultation - Cardiology   Yolande Blackwell 67 y o  female MRN: [id number]  Unit/Bed#: [facility] 428-01 Encounter: [id number]    Assessment/Plan   NSTEMI w/  LAD occlusion  - Solitary lesion with 95% blockage of ostial LAD with no other visualized blockages  Lesion could potentially be treated with PCI; however, due to location at ostium, there is increased risk and CABG may be necessary   - PCI scheduled for today, if unsuccessful, will go for CABG  - Continue patient on heparin drip until PCI today after which can be maintained on dual anti-platelet therapy  - Continue dual anti-platelet therapy as currently prescribed  - Continue statin, Coreg, and lisinopril as prescribed  Hypertension  - Patient had beta blocker added since admission and switched to Coreg yesterday from lopressor   - On lisinopril at home  - Hypertensive this AM and will continue to follow pressures  - Patient with episodes of bradycardia so would prefer to increase ACEI over BB   - If patient continues to be hypertensive, can increase lisinopril to 40 mg  Hyperlipidemia  - Lipid panel showed adequate control on current statin  - Continue lipitor 40 mg  History of Present Illness   Physician Requesting Consult: Rajeev Morales DO  Reason for Consult / Principal Problem: NSTEMI with ostial LAD disease requiring PCI  HPI: Yolande Blackwell is a 67y o  year old female with a history of HTN, HLD, and GERD who presented with several weeks of exertional burning chest pain to [facility] on 7/31/2019  Pain had become prolonged on 7/31 and patient went to ED where patient was noted to be hypertensive and had elevated troponins which peaked at 0 35  She received ACS workup/protocol and was  started on a heparin infusion and loaded with plavix as well as being started on metoprolol for her BP  On 8/1, she underwent a cardiac cath where she was noted to have a 95% blockage of the ostial LAD   Due to the location and higher risk of intervention to the lesion, she was transferred to [facility] for PCI and/or possible CABG if unable to treat with PCI  Denies any SOB, palpitations, lightheadedness, syncope, or diaphoresis with episodes  Patient saw her PCP about a week ago for her burning chest pain and was scheduled for a stress test on 8/7  Today, patient has no chest pain or other complaints  States last episode was while going to the bathroom just prior to transfer from [address] which was brief and resolved when she laid down  Inpatient consult to Cardiology  Consult performed by: Roverto Barajas  Consult ordered by: Alesia Green PA-C          Review of Systems   Constitutional: Negative for activity change, appetite change, diaphoresis and fever  HENT: Negative for sore throat and trouble swallowing  Eyes: Negative for photophobia, pain and visual disturbance  Respiratory: Negative for cough, choking, chest tightness and shortness of breath  Cardiovascular: Positive for chest pain (burning)  Negative for palpitations and leg swelling  Gastrointestinal: Negative for abdominal pain, constipation, diarrhea and nausea  Endocrine: Negative for polydipsia and polyuria  Musculoskeletal: Negative for back pain, myalgias and neck pain  Skin: Negative for color change and rash  Neurological: Negative for dizziness, tremors, syncope, light-headedness, numbness and headaches         Historical Information   Past Medical History:   Diagnosis Date    AVB (atrioventricular block)     first degree    CAD (coronary artery disease)     GERD (gastroesophageal reflux disease) 7/1/2017    HLD (hyperlipidemia) 7/1/2017    Hypertension     LVH (left ventricular hypertrophy)     Osteopenia     Ventral hernia without obstruction or gangrene 7/1/2017     Past Surgical History:   Procedure Laterality Date    CARDIAC CATHETERIZATION      GALLBLADDER SURGERY      HEMORRHOID SURGERY      HYSTERECTOMY  1987    age 56 w/ left oopherectomy    NOSE SURGERY      basal cell    OOPHORECTOMY Left 1987    age 56   [facility] N/A 8/25/2016    Procedure: LAPAROSCOPIC CHOLECYSTECTOMY ;  Surgeon: Mercedes Johnson MD;  [facility];  Service: [facility]    [id number] [place] [address] N/A 11/30/2017    Procedure: [address];  Surgeon: Mercedes Johnson MD;  [facility];  Service: [facility]    [address] Right      Social History     Substance and Sexual Activity   Alcohol Use Yes    Frequency: Monthly or less    Comment: socially     Social History     Substance and Sexual Activity   Drug Use No     Social History     Tobacco Use   Smoking Status Never Smoker   Smokeless Tobacco Never Used     Family History: CAD in father, unspecified heart disease in mother and grandparents    Meds/Allergies   all current active meds have been reviewed     Scheduled Meds:  Current Facility-Administered Medications:  aspirin 81 mg Oral Every Other Day Shelby Limb, MD    atorvastatin 40 mg Oral Daily With Yves Falling, MD    carvedilol 6 25 mg Oral BID With Meals Krebs Limb, MD    cholecalciferol 1,000 Units Oral Daily Krebs Limb, MD    clopidogrel 75 mg Oral Daily Shelby Limb, MD    famotidine 40 mg Oral HS Krebs Limb, MD    heparin (porcine) 3-20 Units/kg/hr (Order-Specific) Intravenous Titrated Shelby Limb, MD Last Rate: 9 1 Units/kg/hr (08/02/19 0603)   heparin (porcine) 2,000 Units Intravenous PRN Shelby Limb, MD    heparin (porcine) 4,000 Units Intravenous PRN Shelby Limb, MD    Labetalol HCl 10 mg Intravenous Q6H PRN Krebs Limb, MD    latanoprost 1 drop Both Eyes Daily Krebs Limb, MD    lisinopril 20 mg Oral Daily Krebs Limb, MD    morphine injection 1 mg Intravenous Q4H PRN Shelby Limb, MD    multivitamin stress formula 1 tablet Oral Daily Shelby Limb, MD    nitroglycerin 0 4 mg Sublingual Q5 Min PRN Shelby Limb, MD      Continuous Infusions:  heparin (porcine) 3-20 Units/kg/hr (Order-Specific) Last Rate: 9 1 Units/kg/hr (08/02/19 Procula Maze)     PRN Meds: heparin (porcine)    heparin (porcine)    Labetalol HCl    morphine injection    nitroglycerin    Allergies   Allergen Reactions    Codeine      Reaction Date: 19Jul2011;     Other      darvocet       Objective   Vitals: Blood pressure 170/78, pulse 55, temperature 98 °F (36 7 °C), temperature source Oral, resp  rate 20, height 5' 4" (1 626 m), weight 95 8 kg (211 lb 3 2 oz), SpO2 97 %, not currently breastfeeding  Orthostatic Blood Pressures      Most Recent Value   Blood Pressure  170/78 filed at 08/02/2019 0700   Patient Position - Orthostatic VS  Sitting filed at 08/02/2019 0700            Intake/Output Summary (Last 24 hours) at 8/2/2019 1014  Last data filed at 8/2/2019 1975  Gross per 24 hour   Intake 940 24 ml   Output 850 ml   Net 90 24 ml       Invasive Devices     Peripheral Intravenous Line            Peripheral IV 07/31/19 Right Hand 2 days                Physical Exam   Constitutional: She is oriented to person, place, and time  She appears well-developed and well-nourished  HENT:   Head: Normocephalic  Eyes: Pupils are equal, round, and reactive to light  Neck: Normal range of motion  Neck supple  No JVD present  Cardiovascular: Regular rhythm and normal heart sounds  Bradycardia present  Exam reveals no gallop and no friction rub  Pulmonary/Chest: Effort normal and breath sounds normal  She has no rales  Abdominal: Soft  Bowel sounds are normal    Musculoskeletal: Normal range of motion  She exhibits no edema  Neurological: She is alert and oriented to person, place, and time  Skin: Skin is warm and dry  Lab Results: I have personally reviewed pertinent lab results  Troponins - peaked at 0 35    Imaging: I have personally reviewed pertinent reports  CXR - No acute cardiopulmonary disease    ECHO - LEFT VENTRICLE:  Systolic function was normal  Ejection fraction was estimated to be 65 %    There were no regional wall motion abnormalities      AORTIC VALVE:  There was trace regurgitation      EKG: Sinus bradycardia with 1st degree A-V block, nonspecific T-wave abnormality  VTE Prophylaxis: Heparin    Code Status: Level 1 - Full Code

## 2019-08-02 NOTE — RESTORATIVE TECHNICIAN NOTE
Restorative Specialist Mobility Note       Activity: Bathroom privileges, Ambulate in galloway(with daughter)     Assistive Device: None

## 2019-08-02 NOTE — PROGRESS NOTES
Progress Note - Kendal Soulier 1940, 67 y o  female MRN: [id number]    Unit/Bed#: [facility] 428-01 Encounter: [id number]    Primary Care Provider: Aileen Washington MD   Date and time admitted to hospital: 2019  7:35 PM        * NSTEMI, initial episode of care [facility])  Assessment & Plan  · Patient initially presented to the hospital with burning chest pain for 2-3 weeks mainly with exertion  Elevated troponin  Had a cardiac catheterization done in [facility]  Found to have  critical lesion of ostial LAD  Transferred over here for high-risk PCI versus CABG  · Continue with aspirin, Plavix and beta-blocker  · Continue heparin drip  · Awaiting cardiology evaluation      CKD (chronic kidney disease) stage 3, GFR 30-59 ml/min ([facility])  Assessment & Plan  · Status post cardiac catheterization  · Monitor creatinine dye exposure    Essential hypertension  Assessment & Plan  · Patient was on lisinopril as an outpatient  · Started on Coreg  · Monitor creatinine           VTE Pharmacologic Prophylaxis:   Pharmacologic: Heparin Drip  Mechanical VTE Prophylaxis in Place: Yes    Patient Centered Rounds: I have performed bedside rounds with nursing staff today  Discussions with Specialists or Other Care Team Provider:     Education and Discussions with Family / Patient:  Daughter    Time Spent for Care: 30 minutes  More than 50% of total time spent on counseling and coordination of care as described above      Current Length of Stay: 1 day(s)    Current Patient Status: Inpatient   Certification Statement: The patient will continue to require additional inpatient hospital stay due to Above    Discharge Plan / Estimated Discharge Date: TBD    Code Status: Level 1 - Full Code      Subjective:   Patient seen and examined  Comfortable sitting in chair  Denied chest pain or shortness of breath    Objective:     Vitals:   Temp (24hrs), Av 8 °F (36 6 °C), Min:97 5 °F (36 4 °C), Max:98 °F (36 7 °C)    Temp:  [97 5 °F (36 4 °C)-98 °F (36 7 °C)] 98 °F (36 7 °C)  HR:  [50-68] 55  Resp:  [15-20] 20  BP: (127-170)/(56-78) 170/78  SpO2:  [95 %-99 %] 97 %  Body mass index is 36 25 kg/m²  Input and Output Summary (last 24 hours): Intake/Output Summary (Last 24 hours) at 8/2/2019 1154  Last data filed at 8/2/2019 0828  Gross per 24 hour   Intake 940 24 ml   Output 850 ml   Net 90 24 ml       Physical Exam:     Physical Exam  Patient is awake alert oriented no acute distress  Lung clear to auscultation bilateral  Heart positive S1-S2 no murmur  Abdomen soft nontender  Lower extremities no edema    Additional Data:     Labs:    Results from last 7 days   Lab Units 08/01/19  0635 07/31/19  0829   WBC Thousand/uL 7 92 6 76   HEMOGLOBIN g/dL 12 8 11 9   HEMATOCRIT % 42 7 37 5   PLATELETS Thousands/uL 244 228   NEUTROS PCT %  --  60   LYMPHS PCT %  --  25   MONOS PCT %  --  12   EOS PCT %  --  2     Results from last 7 days   Lab Units 08/02/19  0525  07/31/19  0829   POTASSIUM mmol/L 4 3   < > 4 5   CHLORIDE mmol/L 114*   < > 106   CO2 mmol/L 24   < > 26   BUN mg/dL 20   < > 19   CREATININE mg/dL 0 83   < > 0 95   CALCIUM mg/dL 8 5   < > 9 0   ALK PHOS U/L  --   --  65   ALT U/L  --   --  36   AST U/L  --   --  28    < > = values in this interval not displayed  Results from last 7 days   Lab Units 07/31/19  1506   INR  1 03       * I Have Reviewed All Lab Data Listed Above  * Additional Pertinent Lab Tests Reviewed:  Marjorie 67 Admission Reviewed    Imaging:    Imaging Reports Reviewed Today Include:   Imaging Personally Reviewed by Myself Includes:      Recent Cultures (last 7 days):           Last 24 Hours Medication List:     Current Facility-Administered Medications:  aspirin 81 mg Oral Every Other Day Glendy Hunt MD    atorvastatin 40 mg Oral Daily With Anirudh Field MD    carvedilol 6 25 mg Oral BID With Meals Glendy Hunt MD    cholecalciferol 1,000 Units Oral Daily Glendy Hunt MD    clopidogrel 75 mg Oral Daily Chandrika Felix MD    famotidine 40 mg Oral HS Chandrika Felix MD    heparin (porcine) 3-20 Units/kg/hr (Order-Specific) Intravenous Titrated Chandrika Felix MD Last Rate: 9 1 Units/kg/hr (08/02/19 0603)   heparin (porcine) 2,000 Units Intravenous PRN Chandrika Felix MD    heparin (porcine) 4,000 Units Intravenous PRN Chandrika Felix MD    Labetalol HCl 10 mg Intravenous Q6H PRN Chandrika Felix MD    latanoprost 1 drop Both Eyes Daily Chandrika Felix MD    lisinopril 20 mg Oral Daily Chandrika Felix MD    morphine injection 1 mg Intravenous Q4H PRN Chandrika Felix MD    multivitamin stress formula 1 tablet Oral Daily Chandrika Felix MD    nitroglycerin 0 4 mg Sublingual Q5 Min PRN Chandrika Felix MD         Today, Patient Was Seen By: Lucía Marin DO    ** Please Note: This note has been constructed using a voice recognition system   **

## 2019-08-02 NOTE — SOCIAL WORK
CM obtained all the following info about the pt  HOME: Pt resides in a 1-story house w/ 12 steps down to basement and 4 steps to enter house at front door  LIVES W/: Herself only  : Pt's son Ottis Hashimoto / c: [phone]  INDEPENDENCE: Pt reported she is fully independent at baseline w/ ambulating and performing her ADLS  DME: Pt's only DME is an unused cane  HHC: No hx reported  I/P REHAB: No hx reported  MENTAL HEALTH ISSUES: No hx reported  D&A ISSUES: No hx reported  PCP: Dr Vargas Coe through [facility]  PHARMACY: [facility] located on [address] [place] [address] in [address] [place] [address]  INCOME SOURCE: Pension and Social Security benefits  MEDICAL POA: No one is pre-designated  TRANSPORTATION AT D/C: Pt's family members  CM reviewed d/c planning process including the following: identifying help at home, patient preference for d/c planning needs, Discharge Lounge, [facility] Meds to Bed program, availability of treatment team to discuss questions or concerns patient and/or family may have regarding understanding medications and recognizing signs and symptoms once discharged  CM also encouraged patient to follow up with all recommended appointments after discharge  Patient advised of importance for patient and family to participate in managing patients medical well being  Patient/caregiver received discharge checklist  Content reviewed  Patient/caregiver encouraged to participate in discharge plan of care prior to discharge home

## 2019-08-02 NOTE — UTILIZATION REVIEW
Initial Clinical Review    Admission: Date/Time/Statement: 8/1/19 @ 1945  Orders Placed This Encounter   Procedures    Inpatient Admission     Standing Status:   Standing     Number of Occurrences:   1     Order Specific Question:   Admitting Physician     Answer:   Whit Sales     Order Specific Question:   Level of Care     Answer:   Med Surg [16]     Order Specific Question:   Estimated length of stay     Answer:   More than 2 Midnights     Order Specific Question:   Certification     Answer:   I certify that inpatient services are medically necessary for this patient for a duration of greater than two midnights  See H&P and MD Progress Notes for additional information about the patient's course of treatment  Assessment/Plan: 67year old female, presented to the ED @ [facility] (Admitted), Transferred to [facility] via EMS, higher level of care  Admitted as Inpatient due to NSTEMI  Patient with NSTEMI and had cardiac catheterization done today  Patient found to have 99 percent ostial LAD and the rest of the coronary anatomy is patent  It appears that the patient is a high-risk PCI and transferred over here for PCI or consideration of CABG  NSTEMI, initial episode of care [facility]):  Continue with aspirin, Plavix and beta-blocker    Continue heparin drip       08/02/2019 Progress Note Cardio: Poss PCI of LAD    Triage Vitals   Temperature Pulse Respirations Blood Pressure SpO2   08/01/19 2000 08/01/19 2000 08/01/19 2000 08/01/19 2000 08/01/19 2000   97 7 °F (36 5 °C) 58 18 152/69 99 %      Temp Source Heart Rate Source Patient Position - Orthostatic VS BP Location FiO2 (%)   08/01/19 2000 08/01/19 2334 08/01/19 2000 08/01/19 2000 --   Oral Monitor Sitting Left arm       Pain Score       08/01/19 2000       No Pain        Wt Readings from Last 1 Encounters:   08/01/19 95 8 kg (211 lb 3 2 oz)     Additional Vital Signs:   Date/Time  Temp  Pulse  Resp  BP  MAP (mmHg)  SpO2  O2 Device  Patient Position - Orthostatic VS   08/02/19 0700  98 °F (36 7 °C)  55  20  170/78  --  --  --  Sitting   08/02/19 0355  98 °F (36 7 °C)  58  18  139/63  91  97 %  None (Room air)  Lying   08/01/19 2334  97 5 °F (36 4 °C)  60  15  127/58   --  95 %  None (Room air)  Lying   BP: Map 84 at 08/01/19 2334   08/01/19 2000  97 7 °F (36 5 °C)  58  18  152/69  99  99 %  None (Room air)  Sitting       Pertinent Labs/Diagnostic Test Results:   SUMMARY:  CORONARY CIRCULATION:  Left main: Normal  very short  LAD: The vessel was large sized  Ostial LAD: There was a tubular 95 % stenosis  The lesion was without evidence of thrombus  There was SHEBA grade 3 flow through the vessel (brisk flow)  This is a likely culprit for the patient's clinical presentation  It appears amenable  to percutaneous intervention  Circumflex: The vessel was large sized  Angiography showed minor luminal irregularities  1st obtuse marginal: The vessel was medium sized  The artery bifurcated into two medium sized vessels  2nd obtuse marginal: The vessel was small sized  Angiography showed no evidence of disease  RCA: The vessel was medium to large sized  Angiography showed no evidence of disease      Results from last 7 days   Lab Units 08/01/19  0635 07/31/19  0829   WBC Thousand/uL 7 92 6 76   HEMOGLOBIN g/dL 12 8 11 9   HEMATOCRIT % 42 7 37 5   PLATELETS Thousands/uL 244 228   NEUTROS ABS Thousands/µL  --  4 03     Results from last 7 days   Lab Units 08/02/19  0525 08/01/19  0635 07/31/19  0829   SODIUM mmol/L 145 144 142   POTASSIUM mmol/L 4 3 5 0 4 5   CHLORIDE mmol/L 114* 107 106   CO2 mmol/L 24 25 26   ANION GAP mmol/L 7 12 10   BUN mg/dL 20 22 19   CREATININE mg/dL 0 83 1 01 0 95   EGFR ml/min/1 73sq m 68 53 58   CALCIUM mg/dL 8 5 9 4 9 0     Results from last 7 days   Lab Units 07/31/19  0829   AST U/L 28   ALT U/L 36   ALK PHOS U/L 65   TOTAL PROTEIN g/dL 7 1   ALBUMIN g/dL 3 7   TOTAL BILIRUBIN mg/dL 0 60     Results from last 7 days   Lab Units 08/02/19  0525 08/01/19  0635 07/31/19  0829   GLUCOSE RANDOM mg/dL 103 101 113     Results from last 7 days   Lab Units 08/02/19  0431 08/01/19  2231 07/31/19  2124 07/31/19  1804 07/31/19  1506 07/31/19  1140 07/31/19  0829   TROPONIN I ng/mL 0 08* 0 11* 0 28* 0 35* 0 27* 0 32* 0 22*     Results from last 7 days   Lab Units 08/02/19  0530 08/01/19  2231 08/01/19  0932  07/31/19  1506   PROTIME seconds  --   --   --   --  12 9   INR   --   --   --   --  1 03   PTT seconds 105* 78* 92*   < > 31    < > = values in this interval not displayed       Results from last 7 days   Lab Units 07/31/19  0829   TSH 3RD GENERATON uIU/mL 1 919     Results from last 7 days   Lab Units 07/31/19  0829   LIPASE u/L 110     EKG:  First-degree AV block    Past Medical History:   Diagnosis Date    AVB (atrioventricular block)     first degree    CAD (coronary artery disease)     GERD (gastroesophageal reflux disease) 7/1/2017    HLD (hyperlipidemia) 7/1/2017    Hypertension     LVH (left ventricular hypertrophy)     Osteopenia     Ventral hernia without obstruction or gangrene 7/1/2017     Present on Admission:   CKD (chronic kidney disease) stage 3, GFR 30-59 ml/min ([facility])   Essential hypertension   [facility]   Other hyperlipidemia    Admitting Diagnosis: CAD (coronary artery disease) [I25 10]  Age/Sex: 67 y o  female  Admission Orders:  Current Facility-Administered Medications:  aspirin 81 mg Oral Every Other Day   atorvastatin 40 mg Oral Daily With Dinner   carvedilol 6 25 mg Oral BID With Meals   cholecalciferol 1,000 Units Oral Daily   clopidogrel 75 mg Oral Daily   famotidine 40 mg Oral HS   heparin (porcine) 3-20 Units/kg/hr (Order-Specific) Intravenous Titrated   heparin (porcine) 2,000 Units Intravenous PRN   heparin (porcine) 4,000 Units Intravenous PRN   Labetalol HCl 10 mg Intravenous Q6H PRN   latanoprost 1 drop Both Eyes Daily   lisinopril 20 mg Oral Daily   morphine injection 1 mg Intravenous Q4H PRN   multivitamin stress formula 1 tablet Oral Daily   nitroglycerin 0 4 mg Sublingual Q5 Min PRN       IP CONSULT TO CARDIOLOGY    Network Utilization Review Department  Phone: [phone]; Fax [phone]  [email or web address]  ATTENTION: Please call with any questions or concerns to [phone]  and carefully listen to the prompts so that you are directed to the right person  Send all requests for admission clinical reviews, approved or denied determinations and any other requests to fax [phone]   All voicemails are confidential

## 2019-08-02 NOTE — UTILIZATION REVIEW
Notification of Inpatient Admission/Inpatient Authorization Request  This is a Notification of Inpatient Admission/Request for Inpatient Authorization for our facility [address] [place] [address]  Be advised that this patient was admitted to our facility under Inpatient Status  Please contact the Utilization Review Department where the patient is receiving care services for additional admission information  Place of Service Code: 24   Place of Service Name: Inpatient Hospital  Presentation Date & Time: 8/1/2019  7:35 PM  Inpatient Admission Date & Time: 8/1/19 1935  Discharge Date & Time: No discharge date for patient encounter  Discharge Disposition (if discharged): [address] [place] [address]  Attending Physician: Kevon Zhou Do [[id number]]     meliton  Admission Orders (From admission, onward)     Ordered        08/01/19 1945  Inpatient Admission  Once                   Facility: [address] [place] [address])  Network Utilization Review Department  Phone: [phone]; Fax [phone]  [email or web address]  ATTENTION: Please call with any questions or concerns to [phone]  and carefully listen to the prompts so that you are directed to the right person  Send all requests for admission clinical reviews, approved or denied determinations and any other requests to fax [phone]   All voicemails are confidential

## 2019-08-02 NOTE — PROGRESS NOTES
Patient has critical lesion of ostial LAD - NOT THE RCA  I discussed with family PCI of LAD  I will d/w Dr Lucrecia Ron

## 2019-08-02 NOTE — PLAN OF CARE
Problem: CARDIOVASCULAR - ADULT  Goal: Maintains optimal cardiac output and hemodynamic stability  Description  INTERVENTIONS:  - Monitor I/O, vital signs and rhythm  - Monitor for S/S and trends of decreased cardiac output i e  bleeding, hypotension  - Administer and titrate ordered vasoactive medications to optimize hemodynamic stability  - Assess quality of pulses, skin color and temperature  - Assess for signs of decreased coronary artery perfusion - ex   Angina  - Instruct patient to report change in severity of symptoms  Outcome: Progressing  Goal: Absence of cardiac dysrhythmias or at baseline rhythm  Description  INTERVENTIONS:  - Continuous cardiac monitoring, monitor vital signs, obtain 12 lead EKG if indicated  - Administer antiarrhythmic and heart rate control medications as ordered  - Monitor electrolytes and administer replacement therapy as ordered  Outcome: Progressing     Problem: SKIN/TISSUE INTEGRITY - ADULT  Goal: Skin integrity remains intact  Description  INTERVENTIONS  - Identify patients at risk for skin breakdown  - Assess and monitor skin integrity  - Assess and monitor nutrition and hydration status  - Monitor labs (i e  albumin)  - Assess for incontinence   - Turn and reposition patient  - Assist with mobility/ambulation  - Relieve pressure over bony prominences  - Avoid friction and shearing  - Provide appropriate hygiene as needed including keeping skin clean and dry  - Evaluate need for skin moisturizer/barrier cream  - Collaborate with interdisciplinary team (i e  Nutrition, Rehabilitation, etc )   - Patient/family teaching  Outcome: Progressing     Problem: PAIN - ADULT  Goal: Verbalizes/displays adequate comfort level or baseline comfort level  Description  Interventions:  - Encourage patient to monitor pain and request assistance  - Assess pain using appropriate pain scale  - Administer analgesics based on type and severity of pain and evaluate response  - Implement non-pharmacological measures as appropriate and evaluate response  - Consider cultural and social influences on pain and pain management  - Notify physician/advanced practitioner if interventions unsuccessful or patient reports new pain  Outcome: Progressing     Problem: SAFETY ADULT  Goal: Patient will remain free of falls  Description  INTERVENTIONS:  - Assess patient frequently for physical needs  -  Identify cognitive and physical deficits and behaviors that affect risk of falls  -  [place] fall precautions as indicated by assessment   - Educate patient/family on patient safety including physical limitations  - Instruct patient to call for assistance with activity based on assessment  - Modify environment to reduce risk of injury  - Consider OT/PT consult to assist with strengthening/mobility  Outcome: Progressing     Problem: DISCHARGE PLANNING  Goal: Discharge to home or other facility with appropriate resources  Description  INTERVENTIONS:  - Identify barriers to discharge w/patient and caregiver  - Arrange for needed discharge resources and transportation as appropriate  - Identify discharge learning needs (meds, wound care, etc )  - Arrange for interpretive services to assist at discharge as needed  - Refer to Case Management Department for coordinating discharge planning if the patient needs post-hospital services based on physician/advanced practitioner order or complex needs related to functional status, cognitive ability, or social support system  Outcome: Progressing     Problem: Knowledge Deficit  Goal: Patient/family/caregiver demonstrates understanding of disease process, treatment plan, medications, and discharge instructions  Description  Complete learning assessment and assess knowledge base    Interventions:  - Provide teaching at level of understanding  - Provide teaching via preferred learning methods  Outcome: Progressing

## 2019-08-02 NOTE — DISCHARGE INSTRUCTIONS
1  Please see the post angioplasty discharge instructions  No heavy lifting, greater than 10 lbs  or strenuous activity for 1 week  Follow angioplasty discharge instructions  2 Remove band aid tomorrow  Shower and wash area- groin gently with soap and water- beginning tomorrow  Rinse and pat dry  Apply new water seal band aid  Repeat this process for 5 days  No powders, creams lotions or antibiotic ointments  for 5 days  No tub baths, hot tubs or swimming for 5 days  3  Call [place] [address] Cardiology Office ([phone]) if you develop a fever, redness or drainage at your groin access site  4  No driving for 2 days    5  Do not stop aspirin or Brilinta (Ticagrelor) any reason without a cardiologists consent, or the stent could block up and cause a heart attack  6  Stent card and book  7 Angioseal Booklet     Coronary Intravascular Stent Placement   WHAT YOU SHOULD KNOW:   Coronary intravascular stent placement is a procedure to place a stent in an artery of your heart that has plaque buildup  Plaque is a mixture of fat and cholesterol  A stent is a small mesh tube made of metal that helps keep your artery open  Your caregiver may place a bare metal stent or a drug-eluting stent (GABY) in your artery  A GABY is coated with medicine that is slowly released and helps prevent more plaque buildup in the area where the stent is placed  The stent remains in your artery for life  You may need more than one stent  AFTER YOU LEAVE:   Medicines: You will be given any of the following:  · Antiplatelets  prevent blood clots from forming  You will need to take aspirin and another type of platelet medicine  Take this medicine daily as directed  Do not stop taking aspirin or other type of antiplatelet medicine  · Nitrates , such as nitroglycerin, relax the arteries of your heart so it gets more oxygen  This medicine helps to relieve chest pain      · Cholesterol medicine  helps decrease the amount of cholesterol in your blood  · Blood pressure medicine  lowers your blood pressure  · Take your medicine as directed  Call your healthcare provider if you think your medicine is not helping or if you have side effects  Tell him if you are allergic to any medicine  Keep a list of the medicines, vitamins, and herbs you take  Include the amounts, and when and why you take them  Bring the list or the pill bottles to follow-up visits  Carry your medicine list with you in case of an emergency  Follow up with your cardiologist as directed:  Write down your questions so you remember to ask them during your visits  Activity:   · Avoid unnecessary stair climbing for 48 hours, if a catheter was put in your groin  · Do not place pressure on your arm, hand, or wrist, if the catheter was placed in your wrist  Avoid pushing, pulling, or heavy lifting with that arm  · If you need to cough, support the area where the catheter was inserted with your hand  · Ask your cardiologist how long you need to limit movement and avoid certain activities  · You may feel like resting more after your procedure  Slowly start to do more each day  Rest when you feel it is needed  Wound care:  Ask your cardiologist about how to care for your incision wound  Ask when you can get into a tub, shower, or pool  Do not smoke: If you smoke, it is never too late to quit  Smoking increases your risk for heart disease and stroke  Ask your cardiologist for information if you need help quitting  Cardiac rehab:  Your cardiologist may recommend that you attend cardiac rehabilitation (rehab)  This is a program run by specialists who will help you safely strengthen your heart and reduce the risk of more heart disease  The plan includes exercise, relaxation, stress management, and heart-healthy nutrition  Caregivers will also check to make sure any medicines you are taking are working     Contact your cardiologist if:   · You have a fever or chills  · You have questions or concerns about your condition or care  Seek care immediately or call 911 if:   · Your leg or arm, used for the procedure, becomes numb or turns white or blue  · The area where the catheter was placed is swollen, red, or has pus or foul-smelling fluid coming from it  · Your arm or leg feels warm, tender, and painful  It may look swollen and red  · You start to bleed from your catheter site again  · You have any of the following signs of a heart attack:     ¨ Squeezing, pressure, fullness, or pain in your chest that lasts longer than a few minutes or returns     ¨ Discomfort or pain in your back, neck, jaw, stomach, or arm    ¨ Shortness of breath or breathing problems    ¨ A sudden cold sweat, lightheadedness, dizziness, or nausea, especially with chest pain or trouble breathing    · You have any of the following signs of a stroke:     ¨ Part of your face droops or is numb    ¨ Weakness in an arm or leg    ¨ Confusion or difficulty speaking    ¨ Dizziness, a severe headache, or vision loss  © 2014 [employer] is for End User's use only and may not be sold, redistributed or otherwise used for commercial purposes  All illustrations and images included in CareNotes® are the copyrighted property of [employer]  or Javed Pearson  The above information is an  only  It is not intended as medical advice for individual conditions or treatments  Talk to your doctor, nurse or pharmacist before following any medical regimen to see if it is safe and effective for you  12.7

## 2019-08-03 VITALS
OXYGEN SATURATION: 97 % | WEIGHT: 211.2 LBS | SYSTOLIC BLOOD PRESSURE: 164 MMHG | HEIGHT: 64 IN | RESPIRATION RATE: 20 BRPM | TEMPERATURE: 97.9 F | HEART RATE: 57 BPM | BODY MASS INDEX: 36.06 KG/M2 | DIASTOLIC BLOOD PRESSURE: 75 MMHG

## 2019-08-03 LAB
ANION GAP SERPL CALCULATED.3IONS-SCNC: 8 MMOL/L (ref 4–13)
BUN SERPL-MCNC: 17 MG/DL (ref 5–25)
CALCIUM SERPL-MCNC: 8.8 MG/DL (ref 8.3–10.1)
CHLORIDE SERPL-SCNC: 112 MMOL/L (ref 100–108)
CO2 SERPL-SCNC: 25 MMOL/L (ref 21–32)
CREAT SERPL-MCNC: 0.83 MG/DL (ref 0.6–1.3)
ERYTHROCYTE [DISTWIDTH] IN BLOOD BY AUTOMATED COUNT: 14.4 % (ref 11.6–15.1)
GFR SERPL CREATININE-BSD FRML MDRD: 68 ML/MIN/1.73SQ M
GLUCOSE SERPL-MCNC: 89 MG/DL (ref 65–140)
HCT VFR BLD AUTO: 33.5 % (ref 34.8–46.1)
HGB BLD-MCNC: 10.5 G/DL (ref 11.5–15.4)
MCH RBC QN AUTO: 30.3 PG (ref 26.8–34.3)
MCHC RBC AUTO-ENTMCNC: 31.3 G/DL (ref 31.4–37.4)
MCV RBC AUTO: 97 FL (ref 82–98)
PLATELET # BLD AUTO: 199 THOUSANDS/UL (ref 149–390)
PMV BLD AUTO: 11.3 FL (ref 8.9–12.7)
POTASSIUM SERPL-SCNC: 3.9 MMOL/L (ref 3.5–5.3)
RBC # BLD AUTO: 3.47 MILLION/UL (ref 3.81–5.12)
SODIUM SERPL-SCNC: 145 MMOL/L (ref 136–145)
WBC # BLD AUTO: 6.72 THOUSAND/UL (ref 4.31–10.16)

## 2019-08-03 PROCEDURE — 85027 COMPLETE CBC AUTOMATED: CPT | Performed by: STUDENT IN AN ORGANIZED HEALTH CARE EDUCATION/TRAINING PROGRAM

## 2019-08-03 PROCEDURE — 80048 BASIC METABOLIC PNL TOTAL CA: CPT | Performed by: STUDENT IN AN ORGANIZED HEALTH CARE EDUCATION/TRAINING PROGRAM

## 2019-08-03 PROCEDURE — 99232 SBSQ HOSP IP/OBS MODERATE 35: CPT | Performed by: INTERNAL MEDICINE

## 2019-08-03 PROCEDURE — 99239 HOSP IP/OBS DSCHRG MGMT >30: CPT | Performed by: INTERNAL MEDICINE

## 2019-08-03 RX ORDER — CARVEDILOL 6.25 MG/1
6.25 TABLET ORAL 2 TIMES DAILY WITH MEALS
Qty: 60 TABLET | Refills: 0 | Status: SHIPPED | OUTPATIENT
Start: 2019-08-03 | End: 2019-08-30 | Stop reason: SDUPTHER

## 2019-08-03 RX ORDER — NITROGLYCERIN 0.4 MG/1
0.4 TABLET SUBLINGUAL
Qty: 90 TABLET | Refills: 0 | Status: SHIPPED | OUTPATIENT
Start: 2019-08-03 | End: 2021-07-20 | Stop reason: SDUPTHER

## 2019-08-03 RX ADMIN — CARVEDILOL 6.25 MG: 6.25 TABLET, FILM COATED ORAL at 07:33

## 2019-08-03 RX ADMIN — TICAGRELOR 90 MG: 90 TABLET ORAL at 08:53

## 2019-08-03 RX ADMIN — LISINOPRIL 20 MG: 20 TABLET ORAL at 08:53

## 2019-08-03 RX ADMIN — ZINC 1 TABLET: TAB ORAL at 08:53

## 2019-08-03 RX ADMIN — VITAMIN D, TAB 1000IU (100/BT) 1000 UNITS: 25 TAB at 08:53

## 2019-08-03 RX ADMIN — ACETAMINOPHEN 650 MG: 325 TABLET ORAL at 02:46

## 2019-08-03 NOTE — PLAN OF CARE
Problem: CARDIOVASCULAR - ADULT  Goal: Maintains optimal cardiac output and hemodynamic stability  Description  INTERVENTIONS:  - Monitor I/O, vital signs and rhythm  - Monitor for S/S and trends of decreased cardiac output i e  bleeding, hypotension  - Administer and titrate ordered vasoactive medications to optimize hemodynamic stability  - Assess quality of pulses, skin color and temperature  - Assess for signs of decreased coronary artery perfusion - ex   Angina  - Instruct patient to report change in severity of symptoms  Outcome: Completed  Goal: Absence of cardiac dysrhythmias or at baseline rhythm  Description  INTERVENTIONS:  - Continuous cardiac monitoring, monitor vital signs, obtain 12 lead EKG if indicated  - Administer antiarrhythmic and heart rate control medications as ordered  - Monitor electrolytes and administer replacement therapy as ordered  Outcome: Completed     Problem: SKIN/TISSUE INTEGRITY - ADULT  Goal: Skin integrity remains intact  Description  INTERVENTIONS  - Identify patients at risk for skin breakdown  - Assess and monitor skin integrity  - Assess and monitor nutrition and hydration status  - Monitor labs (i e  albumin)  - Assess for incontinence   - Turn and reposition patient  - Assist with mobility/ambulation  - Relieve pressure over bony prominences  - Avoid friction and shearing  - Provide appropriate hygiene as needed including keeping skin clean and dry  - Evaluate need for skin moisturizer/barrier cream  - Collaborate with interdisciplinary team (i e  Nutrition, Rehabilitation, etc )   - Patient/family teaching  Outcome: Completed     Problem: PAIN - ADULT  Goal: Verbalizes/displays adequate comfort level or baseline comfort level  Description  Interventions:  - Encourage patient to monitor pain and request assistance  - Assess pain using appropriate pain scale  - Administer analgesics based on type and severity of pain and evaluate response  - Implement non-pharmacological measures as appropriate and evaluate response  - Consider cultural and social influences on pain and pain management  - Notify physician/advanced practitioner if interventions unsuccessful or patient reports new pain  Outcome: Completed     Problem: SAFETY ADULT  Goal: Patient will remain free of falls  Description  INTERVENTIONS:  - Assess patient frequently for physical needs  -  Identify cognitive and physical deficits and behaviors that affect risk of falls  -  [place] fall precautions as indicated by assessment   - Educate patient/family on patient safety including physical limitations  - Instruct patient to call for assistance with activity based on assessment  - Modify environment to reduce risk of injury  - Consider OT/PT consult to assist with strengthening/mobility  Outcome: Completed     Problem: DISCHARGE PLANNING  Goal: Discharge to home or other facility with appropriate resources  Description  INTERVENTIONS:  - Identify barriers to discharge w/patient and caregiver  - Arrange for needed discharge resources and transportation as appropriate  - Identify discharge learning needs (meds, wound care, etc )  - Arrange for interpretive services to assist at discharge as needed  - Refer to Case Management Department for coordinating discharge planning if the patient needs post-hospital services based on physician/advanced practitioner order or complex needs related to functional status, cognitive ability, or social support system  Outcome: Completed     Problem: Knowledge Deficit  Goal: Patient/family/caregiver demonstrates understanding of disease process, treatment plan, medications, and discharge instructions  Description  Complete learning assessment and assess knowledge base    Interventions:  - Provide teaching at level of understanding  - Provide teaching via preferred learning methods  Outcome: Completed     Problem: Potential for Falls  Goal: Patient will remain free of falls  Description  INTERVENTIONS:  - Assess patient frequently for physical needs  -  Identify cognitive and physical deficits and behaviors that affect risk of falls    -  [place] fall precautions as indicated by assessment   - Educate patient/family on patient safety including physical limitations  - Instruct patient to call for assistance with activity based on assessment  - Modify environment to reduce risk of injury  - Consider OT/PT consult to assist with strengthening/mobility  Outcome: Completed

## 2019-08-03 NOTE — PROGRESS NOTES
Cardiology Progress Note - Shania Schulz 67 y o  female MRN: [id number]    Unit/Bed#: [facility] 428-01 Encounter: [id number]      Assessment:  Principal Problem:    NSTEMI, initial episode of care [facility])  Active Problems:    Essential hypertension    Other hyperlipidemia    CKD (chronic kidney disease) stage 3, GFR 30-59 ml/min ([address] )      Plan:  Patient is comfortable today  She has no chest pain or significant dyspnea  Patient had successful LAD PCI yesterday with placement of a 4 0 x 15 mm drug-eluting stent  She is on appropriate medical therapy including dual anti-platelet therapy and statin  BMP today shows potassium of 3 9 with creatinine of 0 83  She is okay for discharge and will require cardiology follow-up as an outpatient  Subjective:   Patient seen and examined  No significant events overnight   negative  Objective:     Vitals: Blood pressure 142/60, pulse 55, temperature 98 2 °F (36 8 °C), temperature source Oral, resp  rate 20, height 5' 4" (1 626 m), weight 95 8 kg (211 lb 3 2 oz), SpO2 96 %, not currently breastfeeding , Body mass index is 36 25 kg/m² ,   Orthostatic Blood Pressures      Most Recent Value   Blood Pressure  142/60 filed at 08/03/2019 0853   Patient Position - Orthostatic VS  Lying filed at 08/03/2019 0700      ,      Intake/Output Summary (Last 24 hours) at 8/3/2019 1014  Last data filed at 8/3/2019 0606  Gross per 24 hour   Intake 1556 51 ml   Output 1300 ml   Net 256 51 ml       No significant arrhythmias seen on telemetry review         Physical Exam:    GEN: Shania Schulz appears well, alert and oriented x 3, pleasant and cooperative   NECK: supple, no carotid bruits, no JVD or HJR  HEART: normal rate, regular rhythm, normal S1 and S2, no murmurs, clicks, gallops or rubs   LUNGS: clear to auscultation bilaterally; no wheezes, rales, or rhonchi   ABDOMEN: normal bowel sounds, soft, no tenderness, no distention  EXTREMITIES: peripheral pulses normal; no clubbing, cyanosis, or edema  SKIN: warm and well perfused, no suspicious lesions on exposed skin    Labs & Results:    Admission on 08/01/2019   Component Date Value    Troponin I 08/01/2019 0 11*    PTT 08/01/2019 78*    Troponin I 08/02/2019 0 08*    PTT 08/02/2019 105*    Sodium 08/02/2019 145     Potassium 08/02/2019 4 3     Chloride 08/02/2019 114*    CO2 08/02/2019 24     ANION GAP 08/02/2019 7     BUN 08/02/2019 20     Creatinine 08/02/2019 0 83     Glucose 08/02/2019 103     Calcium 08/02/2019 8 5     eGFR 08/02/2019 68     Activated Clotting Time,* 08/02/2019 297*    Specimen Type 08/02/2019 VENOUS     Sodium 08/03/2019 145     Potassium 08/03/2019 3 9     Chloride 08/03/2019 112*    CO2 08/03/2019 25     ANION GAP 08/03/2019 8     BUN 08/03/2019 17     Creatinine 08/03/2019 0 83     Glucose 08/03/2019 89     Calcium 08/03/2019 8 8     eGFR 08/03/2019 68     WBC 08/03/2019 6 72     RBC 08/03/2019 3 47*    Hemoglobin 08/03/2019 10 5*    Hematocrit 08/03/2019 33 5*    MCV 08/03/2019 97     MCH 08/03/2019 30 3     MCHC 08/03/2019 31 3*    RDW 08/03/2019 14 4     Platelets 10/63/3774 199     MPV 08/03/2019 11 3        Xr Chest 2 Views    Result Date: 7/31/2019  Narrative: CHEST INDICATION:   Chest pain  COMPARISON:  Chest x-ray 7/1/2017 EXAM PERFORMED/VIEWS:  XR CHEST PA & LATERAL FINDINGS: Cardiomediastinal silhouette appears unremarkable  There is aortic knob calcification  The lungs are clear  No pneumothorax or pleural effusion  There are degenerative changes of the spine  Impression: No acute cardiopulmonary disease  Workstation performed: [id number]       EKG personally reviewed by Megan Braun MD      Counseling / Coordination of Care  Total floor / unit time spent today 30 minutes  Greater than 50% of total time was spent with the patient and / or family counseling and / or coordination of care

## 2019-08-03 NOTE — DISCHARGE SUMMARY
Discharge Summary - [address] [facility]    Patient Information: Samson Maravilla 67 y o  female MRN: [id number]  Unit/Bed#: [facility] 428-01 Encounter: [id number]    Discharging Physician / Practitioner: Vivian Marlow DO  PCP: Monica Velazco MD  Admission Date: 8/1/2019  Discharge Date: 08/03/19    Disposition:     Home    Reason for Admission:   Non STEMI    Discharge Diagnoses:     Principal Problem:    NSTEMI, initial episode of care [facility])  Active Problems:    CKD (chronic kidney disease) stage 3, GFR 30-59 ml/min ([facility])    Essential hypertension    Other hyperlipidemia  Resolved Problems:    * No resolved hospital problems  *      Consultations During Hospital Stay:  · Cardiology    Procedures Performed:     Cardiac catheterization with successful balloon angioplasty and GABY placed on 90% lesion of the ostial LAD    Significant Findings / Test Results:     · As above    Incidental Findings:   · none    Test Results Pending at Discharge (will require follow up): Non     Outpatient Tests Requested:  · Non    Complications:  None    Hospital Course:     Samson Maravilla is a 67 y o  female patient who originally presented to the hospital on 8/1/2019 due to CAD  Patient initially presented to [facility] with exertion chest pain, found to have non STEMI and underwent cardiac catheterization, found to have critical lesion of ostial LAD  Then patient was transferred to [facility] for high-risk PCI   She underwent cardiac PCI on 08/02 with successful balloon angioplasty and GABY placement on 90% lesion of the ostial LAD  Started on aspirin, Brilinta and Coreg    Currently she is in stable condition, no further symptoms, she will be discharged home to follow with her family doctor in 1 week and with Cardiology    Condition at Discharge: stable     Discharge Day Visit / Exam:     Subjective:    Patient seen and examined  Comfortable sitting in chair  No chest pain or shortness of breath  Anxious to go home  Vitals: Blood Pressure: 164/75 (08/03/19 1100)  Pulse: 57 (08/03/19 1100)  Temperature: 97 9 °F (36 6 °C) (08/03/19 1100)  Temp Source: Oral (08/03/19 1100)  Respirations: 20 (08/03/19 1100)  Height: 5' 4" (162 6 cm) (08/01/19 2000)  Weight - Scale: 95 8 kg (211 lb 3 2 oz) (08/01/19 2000)  SpO2: 97 % (08/03/19 1100)  Exam:   Physical Exam  Patient is awake alert oriented in no acute distress  Lung clear to auscultation bilateral  Heart positive S1-S2 no murmur  Abdomen soft nontender  Lower extremities no edema  Discussion with Family:  Daughter    Discharge instructions/Information to patient and family:   See after visit summary for information provided to patient and family  Provisions for Follow-Up Care:  See after visit summary for information related to follow-up care and any pertinent home health orders  Planned Readmission: no     Discharge Statement:  I spent 45  minutes discharging the patient  This time was spent on the day of discharge  I had direct contact with the patient on the day of discharge  Greater than 50% of the total time was spent examining patient, answering all patient questions, arranging and discussing plan of care with patient as well as directly providing post-discharge instructions  Additional time then spent on discharge activities  Discharge Medications:  See after visit summary for reconciled discharge medications provided to patient and family        ** Please Note: This note has been constructed using a voice recognition system **

## 2019-08-03 NOTE — PLAN OF CARE
Problem: CARDIOVASCULAR - ADULT  Goal: Maintains optimal cardiac output and hemodynamic stability  Description  INTERVENTIONS:  - Monitor I/O, vital signs and rhythm  - Monitor for S/S and trends of decreased cardiac output i e  bleeding, hypotension  - Administer and titrate ordered vasoactive medications to optimize hemodynamic stability  - Assess quality of pulses, skin color and temperature  - Assess for signs of decreased coronary artery perfusion - ex   Angina  - Instruct patient to report change in severity of symptoms  Outcome: Progressing  Goal: Absence of cardiac dysrhythmias or at baseline rhythm  Description  INTERVENTIONS:  - Continuous cardiac monitoring, monitor vital signs, obtain 12 lead EKG if indicated  - Administer antiarrhythmic and heart rate control medications as ordered  - Monitor electrolytes and administer replacement therapy as ordered  Outcome: Progressing     Problem: SKIN/TISSUE INTEGRITY - ADULT  Goal: Skin integrity remains intact  Description  INTERVENTIONS  - Identify patients at risk for skin breakdown  - Assess and monitor skin integrity  - Assess and monitor nutrition and hydration status  - Monitor labs (i e  albumin)  - Assess for incontinence   - Turn and reposition patient  - Assist with mobility/ambulation  - Relieve pressure over bony prominences  - Avoid friction and shearing  - Provide appropriate hygiene as needed including keeping skin clean and dry  - Evaluate need for skin moisturizer/barrier cream  - Collaborate with interdisciplinary team (i e  Nutrition, Rehabilitation, etc )   - Patient/family teaching  Outcome: Progressing     Problem: PAIN - ADULT  Goal: Verbalizes/displays adequate comfort level or baseline comfort level  Description  Interventions:  - Encourage patient to monitor pain and request assistance  - Assess pain using appropriate pain scale  - Administer analgesics based on type and severity of pain and evaluate response  - Implement non-pharmacological measures as appropriate and evaluate response  - Consider cultural and social influences on pain and pain management  - Notify physician/advanced practitioner if interventions unsuccessful or patient reports new pain  Outcome: Progressing     Problem: SAFETY ADULT  Goal: Patient will remain free of falls  Description  INTERVENTIONS:  - Assess patient frequently for physical needs  -  Identify cognitive and physical deficits and behaviors that affect risk of falls  -  [place] fall precautions as indicated by assessment   - Educate patient/family on patient safety including physical limitations  - Instruct patient to call for assistance with activity based on assessment  - Modify environment to reduce risk of injury  - Consider OT/PT consult to assist with strengthening/mobility  Outcome: Progressing     Problem: DISCHARGE PLANNING  Goal: Discharge to home or other facility with appropriate resources  Description  INTERVENTIONS:  - Identify barriers to discharge w/patient and caregiver  - Arrange for needed discharge resources and transportation as appropriate  - Identify discharge learning needs (meds, wound care, etc )  - Arrange for interpretive services to assist at discharge as needed  - Refer to Case Management Department for coordinating discharge planning if the patient needs post-hospital services based on physician/advanced practitioner order or complex needs related to functional status, cognitive ability, or social support system  Outcome: Progressing     Problem: Knowledge Deficit  Goal: Patient/family/caregiver demonstrates understanding of disease process, treatment plan, medications, and discharge instructions  Description  Complete learning assessment and assess knowledge base    Interventions:  - Provide teaching at level of understanding  - Provide teaching via preferred learning methods  Outcome: Progressing     Problem: Potential for Falls  Goal: Patient will remain free of falls  Description  INTERVENTIONS:  - Assess patient frequently for physical needs  -  Identify cognitive and physical deficits and behaviors that affect risk of falls    -  [place] fall precautions as indicated by assessment   - Educate patient/family on patient safety including physical limitations  - Instruct patient to call for assistance with activity based on assessment  - Modify environment to reduce risk of injury  - Consider OT/PT consult to assist with strengthening/mobility  Outcome: Progressing

## 2019-08-03 NOTE — NURSING NOTE
Discharged instructions reviewed with patient and daughter, by primary RN  All belongings with patient at time of discharge, IV AND TELEMETRY REMOVED

## 2019-08-05 ENCOUNTER — TELEPHONE (OUTPATIENT)
Dept: FAMILY MEDICINE CLINIC | Facility: CLINIC | Age: 79
End: 2019-08-05

## 2019-08-05 ENCOUNTER — TELEPHONE (OUTPATIENT)
Dept: CARDIOLOGY CLINIC | Facility: CLINIC | Age: 79
End: 2019-08-05

## 2019-08-05 ENCOUNTER — TRANSITIONAL CARE MANAGEMENT (OUTPATIENT)
Dept: FAMILY MEDICINE CLINIC | Facility: CLINIC | Age: 79
End: 2019-08-05

## 2019-08-05 NOTE — TELEPHONE ENCOUNTER
Patient's daughter Zaria Alvarez called to confirm whether her mom should have the NM stress test that she is scheduled for on Wed 8/7/19  Patient just had a stent put in on 8/1/19 and she is not sure that this procedure is required  If you want her to go ahead with test then she needs instructions on what medications her mom should stop 48 prior to stress test  The instructions say she needs to stop meds  Please advise      Zaria Kim [phone]

## 2019-08-05 NOTE — TELEPHONE ENCOUNTER
I'm assuming pt does not need to complete lexiscan scheduled in 2 days? This test was ordered by PCP prior to the cath that she had completed in [place] last week? Pt called the PCP and he advised to consult cardiology

## 2019-08-05 NOTE — TELEPHONE ENCOUNTER
Suggested that patient's daughter call cardiology, Dr Christine Ramirez to confirm with him what her mother should be doing   Dr Adan Mcclendon informed

## 2019-08-05 NOTE — TELEPHONE ENCOUNTER
Pt asking if too soon to go on vacation to [place], [place]? She is riding with friends to [facility] on the 8/14 for a few days then riding to [place]  Expected to return on the 28th  If ok to go, then she will also be pushing back cardiac rehab  Pt stated she feels good, daughter stated she looks well     F/U with NP on Monday-8/12/19

## 2019-08-09 ENCOUNTER — OFFICE VISIT (OUTPATIENT)
Dept: FAMILY MEDICINE CLINIC | Facility: CLINIC | Age: 79
End: 2019-08-09
Payer: COMMERCIAL

## 2019-08-09 VITALS
DIASTOLIC BLOOD PRESSURE: 56 MMHG | WEIGHT: 209.5 LBS | TEMPERATURE: 98.4 F | HEART RATE: 61 BPM | SYSTOLIC BLOOD PRESSURE: 124 MMHG | RESPIRATION RATE: 16 BRPM | BODY MASS INDEX: 35.96 KG/M2 | OXYGEN SATURATION: 92 %

## 2019-08-09 DIAGNOSIS — I10 ESSENTIAL HYPERTENSION: ICD-10-CM

## 2019-08-09 DIAGNOSIS — I16.1 HYPERTENSIVE EMERGENCY: ICD-10-CM

## 2019-08-09 DIAGNOSIS — I25.10 CORONARY ARTERY DISEASE INVOLVING NATIVE CORONARY ARTERY OF NATIVE HEART WITHOUT ANGINA PECTORIS: Primary | ICD-10-CM

## 2019-08-09 DIAGNOSIS — E78.49 OTHER HYPERLIPIDEMIA: ICD-10-CM

## 2019-08-09 PROCEDURE — 99495 TRANSJ CARE MGMT MOD F2F 14D: CPT | Performed by: FAMILY MEDICINE

## 2019-08-09 PROCEDURE — 1111F DSCHRG MED/CURRENT MED MERGE: CPT | Performed by: FAMILY MEDICINE

## 2019-08-09 NOTE — ASSESSMENT & PLAN NOTE
BP Readings from Last 3 Encounters:   08/09/19 124/56   08/03/19 164/75   08/01/19 149/65     Lab Results   Component Value Date    CREATININE 0 83 08/03/2019    EGFR 68 08/03/2019     Controlled on current regimen:  Carvedilol 6 25mg BID and Lisinopril 20mg

## 2019-08-09 NOTE — ASSESSMENT & PLAN NOTE
Lab Results   Component Value Date    TROPONINI 0 08 (H) 08/02/2019     Admitted initially for NSTEMI, had elevated troponin, cardiac cath showed LAD blockage, had successful balloon angioplasty and GABY on 90% lesion of the ostial LAD  Continue new meds (Carvedilol 6 25mg BID, ASA 81mg daily, Brilinta 90mg BID)  Continue home Atorvastatin 40mg daily, Lisinopril 20mg daily  Advised patient to discuss with Cardiology about ASA dosing, she is currently only taking it every other day, but I believe she should be taking it daily unless Cardiology instructs her otherwise

## 2019-08-09 NOTE — ASSESSMENT & PLAN NOTE
Lab Results   Component Value Date    CHOLESTEROL 137 08/01/2019    HDL 61 (H) 08/01/2019    LDLC 108 (H) 01/10/2019    LDLCALC 64 08/01/2019    TRIG 60 08/01/2019     Reviewed healthy, low cholesterol diet  Continue Atorvastatin 40mg daily

## 2019-08-09 NOTE — PROGRESS NOTES
Transition of Care Management Visit  Adam Bocanegra 67 y o  female   MRN: [id number]    Assessment/Plan: Adam Bocanegra is a 67 y o  female with:     Coronary artery disease involving native coronary artery of native heart without angina pectoris  Lab Results   Component Value Date    TROPONINI 0 56 (H) 08/02/2019     Admitted initially for NSTEMI, had elevated troponin, cardiac cath showed LAD blockage, had successful balloon angioplasty and GABY on 90% lesion of the ostial LAD  Continue new meds (Carvedilol 6 25mg BID, ASA 81mg daily, Brilinta 90mg BID)  Continue home Atorvastatin 40mg daily, Lisinopril 20mg daily  Advised patient to discuss with Cardiology about ASA dosing, she is currently only taking it every other day, but I believe she should be taking it daily unless Cardiology instructs her otherwise    Hypertensive emergency  Resolved    Essential hypertension  BP Readings from Last 3 Encounters:   08/09/19 124/56   08/03/19 164/75   08/01/19 149/65     Lab Results   Component Value Date    CREATININE 0 83 08/03/2019    EGFR 68 08/03/2019     Controlled on current regimen:  Carvedilol 6 25mg BID and Lisinopril 20mg      Other hyperlipidemia  Lab Results   Component Value Date    CHOLESTEROL 137 08/01/2019    HDL 61 (H) 08/01/2019    LDLC 108 (H) 01/10/2019    LDLCALC 64 08/01/2019    TRIG 60 08/01/2019     Reviewed healthy, low cholesterol diet  Continue Atorvastatin 40mg daily      Subjective:  Adam Bocanegra is a 67 y o  female here for Transition Care Management Visit  Pt is here for hospital follow up with her daughter, Tracey Rose  Pt initially presented with exertional chest pain to the ER, diagnosed with NSTEMI, underwent cath that revealed 90% blockage of LAD and had GABY placement  She was started on 3 new meds  Since discharge from the hospital she has been taking her meds as prescribed, currently taking the ASA every other day  She denies any lingering/new chest pain, palpitations, SOB, COE, diaphoresis  Hospital Summary:  Admission Date: 8/1/2019  Discharge Date: 08/03/19    Annika Lawrence is a 67 y o  female patient who originally presented to the hospital on 8/1/2019 due to CAD  Cardiac catheterization with successful balloon angioplasty and GABY placed on 90% lesion of the ostial LAD  Patient initially presented to [facility] with exertion chest pain, found to have non STEMI and underwent cardiac catheterization, found to have critical lesion of ostial LAD  Then patient was transferred to [facility] for high-risk PCI  She underwent cardiac PCI on 08/02 with successful balloon angioplasty and GABY placement on 90% lesion of the ostial LAD  Started on aspirin, Brilinta and Coreg     Need for Follow-up: With Cardiology next week    Changed/New Medications: ASA, Brilinta, Coreg, Nitro PRN     I personally reviewed the following images: CXR- [facility]    Review of Systems   Eyes: Negative for visual disturbance  Respiratory: Negative for choking, chest tightness and shortness of breath  Cardiovascular: Negative for chest pain and palpitations  Neurological: Negative for dizziness and light-headedness  Hematological: Bruises/bleeds easily       Patient Active Problem List    Diagnosis Date Noted    Coronary artery disease involving native coronary artery of native heart without angina pectoris 07/31/2019    CKD (chronic kidney disease) stage 3, GFR 30-59 ml/min ([facility] [address] ) 01/11/2019    Vitamin D deficiency 01/04/2019    Class 2 severe obesity due to excess calories with serious comorbidity and body mass index (BMI) of 35 0 to 35 9 in adult [facility]) 01/04/2019    GERD without esophagitis 07/01/2017    Other hyperlipidemia 07/01/2017    Essential hypertension     Chronic calculous cholecystitis 08/25/2016       Current Outpatient Medications:     aspirin 81 MG tablet, Take 81 mg by mouth every other day , Disp: , Rfl:     atorvastatin (LIPITOR) 40 mg tablet, Take 1 tablet (40 mg total) by mouth daily, Disp: 90 tablet, Rfl: 1    b complex vitamins tablet, Take 1 tablet by mouth daily  , Disp: , Rfl:     carvedilol (COREG) 6 25 mg tablet, Take 1 tablet (6 25 mg total) by mouth 2 (two) times a day with meals, Disp: 60 tablet, Rfl: 0    cholecalciferol (VITAMIN D3) 1,000 units tablet, Take 1,000 Units by mouth daily, Disp: , Rfl:     Co-Enzyme Q-10 100 MG CAPS, Take 200 mg by mouth daily  , Disp: , Rfl:     latanoprost (XALATAN) 0 005 % ophthalmic solution, Administer 1 drop to both eyes daily, Disp: , Rfl: 3    lisinopril (ZESTRIL) 20 mg tablet, Take 1 tablet (20 mg total) by mouth daily, Disp: 90 tablet, Rfl: 1    Multiple Vitamins-Minerals (PRESERVISION AREDS) CAPS, Take 2 capsules by mouth daily, Disp: , Rfl:     nitroglycerin (NITROSTAT) 0 4 mg SL tablet, Place 1 tablet (0 4 mg total) under the tongue every 5 (five) minutes as needed for chest pain, Disp: 90 tablet, Rfl: 0    PX GLUCOSAMINE-CHONDROITIN PO, Take 2 tablets by mouth daily  , Disp: , Rfl:     ranitidine (ZANTAC) 300 MG capsule, Take 1 capsule (300 mg total) by mouth every evening, Disp: 90 capsule, Rfl: 0    ticagrelor (BRILINTA) 90 MG, Take 1 tablet (90 mg total) by mouth every 12 (twelve) hours, Disp: 60 tablet, Rfl: 0    Zoster Vac Recomb Adjuvanted 50 MCG SUSR, Inject 50 mcg into the shoulder, thigh, or buttocks as needed (imm) for up to 1 dose, Disp: 1 each, Rfl: 0    Objective:  /56   Pulse 61   Temp 98 4 °F (36 9 °C)   Resp 16   Wt 95 kg (209 lb 8 oz)   SpO2 92%   BMI 35 96 kg/m²   Physical Exam   Constitutional: She appears well-developed and well-nourished  No distress  obese   Cardiovascular: Normal rate, regular rhythm and normal heart sounds  Pulmonary/Chest: Effort normal and breath sounds normal  No respiratory distress  She has no wheezes  She has no rales  Skin: She is not diaphoretic  Vitals reviewed  Transitional Care Management Review:  Gilbert Pedersen is a 67 y o  female here for TCM follow up  During the TCM phone call patient stated:  TCM Call (since 7/9/2019)     Date and time call was made  8/5/2019  8:54 AM    Hospital care reviewed  Records reviewed    Patient was hospitialized at  [facility]    Date of Admission  08/01/19    Date of discharge  08/03/19    Diagnosis  N STEMI    Disposition  Home    Were the patients medications reviewed and updated  Yes    Current Symptoms  None      TCM Call (since 7/9/2019)     Post hospital issues  None    Scheduled for follow up? Yes    Not clinically warranted  PATIENT TRANSFERED [address]    Did you obtain your prescribed medications  Yes    Do you need help managing your prescriptions or medications  No    Is transportation to your appointment needed  No    I have advised the patient to call PCP with any new or worsening symptoms  Cindy Bridges, [address]  Family; Friends    The type of support provided  Emotional; Physical    Do you have social support  Yes, as much as I need    Are you recieving any outpatient services  No    Are you recieving home care services  No    Are you using any community resources  No    Current waiver services  No    Have you fallen in the last 12 months  No    Interperter language line needed  No    Counseling  Patient        John Aster Obando MD    Note: Portions of the record may have been created with voice recognition software  Occasional wrong word or "sound a like" substitutions may have occurred due to the inherent limitations of voice recognition software  Read the chart carefully and recognize, using context, where substitutions have occurred

## 2019-08-12 ENCOUNTER — OFFICE VISIT (OUTPATIENT)
Dept: CARDIOLOGY CLINIC | Facility: CLINIC | Age: 79
End: 2019-08-12
Payer: COMMERCIAL

## 2019-08-12 VITALS
OXYGEN SATURATION: 96 % | BODY MASS INDEX: 35.75 KG/M2 | HEIGHT: 64 IN | SYSTOLIC BLOOD PRESSURE: 134 MMHG | DIASTOLIC BLOOD PRESSURE: 54 MMHG | WEIGHT: 209.4 LBS | HEART RATE: 68 BPM

## 2019-08-12 DIAGNOSIS — E78.5 HYPERLIPIDEMIA, UNSPECIFIED HYPERLIPIDEMIA TYPE: ICD-10-CM

## 2019-08-12 DIAGNOSIS — I21.4 NSTEMI (NON-ST ELEVATED MYOCARDIAL INFARCTION) (HCC): ICD-10-CM

## 2019-08-12 DIAGNOSIS — I10 HYPERTENSION, UNSPECIFIED TYPE: ICD-10-CM

## 2019-08-12 DIAGNOSIS — Z95.5 S/P DRUG ELUTING CORONARY STENT PLACEMENT: Primary | ICD-10-CM

## 2019-08-12 PROCEDURE — 3075F SYST BP GE 130 - 139MM HG: CPT | Performed by: NURSE PRACTITIONER

## 2019-08-12 PROCEDURE — 99214 OFFICE O/P EST MOD 30 MIN: CPT | Performed by: NURSE PRACTITIONER

## 2019-08-12 NOTE — PATIENT INSTRUCTIONS
2gm sodium low fat low choleserol low diet, eating fresh is best, red meat only twice a month, lean protein, legumes and salt free nuts      Do not stop aspirin or Brilinta for any reason  CBC, BMP in one month prior to follow up office visit
Home

## 2019-08-12 NOTE — PROGRESS NOTES
Cardiology Follow Up    Hays Kanner  1940  [id number]  [address] [facility] [address]  [phone]    Hospital follow-up    Interval History:  Ms Tre Brower presented to [facility] on 7/31 - 8/01/19 with NSTEMI  BP in /80  She presented with intermittent chest pain occurring for the past week  NSTEMI troponin elevated to 0 35  She was started on IV heparin metoprolol started emergency room  Cardiology consulted  7/31/19 TTE LVEF 65 percent no regional wall motion abnormality  Trace aortic valve regurgitation  Trace mitral valve regurgitation  She underwent a cardiac catheterization which showed ostial LAD tubular 95 percent stenosis  She was transferred to [facility] for telly risk PCI  Ms Emma Brady was admitted to [facility] from 8/01 - 8/03/19 with NSTEMI  On 8/02/19 Ms Emma Brady underwent a successful balloon angioplasty and stent procedure performed on 90 percent lesion in the ostial LAD  Xience Diana GABY placed across the lesion  She was started on Brilinta  Lipid panel cholesterol 137, triglycerides 60, HDL 61, LDL 64  Ms Tre Brower presents our office for recent hospitalization follow-up visit  She is accompanied by her granddaughter  She denies chest pain palpitations lightheadedness or dizziness  She admits to dyspnea with exertion such as walking up a small hill from her mailbox  He is taking all her medications as prescribed  She has sublingual nitroglycerin at home and is where the proper use        Patient Active Problem List   Diagnosis    Chronic calculous cholecystitis    Essential hypertension    GERD without esophagitis    Other hyperlipidemia    Vitamin D deficiency    Class 2 severe obesity due to excess calories with serious comorbidity and body mass index (BMI) of 35 0 to 35 9 in [facility])    CKD (chronic kidney disease) stage 3, GFR 30-59 ml/min ([facility])    Coronary artery disease involving native coronary artery of native heart without angina pectoris     Past Medical History:   Diagnosis Date    AVB (atrioventricular block)     first degree    CAD (coronary artery disease)     GERD (gastroesophageal reflux disease) 7/1/2017    HLD (hyperlipidemia) 7/1/2017    Hypertension     LVH (left ventricular hypertrophy)     Osteopenia     Ventral hernia without obstruction or gangrene 7/1/2017     Social History     Socioeconomic History    Marital status:       Spouse name: Not on file    Number of children: 2    Years of education: Not on file    Highest education level: Not on file   Occupational History    Occupation: retired   Social Needs    Financial resource strain: Not on file    Food insecurity:     Worry: Not on file     Inability: Not on file   [facility] needs:     Medical: Not on file     Non-medical: Not on file   Tobacco Use    Smoking status: Never Smoker    Smokeless tobacco: Never Used   Substance and Sexual Activity    Alcohol use: Yes     Frequency: Monthly or less     Comment: socially    Drug use: No    Sexual activity: Never   Lifestyle    Physical activity:     Days per week: Not on file     Minutes per session: Not on file    Stress: Not on file   Relationships    Social connections:     Talks on phone: Not on file     Gets together: Not on file     Attends [employer] service: Not on file     Active member of club or organization: Not on file     Attends meetings of clubs or organizations: Not on file     Relationship status: Not on file    Intimate partner violence:     Fear of current or ex partner: Not on file     Emotionally abused: Not on file     Physically abused: Not on file     Forced sexual activity: Not on file   Other Topics Concern    Not on file   Social History Narrative    Caffeine use    Moderate exercising less than 3 times a week      Family History Problem Relation Age of Onset    No Known Problems Mother     No Known Problems Father     No Known Problems Daughter     No Known Problems Maternal Grandmother     No Known Problems Maternal Grandfather     No Known Problems Paternal Grandmother     No Known Problems Paternal Grandfather     No Known Problems Half-Sister     No Known Problems Maternal Aunt     Squamous cell carcinoma Brother     No Known Problems Son      Past Surgical History:   Procedure Laterality Date    CARDIAC CATHETERIZATION      GALLBLADDER SURGERY      HEMORRHOID SURGERY      HYSTERECTOMY  1987    age 56 w/ left oopherectomy    NOSE SURGERY      basal cell    OOPHORECTOMY Left 1987    age 56    ME LAP,CHOLECYSTECTOMY N/A 8/25/2016    Procedure: LAPAROSCOPIC CHOLECYSTECTOMY ;  Surgeon: Brigido Goldman MD;  [facility];  Service: General    ME REPAIR INCISIONAL HERNIA,REDUCIBLE N/A 11/30/2017    Procedure: INCISIONAL HERNIA REPAIR;  Surgeon: Brigido Goldman MD;  [facility];  Service: General    ROTATOR CUFF REPAIR Right        Current Outpatient Medications:     aspirin 81 MG tablet, Take 81 mg by mouth every other day , Disp: , Rfl:     atorvastatin (LIPITOR) 40 mg tablet, Take 1 tablet (40 mg total) by mouth daily, Disp: 90 tablet, Rfl: 1    b complex vitamins tablet, Take 1 tablet by mouth daily  , Disp: , Rfl:     carvedilol (COREG) 6 25 mg tablet, Take 1 tablet (6 25 mg total) by mouth 2 (two) times a day with meals, Disp: 60 tablet, Rfl: 0    cholecalciferol (VITAMIN D3) 1,000 units tablet, Take 1,000 Units by mouth daily, Disp: , Rfl:     Co-Enzyme Q-10 100 MG CAPS, Take 200 mg by mouth daily  , Disp: , Rfl:     latanoprost (XALATAN) 0 005 % ophthalmic solution, Administer 1 drop to both eyes daily, Disp: , Rfl: 3    lisinopril (ZESTRIL) 20 mg tablet, Take 1 tablet (20 mg total) by mouth daily, Disp: 90 tablet, Rfl: 1    Multiple Vitamins-Minerals (PRESERVISION AREDS) CAPS, Take 2 capsules by mouth daily, Disp: , Rfl:     nitroglycerin (NITROSTAT) 0 4 mg SL tablet, Place 1 tablet (0 4 mg total) under the tongue every 5 (five) minutes as needed for chest pain, Disp: 90 tablet, Rfl: 0    PX GLUCOSAMINE-CHONDROITIN PO, Take 2 tablets by mouth daily  , Disp: , Rfl:     ranitidine (ZANTAC) 300 MG capsule, Take 1 capsule (300 mg total) by mouth every evening, Disp: 90 capsule, Rfl: 0    ticagrelor (BRILINTA) 90 MG, Take 1 tablet (90 mg total) by mouth every 12 (twelve) hours, Disp: 60 tablet, Rfl: 0    Zoster Vac Recomb Adjuvanted 50 MCG SUSR, Inject 50 mcg into the shoulder, thigh, or buttocks as needed (imm) for up to 1 dose, Disp: 1 each, Rfl: 0  Allergies   Allergen Reactions    Codeine      Reaction Date: [id number];     Other      darvocet       Labs:  Admission on 08/01/2019, Discharged on 08/03/2019   Component Date Value    Troponin I 08/01/2019 0 11*    PTT 08/01/2019 78*    Troponin I 08/02/2019 0 08*    PTT 08/02/2019 105*    Sodium 08/02/2019 145     Potassium 08/02/2019 4 3     Chloride 08/02/2019 114*    CO2 08/02/2019 24     ANION GAP 08/02/2019 7     BUN 08/02/2019 20     Creatinine 08/02/2019 0 83     Glucose 08/02/2019 103     Calcium 08/02/2019 8 5     eGFR 08/02/2019 68     Activated Clotting Time,* 08/02/2019 297*    Specimen Type 08/02/2019 VENOUS     Sodium 08/03/2019 145     Potassium 08/03/2019 3 9     Chloride 08/03/2019 112*    CO2 08/03/2019 25     ANION GAP 08/03/2019 8     BUN 08/03/2019 17     Creatinine 08/03/2019 0 83     Glucose 08/03/2019 89     Calcium 08/03/2019 8 8     eGFR 08/03/2019 68     WBC 08/03/2019 6 72     RBC 08/03/2019 3 47*    Hemoglobin 08/03/2019 10 5*    Hematocrit 08/03/2019 33 5*    MCV 08/03/2019 97     MCH 08/03/2019 30 3     MCHC 08/03/2019 31 3*    RDW 08/03/2019 14 4     Platelets 99/68/2755 199     MPV 08/03/2019 11 3    Admission on 07/31/2019, Discharged on 08/01/2019   Component Date Value    WBC 07/31/2019 6 76     RBC 07/31/2019 3 93     Hemoglobin 07/31/2019 11 9     Hematocrit 07/31/2019 37 5     MCV 07/31/2019 95     MCH 07/31/2019 30 3     MCHC 07/31/2019 31 7     RDW 07/31/2019 14 0     MPV 07/31/2019 10 6     Platelets 01/59/1563 228     nRBC 07/31/2019 0     Neutrophils Relative 07/31/2019 60     Immat GRANS % 07/31/2019 0     Lymphocytes Relative 07/31/2019 25     Monocytes Relative 07/31/2019 12     Eosinophils Relative 07/31/2019 2     Basophils Relative 07/31/2019 1     Neutrophils Absolute 07/31/2019 4 03     Immature Grans Absolute 07/31/2019 0 02     Lymphocytes Absolute 07/31/2019 1 67     Monocytes Absolute 07/31/2019 0 84     Eosinophils Absolute 07/31/2019 0 16     Basophils Absolute 07/31/2019 0 04     Sodium 07/31/2019 142     Potassium 07/31/2019 4 5     Chloride 07/31/2019 106     CO2 07/31/2019 26     ANION GAP 07/31/2019 10     BUN 07/31/2019 19     Creatinine 07/31/2019 0 95     Glucose 07/31/2019 113     Calcium 07/31/2019 9 0     AST 07/31/2019 28     ALT 07/31/2019 36     Alkaline Phosphatase 07/31/2019 65     Total Protein 07/31/2019 7 1     Albumin 07/31/2019 3 7     Total Bilirubin 07/31/2019 0 60     eGFR 07/31/2019 58     Lipase 07/31/2019 110     Troponin I 07/31/2019 0 22*    TSH 3RD GENERATON 07/31/2019 1 919     Troponin I 07/31/2019 0 32*    Troponin I 07/31/2019 0 27*    PTT 07/31/2019 31     Protime 07/31/2019 12 9     INR 07/31/2019 1 03     Ventricular Rate 07/31/2019 72     Atrial Rate 07/31/2019 72     PA Interval 07/31/2019 272     QRSD Interval 07/31/2019 86     QT Interval 07/31/2019 370     QTC Interval 07/31/2019 405     P Axis 07/31/2019 53     QRS Axis 07/31/2019 -5     T Wave Axis 07/31/2019 65     Troponin I 07/31/2019 0 35*    Troponin I 07/31/2019 0 28*    PTT 07/31/2019 75*    PTT 08/01/2019 105*    Cholesterol 08/01/2019 137     Triglycerides 08/01/2019 60     HDL, Direct 08/01/2019 61*  LDL Calculated 08/01/2019 64     Sodium 08/01/2019 144     Potassium 08/01/2019 5 0     Chloride 08/01/2019 107     CO2 08/01/2019 25     ANION GAP 08/01/2019 12     BUN 08/01/2019 22     Creatinine 08/01/2019 1 01     Glucose 08/01/2019 101     Glucose, Fasting 08/01/2019 101*    Calcium 08/01/2019 9 4     eGFR 08/01/2019 53     WBC 08/01/2019 7 92     RBC 08/01/2019 4 34     Hemoglobin 08/01/2019 12 8     Hematocrit 08/01/2019 42 7     MCV 08/01/2019 98     MCH 08/01/2019 29 5     MCHC 08/01/2019 30 0*    RDW 08/01/2019 14 1     Platelets 27/43/2784 244     MPV 08/01/2019 10 3     PTT 08/01/2019 92*    Ventricular Rate 08/01/2019 54     Atrial Rate 08/01/2019 54     CT Interval 08/01/2019 260     QRSD Interval 08/01/2019 88     QT Interval 08/01/2019 438     QTC Interval 08/01/2019 415     P Axis 08/01/2019 51     QRS Axis 08/01/2019 18     T Wave [place] 08/01/2019 60    Appointment on 07/05/2019   Component Date Value    Sodium 07/05/2019 136     Potassium 07/05/2019 4 6     Chloride 07/05/2019 105     CO2 07/05/2019 24     ANION GAP 07/05/2019 7     BUN 07/05/2019 20     Creatinine 07/05/2019 0 96     Glucose 07/05/2019 97     Calcium 07/05/2019 9 4     eGFR 07/05/2019 57     Cholesterol 07/05/2019 148     Triglycerides 07/05/2019 148     HDL, Direct 07/05/2019 58     LDL Calculated 07/05/2019 60     Vit D, 25-Hydroxy 07/05/2019 27 4*     Imaging: Xr Chest 2 Views    Result Date: 7/31/2019  Narrative: CHEST INDICATION:   Chest pain  COMPARISON:  Chest x-ray 7/1/2017 EXAM PERFORMED/VIEWS:  XR CHEST PA & LATERAL FINDINGS: Cardiomediastinal silhouette appears unremarkable  There is aortic knob calcification  The lungs are clear  No pneumothorax or pleural effusion  There are degenerative changes of the spine  Impression: No acute cardiopulmonary disease   Workstation performed: [id number]       Review of Systems:  Review of Systems   Respiratory: Positive for shortness of breath  All other systems reviewed and are negative  Physical Exam:  Physical Exam   Constitutional: She is oriented to person, place, and time  She appears well-developed  HENT:   Head: Normocephalic  Eyes: Pupils are equal, round, and reactive to light  Neck: Normal range of motion  Cardiovascular: Normal rate and regular rhythm  Pulmonary/Chest: Effort normal and breath sounds normal    Abdominal: Soft  Bowel sounds are normal    Musculoskeletal: Normal range of motion  She exhibits no edema  Neurological: She is alert and oriented to person, place, and time  Skin: Skin is warm and dry  Capillary refill takes less than 2 seconds  Psychiatric: She has a normal mood and affect  Vitals reviewed  Discussion/Summary:  1  Recent NSTEMI   2  SP GABY to 90% stenosis of ostial LAD- continue on aspirin 81 mg daily, Brilinta 90 mg q 12 hours,  Coreg 6 25 mg b i d , Zestril 20 mg daily  Do not stop aspirin or Brilinta for any reason  Evelyn Lupillo has sublingual nitroglycerin at home and is aware of the proper use  Sodium low-fat low-cholesterol diet eating fresh is best ambulatory referral to cardiac rehabilitation  3  HLD- Cholesterol 137, triglycerides 60, HDL 61, LDL 64- continue on Lipitor 40 mg daily  4  HTN- controlled on Coreg 6 25 mg b i d   And lisinopril 20 mg daily

## 2019-08-16 LAB
ATRIAL RATE: 54 BPM
P AXIS: 51 DEGREES
PR INTERVAL: 260 MS
QRS AXIS: 18 DEGREES
QRSD INTERVAL: 88 MS
QT INTERVAL: 438 MS
QTC INTERVAL: 415 MS
T WAVE AXIS: 60 DEGREES
VENTRICULAR RATE: 54 BPM

## 2019-08-16 PROCEDURE — 93010 ELECTROCARDIOGRAM REPORT: CPT | Performed by: INTERNAL MEDICINE

## 2019-08-29 ENCOUNTER — TELEPHONE (OUTPATIENT)
Dept: CARDIOLOGY CLINIC | Facility: CLINIC | Age: 79
End: 2019-08-29

## 2019-08-29 NOTE — TELEPHONE ENCOUNTER
Soni Gold called asking if safe from a cardiac standpoint to wash her car? I stated as long as she takes it easy and doesn't over exert herself   Pt will start cardiac rehab next week

## 2019-08-30 DIAGNOSIS — I21.4 NSTEMI, INITIAL EPISODE OF CARE (HCC): ICD-10-CM

## 2019-08-30 RX ORDER — CARVEDILOL 6.25 MG/1
6.25 TABLET ORAL 2 TIMES DAILY WITH MEALS
Qty: 60 TABLET | Refills: 1 | Status: SHIPPED | OUTPATIENT
Start: 2019-08-30 | End: 2019-10-28 | Stop reason: SDUPTHER

## 2019-08-30 NOTE — TELEPHONE ENCOUNTER
Refill request for Carvedilol 6 25 mg 1 tab 2x daily, and Ticagrelor 90 mg 1 tab every 12 hours   Camille Garcia

## 2019-09-05 ENCOUNTER — CLINICAL SUPPORT (OUTPATIENT)
Dept: CARDIAC REHAB | Age: 79
End: 2019-09-05
Payer: COMMERCIAL

## 2019-09-05 DIAGNOSIS — Z95.5 S/P DRUG ELUTING CORONARY STENT PLACEMENT: ICD-10-CM

## 2019-09-05 DIAGNOSIS — Z95.5 STENTED CORONARY ARTERY: ICD-10-CM

## 2019-09-05 PROCEDURE — 93797 PHYS/QHP OP CAR RHAB WO ECG: CPT

## 2019-09-05 NOTE — PROGRESS NOTES
Cardiac Rehabilitation Plan of Care   Care Plan       Today's date: 2019   Visits: Initial Evaluation  Patient name: Ariel Saxena      : 1940  Age: 67 y o  MRN: [id number]  Referring Physician: Debby Herrera DO  Cardiologist: Weston Ku MD  Provider: Deandre Causey  Clinician: Chente Talbot, MS, CCRP    Dx:   Encounter Diagnosis   Name Primary?  Stented coronary artery      Date of onset: 19      SUMMARY OF PROGRESS:  Campbell Charlton reports she has remained free of cardiac complaints  She carries her NTG and understands proper use  She has resumed all ADLs including moderate to heavy tasks such as yard work and scrubbing floors  ATKINS activity index estimates MET level at 8 33 METs  She lives alone and is active most of the day  She denies depression - PHQ-9 = 0 and reports excellent social/emotional support from family, friends, Mu-ism  We discussed her current dietary habits and reviewed heart healthy eating for wt loss and lipid management  Her goals for CR include wt loss to 180lbs, increased confidence with activity and begin a regular exercise regimen  She walked 930ft (2 35 METS) in the 6 MWT  Tolerated the walk well from a cardiac standpoint  No cardiac complaints with minimal COE, reporting 2/10 on dyspnea scale  NSR with 1st deg AV block on tele, no ectopy or ischemic changes observed  Resting /64 increasing to 150/54  She is scheduled with a f/u appt on  with Dr Ike Cisneros        Medication compliance: Yes   Comments: admits 100% compliance  Fall Risk: Moderate   Comments:     EKG changes: 1st degree AV block, no ectopy      EXERCISE ASSESSMENT and PLAN    Current Exercise Program in Rehab:       Frequency: 3 days/week        Minutes: 30-40         METS: 2 2 - 3 2            HR: RHR+30   RPE: 4-5         Modalities: UBE, NuStep and Recumbent bike      Exercise Progression 30 Day Goals :    Frequency: 3 days/week   Minutes: 40   METS: 2 5 - 3 5   HR: RHR+30    RPE: 4-5   Modalities: Treadmill, UBE, NuStep and Room walking    Strength trainin-3 days / week  12-15 repetitions  1-2 sets per modality   Will be added following 2-3 weeks of monitored exercise sessions   Modalities: Leg Press, Chest Press, Lateral Raise, Arm Curl and Seated Row    Progressing: In Progress    Home Exercise: Type: walking, Frequency: 1 days/week, Duration: 20 mins    Goals: 10% improvement in functional capacity, Reduced Dyspnea with physical activity  0-1/10, improved DASI score by 10%, Increase in peak CR METs by 40%, Improved 6MWT distance by 10% and Exercise 5 days/wk, >150mins/wk  Education: Benefit of exercise for CAD risk factors, signs and sxs and RPE scale   Plan:education on home exercise guidelines, home exercise 30+ mins 2 days opposite CR and Education class: Risk Factors for Heart Disease  Readiness to change: Preparation      NUTRITION ASSESSMENT AND PLAN    Weight control:    Starting weight:  206 6   Current weight:     Waist circumference:    Startin 5   Current:    Diabetes: N/A  Lipid management: Discussed diet and lipid management and Last lipid profile 19  Chol 137  TRG 60  HDL 61  LDL 64  Goals:reduced BMI to < 25, decreased body fat % <33%, reduced waist circumference <35 inches and Wt  loss 1-2 ppw goal of 180 lbs  Education: heart healthy eating  low sodium diet  diet and lipid management  wt  loss  Progressing: In Progress  Plan: Education class: Reading Food Labels, Education Class: Heart Healthy Eating, Increase PUFA and MUFA and Reduce added sugars <25g/day, improved snack choices  Readiness to change: Action      PSYCHOSOCIAL ASSESSMENT AND PLAN    Emotional:  PHQ-9 = 0 =No Depression  Self-reported stress level: 1   Social support: Excellent  Goals:  Physical Fitness in [facility] Score < 3  Education: benefits of positive support system and coping mechanisms  Progressing: In Progress  Plan: Class: Stress and Your Health and Class: Relaxation  Readiness to change: Preparation      OTHER CORE COMPONENTS     Tobacco:   Social History     Tobacco Use   Smoking Status Never Smoker   Smokeless Tobacco Never Used       Tobacco Use Intervention: Referral to tobacco expert:   N/A    Blood pressure:    Restin/64   Exercise: 150/54    Goals: consistent BP < 130/80, reduced dietary sodium <2300mg and consistent exercise >150 mins/wk  Education:  understanding HTN and CAD and low sodium diet and HTN  Progressing: In Progress  Plan: Class: Understanding Heart Disease and Class: Common Heart Medications  Readiness to change: Preparation

## 2019-09-05 NOTE — PROGRESS NOTES
CARDIAC REHAB ASSESSMENT    Today's date: 2019  Patient name: Gregoria Flynn     : 1940       MRN: [id number]  PCP: Raven Lucas MD  Referring Physician: Mainor Hoffman DO  Cardiologist: Jasmyn Sargent MD  Surgeon:   Dx:   Encounter Diagnosis   Name Primary?     Stented coronary artery        Date of onset: 19  Cultural needs:     Height:    Wt Readings from Last 1 Encounters:   19 95 kg (209 lb 6 4 oz)      Weight:   Ht Readings from Last 1 Encounters:   19 5' 4" (1 626 m)     Medical History:   Past Medical History:   Diagnosis Date    AVB (atrioventricular block)     first degree    CAD (coronary artery disease)     GERD (gastroesophageal reflux disease) 2017    HLD (hyperlipidemia) 2017    Hypertension     LVH (left ventricular hypertrophy)     Osteopenia     Ventral hernia without obstruction or gangrene 2017         Physical Limitations: pain in R hip with long walks,     Risk Factors   Cholesterol: Yes  Smoking: Never used  HTN: Yes  DM: No  Obesity: Yes   Inactivity: Yes - occasional walking,   Stress:  perceived  stress: 1/10   Stressors:none   Goals for Stress Management: keep busy    Family History:  Family History   Problem Relation Age of Onset    No Known Problems Mother     No Known Problems Father     No Known Problems Daughter     No Known Problems Maternal Grandmother     No Known Problems Maternal Grandfather     No Known Problems Paternal Grandmother     No Known Problems Paternal Grandfather     No Known Problems Half-Sister     No Known Problems Maternal Aunt     Squamous cell carcinoma Brother     No Known Problems Son        Allergies: Codeine and Other  ETOH:   Social History     Substance and Sexual Activity   Alcohol Use Yes    Frequency: Monthly or less    Comment: socially         Current Medications:   Current Outpatient Medications   Medication Sig Dispense Refill    aspirin 81 MG tablet Take 81 mg by mouth every other day       atorvastatin (LIPITOR) 40 mg tablet Take 1 tablet (40 mg total) by mouth daily 90 tablet 1    b complex vitamins tablet Take 1 tablet by mouth daily   carvedilol (COREG) 6 25 mg tablet Take 1 tablet (6 25 mg total) by mouth 2 (two) times a day with meals 60 tablet 1    cholecalciferol (VITAMIN D3) 1,000 units tablet Take 1,000 Units by mouth daily      Co-Enzyme Q-10 100 MG CAPS Take 200 mg by mouth daily   latanoprost (XALATAN) 0 005 % ophthalmic solution Administer 1 drop to both eyes daily  3    lisinopril (ZESTRIL) 20 mg tablet Take 1 tablet (20 mg total) by mouth daily 90 tablet 1    Multiple Vitamins-Minerals (PRESERVISION AREDS) CAPS Take 2 capsules by mouth daily      nitroglycerin (NITROSTAT) 0 4 mg SL tablet Place 1 tablet (0 4 mg total) under the tongue every 5 (five) minutes as needed for chest pain 90 tablet 0    PX GLUCOSAMINE-CHONDROITIN PO Take 2 tablets by mouth daily   ranitidine (ZANTAC) 300 MG capsule Take 1 capsule (300 mg total) by mouth every evening 90 capsule 0    ticagrelor (BRILINTA) 90 MG Take 1 tablet (90 mg total) by mouth every 12 (twelve) hours 60 tablet 1    Zoster Vac Recomb Adjuvanted 50 MCG SUSR Inject 50 mcg into the shoulder, thigh, or buttocks as needed (imm) for up to 1 dose 1 each 0     No current facility-administered medications for this visit  Functional Status Prior to Diagnosis for Treatment   Occupation: retired   ([employer])  Recreation: spending time with friends, Anabaptism  ADLs: No limitations  Bruni: No limitations  Exercise: none  Other:     Current Functional Status  Occupation: retired  Recreation: friends, Anabaptism, maintaining house  ADLs:No limitations  Bruni: No limitations  Exercise: walked a few times - slight SOB - MD feels it may be d/t bilinta  Other: stairs to the basement - no SOB unless she goes too fast    Patient Specific Goals:   Wt loss 180lbs,     Short Term Program Goals: increased strength improved energy/stamina with ADLs exercise 120-150 mins/wk wt loss 1-2 ppw    Long Term Goals: Improved lipid profile  Reduced body fat%  Reduced waist circumference  weight loss goal of 180    Ability to reach goals/rehabilitation potential:  Excellent    Projected return to function: 12 weeks  Objective tests: 6 MWT      Nutritional   Reviewed details of Rate your Plate  Discussed key elements of heart healthy eating  Reviewed patient goals for dietary modifications and their clinical implications  Reviewed most recent lipid profile  Needs to improve snacks -   Wt loss goal - 180    Goals for dietary modification: reduced fat cheese  increase whole grains  improved snack choices  more nuts/seeds      Emotional/Social  Patient reports he/she is coping well with good social support and denies depression or anxiety    SOCIAL SUPPORT NETWORK    Marital status:     Rate 1-5:    Marriage: n/a   Family: 5   Financial: 5   Relationships: 5 - many good friends   Spirituality: 5 - active in Muslim   Intellectual: 5      Domestic Violence Screening: No    Comments: chest burning with exertion x 2 weeks    Sometimes has episodes of SOB with rest - brilinta  Feels good

## 2019-09-09 ENCOUNTER — CLINICAL SUPPORT (OUTPATIENT)
Dept: CARDIAC REHAB | Age: 79
End: 2019-09-09
Payer: COMMERCIAL

## 2019-09-09 DIAGNOSIS — Z95.5 STENTED CORONARY ARTERY: ICD-10-CM

## 2019-09-09 DIAGNOSIS — I21.4 NSTEMI (NON-ST ELEVATION MYOCARDIAL INFARCTION) (HCC): ICD-10-CM

## 2019-09-09 PROCEDURE — 93798 PHYS/QHP OP CAR RHAB W/ECG: CPT

## 2019-09-10 ENCOUNTER — APPOINTMENT (OUTPATIENT)
Dept: CARDIAC REHAB | Facility: CLINIC | Age: 79
End: 2019-09-10
Payer: COMMERCIAL

## 2019-09-11 ENCOUNTER — APPOINTMENT (OUTPATIENT)
Dept: CARDIAC REHAB | Age: 79
End: 2019-09-11
Payer: COMMERCIAL

## 2019-09-12 ENCOUNTER — CLINICAL SUPPORT (OUTPATIENT)
Dept: CARDIAC REHAB | Facility: CLINIC | Age: 79
End: 2019-09-12
Payer: COMMERCIAL

## 2019-09-12 ENCOUNTER — APPOINTMENT (OUTPATIENT)
Dept: CARDIAC REHAB | Facility: CLINIC | Age: 79
End: 2019-09-12
Payer: COMMERCIAL

## 2019-09-12 DIAGNOSIS — Z95.5 S/P PRIMARY ANGIOPLASTY WITH CORONARY STENT: ICD-10-CM

## 2019-09-12 PROCEDURE — 93798 PHYS/QHP OP CAR RHAB W/ECG: CPT

## 2019-09-13 ENCOUNTER — CLINICAL SUPPORT (OUTPATIENT)
Dept: CARDIAC REHAB | Facility: CLINIC | Age: 79
End: 2019-09-13
Payer: COMMERCIAL

## 2019-09-13 ENCOUNTER — APPOINTMENT (OUTPATIENT)
Dept: CARDIAC REHAB | Facility: CLINIC | Age: 79
End: 2019-09-13
Payer: COMMERCIAL

## 2019-09-13 ENCOUNTER — APPOINTMENT (OUTPATIENT)
Dept: CARDIAC REHAB | Age: 79
End: 2019-09-13
Payer: COMMERCIAL

## 2019-09-13 DIAGNOSIS — Z95.5 S/P PRIMARY ANGIOPLASTY WITH CORONARY STENT: ICD-10-CM

## 2019-09-13 PROCEDURE — 93798 PHYS/QHP OP CAR RHAB W/ECG: CPT

## 2019-09-16 ENCOUNTER — CLINICAL SUPPORT (OUTPATIENT)
Dept: CARDIAC REHAB | Facility: CLINIC | Age: 79
End: 2019-09-16
Payer: COMMERCIAL

## 2019-09-16 ENCOUNTER — APPOINTMENT (OUTPATIENT)
Dept: CARDIAC REHAB | Age: 79
End: 2019-09-16
Payer: COMMERCIAL

## 2019-09-16 DIAGNOSIS — Z95.5 S/P PRIMARY ANGIOPLASTY WITH CORONARY STENT: ICD-10-CM

## 2019-09-16 PROCEDURE — 93798 PHYS/QHP OP CAR RHAB W/ECG: CPT

## 2019-09-17 ENCOUNTER — CLINICAL SUPPORT (OUTPATIENT)
Dept: CARDIAC REHAB | Facility: CLINIC | Age: 79
End: 2019-09-17
Payer: COMMERCIAL

## 2019-09-17 ENCOUNTER — APPOINTMENT (OUTPATIENT)
Dept: CARDIAC REHAB | Facility: CLINIC | Age: 79
End: 2019-09-17
Payer: COMMERCIAL

## 2019-09-17 DIAGNOSIS — I21.4 NSTEMI (NON-ST ELEVATION MYOCARDIAL INFARCTION) (HCC): ICD-10-CM

## 2019-09-17 DIAGNOSIS — Z95.5 S/P PRIMARY ANGIOPLASTY WITH CORONARY STENT: ICD-10-CM

## 2019-09-17 PROCEDURE — 93798 PHYS/QHP OP CAR RHAB W/ECG: CPT

## 2019-09-18 ENCOUNTER — APPOINTMENT (OUTPATIENT)
Dept: CARDIAC REHAB | Age: 79
End: 2019-09-18
Payer: COMMERCIAL

## 2019-09-20 ENCOUNTER — APPOINTMENT (OUTPATIENT)
Dept: CARDIAC REHAB | Facility: CLINIC | Age: 79
End: 2019-09-20
Payer: COMMERCIAL

## 2019-09-20 ENCOUNTER — APPOINTMENT (OUTPATIENT)
Dept: CARDIAC REHAB | Age: 79
End: 2019-09-20
Payer: COMMERCIAL

## 2019-09-23 ENCOUNTER — APPOINTMENT (OUTPATIENT)
Dept: CARDIAC REHAB | Age: 79
End: 2019-09-23
Payer: COMMERCIAL

## 2019-09-24 ENCOUNTER — APPOINTMENT (OUTPATIENT)
Dept: CARDIAC REHAB | Facility: CLINIC | Age: 79
End: 2019-09-24
Payer: COMMERCIAL

## 2019-09-24 ENCOUNTER — APPOINTMENT (OUTPATIENT)
Dept: LAB | Facility: MEDICAL CENTER | Age: 79
End: 2019-09-24
Payer: COMMERCIAL

## 2019-09-24 ENCOUNTER — CLINICAL SUPPORT (OUTPATIENT)
Dept: CARDIAC REHAB | Facility: CLINIC | Age: 79
End: 2019-09-24
Payer: COMMERCIAL

## 2019-09-24 DIAGNOSIS — Z95.5 S/P PRIMARY ANGIOPLASTY WITH CORONARY STENT: ICD-10-CM

## 2019-09-24 DIAGNOSIS — Z95.5 S/P DRUG ELUTING CORONARY STENT PLACEMENT: ICD-10-CM

## 2019-09-24 LAB
ANION GAP SERPL CALCULATED.3IONS-SCNC: 6 MMOL/L (ref 4–13)
BASOPHILS # BLD AUTO: 0.03 THOUSANDS/ΜL (ref 0–0.1)
BASOPHILS NFR BLD AUTO: 1 % (ref 0–1)
BUN SERPL-MCNC: 20 MG/DL (ref 5–25)
CALCIUM SERPL-MCNC: 9.9 MG/DL (ref 8.3–10.1)
CHLORIDE SERPL-SCNC: 107 MMOL/L (ref 100–108)
CO2 SERPL-SCNC: 27 MMOL/L (ref 21–32)
CREAT SERPL-MCNC: 1.03 MG/DL (ref 0.6–1.3)
EOSINOPHIL # BLD AUTO: 0.19 THOUSAND/ΜL (ref 0–0.61)
EOSINOPHIL NFR BLD AUTO: 3 % (ref 0–6)
ERYTHROCYTE [DISTWIDTH] IN BLOOD BY AUTOMATED COUNT: 13.4 % (ref 11.6–15.1)
GFR SERPL CREATININE-BSD FRML MDRD: 52 ML/MIN/1.73SQ M
GLUCOSE P FAST SERPL-MCNC: 100 MG/DL (ref 65–99)
HCT VFR BLD AUTO: 39.6 % (ref 34.8–46.1)
HGB BLD-MCNC: 12.1 G/DL (ref 11.5–15.4)
IMM GRANULOCYTES # BLD AUTO: 0.02 THOUSAND/UL (ref 0–0.2)
IMM GRANULOCYTES NFR BLD AUTO: 0 % (ref 0–2)
LYMPHOCYTES # BLD AUTO: 2.03 THOUSANDS/ΜL (ref 0.6–4.47)
LYMPHOCYTES NFR BLD AUTO: 33 % (ref 14–44)
MCH RBC QN AUTO: 30 PG (ref 26.8–34.3)
MCHC RBC AUTO-ENTMCNC: 30.6 G/DL (ref 31.4–37.4)
MCV RBC AUTO: 98 FL (ref 82–98)
MONOCYTES # BLD AUTO: 0.63 THOUSAND/ΜL (ref 0.17–1.22)
MONOCYTES NFR BLD AUTO: 10 % (ref 4–12)
NEUTROPHILS # BLD AUTO: 3.21 THOUSANDS/ΜL (ref 1.85–7.62)
NEUTS SEG NFR BLD AUTO: 53 % (ref 43–75)
NRBC BLD AUTO-RTO: 0 /100 WBCS
PLATELET # BLD AUTO: 270 THOUSANDS/UL (ref 149–390)
PMV BLD AUTO: 11.6 FL (ref 8.9–12.7)
POTASSIUM SERPL-SCNC: 5.3 MMOL/L (ref 3.5–5.3)
RBC # BLD AUTO: 4.03 MILLION/UL (ref 3.81–5.12)
SODIUM SERPL-SCNC: 140 MMOL/L (ref 136–145)
WBC # BLD AUTO: 6.11 THOUSAND/UL (ref 4.31–10.16)

## 2019-09-24 PROCEDURE — 80048 BASIC METABOLIC PNL TOTAL CA: CPT

## 2019-09-24 PROCEDURE — 93798 PHYS/QHP OP CAR RHAB W/ECG: CPT

## 2019-09-24 PROCEDURE — 85025 COMPLETE CBC W/AUTO DIFF WBC: CPT

## 2019-09-24 PROCEDURE — 36415 COLL VENOUS BLD VENIPUNCTURE: CPT

## 2019-09-24 NOTE — PROGRESS NOTES
Cardiology Outpatient Follow up Note    Leanna Vasquez 67 y o  female MRN: [id number]    09/26/19          Assessment/Plan:    1  UA/NSTEMI 7/19 s/p Xience Diana GABY to ostial LAD 8/2/19  On aspirin, Brilinta, statin, beta blocker  EF preserved by echo 7/19  Does get easy bruising with Brilinta, will continue until after 8/2/20, then will likely discontinue       2  HTN  On Coreg 6 25 bid and Lisinopril 20  BP controlled       3  HL  Was on Atorvastatin 40 mg daily prior to admission 7/19, lipid panel 8/1/19 showed total cholesterol 137, TG 60, HDL 61, LDL 64  LDL at goal on current regimen       4  IFG  Hgb A1c 5 9% 5/17, currently diet controlled  5  CKD III  Creatinine 0 8 to 1 0  Potassium 5 3 9/24/19, advised her to watch potassium intake  She does have a banana daily to help with muscle cramping       1  Essential hypertension     2  Coronary artery disease involving native coronary artery of native heart without angina pectoris     3  CKD (chronic kidney disease) stage 3, GFR 30-59 ml/min ([facility])     4  Other hyperlipidemia         HPI: 67 y o  woman with a history of HTN, HL, IFG, who is here for follow up of CAD  She was admitted to [address] [place] [address] 7/19 with exertional chest burning that had been worsening over 1-2 weeks  Troponins peaked at 0 35, EKG unremarkable for ischemia  She was referred for cardiac catheterization 8/1/19, which showed tubular 95% stenosis of ostial LAD  She was transferred to [facility] and had Ailyn Jono stent placed in ostial LAD 8/2/19  Echo 7/31/19 showed normal LV size and function, EF 65%, no RWMA, normal diastolic function  Sigmoid septum  Normal RV size and function  LA mildly dilated  Trace MR  Trace AI  She reports she is currently going to cardiac rehab  She is doing NuStep and arm exercises, had issues with hip pain when she was doing treadmill so not currently doing it  No exertional chest burning  No orthopnea, uses 1 pillow to sleep, no PND  No LE edema   No dizziness or lightheadedness  No palpitations  She bruises easily  She did not some shortness of breath with Brilinta, was told to drink coffee in morning, which seems to help  Patient Active Problem List   Diagnosis    Chronic calculous cholecystitis    Essential hypertension    GERD without esophagitis    Other hyperlipidemia    Vitamin D deficiency    Class 2 severe obesity due to excess calories with serious comorbidity and body mass index (BMI) of 35 0 to 35 9 in adult [facility])    CKD (chronic kidney disease) stage 3, GFR 30-59 ml/min ([facility])    Coronary artery disease involving native coronary artery of native heart without angina pectoris       Allergies   Allergen Reactions    Codeine      Reaction Date: 19Jul2011;     Other      darvocet         Current Outpatient Medications:     aspirin 81 MG tablet, Take 81 mg by mouth every other day , Disp: , Rfl:     atorvastatin (LIPITOR) 40 mg tablet, Take 1 tablet (40 mg total) by mouth daily, Disp: 90 tablet, Rfl: 1    b complex vitamins tablet, Take 1 tablet by mouth daily  , Disp: , Rfl:     carvedilol (COREG) 6 25 mg tablet, Take 1 tablet (6 25 mg total) by mouth 2 (two) times a day with meals, Disp: 60 tablet, Rfl: 1    cholecalciferol (VITAMIN D3) 1,000 units tablet, Take 1,000 Units by mouth daily, Disp: , Rfl:     Co-Enzyme Q-10 100 MG CAPS, Take 200 mg by mouth daily  , Disp: , Rfl:     latanoprost (XALATAN) 0 005 % ophthalmic solution, Administer 1 drop to both eyes daily, Disp: , Rfl: 3    lisinopril (ZESTRIL) 20 mg tablet, Take 1 tablet (20 mg total) by mouth daily, Disp: 90 tablet, Rfl: 1    Multiple Vitamins-Minerals (PRESERVISION AREDS) CAPS, Take 2 capsules by mouth daily, Disp: , Rfl:     nitroglycerin (NITROSTAT) 0 4 mg SL tablet, Place 1 tablet (0 4 mg total) under the tongue every 5 (five) minutes as needed for chest pain, Disp: 90 tablet, Rfl: 0    PX GLUCOSAMINE-CHONDROITIN PO, Take 2 tablets by mouth daily  , Disp: , Rfl:   ranitidine (ZANTAC) 300 MG capsule, Take 1 capsule (300 mg total) by mouth every evening, Disp: 90 capsule, Rfl: 0    ticagrelor (BRILINTA) 90 MG, Take 1 tablet (90 mg total) by mouth every 12 (twelve) hours, Disp: 60 tablet, Rfl: 1    Zoster Vac Recomb Adjuvanted 50 MCG SUSR, Inject 50 mcg into the shoulder, thigh, or buttocks as needed (imm) for up to 1 dose, Disp: 1 each, Rfl: 0    Past Medical History:   Diagnosis Date    AVB (atrioventricular block)     first degree    CAD (coronary artery disease)     GERD (gastroesophageal reflux disease) 7/1/2017    HLD (hyperlipidemia) 7/1/2017    Hypertension     LVH (left ventricular hypertrophy)     Osteopenia     Ventral hernia without obstruction or gangrene 7/1/2017       Family History   Problem Relation Age of Onset    No Known Problems Mother     No Known Problems Father     No Known Problems Daughter     No Known Problems Maternal Grandmother     No Known Problems Maternal Grandfather     No Known Problems Paternal Grandmother     No Known Problems Paternal Grandfather     No Known Problems Half-Sister     No Known Problems Maternal Aunt     Squamous cell carcinoma Brother     No Known Problems Son        Past Surgical History:   Procedure Laterality Date    CARDIAC CATHETERIZATION      GALLBLADDER SURGERY      HEMORRHOID SURGERY      HYSTERECTOMY  1987    age 56 w/ left oopherectomy    NOSE SURGERY      basal cell    OOPHORECTOMY Left 1987    age 56    WV LAP,CHOLECYSTECTOMY N/A 8/25/2016    Procedure: LAPAROSCOPIC CHOLECYSTECTOMY ;  Surgeon: Sim Hall MD;  [facility];  Service: General    WV REPAIR INCISIONAL HERNIA,REDUCIBLE N/A 11/30/2017    Procedure: INCISIONAL HERNIA REPAIR;  Surgeon: Sim Hall MD;  [facility];  Service: General    ROTATOR CUFF REPAIR Right        Social History     Socioeconomic History    Marital status:       Spouse name: Not on file    Number of children: 2    Years of education: Not on file    Highest education level: Not on file   Occupational History    Occupation: retired   Social Needs    Financial resource strain: Not on file    Food insecurity:     Worry: Not on file     Inability: Not on file   [employer] needs:     Medical: Not on file     Non-medical: Not on file   Tobacco Use    Smoking status: Never Smoker    Smokeless tobacco: Never Used   Substance and Sexual Activity    Alcohol use: Yes     Frequency: Monthly or less     Comment: socially    Drug use: No    Sexual activity: Never   Lifestyle    Physical activity:     Days per week: Not on file     Minutes per session: Not on file    Stress: Not on file   Relationships    Social connections:     Talks on phone: Not on file     Gets together: Not on file     Attends [employer] service: Not on file     Active member of club or organization: Not on file     Attends meetings of clubs or organizations: Not on file     Relationship status: Not on file    Intimate partner violence:     Fear of current or ex partner: Not on file     Emotionally abused: Not on file     Physically abused: Not on file     Forced sexual activity: Not on file   Other Topics Concern    Not on file   Social History Narrative    Caffeine use    Moderate exercising less than 3 times a week       Review of Systems   Constitution: Positive for weight loss  Negative for chills, decreased appetite, diaphoresis, fever, malaise/fatigue, night sweats and weight gain  HENT: Negative for ear pain, hearing loss, hoarse voice, nosebleeds, sore throat and tinnitus  Eyes: Negative for blurred vision and pain  Cardiovascular: Negative  Negative for chest pain, claudication, cyanosis, dyspnea on exertion, irregular heartbeat, leg swelling, near-syncope, orthopnea, palpitations, paroxysmal nocturnal dyspnea and syncope     Respiratory: Negative for cough, hemoptysis, shortness of breath, sleep disturbances due to breathing, snoring, sputum production and wheezing  Hematologic/Lymphatic: Negative for adenopathy and bleeding problem  Bruises/bleeds easily  Skin: Negative for color change, dry skin, flushing, itching, poor wound healing and rash  Musculoskeletal: Positive for arthritis  Negative for back pain, falls, joint pain, muscle cramps, muscle weakness, myalgias and neck pain  Gastrointestinal: Negative for abdominal pain, constipation, diarrhea, dysphagia, heartburn, hematemesis, hematochezia, melena, nausea and vomiting  Genitourinary: Negative for dysuria, frequency, hematuria, hesitancy, non-menstrual bleeding and urgency  Neurological: Negative for excessive daytime sleepiness, dizziness, focal weakness, headaches, light-headedness, loss of balance, numbness, paresthesias, tremors, vertigo and weakness  Psychiatric/Behavioral: Negative for altered mental status, depression and memory loss  The patient does not have insomnia and is not nervous/anxious  Allergic/Immunologic: Negative for environmental allergies and persistent infections  Vitals: /54 (BP Location: Left arm, Patient Position: Sitting, Cuff Size: Large)   Pulse 55   Ht 5' 4" (1 626 m)   Wt 94 kg (207 lb 3 2 oz)   SpO2 97%   BMI 35 57 kg/m²       Physical Exam:     GEN: Alert and oriented x 3, in no acute distress  Well appearing and well nourished  HEENT: Sclera anicteric, conjunctivae pink, mucous membranes moist  Oropharynx clear  NECK: Supple, no carotid bruits, no significant JVD  Trachea midline, no thyromegaly  HEART: Regular rhythm, normal S1 and S2, no murmurs, clicks, gallops or rubs  PMI nondisplaced, no thrills  LUNGS: Clear to auscultation bilaterally; no wheezes, rales, or rhonchi  No increased work of breathing or signs of respiratory distress  ABDOMEN: Soft, nontender, nondistended, normoactive bowel sounds  EXTREMITIES: Skin warm and well perfused, no clubbing, cyanosis, or edema  NEURO: No focal findings  Normal gait  Normal speech  Mood and affect normal    SKIN: Normal without suspicious lesions on exposed skin        Lab Results:       Lab Results   Component Value Date    HGBA1C 5 9 (H) 05/16/2017    HGBA1C 6 3 (H) 07/27/2016    HGBA1C 5 9 (H) 10/10/2013     Lab Results   Component Value Date    CHOL 201 (H) 12/14/2017    CHOL 179 05/16/2017    CHOL 168 11/10/2016     Lab Results   Component Value Date    HDL 61 (H) 08/01/2019    HDL 58 07/05/2019    HDL 64 01/10/2019     Lab Results   Component Value Date    LDLCALC 64 08/01/2019    LDLCALC 60 07/05/2019     Lab Results   Component Value Date    TRIG 60 08/01/2019    TRIG 148 07/05/2019    TRIG 164 (H) 01/10/2019     No results found for: CHOLHDL

## 2019-09-25 ENCOUNTER — APPOINTMENT (OUTPATIENT)
Dept: CARDIAC REHAB | Age: 79
End: 2019-09-25
Payer: COMMERCIAL

## 2019-09-26 ENCOUNTER — OFFICE VISIT (OUTPATIENT)
Dept: CARDIOLOGY CLINIC | Facility: MEDICAL CENTER | Age: 79
End: 2019-09-26
Payer: COMMERCIAL

## 2019-09-26 ENCOUNTER — APPOINTMENT (OUTPATIENT)
Dept: CARDIAC REHAB | Facility: CLINIC | Age: 79
End: 2019-09-26
Payer: COMMERCIAL

## 2019-09-26 ENCOUNTER — CLINICAL SUPPORT (OUTPATIENT)
Dept: CARDIAC REHAB | Facility: CLINIC | Age: 79
End: 2019-09-26
Payer: COMMERCIAL

## 2019-09-26 VITALS
HEART RATE: 55 BPM | OXYGEN SATURATION: 97 % | BODY MASS INDEX: 35.37 KG/M2 | HEIGHT: 64 IN | SYSTOLIC BLOOD PRESSURE: 120 MMHG | DIASTOLIC BLOOD PRESSURE: 54 MMHG | WEIGHT: 207.2 LBS

## 2019-09-26 DIAGNOSIS — Z95.5 S/P PRIMARY ANGIOPLASTY WITH CORONARY STENT: ICD-10-CM

## 2019-09-26 DIAGNOSIS — I10 ESSENTIAL HYPERTENSION: Primary | ICD-10-CM

## 2019-09-26 DIAGNOSIS — N18.30 CKD (CHRONIC KIDNEY DISEASE) STAGE 3, GFR 30-59 ML/MIN (HCC): ICD-10-CM

## 2019-09-26 DIAGNOSIS — I25.10 CORONARY ARTERY DISEASE INVOLVING NATIVE CORONARY ARTERY OF NATIVE HEART WITHOUT ANGINA PECTORIS: ICD-10-CM

## 2019-09-26 DIAGNOSIS — E78.49 OTHER HYPERLIPIDEMIA: ICD-10-CM

## 2019-09-26 PROCEDURE — 99214 OFFICE O/P EST MOD 30 MIN: CPT | Performed by: INTERNAL MEDICINE

## 2019-09-26 PROCEDURE — 93798 PHYS/QHP OP CAR RHAB W/ECG: CPT

## 2019-09-26 PROCEDURE — 3074F SYST BP LT 130 MM HG: CPT | Performed by: INTERNAL MEDICINE

## 2019-09-26 PROCEDURE — 3078F DIAST BP <80 MM HG: CPT | Performed by: INTERNAL MEDICINE

## 2019-09-27 ENCOUNTER — CLINICAL SUPPORT (OUTPATIENT)
Dept: CARDIAC REHAB | Facility: CLINIC | Age: 79
End: 2019-09-27
Payer: COMMERCIAL

## 2019-09-27 ENCOUNTER — APPOINTMENT (OUTPATIENT)
Dept: CARDIAC REHAB | Facility: CLINIC | Age: 79
End: 2019-09-27
Payer: COMMERCIAL

## 2019-09-27 ENCOUNTER — APPOINTMENT (OUTPATIENT)
Dept: CARDIAC REHAB | Age: 79
End: 2019-09-27
Payer: COMMERCIAL

## 2019-09-27 DIAGNOSIS — Z95.5 S/P PRIMARY ANGIOPLASTY WITH CORONARY STENT: ICD-10-CM

## 2019-09-27 PROCEDURE — 93798 PHYS/QHP OP CAR RHAB W/ECG: CPT

## 2019-09-27 NOTE — PROGRESS NOTES
Cardiac Rehabilitation Plan of Care   30 day       Today's date: 10/4/2019   Visits: 9  Patient name: Niko Rosado      : 1940  Age: 67 y o  MRN: [id number]  Referring Physician: Evan Greenfield DO  Cardiologist: Giovany Sevilla MD  Provider: [facility] [address] [place] [address]  Clinician: Dashawn Gaines, MS, CEP, [facility]    Dx:   Encounter Diagnosis   Name Primary?  S/P primary angioplasty with coronary stent      Date of onset: 19      SUMMARY OF PROGRESS:  This is Sofia's 30 day note for cardiac rehab, she completed 9 sessions her initial evaluation  She has progressed to 40-45 min of cardio at 2-3 METs  Saint Lot reports she has remained free of cardiac complaints and has normal hemodynamic response to exercise  On telemetry she is NSR with 1st degree AV block during rest and with exercise  She carries her NTG and understands proper use  Saint Lot states she has increased ADLs that include and are not limited to moderate to heavy tasks such as yard work and scrubbing floors  Saint Len remains active at home and with her [facility] as well as does live alone  Saint Len has been following a heart healthy diet however current status on her dietary habits are unknown  She states she has excellent social support from friends, family, and her [facility]  An unexpected co-pay for CR has limited Saint Lot with rehab, she is currently on a hult and may be discharged soon  Saint Lot states she enjoyed the security of coming to [facility] however would consider following up with exercise at [facility]  [employer]         Medication compliance: Yes   Comments: admits 100% compliance  Fall Risk: Moderate   Comments:     EKG changes: NSR with 1st degree AV block, no ectopy      EXERCISE ASSESSMENT and PLAN    Current Exercise Program in Rehab:       Frequency: 3 days/week        Minutes: 30-40         METS: 2-3            HR: RHR+30   RPE: 4-5         Modalities: UBE, NuStep and Recumbent bike      Exercise Progression 30 Day Goals :    Frequency: 3 days/week   Minutes: 40   METS: 2 5 - 3 5   HR: RHR+30    RPE: 4-5   Modalities: Treadmill, UBE, NuStep and Recumbent bike    Strength training: Will be added following at least 8 weeks post surgery and 8-10 monitored sessions  Will be added following 2-3 weeks of monitored exercise sessions   Modalities: Leg Press, Chest Press, Lateral Raise, Arm Curl and Seated Row    Progressing: Yes - intensity goals met     Home Exercise: Type: walking, Frequency: 1 days/week, Duration: 20 mins    Goals: 10% improvement in functional capacity, Reduced Dyspnea with physical activity  0-1/10, improved DASI score by 10%, Increase in peak CR METs by 40%, Improved 6MWT distance by 10% and Exercise 5 days/wk, >150mins/wk  Education: Benefit of exercise for CAD risk factors, signs and sxs and RPE scale   Plan:education on home exercise guidelines, home exercise 30+ mins 2 days opposite CR and Education class: Risk Factors for Heart Disease  Readiness to change: Action      NUTRITION ASSESSMENT AND PLAN    Weight control:    Starting weight:  206 6   Current weight:   209 lbs   Waist circumference:    Startin 5   Current:    Diabetes: N/A  Lipid management: Discussed diet and lipid management and Last lipid profile 19  Chol 137  TRG 60  HDL 61  LDL 64  Goals:reduced BMI to < 25, decreased body fat % <33%, reduced waist circumference <35 inches and Wt  loss 1-2 ppw goal of 180 lbs  Education: heart healthy eating  low sodium diet  diet and lipid management  wt   Loss  Education class: Reading Food Labels, Education Class: Heart Healthy Eating,  Progressing: Yes - has attended heart healthy eating education classes   Plan: Increase PUFA and MUFA and Reduce added sugars <25g/day, improved snack choices  Readiness to change: Action      PSYCHOSOCIAL ASSESSMENT AND PLAN    Emotional: Patient reports he/she is coping well with good social support and denies depression or anxiety   PHQ-9 = 0 =No Depression  Self-reported stress level: 1   Social support: Excellent  Goals:  Physical Fitness in [facility] Score < 3  Education: benefits of positive support system and coping mechanisms  Progressing: Yes - continues to have emotional support   Plan: Class: Stress and Your Health and Class: Relaxation  Readiness to change: Action      OTHER CORE COMPONENTS     Tobacco:   Social History     Tobacco Use   Smoking Status Never Smoker   Smokeless Tobacco Never Used       Tobacco Use Intervention: Referral to tobacco expert:   N/A    Blood pressure:    Restin/72   Exercise: 132/58    Goals: consistent BP < 130/80, reduced dietary sodium <2300mg and consistent exercise >150 mins/wk  Education:  understanding HTN and CAD, low sodium diet and HTN and Education class:  Common Heart Medications  Progressing: Yes - BP under control, will monitor at home   Plan: Class: Understanding Heart Disease  Readiness to change: Action

## 2019-09-30 ENCOUNTER — APPOINTMENT (OUTPATIENT)
Dept: CARDIAC REHAB | Age: 79
End: 2019-09-30
Payer: COMMERCIAL

## 2019-10-01 ENCOUNTER — TELEPHONE (OUTPATIENT)
Dept: FAMILY MEDICINE CLINIC | Facility: CLINIC | Age: 79
End: 2019-10-01

## 2019-10-01 DIAGNOSIS — K21.9 GERD WITHOUT ESOPHAGITIS: Primary | ICD-10-CM

## 2019-10-01 RX ORDER — FAMOTIDINE 20 MG/1
20 TABLET, FILM COATED ORAL 2 TIMES DAILY PRN
Qty: 60 TABLET | Refills: 2 | Status: SHIPPED | OUTPATIENT
Start: 2019-10-01 | End: 2019-12-24 | Stop reason: SDUPTHER

## 2019-10-01 NOTE — TELEPHONE ENCOUNTER
Patient called stating she has seen several commercials on TV that they are going to take Zantac off the shelves  Patient would like to know if she should have any concerns or if an alternative needs to be sent in  Please advise  Patient would like a return call

## 2019-10-02 ENCOUNTER — TELEPHONE (OUTPATIENT)
Dept: OTHER | Facility: HOSPITAL | Age: 79
End: 2019-10-02

## 2019-10-02 NOTE — TELEPHONE ENCOUNTER
Attempted to contact the patient in regards to the HARP stress study with Dr Fina Carrasquillo  Left a LV to please call back at [phone] for more details about the study and possibly enrolling in the study

## 2019-10-28 DIAGNOSIS — I21.4 NSTEMI, INITIAL EPISODE OF CARE (HCC): ICD-10-CM

## 2019-10-28 RX ORDER — CARVEDILOL 6.25 MG/1
6.25 TABLET ORAL 2 TIMES DAILY WITH MEALS
Qty: 60 TABLET | Refills: 1 | Status: SHIPPED | OUTPATIENT
Start: 2019-10-28 | End: 2019-12-24 | Stop reason: SDUPTHER

## 2019-11-21 ENCOUNTER — TELEPHONE (OUTPATIENT)
Dept: FAMILY MEDICINE CLINIC | Facility: CLINIC | Age: 79
End: 2019-11-21

## 2019-11-21 NOTE — TELEPHONE ENCOUNTER
Patient called she has sinus congestion and cough and bringing up mucus, headache, ear pain, eye pain and chills  No Fever  Patient would like to know if she can try anything over the counter or does she need a prescription and be seen  Please advise  Patient would like a return call   [phone]

## 2019-11-21 NOTE — TELEPHONE ENCOUNTER
-Start taking Mucinex DM 1200mg twice a day  -Take anti-inflammatories such as Naproxen (Aleve) 1 tab 2x/day or Ibuprofen (Motrin, Advil) 2 tabs 3x/day to help with aches, pains, and swelling  -Drink at least 8-10 glasses of water per day  -Use intranasal saline or Flonase  -If it doesn't get better she should be seen next week

## 2019-11-22 NOTE — PROGRESS NOTES
Kraig Orourke      Dear Dr Ashley Peng    Thank you for referring your patient to our cardiac rehabilitation program  The patient has completed 9  visits  However, rehab is being discontinued at this time due to:    Voluntary Dropout:  The patient has chosen to quit due to :  Learning that she had a co-pay after [facility] told her she did not  30 plus days went by before she could get an answer and she decided not to come back at this time  Attached is her final Outcome Sheet and Exercise Session Detail  Please contact us at [phone] if you have any questions about this patents case or if/when it is appropriate to re-start the patients rehab program at a later date  Thank you for your continued support of cardiac rehabilitation  Sincerely,      Omid Mirandaor, MS, CEP  Exercise Physiologist

## 2019-12-09 ENCOUNTER — TELEPHONE (OUTPATIENT)
Dept: FAMILY MEDICINE CLINIC | Facility: CLINIC | Age: 79
End: 2019-12-09

## 2019-12-09 NOTE — TELEPHONE ENCOUNTER
Patient called  She has been having a lot of post nasal drip  No other symptoms  Can you recommend something OTC for her?

## 2019-12-24 DIAGNOSIS — I21.4 NSTEMI, INITIAL EPISODE OF CARE (HCC): ICD-10-CM

## 2019-12-24 DIAGNOSIS — K21.9 GERD WITHOUT ESOPHAGITIS: ICD-10-CM

## 2019-12-24 RX ORDER — FAMOTIDINE 20 MG/1
20 TABLET, FILM COATED ORAL 2 TIMES DAILY PRN
Qty: 60 TABLET | Refills: 2 | Status: SHIPPED | OUTPATIENT
Start: 2019-12-24 | End: 2021-01-18 | Stop reason: ALTCHOICE

## 2019-12-24 RX ORDER — CARVEDILOL 6.25 MG/1
6.25 TABLET ORAL 2 TIMES DAILY WITH MEALS
Qty: 60 TABLET | Refills: 1 | Status: SHIPPED | OUTPATIENT
Start: 2019-12-24 | End: 2020-03-02 | Stop reason: SDUPTHER

## 2019-12-24 NOTE — TELEPHONE ENCOUNTER
Refill request for Carvedilol 6 25 mg 1 tab 2x daily, Ticagrelor 90 mg 1 tab q 12 hrs, Famotidine 20 mg 1 tab 2x daily   [facility]

## 2020-01-08 ENCOUNTER — TELEPHONE (OUTPATIENT)
Dept: FAMILY MEDICINE CLINIC | Facility: CLINIC | Age: 80
End: 2020-01-08

## 2020-01-08 DIAGNOSIS — E55.9 VITAMIN D DEFICIENCY: ICD-10-CM

## 2020-01-08 DIAGNOSIS — I10 ESSENTIAL HYPERTENSION: Primary | ICD-10-CM

## 2020-01-08 DIAGNOSIS — K21.9 GERD WITHOUT ESOPHAGITIS: ICD-10-CM

## 2020-01-08 DIAGNOSIS — N18.30 CKD (CHRONIC KIDNEY DISEASE) STAGE 3, GFR 30-59 ML/MIN (HCC): ICD-10-CM

## 2020-01-08 DIAGNOSIS — E78.49 OTHER HYPERLIPIDEMIA: ICD-10-CM

## 2020-01-08 NOTE — TELEPHONE ENCOUNTER
Patient is scheduled for appt on 1/13/20 & is requesting LAB orders       Call Zaire Arora when in chart [phone]

## 2020-01-09 ENCOUNTER — APPOINTMENT (OUTPATIENT)
Dept: LAB | Facility: MEDICAL CENTER | Age: 80
End: 2020-01-09
Payer: COMMERCIAL

## 2020-01-09 DIAGNOSIS — N18.30 CKD (CHRONIC KIDNEY DISEASE) STAGE 3, GFR 30-59 ML/MIN (HCC): ICD-10-CM

## 2020-01-09 DIAGNOSIS — K21.9 GERD WITHOUT ESOPHAGITIS: ICD-10-CM

## 2020-01-09 DIAGNOSIS — E78.49 OTHER HYPERLIPIDEMIA: ICD-10-CM

## 2020-01-09 DIAGNOSIS — E55.9 VITAMIN D DEFICIENCY: ICD-10-CM

## 2020-01-09 LAB
25(OH)D3 SERPL-MCNC: 33.5 NG/ML (ref 30–100)
ALBUMIN SERPL BCP-MCNC: 4 G/DL (ref 3.5–5)
ALP SERPL-CCNC: 73 U/L (ref 46–116)
ALT SERPL W P-5'-P-CCNC: 26 U/L (ref 12–78)
ANION GAP SERPL CALCULATED.3IONS-SCNC: 7 MMOL/L (ref 4–13)
AST SERPL W P-5'-P-CCNC: 16 U/L (ref 5–45)
BASOPHILS # BLD AUTO: 0.04 THOUSANDS/ΜL (ref 0–0.1)
BASOPHILS NFR BLD AUTO: 1 % (ref 0–1)
BILIRUB SERPL-MCNC: 0.79 MG/DL (ref 0.2–1)
BUN SERPL-MCNC: 23 MG/DL (ref 5–25)
CALCIUM SERPL-MCNC: 9.6 MG/DL (ref 8.3–10.1)
CHLORIDE SERPL-SCNC: 109 MMOL/L (ref 100–108)
CHOLEST SERPL-MCNC: 148 MG/DL (ref 50–200)
CO2 SERPL-SCNC: 27 MMOL/L (ref 21–32)
CREAT SERPL-MCNC: 0.98 MG/DL (ref 0.6–1.3)
CREAT UR-MCNC: 39.3 MG/DL
EOSINOPHIL # BLD AUTO: 0.19 THOUSAND/ΜL (ref 0–0.61)
EOSINOPHIL NFR BLD AUTO: 3 % (ref 0–6)
ERYTHROCYTE [DISTWIDTH] IN BLOOD BY AUTOMATED COUNT: 14.3 % (ref 11.6–15.1)
GFR SERPL CREATININE-BSD FRML MDRD: 55 ML/MIN/1.73SQ M
GLUCOSE P FAST SERPL-MCNC: 99 MG/DL (ref 65–99)
HCT VFR BLD AUTO: 39 % (ref 34.8–46.1)
HDLC SERPL-MCNC: 63 MG/DL
HGB BLD-MCNC: 11.9 G/DL (ref 11.5–15.4)
IMM GRANULOCYTES # BLD AUTO: 0.02 THOUSAND/UL (ref 0–0.2)
IMM GRANULOCYTES NFR BLD AUTO: 0 % (ref 0–2)
LDLC SERPL CALC-MCNC: 60 MG/DL (ref 0–100)
LYMPHOCYTES # BLD AUTO: 2.08 THOUSANDS/ΜL (ref 0.6–4.47)
LYMPHOCYTES NFR BLD AUTO: 34 % (ref 14–44)
MAGNESIUM SERPL-MCNC: 2.5 MG/DL (ref 1.6–2.6)
MCH RBC QN AUTO: 29.8 PG (ref 26.8–34.3)
MCHC RBC AUTO-ENTMCNC: 30.5 G/DL (ref 31.4–37.4)
MCV RBC AUTO: 98 FL (ref 82–98)
MICROALBUMIN UR-MCNC: <5 MG/L (ref 0–20)
MICROALBUMIN/CREAT 24H UR: <13 MG/G CREATININE (ref 0–30)
MONOCYTES # BLD AUTO: 0.74 THOUSAND/ΜL (ref 0.17–1.22)
MONOCYTES NFR BLD AUTO: 12 % (ref 4–12)
NEUTROPHILS # BLD AUTO: 3.1 THOUSANDS/ΜL (ref 1.85–7.62)
NEUTS SEG NFR BLD AUTO: 50 % (ref 43–75)
NRBC BLD AUTO-RTO: 0 /100 WBCS
PHOSPHATE SERPL-MCNC: 4 MG/DL (ref 2.3–4.1)
PLATELET # BLD AUTO: 254 THOUSANDS/UL (ref 149–390)
PMV BLD AUTO: 11.6 FL (ref 8.9–12.7)
POTASSIUM SERPL-SCNC: 4.5 MMOL/L (ref 3.5–5.3)
PROT SERPL-MCNC: 7.4 G/DL (ref 6.4–8.2)
PTH-INTACT SERPL-MCNC: 58.8 PG/ML (ref 18.4–80.1)
RBC # BLD AUTO: 3.99 MILLION/UL (ref 3.81–5.12)
SODIUM SERPL-SCNC: 143 MMOL/L (ref 136–145)
TRIGL SERPL-MCNC: 127 MG/DL
WBC # BLD AUTO: 6.17 THOUSAND/UL (ref 4.31–10.16)

## 2020-01-09 PROCEDURE — 85025 COMPLETE CBC W/AUTO DIFF WBC: CPT

## 2020-01-09 PROCEDURE — 80053 COMPREHEN METABOLIC PANEL: CPT

## 2020-01-09 PROCEDURE — 80061 LIPID PANEL: CPT

## 2020-01-09 PROCEDURE — 82043 UR ALBUMIN QUANTITATIVE: CPT

## 2020-01-09 PROCEDURE — 83970 ASSAY OF PARATHORMONE: CPT

## 2020-01-09 PROCEDURE — 82570 ASSAY OF URINE CREATININE: CPT

## 2020-01-09 PROCEDURE — 82306 VITAMIN D 25 HYDROXY: CPT

## 2020-01-09 PROCEDURE — 83735 ASSAY OF MAGNESIUM: CPT

## 2020-01-09 PROCEDURE — 36415 COLL VENOUS BLD VENIPUNCTURE: CPT

## 2020-01-09 PROCEDURE — 84100 ASSAY OF PHOSPHORUS: CPT

## 2020-01-13 ENCOUNTER — OFFICE VISIT (OUTPATIENT)
Dept: FAMILY MEDICINE CLINIC | Facility: CLINIC | Age: 80
End: 2020-01-13
Payer: COMMERCIAL

## 2020-01-13 VITALS
BODY MASS INDEX: 34.33 KG/M2 | HEART RATE: 70 BPM | SYSTOLIC BLOOD PRESSURE: 122 MMHG | RESPIRATION RATE: 16 BRPM | DIASTOLIC BLOOD PRESSURE: 54 MMHG | WEIGHT: 200 LBS | OXYGEN SATURATION: 98 % | TEMPERATURE: 97.4 F

## 2020-01-13 DIAGNOSIS — I10 ESSENTIAL HYPERTENSION: Primary | ICD-10-CM

## 2020-01-13 DIAGNOSIS — I25.10 CORONARY ARTERY DISEASE INVOLVING NATIVE CORONARY ARTERY OF NATIVE HEART WITHOUT ANGINA PECTORIS: ICD-10-CM

## 2020-01-13 DIAGNOSIS — E55.9 VITAMIN D DEFICIENCY: ICD-10-CM

## 2020-01-13 DIAGNOSIS — K21.9 GERD WITHOUT ESOPHAGITIS: ICD-10-CM

## 2020-01-13 DIAGNOSIS — E66.09 CLASS 1 OBESITY DUE TO EXCESS CALORIES WITH SERIOUS COMORBIDITY AND BODY MASS INDEX (BMI) OF 34.0 TO 34.9 IN ADULT: ICD-10-CM

## 2020-01-13 DIAGNOSIS — E78.49 OTHER HYPERLIPIDEMIA: ICD-10-CM

## 2020-01-13 DIAGNOSIS — Z00.00 MEDICARE ANNUAL WELLNESS VISIT, SUBSEQUENT: ICD-10-CM

## 2020-01-13 DIAGNOSIS — I25.119 ATHEROSCLEROSIS OF NATIVE CORONARY ARTERY OF NATIVE HEART WITH ANGINA PECTORIS (HCC): ICD-10-CM

## 2020-01-13 DIAGNOSIS — N18.30 CKD (CHRONIC KIDNEY DISEASE) STAGE 3, GFR 30-59 ML/MIN (HCC): ICD-10-CM

## 2020-01-13 PROBLEM — E66.811 CLASS 1 OBESITY DUE TO EXCESS CALORIES WITH SERIOUS COMORBIDITY AND BODY MASS INDEX (BMI) OF 34.0 TO 34.9 IN ADULT: Status: ACTIVE | Noted: 2019-01-04

## 2020-01-13 PROCEDURE — 3725F SCREEN DEPRESSION PERFORMED: CPT | Performed by: FAMILY MEDICINE

## 2020-01-13 PROCEDURE — 4040F PNEUMOC VAC/ADMIN/RCVD: CPT | Performed by: FAMILY MEDICINE

## 2020-01-13 PROCEDURE — 1036F TOBACCO NON-USER: CPT | Performed by: FAMILY MEDICINE

## 2020-01-13 PROCEDURE — G0439 PPPS, SUBSEQ VISIT: HCPCS | Performed by: FAMILY MEDICINE

## 2020-01-13 PROCEDURE — 3078F DIAST BP <80 MM HG: CPT | Performed by: FAMILY MEDICINE

## 2020-01-13 PROCEDURE — 1160F RVW MEDS BY RX/DR IN RCRD: CPT | Performed by: FAMILY MEDICINE

## 2020-01-13 PROCEDURE — 99214 OFFICE O/P EST MOD 30 MIN: CPT | Performed by: FAMILY MEDICINE

## 2020-01-13 PROCEDURE — 1101F PT FALLS ASSESS-DOCD LE1/YR: CPT | Performed by: FAMILY MEDICINE

## 2020-01-13 PROCEDURE — 3074F SYST BP LT 130 MM HG: CPT | Performed by: FAMILY MEDICINE

## 2020-01-13 PROCEDURE — 1170F FXNL STATUS ASSESSED: CPT | Performed by: FAMILY MEDICINE

## 2020-01-13 PROCEDURE — 3288F FALL RISK ASSESSMENT DOCD: CPT | Performed by: FAMILY MEDICINE

## 2020-01-13 PROCEDURE — 1125F AMNT PAIN NOTED PAIN PRSNT: CPT | Performed by: FAMILY MEDICINE

## 2020-01-13 RX ORDER — ATORVASTATIN CALCIUM 40 MG/1
40 TABLET, FILM COATED ORAL DAILY
Qty: 90 TABLET | Refills: 3 | Status: SHIPPED | OUTPATIENT
Start: 2020-01-13 | End: 2020-04-13 | Stop reason: SDUPTHER

## 2020-01-13 NOTE — ASSESSMENT & PLAN NOTE
Lab Results   Component Value Date    CHOLESTEROL 148 01/09/2020    HDL 63 01/09/2020    LDLC 108 (H) 01/10/2019    LDLCALC 60 01/09/2020    TRIG 127 01/09/2020   Controlled  H/o CAD, continue Atorvastatin 40mg daily  Reviewed healthy, low cholesterol diet

## 2020-01-13 NOTE — ASSESSMENT & PLAN NOTE
BP Readings from Last 3 Encounters:   01/13/20 122/54   09/26/19 120/54   08/12/19 134/54     Lab Results   Component Value Date    CREATININE 0 98 01/09/2020    EGFR 55 01/09/2020     Controlled on current regimen:  Lisinopril 20mg, Carvedilol 6 25mg BID

## 2020-01-13 NOTE — ASSESSMENT & PLAN NOTE
Vit D, 25-Hydroxy   Date Value Ref Range Status   01/09/2020 33 5 30 0 - 100 0 ng/mL Final   12/14/2017 39 30 - 100 ng/mL Final     Continue Vit D 2000 IU

## 2020-01-13 NOTE — ASSESSMENT & PLAN NOTE
August 2019- cardiac cath showed LAD blockage, had successful balloon angioplasty and GABY on 90% lesion of the ostial LAD  Continue cardiac meds (Carvedilol 6 25mg BID, ASA 81mg daily, Brilinta 90mg BID, Lisinopril 20mg daily, Atorvastatin 40mg)

## 2020-01-13 NOTE — ASSESSMENT & PLAN NOTE
Results from last 6 Months   Lab Units 01/09/20  0712 09/24/19  0821 08/03/19  0503 08/02/19  0525   BUN mg/dL 23 20 17 20   CREATININE mg/dL 0 98 1 03 0 83 0 83   EGFR ml/min/1 73sq m 55 52 68 68     Results from last 6 Months   Lab Units 01/09/20  0712   MICROALB/CREAT RATIO mg/g creatinine <13     Avoid nephrotoxins  Likely 2/2 HTN and age  Continue Lisinopril 20mg daily

## 2020-01-13 NOTE — PATIENT INSTRUCTIONS
Medicare Preventive Visit Patient Instructions  Thank you for completing your Welcome to Medicare Visit or Medicare Annual Wellness Visit today  Your next wellness visit will be due in one year (1/13/2021)  The screening/preventive services that you may require over the next 5-10 years are detailed below  Some tests may not apply to you based off risk factors and/or age  Screening tests ordered at today's visit but not completed yet may show as past due  Also, please note that scanned in results may not display below  Preventive Screenings:  Service Recommendations Previous Testing/Comments   Colorectal Cancer Screening  * Colonoscopy    * Fecal Occult Blood Test (FOBT)/Fecal Immunochemical Test (FIT)  * Fecal DNA/Cologuard Test  * Flexible Sigmoidoscopy Age: 54-65 years old   Colonoscopy: every 10 years (may be performed more frequently if at higher risk)  OR  FOBT/FIT: every 1 year  OR  Cologuard: every 3 years  OR  Sigmoidoscopy: every 5 years  Screening may be recommended earlier than age 48 if at higher risk for colorectal cancer  Also, an individualized decision between you and your healthcare provider will decide whether screening between the ages of 74-80 would be appropriate  Colonoscopy: 09/13/2018  FOBT/FIT: Not on file  Cologuard: 07/16/2018  Sigmoidoscopy: Not on file    Risks and Benefits Discussed  Screening Current     Breast Cancer Screening Age: 36 years old  Frequency: every 1-2 years  Not required if history of left and right mastectomy Mammogram: 07/01/2019    Screening Current  Risks and Benefits Discussed   Cervical Cancer Screening Between the ages of 21-29, pap smear recommended once every 3 years  Between the ages of 33-67, can perform pap smear with HPV co-testing every 5 years     Recommendations may differ for women with a history of total hysterectomy, cervical cancer, or abnormal pap smears in past  Pap Smear: 07/03/2019    Screening Not Indicated   Hepatitis C Screening Once for adults born between [birth year] and 1965  More frequently in patients at high risk for Hepatitis C Hep C Antibody: Not on file    Screening Not Indicated   Diabetes Screening 1-2 times per year if you're at risk for diabetes or have pre-diabetes Fasting glucose: 99 mg/dL   A1C: 5 9 % of total Hgb    Screening Current  Risks and Benefits Discussed   Cholesterol Screening Once every 5 years if you don't have a lipid disorder  May order more often based on risk factors  Lipid panel: 01/09/2020    Screening Not Indicated  History Lipid Disorder  Risks and Benefits Discussed  Screening Current     Other Preventive Screenings Covered by Medicare:  1  Abdominal Aortic Aneurysm (AAA) Screening: covered once if your at risk  You're considered to be at risk if you have a family history of AAA  2  Lung Cancer Screening: covers low dose CT scan once per year if you meet all of the following conditions: (1) Age 50-69; (2) No signs or symptoms of lung cancer; (3) Current smoker or have quit smoking within the last 15 years; (4) You have a tobacco smoking history of at least 30 pack years (packs per day multiplied by number of years you smoked); (5) You get a written order from a healthcare provider  3  Glaucoma Screening: covered annually if you're considered high risk: (1) You have diabetes OR (2) Family history of glaucoma OR (3)  aged 48 and older OR (3)  American aged 72 and older  3  Osteoporosis Screening: covered every 2 years if you meet one of the following conditions: (1) You're estrogen deficient and at risk for osteoporosis based off medical history and other findings; (2) Have a vertebral abnormality; (3) On glucocorticoid therapy for more than 3 months; (4) Have primary hyperparathyroidism; (5) On osteoporosis medications and need to assess response to drug therapy  · Last bone density test (DXA Scan): 07/01/2019   5  HIV Screening: covered annually if you're between the age of 15-65   Also covered annually if you are younger than 13 and older than 72 with risk factors for HIV infection  For pregnant patients, it is covered up to 3 times per pregnancy  Immunizations:  Immunization Recommendations   Influenza Vaccine Annual influenza vaccination during flu season is recommended for all persons aged >= 6 months who do not have contraindications   Pneumococcal Vaccine (Prevnar and Pneumovax)  * Prevnar = PCV13  * Pneumovax = PPSV23   Adults 25-60 years old: 1-3 doses may be recommended based on certain risk factors  Adults 72 years old: Prevnar (PCV13) vaccine recommended followed by Pneumovax (PPSV23) vaccine  If already received PPSV23 since turning 65, then PCV13 recommended at least one year after PPSV23 dose  Hepatitis B Vaccine 3 dose series if at intermediate or high risk (ex: diabetes, end stage renal disease, liver disease)   Tetanus (Td) Vaccine - COST NOT COVERED BY MEDICARE PART B Following completion of primary series, a booster dose should be given every 10 years to maintain immunity against tetanus  Td may also be given as tetanus wound prophylaxis  Tdap Vaccine - COST NOT COVERED BY MEDICARE PART B Recommended at least once for all adults  For pregnant patients, recommended with each pregnancy  Shingles Vaccine (Shingrix) - COST NOT COVERED BY MEDICARE PART B  2 shot series recommended in those aged 48 and above     Health Maintenance Due:  There are no preventive care reminders to display for this patient  Immunizations Due:  There are no preventive care reminders to display for this patient  Advance Directives   What are advance directives? Advance directives are legal documents that state your wishes and plans for medical care  These plans are made ahead of time in case you lose your ability to make decisions for yourself  Advance directives can apply to any medical decision, such as the treatments you want, and if you want to donate organs     What are the types of advance directives? There are many types of advance directives, and each state has rules about how to use them  You may choose a combination of any of the following:  · Living will: This is a written record of the treatment you want  You can also choose which treatments you do not want, which to limit, and which to stop at a certain time  This includes surgery, medicine, IV fluid, and tube feedings  · Durable power of  for healthcare [facility]): This is a written record that states who you want to make healthcare choices for you when you are unable to make them for yourself  This person, called a proxy, is usually a family member or a friend  You may choose more than 1 proxy  · Do not resuscitate (DNR) order:  A DNR order is used in case your heart stops beating or you stop breathing  It is a request not to have certain forms of treatment, such as CPR  A DNR order may be included in other types of advance directives  · Medical directive: This covers the care that you want if you are in a coma, near death, or unable to make decisions for yourself  You can list the treatments you want for each condition  Treatment may include pain medicine, surgery, blood transfusions, dialysis, IV or tube feedings, and a ventilator (breathing machine)  · Values history: This document has questions about your views, beliefs, and how you feel and think about life  This information can help others choose the care that you would choose  Why are advance directives important? An advance directive helps you control your care  Although spoken wishes may be used, it is better to have your wishes written down  Spoken wishes can be misunderstood, or not followed  Treatments may be given even if you do not want them  An advance directive may make it easier for your family to make difficult choices about your care     Weight Management   Why it is important to manage your weight:  Being overweight increases your risk of health conditions such as heart disease, high blood pressure, type 2 diabetes, and certain types of cancer  It can also increase your risk for osteoarthritis, sleep apnea, and other respiratory problems  Aim for a slow, steady weight loss  Even a small amount of weight loss can lower your risk of health problems  How to lose weight safely:  A safe and healthy way to lose weight is to eat fewer calories and get regular exercise  You can lose up about 1 pound a week by decreasing the number of calories you eat by 500 calories each day  Healthy meal plan for weight management:  A healthy meal plan includes a variety of foods, contains fewer calories, and helps you stay healthy  A healthy meal plan includes the following:  · Eat whole-grain foods more often  A healthy meal plan should contain fiber  Fiber is the part of grains, fruits, and vegetables that is not broken down by your body  Whole-grain foods are healthy and provide extra fiber in your diet  Some examples of whole-grain foods are whole-wheat breads and pastas, oatmeal, brown rice, and bulgur  · Eat a variety of vegetables every day  Include dark, leafy greens such as spinach, kale, abimael greens, and mustard greens  Eat yellow and orange vegetables such as carrots, sweet potatoes, and winter squash  · Eat a variety of fruits every day  Choose fresh or canned fruit (canned in its own juice or light syrup) instead of juice  Fruit juice has very little or no fiber  · Eat low-fat dairy foods  Drink fat-free (skim) milk or 1% milk  Eat fat-free yogurt and low-fat cottage cheese  Try low-fat cheeses such as mozzarella and other reduced-fat cheeses  · Choose meat and other protein foods that are low in fat  Choose beans or other legumes such as split peas or lentils  Choose fish, skinless poultry (chicken or turkey), or lean cuts of red meat (beef or pork)  Before you cook meat or poultry, cut off any visible fat  · Use less fat and oil    Try baking foods instead of frying them  Add less fat, such as margarine, sour cream, regular salad dressing and mayonnaise to foods  Eat fewer high-fat foods  Some examples of high-fat foods include french fries, doughnuts, ice cream, and cakes  · Eat fewer sweets  Limit foods and drinks that are high in sugar  This includes candy, cookies, regular soda, and sweetened drinks  Exercise:  Exercise at least 30 minutes per day on most days of the week  Some examples of exercise include walking, biking, dancing, and swimming  You can also fit in more physical activity by taking the stairs instead of the elevator or parking farther away from stores  Ask your healthcare provider about the best exercise plan for you  © Copyright [employer] 2018 Information is for End User's use only and may not be sold, redistributed or otherwise used for commercial purposes  All illustrations and images included in CareNotes® are the copyrighted property of A D A M [employer] Inc  or [facility] Aixa Dowell   DASH Eating Plan   AMBULATORY CARE:   The DASH (Dietary Approaches to Stop Hypertension) Eating Plan  is designed to help prevent or lower high blood pressure  It can also help to lower LDL (bad) cholesterol and decrease your risk for heart disease  The plan is low in sodium, sugar, unhealthy fats, and total fat  It is high in potassium, calcium, magnesium, and fiber  These nutrients are added when you eat more fruits, vegetables, and whole grains  Your sodium limit each day: Your dietitian will tell you how much sodium is safe for you to have each day  People with high blood pressure should have no more than 1,500 to 2,300 mg of sodium in a day  A teaspoon (tsp) of salt has 2,300 mg of sodium  This may seem like a difficult goal, but small changes to the foods you eat can make a big difference  Your healthcare provider or dietitian can help you create a meal plan that follows your sodium limit  How to limit sodium:   · Read food labels    Food labels can help you choose foods that are low in sodium  The amount of sodium is listed in milligrams (mg)  The % Daily Value (DV) column tells you how much of your daily needs are met by 1 serving of the food for each nutrient listed  Choose foods that have less than 5% of the DV of sodium  These foods are considered low in sodium  Foods that have 20% or more of the DV of sodium are considered high in sodium  Avoid foods that have more than 300 mg of sodium in each serving  Choose foods that say low-sodium, reduced-sodium, or no salt added on the food label  · Avoid salt  Do not salt food at the table, and add very little salt to foods during cooking  Use herbs and spices, such as onions, garlic, and salt-free seasonings to add flavor to foods  Try lemon or lime juice or vinegar to give foods a tart flavor  Use hot peppers or a small amount of hot pepper sauce to add a spicy flavor to foods  · Ask about salt substitutes  Ask your healthcare provider if you may use salt substitutes  Some salt substitutes have ingredients that can be harmful if you have certain health conditions  · Choose foods carefully at restaurants  Meals from restaurants, especially fast food restaurants, are often high in sodium  Some restaurants have nutrition information that tells you the amount of sodium in their foods  Ask to have your food prepared with less, or no salt  What you need to know about fats:   · Include healthy fats  Examples are unsaturated fats and omega-3 fatty acids  Unsaturated fats are found in soybean, canola, olive, or sunflower oil, and liquid and soft tub margarines  Omega-3 fatty acids are found in fatty fish, such as salmon, tuna, mackerel, and sardines  It is also found in flaxseed oil and ground flaxseed  · Avoid unhealthy fats  Do not eat unhealthy fats, such as saturated fats and trans fats  Saturated fats are found in foods that contain fat from animals   Examples are fatty meats, whole milk, butter, cream, and other dairy foods  It is also found in shortening, stick margarine, palm oil, and coconut oil  Trans fats are found in fried foods, crackers, chips, and baked goods made with margarine or shortening  Foods to include: With the DASH eating plan, you need to eat a certain number of servings from each food group  This will help you get enough of certain nutrients and limit others  The amount of servings you should eat depends on how many calories you need  Your dietitian can tell you how many calories you need  The number of servings listed next to the food groups below are for people who need about 2,000 calories each day    · Grains:  6 to 8 servings (3 of these servings should be whole-grain foods)    ¨ 1 slice of whole-grain bread     ¨ 1 ounce of dry cereal    ¨ ½ cup of cooked cereal, pasta, or brown rice    · Vegetables and fruits:  4 to 5 servings of fruits and 4 to 5 servings of vegetables    ¨ 1 medium fruit    ¨ ½ cup of frozen, canned (no added salt), or chopped fresh vegetables     ¨ ½ cup of fresh, frozen, dried, or canned fruit (canned in light syrup or fruit juice)    ¨ ½ cup of vegetable or fruit juice    · Dairy:  2 to 3 servings    ¨ 1 cup of nonfat (skim) or 1% milk    ¨ 1½ ounces of fat-free or low-fat cheese    ¨ 6 ounces of nonfat or low-fat yogurt    · Lean meat, poultry, and fish:  6 ounces or less    [employer] (chicken, turkey) with no skin    ¨ Fish (especially fatty fish, such as salmon, fresh tuna, or mackerel)    ¨ Lean beef and pork (loin, round, extra lean hamburger)    ¨ Egg whites and egg substitutes    · Nuts, seeds, and legumes:  4 to 5 servings each week    ¨ ½ cup of cooked beans and peas    ¨ 1½ ounces of unsalted nuts    ¨ 2 tablespoons of peanut butter or seeds    · Sweets and added sugars:  5 or less each week    ¨ 1 tablespoon of sugar, jelly, or jam    ¨ ½ cup of sorbet or gelatin    ¨ 1 cup of lemonade    · Fats:  2 to 3 servings each week    ¨ 1 teaspoon of soft margarine or vegetable oil    ¨ 1 tablespoon of mayonnaise    ¨ 2 tablespoons of salad dressing  Foods to avoid:   · Grains:      [employer], such as doughnuts, pastries, cookies, and biscuits (high in fat and sugar)    ¨ Mixes for cornbread and biscuits, packaged foods, such as bread stuffing, rice and pasta mixes, macaroni and cheese, and instant cereals (high in sodium)    · Fruits and vegetables:      ¨ Regular, canned vegetables (high in sodium)    ¨ Sauerkraut, pickled vegetables, and other foods prepared in brine (high in sodium)    ¨ Fried vegetables or vegetables in butter or high-fat sauces    ¨ Fruit in cream or butter sauce (high in fat)    · Dairy:      ¨ Whole milk, 2% milk, and cream (high in fat)    ¨ Regular cheese and processed cheese (high in fat and sodium)    · Meats and protein foods:      ¨ Smoked or cured meat, such as corned beef, jacques, ham, hot dogs, and sausage (high in fat and sodium)    ¨ Canned beans and canned meats or spreads, such as potted meats, sardines, anchovies, and imitation seafood (high in sodium)    ¨ Deli or lunch meats, such as bologna, ham, turkey, and roast beef (high in sodium)    ¨ High-fat meat (T-bone steak, regular hamburger, and ribs)    ¨ Whole eggs and egg yolks (high in fat)    · Other:      ¨ Seasonings made with salt, such as garlic salt, celery salt, onion salt, seasoned salt, meat tenderizers, and monosodium glutamate (MSG)    ¨ Miso soup and canned or dried soup mixes (high in sodium)    ¨ Regular soy sauce, barbecue sauce, teriyaki sauce, steak sauce, Worcestershire sauce, and most flavored vinegars (high in sodium)    ¨ Regular condiments, such as mustard, ketchup, and salad dressings (high in sodium)    ¨ Gravy and sauces, such as Gustavo or cheese sauces (high in sodium and fat)    ¨ Drinks high in sugar, such as soda or fruit drinks    [employer] foods, such as salted chips, popcorn, pretzels, pork rinds, salted crackers, and salted nuts    ¨ Frozen foods, such as dinners, entrees, vegetables with sauces, and breaded meats (high in sodium)  Other guidelines to follow:   · Maintain a healthy weight  Your risk for heart disease is higher if you are overweight  Your healthcare provider may suggest that you lose weight if you are overweight  You can lose weight by eating fewer calories and foods that have added sugars and fat  The DASH meal plan can help you do this  Decrease calories by eating smaller portions at each meal and fewer snacks  Ask your healthcare provider for more information about how to lose weight  · Exercise regularly  Regular exercise can help you reach or maintain a healthy weight  Regular exercise can also help decrease your blood pressure and improve your cholesterol levels  Get 30 minutes or more of moderate exercise each day of the week  To lose weight, get at least 60 minutes of exercise  Talk to your healthcare provider about the best exercise program for you  · Limit alcohol  Women should limit alcohol to 1 drink a day  Men should limit alcohol to 2 drinks a day  A drink of alcohol is 12 ounces of beer, 5 ounces of wine, or 1½ ounces of liquor  © 2017 [facility] Information is for End User's use only and may not be sold, redistributed or otherwise used for commercial purposes  All illustrations and images included in CareNotes® are the copyrighted property of [employer] , [employer]  or Javed Pearson  The above information is an  only  It is not intended as medical advice for individual conditions or treatments  Talk to your doctor, nurse or pharmacist before following any medical regimen to see if it is safe and effective for you

## 2020-01-13 NOTE — PROGRESS NOTES
FAMILY MEDICINE PROGRESS NOTE  Blayne Hassan 67 y o  female   DATE: January 13, 2020     ASSESSMENT and PLAN:  Blayne Hassan is a 67 y o  female with:      Other hyperlipidemia  Lab Results   Component Value Date    CHOLESTEROL 148 01/09/2020    HDL 63 01/09/2020    LDLC 108 (H) 01/10/2019    LDLCALC 60 01/09/2020    TRIG 127 01/09/2020   Controlled  H/o CAD, continue Atorvastatin 40mg daily  Reviewed healthy, low cholesterol diet      Vitamin D deficiency  Vit D, 25-Hydroxy   Date Value Ref Range Status   01/09/2020 33 5 30 0 - 100 0 ng/mL Final   12/14/2017 39 30 - 100 ng/mL Final     Continue Vit D 2000 IU     Essential hypertension  BP Readings from Last 3 Encounters:   01/13/20 122/54   09/26/19 120/54   08/12/19 134/54     Lab Results   Component Value Date    CREATININE 0 98 01/09/2020    EGFR 55 01/09/2020     Controlled on current regimen:  Lisinopril 20mg, Carvedilol 6 25mg BID    GERD without esophagitis  Well controlled with Pepcid 20mg BID PRN OTC    CKD (chronic kidney disease) stage 3, GFR 30-59 ml/min ([facility])  Results from last 6 Months   Lab Units 01/09/20  0712 09/24/19  0821 08/03/19  0503 08/02/19  0525   BUN mg/dL 23 20 17 20   CREATININE mg/dL 0 98 1 03 0 83 0 83   EGFR ml/min/1 73sq m 55 52 68 68     Results from last 6 Months   Lab Units 01/09/20  9532   MICROALB/CREAT RATIO mg/g creatinine <13     Avoid nephrotoxins  Likely 2/2 HTN and age  Continue Lisinopril 20mg daily    Coronary artery disease involving native coronary artery of native heart without angina pectoris  August 2019- cardiac cath showed LAD blockage, had successful balloon angioplasty and GABY on 90% lesion of the ostial LAD  Continue cardiac meds (Carvedilol 6 25mg BID, ASA 81mg daily, Brilinta 90mg BID, Lisinopril 20mg daily, Atorvastatin 40mg)    RTC 6 months for f/u with repeat labs    SUBJECTIVE:  Blayne Hassan is a 67 y o  female who presents today with a chief complaint of Follow-up  Blayne Hassan is here for a 4 month follow-up, she did have labs done prior to this visit  The active chronic medical problems and medications are as below:   1  CAD- admitted in August 2019 and had GABY placed, f/w Cardiology (Dr John Christiansen)  Currently on ASA and Brilinta  No chest pain, SOB, COE  Does have easy bruising/bleeding  No longer in cardiac rehab  2  HTN- well controlled, no hypotensive episodes  On Lisinopril and Carvedilol, has lost about 10 pounds with not eating after supper  3  CKD- stable on recent labs, isnt taking any NSAIDs  4  HLD- well controlled with lipitor, no Liban    Review of Systems   Respiratory: Negative for shortness of breath  Cardiovascular: Negative for chest pain  Hematological: Bruises/bleeds easily  I have reviewed the patient's Past Medical History  Current Outpatient Medications:     aspirin 81 MG tablet, Take 81 mg by mouth every other day , Disp: , Rfl:     atorvastatin (LIPITOR) 40 mg tablet, Take 1 tablet (40 mg total) by mouth daily, Disp: 90 tablet, Rfl: 1    carvedilol (COREG) 6 25 mg tablet, Take 1 tablet (6 25 mg total) by mouth 2 (two) times a day with meals, Disp: 60 tablet, Rfl: 1    cholecalciferol (VITAMIN D3) 1,000 units tablet, Take 2,000 Units by mouth daily, Disp: , Rfl:     Co-Enzyme Q-10 100 MG CAPS, Take 200 mg by mouth daily  , Disp: , Rfl:     famotidine (PEPCID) 20 mg tablet, Take 1 tablet (20 mg total) by mouth 2 (two) times a day as needed for heartburn, Disp: 60 tablet, Rfl: 2    latanoprost (XALATAN) 0 005 % ophthalmic solution, Administer 1 drop to both eyes daily, Disp: , Rfl: 3    lisinopril (ZESTRIL) 20 mg tablet, Take 1 tablet (20 mg total) by mouth daily, Disp: 90 tablet, Rfl: 1    Multiple Vitamins-Minerals (PRESERVISION AREDS) CAPS, Take 2 capsules by mouth daily, Disp: , Rfl:     nitroglycerin (NITROSTAT) 0 4 mg SL tablet, Place 1 tablet (0 4 mg total) under the tongue every 5 (five) minutes as needed for chest pain, Disp: 90 tablet, Rfl: 0    ticagrelor (BRILINTA) 90 MG, Take 1 tablet (90 mg total) by mouth every 12 (twelve) hours, Disp: 60 tablet, Rfl: 1    OBJECTIVE:  /54   Pulse 70   Temp (!) 97 4 °F (36 3 °C)   Resp 16   Wt 90 7 kg (200 lb)   SpO2 98%   BMI 34 33 kg/m²    Physical Exam   Constitutional: She is oriented to person, place, and time  She appears well-developed and well-nourished  No distress  obese   Cardiovascular: Normal rate, regular rhythm and normal heart sounds  No murmur heard  Pulmonary/Chest: Effort normal and breath sounds normal  No respiratory distress  She has no wheezes  She has no rales  Neurological: She is alert and oriented to person, place, and time  Skin: She is not diaphoretic  Psychiatric: She has a normal mood and affect  Vitals reviewed  BMI Counseling: Body mass index is 34 33 kg/m²  The BMI is above normal  Nutrition recommendations include decreasing portion sizes and consuming healthier snacks  Appointment on 01/09/2020   Component Date Value Ref Range Status    Cholesterol 01/09/2020 148  50 - 200 mg/dL Final      Cholesterol:       Desirable         <200 mg/dl       Borderline         200-239 mg/dl       High              >239           Triglycerides 01/09/2020 127  <=150 mg/dL Final      Triglyceride:     Normal          <150 mg/dl     Borderline High 150-199 mg/dl     High            200-499 mg/dl        Very High       >499 mg/dl    Specimen collection should occur prior to N-Acetylcysteine or Metamizole administration due to the potential for falsely depressed results   HDL, Direct 01/09/2020 63  >=40 mg/dL Final      HDL Cholesterol:       Low     <41 mg/dL  Specimen collection should occur prior to Metamizole administration due to the potential for falsley depressed results      LDL Calculated 01/09/2020 60  0 - 100 mg/dL Final      LDL Cholesterol:     Optimal           <100 mg/dl     Near Optimal      100-129 mg/dl     Above Optimal       Borderline High 130-159 mg/dl       High            160-189 mg/dl       Very High       >189 mg/dl         This screening LDL is a calculated result  It does not have the accuracy of the Direct Measured LDL in the monitoring of patients with hyperlipidemia and/or statin therapy  Direct Measure LDL ([id number]) must be ordered separately in these patients      Magnesium 01/09/2020 2 5  1 6 - 2 6 mg/dL Final    PTH 01/09/2020 58 8  18 4 - 80 1 pg/mL Final    Phosphorus 01/09/2020 4 0  2 3 - 4 1 mg/dL Final    Vit D, 25-Hydroxy 01/09/2020 33 5  30 0 - 100 0 ng/mL Final    WBC 01/09/2020 6 17  4 31 - 10 16 Thousand/uL Final    RBC 01/09/2020 3 99  3 81 - 5 12 Million/uL Final    Hemoglobin 01/09/2020 11 9  11 5 - 15 4 g/dL Final    Hematocrit 01/09/2020 39 0  34 8 - 46 1 % Final    MCV 01/09/2020 98  82 - 98 fL Final    MCH 01/09/2020 29 8  26 8 - 34 3 pg Final    MCHC 01/09/2020 30 5* 31 4 - 37 4 g/dL Final    RDW 01/09/2020 14 3  11 6 - 15 1 % Final    MPV 01/09/2020 11 6  8 9 - 12 7 fL Final    Platelets 76/12/3994 254  149 - 390 Thousands/uL Final    nRBC 01/09/2020 0  /100 WBCs Final    Neutrophils Relative 01/09/2020 50  43 - 75 % Final    Immat GRANS % 01/09/2020 0  0 - 2 % Final    Lymphocytes Relative 01/09/2020 34  14 - 44 % Final    Monocytes Relative 01/09/2020 12  4 - 12 % Final    Eosinophils Relative 01/09/2020 3  0 - 6 % Final    Basophils Relative 01/09/2020 1  0 - 1 % Final    Neutrophils Absolute 01/09/2020 3 10  1 85 - 7 62 Thousands/µL Final    Immature Grans Absolute 01/09/2020 0 02  0 00 - 0 20 Thousand/uL Final    Lymphocytes Absolute 01/09/2020 2 08  0 60 - 4 47 Thousands/µL Final    Monocytes Absolute 01/09/2020 0 74  0 17 - 1 22 Thousand/µL Final    Eosinophils Absolute 01/09/2020 0 19  0 00 - 0 61 Thousand/µL Final    Basophils Absolute 01/09/2020 0 04  0 00 - 0 10 Thousands/µL Final   Telephone on 01/08/2020   Component Date Value Ref Range Status    Creatinine, Ur 01/09/2020 39 3  mg/dL Final    Microalbum  ,U,Random 01/09/2020 <5 0  0 0 - 20 0 mg/L Final    Microalb Creat Ratio 01/09/2020 <13  0 - 30 mg/g creatinine Final    Sodium 01/09/2020 143  136 - 145 mmol/L Final    Potassium 01/09/2020 4 5  3 5 - 5 3 mmol/L Final    Chloride 01/09/2020 109* 100 - 108 mmol/L Final    CO2 01/09/2020 27  21 - 32 mmol/L Final    ANION GAP 01/09/2020 7  4 - 13 mmol/L Final    BUN 01/09/2020 23  5 - 25 mg/dL Final    Creatinine 01/09/2020 0 98  0 60 - 1 30 mg/dL Final    Standardized to IDMS reference method    Glucose, Fasting 01/09/2020 99  65 - 99 mg/dL Final      Specimen collection should occur prior to Sulfasalazine administration due to the potential for falsely depressed results  Specimen collection should occur prior to Sulfapyridine administration due to the potential for falsely elevated results   Calcium 01/09/2020 9 6  8 3 - 10 1 mg/dL Final    AST 01/09/2020 16  5 - 45 U/L Final      Specimen collection should occur prior to Sulfasalazine administration due to the potential for falsely depressed results   ALT 01/09/2020 26  12 - 78 U/L Final      Specimen collection should occur prior to Sulfasalazine and/or Sulfapyridine administration due to the potential for falsely depressed results   Alkaline Phosphatase 01/09/2020 73  46 - 116 U/L Final    Total Protein 01/09/2020 7 4  6 4 - 8 2 g/dL Final    Albumin 01/09/2020 4 0  3 5 - 5 0 g/dL Final    Total Bilirubin 01/09/2020 0 79  0 20 - 1 00 mg/dL Final    eGFR 01/09/2020 55  ml/min/1 73sq m Final       Videhi M  Angelica Deleon MD    Note: Portions of the record have been created with voice recognition software  Occasional wrong word or "sound a like" substitutions may have occurred due to the inherent limitations of voice recognition software  Read the chart carefully and recognize, using context, where substitutions have occurred

## 2020-01-13 NOTE — PROGRESS NOTES
Assessment and Plan:     Problem List Items Addressed This Visit        Digestive    GERD without esophagitis     Well controlled with Pepcid 20mg BID PRN OTC         Relevant Orders    TSH, 3rd generation with Free T4 reflex       Cardiovascular and Mediastinum    Essential hypertension - Primary     BP Readings from Last 3 Encounters:   01/13/20 122/54   09/26/19 120/54   08/12/19 134/54     Lab Results   Component Value Date    CREATININE 0 98 01/09/2020    EGFR 55 01/09/2020     Controlled on current regimen:  Lisinopril 20mg, Carvedilol 6 25mg BID         Relevant Orders    Comprehensive metabolic panel    TSH, 3rd generation with Free T4 reflex    Coronary artery disease involving native coronary artery of native heart without angina pectoris     August 2019- cardiac cath showed LAD blockage, had successful balloon angioplasty and GABY on 90% lesion of the ostial LAD  Continue cardiac meds (Carvedilol 6 25mg BID, ASA 81mg daily, Brilinta 90mg BID, Lisinopril 20mg daily, Atorvastatin 40mg)         Relevant Orders    Lipid Panel with Direct LDL reflex    Atherosclerosis of native coronary artery of native heart with angina pectoris ([facility])       Genitourinary    CKD (chronic kidney disease) stage 3, GFR 30-59 ml/min ([facility])     Results from last 6 Months   Lab Units 01/09/20  0712 09/24/19  0821 08/03/19  0503 08/02/19  0525   BUN mg/dL 23 20 17 20   CREATININE mg/dL 0 98 1 03 0 83 0 83   EGFR ml/min/1 73sq m 55 52 68 68     Results from last 6 Months   Lab Units 01/09/20  0712   MICROALB/CREAT RATIO mg/g creatinine <13     Avoid nephrotoxins  Likely 2/2 HTN and age  Continue Lisinopril 20mg daily         Relevant Orders    CBC and differential    TSH, 3rd generation with Free T4 reflex    Magnesium    PTH, intact    Phosphorus    Microalbumin / creatinine urine ratio    Vitamin D 25 hydroxy       Other    Other hyperlipidemia     Lab Results   Component Value Date    CHOLESTEROL 148 01/09/2020    HDL 63 01/09/2020 LDLC 108 (H) 01/10/2019    LDLCALC 60 01/09/2020    TRIG 127 01/09/2020   Controlled  H/o CAD, continue Atorvastatin 40mg daily  Reviewed healthy, low cholesterol diet           Relevant Orders    Lipid Panel with Direct LDL reflex    Vitamin D deficiency     Vit D, 25-Hydroxy   Date Value Ref Range Status   01/09/2020 33 5 30 0 - 100 0 ng/mL Final   12/14/2017 39 30 - 100 ng/mL Final     Continue Vit D 2000 IU          Relevant Orders    Vitamin D 25 hydroxy    Class 1 obesity due to excess calories with serious comorbidity and body mass index (BMI) of 34 0 to 34 9 in adult      Other Visit Diagnoses     Medicare annual wellness visit, subsequent            BMI Counseling: Body mass index is 34 33 kg/m²  The BMI is above normal  Nutrition recommendations include decreasing portion sizes and consuming healthier snacks  Preventive health issues were discussed with patient, and age appropriate screening tests were ordered as noted in patient's After Visit Summary  Personalized health advice and appropriate referrals for health education or preventive services given if needed, as noted in patient's After Visit Summary  History of Present Illness:     Patient presents for Medicare Annual Wellness visit    Patient Care Team:  Cale Paulson MD as PCP - General (Family Medicine)  Danelle Keating MD     Problem List:     Patient Active Problem List   Diagnosis    Chronic calculous cholecystitis    Essential hypertension    GERD without esophagitis    Other hyperlipidemia    Vitamin D deficiency    Class 1 obesity due to excess calories with serious comorbidity and body mass index (BMI) of 34 0 to 34 9 in adult    CKD (chronic kidney disease) stage 3, GFR 30-59 ml/min ([facility])    Coronary artery disease involving native coronary artery of native heart without angina pectoris    Atherosclerosis of native coronary artery of native heart with angina pectoris [facility])      Past Medical and Surgical History: Past Medical History:   Diagnosis Date    AVB (atrioventricular block)     first degree    CAD (coronary artery disease)     GERD (gastroesophageal reflux disease) 7/1/2017    HLD (hyperlipidemia) 7/1/2017    Hypertension     LVH (left ventricular hypertrophy)     Osteopenia     Ventral hernia without obstruction or gangrene 7/1/2017     Past Surgical History:   Procedure Laterality Date    CARDIAC CATHETERIZATION      GALLBLADDER SURGERY      HEMORRHOID SURGERY      HYSTERECTOMY  1987    age 56 w/ left oopherectomy    NOSE SURGERY      basal cell    OOPHORECTOMY Left 1987    age 56    [place] LAP,CHOLECYSTECTOMY N/A 8/25/2016    Procedure: LAPAROSCOPIC CHOLECYSTECTOMY ;  Surgeon: Joby Bernard MD;  [facility];  Service: General    [place] REPAIR INCISIONAL HERNIA,REDUCIBLE N/A 11/30/2017    Procedure: Makayla Abdi;  Surgeon: Joby Bernard MD;  [facility];  Service: General    ROTATOR CUFF REPAIR Right       Family History:     Family History   Problem Relation Age of Onset    No Known Problems Mother     No Known Problems Father     No Known Problems Daughter     No Known Problems Maternal Grandmother     No Known Problems Maternal Grandfather     No Known Problems Paternal Grandmother     No Known Problems Paternal Grandfather     No Known Problems Half-Sister     No Known Problems Maternal Aunt     Squamous cell carcinoma Brother     No Known Problems Son       Social History:     Social History     Socioeconomic History    Marital status:       Spouse name: None    Number of children: 2    Years of education: None    Highest education level: None   Occupational History    Occupation: retired   Social Needs    Financial resource strain: None    Food insecurity:     Worry: None     Inability: None    Transportation needs:     Medical: None     Non-medical: None   Tobacco Use    Smoking status: Never Smoker    Smokeless tobacco: Never Used Substance and Sexual Activity    Alcohol use: Yes     Frequency: Monthly or less     Comment: socially    Drug use: No    Sexual activity: Never   Lifestyle    Physical activity:     Days per week: None     Minutes per session: None    Stress: None   Relationships    Social connections:     Talks on phone: None     Gets together: None     Attends Buddhism service: None     Active member of club or organization: None     Attends meetings of clubs or organizations: None     Relationship status: None    Intimate partner violence:     Fear of current or ex partner: None     Emotionally abused: None     Physically abused: None     Forced sexual activity: None   Other Topics Concern    None   Social History Narrative    Caffeine use    Moderate exercising less than 3 times a week       Medications and Allergies:     Current Outpatient Medications   Medication Sig Dispense Refill    aspirin 81 MG tablet Take 81 mg by mouth every other day   atorvastatin (LIPITOR) 40 mg tablet Take 1 tablet (40 mg total) by mouth daily 90 tablet 1    carvedilol (COREG) 6 25 mg tablet Take 1 tablet (6 25 mg total) by mouth 2 (two) times a day with meals 60 tablet 1    cholecalciferol (VITAMIN D3) 1,000 units tablet Take 2,000 Units by mouth daily      Co-Enzyme Q-10 100 MG CAPS Take 200 mg by mouth daily        famotidine (PEPCID) 20 mg tablet Take 1 tablet (20 mg total) by mouth 2 (two) times a day as needed for heartburn 60 tablet 2    latanoprost (XALATAN) 0 005 % ophthalmic solution Administer 1 drop to both eyes daily  3    lisinopril (ZESTRIL) 20 mg tablet Take 1 tablet (20 mg total) by mouth daily 90 tablet 1    Multiple Vitamins-Minerals (PRESERVISION AREDS) CAPS Take 2 capsules by mouth daily      nitroglycerin (NITROSTAT) 0 4 mg SL tablet Place 1 tablet (0 4 mg total) under the tongue every 5 (five) minutes as needed for chest pain 90 tablet 0    ticagrelor (BRILINTA) 90 MG Take 1 tablet (90 mg total) by mouth every 12 (twelve) hours 60 tablet 1     No current facility-administered medications for this visit  Allergies   Allergen Reactions    Codeine      Reaction Date: 19Jul2011;     Other      darvocet      Immunizations:     Immunization History   Administered Date(s) Administered     Influenza (IM) Preservative Free 11/02/2019    INFLUENZA 10/05/2017, 10/24/2018    Influenza Split High Dose Preservative Free IM 10/05/2012, 10/14/2013, 10/16/2014, 10/22/2015, 11/15/2016, 10/24/2018, 11/02/2019    Influenza TIV (IM) 10/21/2011, 10/05/2017    Pneumococcal Conjugate 13-Valent 04/22/2015    Pneumococcal Polysaccharide PPV23 10/05/2012    Td (adult), adsorbed 11/01/2006    Varicella 01/01/2012      Health Maintenance: There are no preventive care reminders to display for this patient  There are no preventive care reminders to display for this patient  Medicare Health Risk Assessment:     /54   Pulse 70   Temp (!) 97 4 °F (36 3 °C)   Resp 16   Wt 90 7 kg (200 lb)   SpO2 98%   BMI 34 33 kg/m²      Jose Bach is here for her Subsequent Wellness visit  Last Medicare Wellness visit information reviewed, patient interviewed, no change since last AWV  Health Risk Assessment:   Patient rates overall health as good  Patient feels that their physical health rating is same  Eyesight was rated as same  Hearing was rated as same  Patient feels that their emotional and mental health rating is slightly better  Pain experienced in the last 7 days has been none  Depression Screening:   PHQ-2 Score: 0      Fall Risk Screening: In the past year, patient has experienced: no history of falling in past year      Urinary Incontinence Screening:   Patient has not leaked urine accidently in the last six months  Home Safety:  Patient does not have trouble with stairs inside or outside of their home  Patient has working smoke alarms Home safety hazards include: none       Nutrition:   Current diet is Low Cholesterol  Medications:   Patient is currently taking over-the-counter supplements  OTC medications include: see medication list  Patient is able to manage medications  Activities of Daily Living (ADLs)/Instrumental Activities of Daily Living (IADLs):   Walk and transfer into and out of bed and chair?: Yes  Dress and groom yourself?: Yes    Bathe or shower yourself?: Yes    Feed yourself? Yes  Do your laundry/housekeeping?: Yes  Manage your money, pay your bills and track your expenses?: Yes  Make your own meals?: Yes    Do your own shopping?: Yes    Previous Hospitalizations:   Any hospitalizations or ED visits within the last 12 months?: Yes    How many hospitalizations have you had in the last year?: 1-2    Advance Care Planning:   Living will: Yes    Advanced directive: Yes    Advanced directive counseling given: Yes    End of Life Decisions reviewed with patient: Yes      Cognitive Screening:   Provider or family/friend/caregiver concerned regarding cognition?: No    PREVENTIVE SCREENINGS      Cardiovascular Screening:    General: Screening Not Indicated, History Lipid Disorder, Risks and Benefits Discussed and Screening Current      Diabetes Screening:     General: Screening Current and Risks and Benefits Discussed      Colorectal Cancer Screening:     General: Risks and Benefits Discussed and Screening Current      Breast Cancer Screening:     General: Screening Current and Risks and Benefits Discussed      Cervical Cancer Screening:    General: Screening Not Indicated      Osteoporosis Screening:    General: Screening Not Indicated and History Osteoporosis      Abdominal Aortic Aneurysm (AAA) Screening:        General: Screening Not Indicated      Lung Cancer Screening:     General: Screening Not Indicated      Hepatitis C Screening:    General: Screening Not Indicated      Yung Rosenberg MD

## 2020-02-07 DIAGNOSIS — I10 ESSENTIAL HYPERTENSION: ICD-10-CM

## 2020-02-07 RX ORDER — LISINOPRIL 20 MG/1
TABLET ORAL
Qty: 90 TABLET | Refills: 1 | Status: SHIPPED | OUTPATIENT
Start: 2020-02-07 | End: 2020-05-05 | Stop reason: SDUPTHER

## 2020-03-02 DIAGNOSIS — I21.4 NSTEMI, INITIAL EPISODE OF CARE (HCC): ICD-10-CM

## 2020-03-02 RX ORDER — CARVEDILOL 6.25 MG/1
6.25 TABLET ORAL 2 TIMES DAILY WITH MEALS
Qty: 180 TABLET | Refills: 3 | Status: SHIPPED | OUTPATIENT
Start: 2020-03-02 | End: 2021-02-12 | Stop reason: SDUPTHER

## 2020-03-02 NOTE — TELEPHONE ENCOUNTER
Patient called requesting refill on Brilinta 90 mg 90 day supply with refills and Carvedilol 90 day with refills  Patient would like a 80  Day supply she will be staying in [place] with her daughter for a little bit   [facility]

## 2020-03-03 ENCOUNTER — OFFICE VISIT (OUTPATIENT)
Dept: CARDIOLOGY CLINIC | Facility: MEDICAL CENTER | Age: 80
End: 2020-03-03
Payer: COMMERCIAL

## 2020-03-03 VITALS
HEIGHT: 64 IN | WEIGHT: 201.4 LBS | DIASTOLIC BLOOD PRESSURE: 58 MMHG | OXYGEN SATURATION: 98 % | SYSTOLIC BLOOD PRESSURE: 134 MMHG | HEART RATE: 66 BPM | BODY MASS INDEX: 34.38 KG/M2

## 2020-03-03 DIAGNOSIS — I25.10 CORONARY ARTERY DISEASE INVOLVING NATIVE CORONARY ARTERY OF NATIVE HEART WITHOUT ANGINA PECTORIS: ICD-10-CM

## 2020-03-03 DIAGNOSIS — N18.30 CKD (CHRONIC KIDNEY DISEASE) STAGE 3, GFR 30-59 ML/MIN (HCC): ICD-10-CM

## 2020-03-03 DIAGNOSIS — I10 ESSENTIAL HYPERTENSION: Primary | ICD-10-CM

## 2020-03-03 DIAGNOSIS — E78.49 OTHER HYPERLIPIDEMIA: ICD-10-CM

## 2020-03-03 PROCEDURE — 3075F SYST BP GE 130 - 139MM HG: CPT | Performed by: INTERNAL MEDICINE

## 2020-03-03 PROCEDURE — 3078F DIAST BP <80 MM HG: CPT | Performed by: INTERNAL MEDICINE

## 2020-03-03 PROCEDURE — 1160F RVW MEDS BY RX/DR IN RCRD: CPT | Performed by: INTERNAL MEDICINE

## 2020-03-03 PROCEDURE — 1036F TOBACCO NON-USER: CPT | Performed by: INTERNAL MEDICINE

## 2020-03-03 PROCEDURE — 4040F PNEUMOC VAC/ADMIN/RCVD: CPT | Performed by: INTERNAL MEDICINE

## 2020-03-03 PROCEDURE — 3008F BODY MASS INDEX DOCD: CPT | Performed by: INTERNAL MEDICINE

## 2020-03-03 PROCEDURE — 99214 OFFICE O/P EST MOD 30 MIN: CPT | Performed by: INTERNAL MEDICINE

## 2020-03-03 NOTE — PROGRESS NOTES
Cardiology Outpatient Follow up Note    Tiny Michael 67 y o  female MRN: [id number]    03/03/20          Assessment/Plan:    1  UA/NSTEMI 7/19 s/p Xience Diana GABY to ostial LAD 8/2/19  On aspirin, Brilinta, statin, beta blocker  EF preserved by echo 7/19  Does get easy bruising with Brilinta, will continue until after 8/2/20, then recommended she discontinue       2  HTN  On Coreg 6 25 bid and Lisinopril 20  BP controlled       3  HL  Was on Atorvastatin 40 mg daily prior to admission 7/19, lipid panel 8/1/19 showed total cholesterol 137, TG 60, HDL 61, LDL 64  Lipid panel 1/20 total 148, , HDL 63, LDL 60  LDL at goal on current regimen      4  IFG  Hgb A1c 5 9% 5/17, currently diet controlled  5  CKD III  Creatinine 0 8 to 1 0  Potassium 5 3 9/24/19, advised her to watch potassium intake  She does have a banana daily to help with muscle cramping  Potassium 4 5 1/20       1  Essential hypertension     2  Coronary artery disease involving native coronary artery of native heart without angina pectoris     3  CKD (chronic kidney disease) stage 3, GFR 30-59 ml/min ([facility])     4  Other hyperlipidemia         HPI: 67 y o  woman with a history of HTN, HL, IFG, who is here for follow up of CAD  She was admitted to [address] [place] [address] 7/19 with exertional chest burning that had been worsening over 1-2 weeks  Troponins peaked at 0 35, EKG unremarkable for ischemia  She was referred for cardiac catheterization 8/1/19, which showed tubular 95% stenosis of ostial LAD  She was transferred to [facility] and had Kevan Van Buren stent placed in ostial LAD 8/2/19  Echo 7/31/19 showed normal LV size and function, EF 65%, no RWMA, normal diastolic function  Sigmoid septum  Normal RV size and function  LA mildly dilated  Trace MR  Trace AI  She denies any current chest pain or chest burning  Not doing any formal exercise, but reports she does stretch bands at home  No orthopnea, uses 1 pillow to sleep, no PND  No LE edema   No dizziness or lightheadedness  No palpitations  She bruises easily  She did note some shortness of breath with Brilinta, was told to drink coffee in morning, which seems to help  Patient Active Problem List   Diagnosis    Chronic calculous cholecystitis    Essential hypertension    GERD without esophagitis    Other hyperlipidemia    Vitamin D deficiency    Class 1 obesity due to excess calories with serious comorbidity and body mass index (BMI) of 34 0 to 34 9 in adult    CKD (chronic kidney disease) stage 3, GFR 30-59 ml/min ([facility])    Coronary artery disease involving native coronary artery of native heart without angina pectoris    Atherosclerosis of native coronary artery of native heart with angina pectoris ([facility])       Allergies   Allergen Reactions    Codeine      Reaction Date: 19Jul2011;     Other      darvocet         Current Outpatient Medications:     aspirin 81 MG tablet, Take 81 mg by mouth daily , Disp: , Rfl:     atorvastatin (LIPITOR) 40 mg tablet, Take 1 tablet (40 mg total) by mouth daily, Disp: 90 tablet, Rfl: 3    carvedilol (COREG) 6 25 mg tablet, Take 1 tablet (6 25 mg total) by mouth 2 (two) times a day with meals, Disp: 180 tablet, Rfl: 3    cholecalciferol (VITAMIN D3) 1,000 units tablet, Take 2,000 Units by mouth daily, Disp: , Rfl:     Co-Enzyme Q-10 100 MG CAPS, Take 200 mg by mouth daily  , Disp: , Rfl:     famotidine (PEPCID) 20 mg tablet, Take 1 tablet (20 mg total) by mouth 2 (two) times a day as needed for heartburn, Disp: 60 tablet, Rfl: 2    latanoprost (XALATAN) 0 005 % ophthalmic solution, Administer 1 drop to both eyes daily, Disp: , Rfl: 3    lisinopril (ZESTRIL) 20 mg tablet, TAKE 1 TABLET BY MOUTH EVERY DAY, Disp: 90 tablet, Rfl: 1    Multiple Vitamins-Minerals (PRESERVISION AREDS) CAPS, Take 2 capsules by mouth daily, Disp: , Rfl:     nitroglycerin (NITROSTAT) 0 4 mg SL tablet, Place 1 tablet (0 4 mg total) under the tongue every 5 (five) minutes as needed for chest pain, Disp: 90 tablet, Rfl: 0    ticagrelor (BRILINTA) 90 MG, Take 1 tablet (90 mg total) by mouth every 12 (twelve) hours, Disp: 180 tablet, Rfl: 1    Past Medical History:   Diagnosis Date    AVB (atrioventricular block)     first degree    CAD (coronary artery disease)     GERD (gastroesophageal reflux disease) 7/1/2017    HLD (hyperlipidemia) 7/1/2017    Hypertension     LVH (left ventricular hypertrophy)     Osteopenia     Ventral hernia without obstruction or gangrene 7/1/2017       Family History   Problem Relation Age of Onset    No Known Problems Mother     No Known Problems Father     No Known Problems Daughter     No Known Problems Maternal Grandmother     No Known Problems Maternal Grandfather     No Known Problems Paternal Grandmother     No Known Problems Paternal Grandfather     No Known Problems Half-Sister     No Known Problems Maternal Aunt     Squamous cell carcinoma Brother     No Known Problems Son        Past Surgical History:   Procedure Laterality Date    CARDIAC CATHETERIZATION      GALLBLADDER SURGERY      HEMORRHOID SURGERY      HYSTERECTOMY  1987    age 56 w/ left oopherectomy    NOSE SURGERY      basal cell    OOPHORECTOMY Left 1987    age 56    WA LAP,CHOLECYSTECTOMY N/A 8/25/2016    Procedure: LAPAROSCOPIC CHOLECYSTECTOMY ;  Surgeon: Ben Marin MD;  [facility];  Service: General    WA REPAIR INCISIONAL HERNIA,REDUCIBLE N/A 11/30/2017    Procedure: INCISIONAL HERNIA REPAIR;  Surgeon: Ben Marin MD;  [facility];  Service: General    ROTATOR CUFF REPAIR Right        Social History     Socioeconomic History    Marital status:       Spouse name: Not on file    Number of children: 2    Years of education: Not on file    Highest education level: Not on file   Occupational History    Occupation: retired   Social Needs    Financial resource strain: Not on file    Food insecurity:     Worry: Not on file Inability: Not on file    Transportation needs:     Medical: Not on file     Non-medical: Not on file   Tobacco Use    Smoking status: Never Smoker    Smokeless tobacco: Never Used   Substance and Sexual Activity    Alcohol use: Yes     Frequency: Monthly or less     Comment: socially    Drug use: No    Sexual activity: Never   Lifestyle    Physical activity:     Days per week: Not on file     Minutes per session: Not on file    Stress: Not on file   Relationships    Social connections:     Talks on phone: Not on file     Gets together: Not on file     Attends L[employer] day service: Not on file     Active member of club or organization: Not on file     Attends meetings of clubs or organizations: Not on file     Relationship status: Not on file    Intimate partner violence:     Fear of current or ex partner: Not on file     Emotionally abused: Not on file     Physically abused: Not on file     Forced sexual activity: Not on file   Other Topics Concern    Not on file   Social History Narrative    Caffeine use    Moderate exercising less than 3 times a week       Review of Systems   Constitution: Negative for chills, decreased appetite, diaphoresis, fever, malaise/fatigue, night sweats, weight gain and weight loss  HENT: Negative for ear pain, hearing loss, hoarse voice, nosebleeds, sore throat and tinnitus  Eyes: Negative for blurred vision and pain  Cardiovascular: Negative  Negative for chest pain, claudication, cyanosis, dyspnea on exertion, irregular heartbeat, leg swelling, near-syncope, orthopnea, palpitations, paroxysmal nocturnal dyspnea and syncope  Respiratory: Negative for cough, hemoptysis, shortness of breath, sleep disturbances due to breathing, snoring, sputum production and wheezing  Hematologic/Lymphatic: Negative for adenopathy and bleeding problem  Bruises/bleeds easily  Skin: Negative for color change, dry skin, flushing, itching, poor wound healing and rash  Musculoskeletal: Positive for arthritis  Negative for back pain, falls, joint pain, muscle cramps, muscle weakness, myalgias and neck pain  Gastrointestinal: Negative for abdominal pain, constipation, diarrhea, dysphagia, heartburn, hematemesis, hematochezia, melena, nausea and vomiting  Genitourinary: Negative for dysuria, frequency, hematuria, hesitancy, non-menstrual bleeding and urgency  Neurological: Negative for excessive daytime sleepiness, dizziness, focal weakness, headaches, light-headedness, loss of balance, numbness, paresthesias, tremors, vertigo and weakness  Psychiatric/Behavioral: Negative for altered mental status, depression and memory loss  The patient does not have insomnia and is not nervous/anxious  Allergic/Immunologic: Negative for environmental allergies and persistent infections  Vitals: /58 (BP Location: Left arm, Patient Position: Sitting, Cuff Size: Adult)   Pulse 66   Ht 5' 4" (1 626 m)   Wt 91 4 kg (201 lb 6 4 oz)   SpO2 98%   BMI 34 57 kg/m²       Physical Exam:     GEN: Alert and oriented x 3, in no acute distress  Well appearing and well nourished  HEENT: Sclera anicteric, conjunctivae pink, mucous membranes moist  Oropharynx clear  NECK: Supple, no carotid bruits, no significant JVD  Trachea midline, no thyromegaly  HEART: Regular rhythm, normal S1 and S2, no murmurs, clicks, gallops or rubs  PMI nondisplaced, no thrills  LUNGS: Clear to auscultation bilaterally; no wheezes, rales, or rhonchi  No increased work of breathing or signs of respiratory distress  ABDOMEN: Soft, nontender, nondistended, normoactive bowel sounds  EXTREMITIES: Skin warm and well perfused, no clubbing, cyanosis, or edema  NEURO: No focal findings  Normal gait  Normal speech  Mood and affect normal    SKIN: Normal without suspicious lesions on exposed skin        Lab Results:       Lab Results   Component Value Date    HGBA1C 5 9 (H) 05/16/2017    HGBA1C 6 3 (H) 07/27/2016    HGBA1C 5 9 (H) 10/10/2013     Lab Results   Component Value Date    CHOL 201 (H) 12/14/2017    CHOL 179 05/16/2017    CHOL 168 11/10/2016     Lab Results   Component Value Date    HDL 63 01/09/2020    HDL 61 (H) 08/01/2019    HDL 58 07/05/2019     Lab Results   Component Value Date    LDLCALC 60 01/09/2020    LDLCALC 64 08/01/2019    LDLCALC 60 07/05/2019     Lab Results   Component Value Date    TRIG 127 01/09/2020    TRIG 60 08/01/2019    TRIG 148 07/05/2019     No results found for: CHOLHDL

## 2020-04-13 DIAGNOSIS — E78.49 OTHER HYPERLIPIDEMIA: ICD-10-CM

## 2020-04-13 RX ORDER — ATORVASTATIN CALCIUM 40 MG/1
40 TABLET, FILM COATED ORAL DAILY
Qty: 90 TABLET | Refills: 3 | Status: SHIPPED | OUTPATIENT
Start: 2020-04-13 | End: 2021-04-12 | Stop reason: SDUPTHER

## 2020-05-05 DIAGNOSIS — I10 ESSENTIAL HYPERTENSION: ICD-10-CM

## 2020-05-05 RX ORDER — LISINOPRIL 20 MG/1
20 TABLET ORAL DAILY
Qty: 90 TABLET | Refills: 1 | Status: SHIPPED | OUTPATIENT
Start: 2020-05-05 | End: 2020-10-19

## 2020-05-13 ENCOUNTER — TELEPHONE (OUTPATIENT)
Dept: FAMILY MEDICINE CLINIC | Facility: CLINIC | Age: 80
End: 2020-05-13

## 2020-07-06 ENCOUNTER — HOSPITAL ENCOUNTER (OUTPATIENT)
Dept: RADIOLOGY | Age: 80
Discharge: HOME | End: 2020-07-06
Payer: COMMERCIAL

## 2020-07-06 VITALS — HEIGHT: 64 IN | BODY MASS INDEX: 34.83 KG/M2 | WEIGHT: 204 LBS

## 2020-07-06 DIAGNOSIS — Z12.31 ENCOUNTER FOR SCREENING MAMMOGRAM FOR MALIGNANT NEOPLASM OF BREAST: ICD-10-CM

## 2020-07-06 PROCEDURE — 77063 BREAST TOMOSYNTHESIS BI: CPT

## 2020-07-06 PROCEDURE — 77067 SCR MAMMO BI INCL CAD: CPT

## 2020-07-09 ENCOUNTER — APPOINTMENT (OUTPATIENT)
Dept: LAB | Facility: MEDICAL CENTER | Age: 80
End: 2020-07-09
Payer: COMMERCIAL

## 2020-07-09 DIAGNOSIS — E78.49 OTHER HYPERLIPIDEMIA: ICD-10-CM

## 2020-07-09 DIAGNOSIS — I10 ESSENTIAL HYPERTENSION: ICD-10-CM

## 2020-07-09 DIAGNOSIS — K21.9 GERD WITHOUT ESOPHAGITIS: ICD-10-CM

## 2020-07-09 DIAGNOSIS — E55.9 VITAMIN D DEFICIENCY: ICD-10-CM

## 2020-07-09 DIAGNOSIS — I25.10 CORONARY ARTERY DISEASE INVOLVING NATIVE CORONARY ARTERY OF NATIVE HEART WITHOUT ANGINA PECTORIS: ICD-10-CM

## 2020-07-09 DIAGNOSIS — N18.30 CKD (CHRONIC KIDNEY DISEASE) STAGE 3, GFR 30-59 ML/MIN (HCC): ICD-10-CM

## 2020-07-09 LAB
25(OH)D3 SERPL-MCNC: 33.3 NG/ML (ref 30–100)
ALBUMIN SERPL BCP-MCNC: 4 G/DL (ref 3.5–5)
ALP SERPL-CCNC: 68 U/L (ref 46–116)
ALT SERPL W P-5'-P-CCNC: 24 U/L (ref 12–78)
ANION GAP SERPL CALCULATED.3IONS-SCNC: 7 MMOL/L (ref 4–13)
AST SERPL W P-5'-P-CCNC: 17 U/L (ref 5–45)
BASOPHILS # BLD AUTO: 0.04 THOUSANDS/ΜL (ref 0–0.1)
BASOPHILS NFR BLD AUTO: 1 % (ref 0–1)
BILIRUB SERPL-MCNC: 0.66 MG/DL (ref 0.2–1)
BUN SERPL-MCNC: 22 MG/DL (ref 5–25)
CALCIUM SERPL-MCNC: 9.4 MG/DL (ref 8.3–10.1)
CHLORIDE SERPL-SCNC: 105 MMOL/L (ref 100–108)
CHOLEST SERPL-MCNC: 143 MG/DL (ref 50–200)
CO2 SERPL-SCNC: 27 MMOL/L (ref 21–32)
CREAT SERPL-MCNC: 0.98 MG/DL (ref 0.6–1.3)
CREAT UR-MCNC: 54.1 MG/DL
EOSINOPHIL # BLD AUTO: 0.17 THOUSAND/ΜL (ref 0–0.61)
EOSINOPHIL NFR BLD AUTO: 3 % (ref 0–6)
ERYTHROCYTE [DISTWIDTH] IN BLOOD BY AUTOMATED COUNT: 13.3 % (ref 11.6–15.1)
GFR SERPL CREATININE-BSD FRML MDRD: 55 ML/MIN/1.73SQ M
GLUCOSE P FAST SERPL-MCNC: 101 MG/DL (ref 65–99)
HCT VFR BLD AUTO: 39.6 % (ref 34.8–46.1)
HDLC SERPL-MCNC: 55 MG/DL
HGB BLD-MCNC: 12.3 G/DL (ref 11.5–15.4)
IMM GRANULOCYTES # BLD AUTO: 0.01 THOUSAND/UL (ref 0–0.2)
IMM GRANULOCYTES NFR BLD AUTO: 0 % (ref 0–2)
LDLC SERPL CALC-MCNC: 62 MG/DL (ref 0–100)
LYMPHOCYTES # BLD AUTO: 1.92 THOUSANDS/ΜL (ref 0.6–4.47)
LYMPHOCYTES NFR BLD AUTO: 33 % (ref 14–44)
MAGNESIUM SERPL-MCNC: 2.6 MG/DL (ref 1.6–2.6)
MCH RBC QN AUTO: 31 PG (ref 26.8–34.3)
MCHC RBC AUTO-ENTMCNC: 31.1 G/DL (ref 31.4–37.4)
MCV RBC AUTO: 100 FL (ref 82–98)
MICROALBUMIN UR-MCNC: 5.2 MG/L (ref 0–20)
MICROALBUMIN/CREAT 24H UR: 10 MG/G CREATININE (ref 0–30)
MONOCYTES # BLD AUTO: 0.61 THOUSAND/ΜL (ref 0.17–1.22)
MONOCYTES NFR BLD AUTO: 11 % (ref 4–12)
NEUTROPHILS # BLD AUTO: 3 THOUSANDS/ΜL (ref 1.85–7.62)
NEUTS SEG NFR BLD AUTO: 52 % (ref 43–75)
NRBC BLD AUTO-RTO: 0 /100 WBCS
PHOSPHATE SERPL-MCNC: 3.5 MG/DL (ref 2.3–4.1)
PLATELET # BLD AUTO: 238 THOUSANDS/UL (ref 149–390)
PMV BLD AUTO: 11.5 FL (ref 8.9–12.7)
POTASSIUM SERPL-SCNC: 5.1 MMOL/L (ref 3.5–5.3)
PROT SERPL-MCNC: 7.3 G/DL (ref 6.4–8.2)
PTH-INTACT SERPL-MCNC: 64.7 PG/ML (ref 18.4–80.1)
RBC # BLD AUTO: 3.97 MILLION/UL (ref 3.81–5.12)
SODIUM SERPL-SCNC: 139 MMOL/L (ref 136–145)
TRIGL SERPL-MCNC: 130 MG/DL
TSH SERPL DL<=0.05 MIU/L-ACNC: 2.73 UIU/ML (ref 0.36–3.74)
WBC # BLD AUTO: 5.75 THOUSAND/UL (ref 4.31–10.16)

## 2020-07-09 PROCEDURE — 84100 ASSAY OF PHOSPHORUS: CPT

## 2020-07-09 PROCEDURE — 82306 VITAMIN D 25 HYDROXY: CPT

## 2020-07-09 PROCEDURE — 80061 LIPID PANEL: CPT

## 2020-07-09 PROCEDURE — 80053 COMPREHEN METABOLIC PANEL: CPT

## 2020-07-09 PROCEDURE — 84443 ASSAY THYROID STIM HORMONE: CPT

## 2020-07-09 PROCEDURE — 83735 ASSAY OF MAGNESIUM: CPT

## 2020-07-09 PROCEDURE — 36415 COLL VENOUS BLD VENIPUNCTURE: CPT

## 2020-07-09 PROCEDURE — 83970 ASSAY OF PARATHORMONE: CPT

## 2020-07-09 PROCEDURE — 85025 COMPLETE CBC W/AUTO DIFF WBC: CPT

## 2020-07-09 PROCEDURE — 82043 UR ALBUMIN QUANTITATIVE: CPT

## 2020-07-09 PROCEDURE — 82570 ASSAY OF URINE CREATININE: CPT

## 2020-07-13 ENCOUNTER — OFFICE VISIT (OUTPATIENT)
Dept: FAMILY MEDICINE CLINIC | Facility: CLINIC | Age: 80
End: 2020-07-13
Payer: COMMERCIAL

## 2020-07-13 VITALS
WEIGHT: 204 LBS | BODY MASS INDEX: 34.83 KG/M2 | HEART RATE: 60 BPM | SYSTOLIC BLOOD PRESSURE: 120 MMHG | OXYGEN SATURATION: 96 % | DIASTOLIC BLOOD PRESSURE: 52 MMHG | HEIGHT: 64 IN | RESPIRATION RATE: 16 BRPM | TEMPERATURE: 97.2 F

## 2020-07-13 DIAGNOSIS — H91.93 DECREASED HEARING OF BOTH EARS: ICD-10-CM

## 2020-07-13 DIAGNOSIS — I25.10 CORONARY ARTERY DISEASE INVOLVING NATIVE CORONARY ARTERY OF NATIVE HEART WITHOUT ANGINA PECTORIS: ICD-10-CM

## 2020-07-13 DIAGNOSIS — T16.2XXA FOREIGN BODY OF LEFT EAR, INITIAL ENCOUNTER: ICD-10-CM

## 2020-07-13 DIAGNOSIS — I25.119 ATHEROSCLEROSIS OF NATIVE CORONARY ARTERY OF NATIVE HEART WITH ANGINA PECTORIS (HCC): ICD-10-CM

## 2020-07-13 DIAGNOSIS — I10 ESSENTIAL HYPERTENSION: Primary | ICD-10-CM

## 2020-07-13 DIAGNOSIS — K21.9 GERD WITHOUT ESOPHAGITIS: ICD-10-CM

## 2020-07-13 DIAGNOSIS — N18.30 CKD (CHRONIC KIDNEY DISEASE) STAGE 3, GFR 30-59 ML/MIN (HCC): ICD-10-CM

## 2020-07-13 DIAGNOSIS — R92.8 ABNORMALITY OF RIGHT BREAST ON SCREENING MAMMOGRAM: ICD-10-CM

## 2020-07-13 DIAGNOSIS — E78.49 OTHER HYPERLIPIDEMIA: ICD-10-CM

## 2020-07-13 DIAGNOSIS — E66.01 CLASS 2 SEVERE OBESITY DUE TO EXCESS CALORIES WITH SERIOUS COMORBIDITY AND BODY MASS INDEX (BMI) OF 35.0 TO 35.9 IN ADULT (HCC): ICD-10-CM

## 2020-07-13 DIAGNOSIS — E55.9 VITAMIN D DEFICIENCY: ICD-10-CM

## 2020-07-13 DIAGNOSIS — R73.01 IFG (IMPAIRED FASTING GLUCOSE): ICD-10-CM

## 2020-07-13 PROCEDURE — 1036F TOBACCO NON-USER: CPT | Performed by: FAMILY MEDICINE

## 2020-07-13 PROCEDURE — 69200 CLEAR OUTER EAR CANAL: CPT | Performed by: FAMILY MEDICINE

## 2020-07-13 PROCEDURE — 99214 OFFICE O/P EST MOD 30 MIN: CPT | Performed by: FAMILY MEDICINE

## 2020-07-13 PROCEDURE — 3074F SYST BP LT 130 MM HG: CPT | Performed by: FAMILY MEDICINE

## 2020-07-13 PROCEDURE — 1160F RVW MEDS BY RX/DR IN RCRD: CPT | Performed by: FAMILY MEDICINE

## 2020-07-13 PROCEDURE — 3078F DIAST BP <80 MM HG: CPT | Performed by: FAMILY MEDICINE

## 2020-07-13 PROCEDURE — 3008F BODY MASS INDEX DOCD: CPT | Performed by: FAMILY MEDICINE

## 2020-07-13 PROCEDURE — 4040F PNEUMOC VAC/ADMIN/RCVD: CPT | Performed by: FAMILY MEDICINE

## 2020-07-13 NOTE — ASSESSMENT & PLAN NOTE
Vit D, 25-Hydroxy   Date Value Ref Range Status   07/09/2020 33 3 30 0 - 100 0 ng/mL Final   12/14/2017 39 30 - 100 ng/mL Final     Continue Vit D 2000 IU

## 2020-07-13 NOTE — ASSESSMENT & PLAN NOTE
August 2019- cardiac cath showed LAD blockage, had successful balloon angioplasty and GABY on 90% lesion of the ostial LAD  Continue cardiac meds (Carvedilol 6 25mg BID, ASA 81mg daily, Brilinta 90mg BID until August 2020, Lisinopril 20mg daily, Atorvastatin 40mg)  F/w Cardiology (Dr Charlotte Sewell)

## 2020-07-13 NOTE — ASSESSMENT & PLAN NOTE
Lab Results   Component Value Date    CHOLESTEROL 143 07/09/2020    HDL 55 07/09/2020    LDLCALC 62 07/09/2020    TRIG 130 07/09/2020   Well controlled, at goal  H/o CAD, continue Atorvastatin 40mg daily  Reviewed healthy, low cholesterol diet

## 2020-07-13 NOTE — ASSESSMENT & PLAN NOTE
Results from last 6 Months   Lab Units 07/09/20  0722   BUN mg/dL 22   CREATININE mg/dL 0 98   EGFR ml/min/1 73sq m 55     Results from last 6 Months   Lab Units 07/09/20  0722   MICROALB/CREAT RATIO mg/g creatinine 10     Continue Lisinopril 20mg daily  Avoid nephrotoxins

## 2020-07-13 NOTE — ASSESSMENT & PLAN NOTE
BP Readings from Last 3 Encounters:   07/13/20 120/52   03/03/20 134/58   01/13/20 122/54     Lab Results   Component Value Date    CREATININE 0 98 07/09/2020    EGFR 55 07/09/2020     Controlled on current regimen:  Lisinopril 20mg, Carvedilol 6 25mg BID

## 2020-07-13 NOTE — PROGRESS NOTES
FAMILY MEDICINE PROGRESS NOTE  Florentin Jackson 79 y o  female   DATE: July 13, 2020     ASSESSMENT and PLAN:  Florentin Jackson is a 79 y o  female with:     Problem List Items Addressed This Visit        Digestive    GERD without esophagitis     Well controlled with Pepcid 20mg daily-BID PRN OTC            Endocrine    IFG (impaired fasting glucose)       Recommend low carb         Relevant Orders    Comprehensive metabolic panel    Hemoglobin A1C       Cardiovascular and Mediastinum    Essential hypertension - Primary     BP Readings from Last 3 Encounters:   07/13/20 120/52   03/03/20 134/58   01/13/20 122/54     Lab Results   Component Value Date    CREATININE 0 98 07/09/2020    EGFR 55 07/09/2020     Controlled on current regimen:  Lisinopril 20mg, Carvedilol 6 25mg BID         Relevant Orders    Comprehensive metabolic panel    Coronary artery disease involving native coronary artery of native heart without angina pectoris     August 2019- cardiac cath showed LAD blockage, had successful balloon angioplasty and GABY on 90% lesion of the ostial LAD  Continue cardiac meds (Carvedilol 6 25mg BID, ASA 81mg daily, Brilinta 90mg BID until August 2020, Lisinopril 20mg daily, Atorvastatin 40mg)  F/w Cardiology (Dr Hever Donahue)         Atherosclerosis of native coronary artery of native heart with angina pectoris ([facility] [address] )       Genitourinary    CKD (chronic kidney disease) stage 3, GFR 30-59 ml/min ([facility])     Results from last 6 Months   Lab Units 07/09/20  0722   BUN mg/dL 22   CREATININE mg/dL 0 98   EGFR ml/min/1 73sq m 55     Results from last 6 Months   Lab Units 07/09/20  0722   MICROALB/CREAT RATIO mg/g creatinine 10     Continue Lisinopril 20mg daily  Avoid nephrotoxins         Relevant Orders    CBC    Magnesium    PTH, intact    Phosphorus    Microalbumin / creatinine urine ratio       Other    Other hyperlipidemia     Lab Results   Component Value Date    CHOLESTEROL 143 07/09/2020    HDL 55 07/09/2020    [facility] 62 07/09/2020    TRIG 130 07/09/2020   Well controlled, at goal  H/o CAD, continue Atorvastatin 40mg daily  Reviewed healthy, low cholesterol diet           Relevant Orders    Lipid Panel with Direct LDL reflex    Vitamin D deficiency     Vit D, 25-Hydroxy   Date Value Ref Range Status   07/09/2020 33 3 30 0 - 100 0 ng/mL Final   12/14/2017 39 30 - 100 ng/mL Final     Continue Vit D 2000 IU         Class 2 severe obesity due to excess calories with serious comorbidity and body mass index (BMI) of 35 0 to 35 9 in adult [facility])      Other Visit Diagnoses     Decreased hearing of both ears        Relevant Orders    Audiogram screen    Foreign body of left ear, initial encounter        Relevant Orders    Foreign body removal    Abnormality of right breast on screening mammogram            Foreign body removal  Date/Time: 7/13/2020 8:24 AM  Performed by: Carla Ta MD  Authorized by: Jennifer Ta MD   Universal Protocol:Consent: Verbal consent obtained  Written consent obtained  Risks and benefits: risks, benefits and alternatives were discussed  Consent given by: patient  Patient understanding: patient states understanding of the procedure being performed  Patient identity confirmed: verbally with patient    Body area: ear  Location details: left ear    Sedation:  Patient sedated: no  Patient restrained: no  Patient cooperative: yes  Localization method: ENT speculum, visualized and probed  Removal mechanism: curette, ear scoop and alligator forceps  Complexity: simple  1 objects recovered  Objects recovered: wax  Post-procedure assessment: foreign body removed  Patient tolerance: Patient tolerated the procedure well with no immediate complications      RTC 6 months for follow up and 18 [facility] or sooner PRN    SUBJECTIVE:  Yogi Jones is a 79 y o  female who presents today with a chief complaint of Follow-up  Yogi Jones is here for a 6 month follow-up, she did have labs done prior to this visit        The active chronic medical problems and medications are as below:   1  CAD, h/o NSTEMI, HLD, HTN- well controlled, f/w Cardiology, compliant with meds  On Brilinta until August 2020, has easy bruising/bleeding  Denies chest pain, shortness of breath, palpitations  Tries to be active  2  IFG- improved, with , diet controlled, did gain 6 pounds while vacationing in [place]  3  CKD3- compliant with no NSAIDS  4  GERD- now only on Pepcid PRN OTC due to backorder, worse with tomatoes and roastbeef  5  Vit D def- takes 2000 IU siena, no recent fractures    Review of Systems   Constitutional: Negative for fatigue and unexpected weight change  HENT: Positive for hearing loss  Eyes: Negative for visual disturbance  Respiratory: Negative for chest tightness and shortness of breath  Cardiovascular: Negative for chest pain and palpitations  Neurological: Negative for dizziness and light-headedness  I have reviewed the patient's Past Medical History  Current Outpatient Medications:     aspirin 81 MG tablet, Take 81 mg by mouth daily , Disp: , Rfl:     atorvastatin (LIPITOR) 40 mg tablet, Take 1 tablet (40 mg total) by mouth daily, Disp: 90 tablet, Rfl: 3    carvedilol (COREG) 6 25 mg tablet, Take 1 tablet (6 25 mg total) by mouth 2 (two) times a day with meals, Disp: 180 tablet, Rfl: 3    cholecalciferol (VITAMIN D3) 1,000 units tablet, Take 2,000 Units by mouth daily, Disp: , Rfl:     Co-Enzyme Q-10 100 MG CAPS, Take 200 mg by mouth daily  , Disp: , Rfl:     famotidine (PEPCID) 20 mg tablet, Take 1 tablet (20 mg total) by mouth 2 (two) times a day as needed for heartburn, Disp: 60 tablet, Rfl: 2    latanoprost (XALATAN) 0 005 % ophthalmic solution, Administer 1 drop to both eyes daily, Disp: , Rfl: 3    lisinopril (ZESTRIL) 20 mg tablet, Take 1 tablet (20 mg total) by mouth daily, Disp: 90 tablet, Rfl: 1    Multiple Vitamins-Minerals (PRESERVISION AREDS) CAPS, Take 2 capsules by mouth daily, Disp: , Rfl:   nitroglycerin (NITROSTAT) 0 4 mg SL tablet, Place 1 tablet (0 4 mg total) under the tongue every 5 (five) minutes as needed for chest pain, Disp: 90 tablet, Rfl: 0    ticagrelor (BRILINTA) 90 MG, Take 1 tablet (90 mg total) by mouth every 12 (twelve) hours, Disp: 180 tablet, Rfl: 1    OBJECTIVE:  /52   Pulse 60   Temp (!) 97 2 °F (36 2 °C)   Resp 16   Ht 5' 4" (1 626 m)   Wt 92 5 kg (204 lb)   SpO2 96%   BMI 35 02 kg/m²    Physical Exam   Constitutional: She is oriented to person, place, and time  She appears well-developed and well-nourished  No distress  obese   Cardiovascular: Normal rate, regular rhythm and normal heart sounds  No murmur heard  Pulmonary/Chest: Effort normal and breath sounds normal  No respiratory distress  She has no wheezes  She has no rales  Neurological: She is alert and oriented to person, place, and time  Skin: She is not diaphoretic  Psychiatric: She has a normal mood and affect  Vitals reviewed  Appointment on 07/09/2020   Component Date Value Ref Range Status    Cholesterol 07/09/2020 143  50 - 200 mg/dL Final      Cholesterol:       Desirable         <200 mg/dl       Borderline         200-239 mg/dl       High              >239           Triglycerides 07/09/2020 130  <=150 mg/dL Final      Triglyceride:     Normal          <150 mg/dl     Borderline High 150-199 mg/dl     High            200-499 mg/dl        Very High       >499 mg/dl    Specimen collection should occur prior to N-Acetylcysteine or Metamizole administration due to the potential for falsely depressed results   HDL, Direct 07/09/2020 55  >=40 mg/dL Final      HDL Cholesterol:       Low     <41 mg/dL  Specimen collection should occur prior to Metamizole administration due to the potential for falsley depressed results      LDL Calculated 07/09/2020 62  0 - 100 mg/dL Final      LDL Cholesterol:     Optimal           <100 mg/dl     Near Optimal      100-129 mg/dl     Above Optimal     Borderline High 130-159 mg/dl       High            160-189 mg/dl       Very High       >189 mg/dl         This screening LDL is a calculated result  It does not have the accuracy of the Direct Measured LDL in the monitoring of patients with hyperlipidemia and/or statin therapy  Direct Measure LDL (XMD383) must be ordered separately in these patients   Sodium 07/09/2020 139  136 - 145 mmol/L Final    Potassium 07/09/2020 5 1  3 5 - 5 3 mmol/L Final    Chloride 07/09/2020 105  100 - 108 mmol/L Final    CO2 07/09/2020 27  21 - 32 mmol/L Final    ANION GAP 07/09/2020 7  4 - 13 mmol/L Final    BUN 07/09/2020 22  5 - 25 mg/dL Final    Creatinine 07/09/2020 0 98  0 60 - 1 30 mg/dL Final    Standardized to IDMS reference method    Glucose, Fasting 07/09/2020 101* 65 - 99 mg/dL Final      Specimen collection should occur prior to Sulfasalazine administration due to the potential for falsely depressed results  Specimen collection should occur prior to Sulfapyridine administration due to the potential for falsely elevated results   Calcium 07/09/2020 9 4  8 3 - 10 1 mg/dL Final    AST 07/09/2020 17  5 - 45 U/L Final      Specimen collection should occur prior to Sulfasalazine administration due to the potential for falsely depressed results   ALT 07/09/2020 24  12 - 78 U/L Final      Specimen collection should occur prior to Sulfasalazine and/or Sulfapyridine administration due to the potential for falsely depressed results   Alkaline Phosphatase 07/09/2020 68  46 - 116 U/L Final    Total Protein 07/09/2020 7 3  6 4 - 8 2 g/dL Final    Albumin 07/09/2020 4 0  3 5 - 5 0 g/dL Final    Total Bilirubin 07/09/2020 0 66  0 20 - 1 00 mg/dL Final      Use of this assay is not recommended for patients undergoing treatment with eltrombopag due to the potential for falsely elevated results      eGFR 07/09/2020 55  ml/min/1 73sq m Final    WBC 07/09/2020 5 75  4 31 - 10 16 Thousand/uL Final    RBC 07/09/2020 3 97  3 81 - 5 12 Million/uL Final    Hemoglobin 07/09/2020 12 3  11 5 - 15 4 g/dL Final    Hematocrit 07/09/2020 39 6  34 8 - 46 1 % Final    MCV 07/09/2020 100* 82 - 98 fL Final    MCH 07/09/2020 31 0  26 8 - 34 3 pg Final    MCHC 07/09/2020 31 1* 31 4 - 37 4 g/dL Final    RDW 07/09/2020 13 3  11 6 - 15 1 % Final    MPV 07/09/2020 11 5  8 9 - 12 7 fL Final    Platelets 29/07/2648 238  149 - 390 Thousands/uL Final    nRBC 07/09/2020 0  /100 WBCs Final    Neutrophils Relative 07/09/2020 52  43 - 75 % Final    Immat GRANS % 07/09/2020 0  0 - 2 % Final    Lymphocytes Relative 07/09/2020 33  14 - 44 % Final    Monocytes Relative 07/09/2020 11  4 - 12 % Final    Eosinophils Relative 07/09/2020 3  0 - 6 % Final    Basophils Relative 07/09/2020 1  0 - 1 % Final    Neutrophils Absolute 07/09/2020 3 00  1 85 - 7 62 Thousands/µL Final    Immature Grans Absolute 07/09/2020 0 01  0 00 - 0 20 Thousand/uL Final    Lymphocytes Absolute 07/09/2020 1 92  0 60 - 4 47 Thousands/µL Final    Monocytes Absolute 07/09/2020 0 61  0 17 - 1 22 Thousand/µL Final    Eosinophils Absolute 07/09/2020 0 17  0 00 - 0 61 Thousand/µL Final    Basophils Absolute 07/09/2020 0 04  0 00 - 0 10 Thousands/µL Final    TSH 3RD GENERATON 07/09/2020 2 730  0 358 - 3 740 uIU/mL Final      The recommended reference ranges for TSH during pregnancy are as follows:   First trimester 0 1 to 2 5 uIU/mL   Second trimester  0 2 to 3 0 uIU/mL   Third trimester 0 3 to 3 0 uIU/m    Note: Normal ranges may not apply to patients who are transgender, non-binary, or whose legal sex, sex at birth, and gender identity differ  Using supplements with high doses of biotin 20 to more than 300 times greater than the adequate daily intake for adults of 30 mcg/day as established by the [employer], can cause falsely depress results      Magnesium 07/09/2020 2 6  1 6 - 2 6 mg/dL Final    PTH 07/09/2020 64 7  18 4 - 80 1 pg/mL Final    Phosphorus 07/09/2020 3 5  2 3 - 4 1 mg/dL Final    Creatinine, Ur 07/09/2020 54 1  mg/dL Final    Microalbum  ,U,Random 07/09/2020 5 2  0 0 - 20 0 mg/L Final    Microalb Creat Ratio 07/09/2020 10  0 - 30 mg/g creatinine Final    Vit D, 25-Hydroxy 07/09/2020 33 3  30 0 - 100 0 ng/mL Final       BMI Counseling: Body mass index is 35 02 kg/m²  The BMI is above normal  Nutrition recommendations include moderation in carbohydrate intake  Exercise recommendations include exercising 3-5 times per week  [facility]  Shayan Abrams MD    Note: Portions of the record have been created with voice recognition software  Occasional wrong word or "sound a like" substitutions may have occurred due to the inherent limitations of voice recognition software  Read the chart carefully and recognize, using context, where substitutions have occurred

## 2020-07-17 ENCOUNTER — HOSPITAL ENCOUNTER (OUTPATIENT)
Dept: ULTRASOUND IMAGING | Facility: CLINIC | Age: 80
Discharge: HOME | End: 2020-07-17
Payer: COMMERCIAL

## 2020-07-17 ENCOUNTER — HOSPITAL ENCOUNTER (OUTPATIENT)
Dept: MAMMOGRAPHY | Facility: CLINIC | Age: 80
Discharge: HOME | End: 2020-07-17
Payer: COMMERCIAL

## 2020-07-17 ENCOUNTER — TRANSCRIBE ORDERS (OUTPATIENT)
Dept: MAMMOGRAPHY | Facility: CLINIC | Age: 80
End: 2020-07-17

## 2020-07-17 VITALS — BODY MASS INDEX: 34.83 KG/M2 | WEIGHT: 204 LBS | HEIGHT: 64 IN

## 2020-07-17 DIAGNOSIS — R92.8 ABNORMAL MAMMOGRAM: ICD-10-CM

## 2020-07-17 DIAGNOSIS — R92.8 ABNORMAL MAMMOGRAM: Primary | ICD-10-CM

## 2020-07-17 PROCEDURE — G0279 TOMOSYNTHESIS, MAMMO: HCPCS

## 2020-07-17 PROCEDURE — 76642 ULTRASOUND BREAST LIMITED: CPT

## 2020-07-17 PROCEDURE — 77065 DX MAMMO INCL CAD UNI: CPT

## 2020-08-24 DIAGNOSIS — I21.4 NSTEMI, INITIAL EPISODE OF CARE (HCC): ICD-10-CM

## 2020-08-24 RX ORDER — TICAGRELOR 90 MG/1
TABLET ORAL
Qty: 180 TABLET | Refills: 1 | OUTPATIENT
Start: 2020-08-24

## 2020-09-11 ENCOUNTER — TELEPHONE (OUTPATIENT)
Dept: OBGYN CLINIC | Facility: CLINIC | Age: 80
End: 2020-09-11

## 2020-09-11 NOTE — TELEPHONE ENCOUNTER
Pt contacted and advised as directed  Pt stated she is aware of results and has the order and testing scheduled for 10/23/2020 with [facility]

## 2020-09-11 NOTE — TELEPHONE ENCOUNTER
----- Message from Kathrene Prader, MD sent at 9/11/2020 11:20 AM EDT -----  Please call the patient regarding her right breast mammogram in July showed her benign findings the radiologist recommended a repeat right breast ultrasound in 3 months which would be October of 2020  See if she is scheduled this appointment

## 2020-10-19 DIAGNOSIS — I10 ESSENTIAL HYPERTENSION: ICD-10-CM

## 2020-10-19 RX ORDER — LISINOPRIL 20 MG/1
TABLET ORAL
Qty: 90 TABLET | Refills: 1 | Status: SHIPPED | OUTPATIENT
Start: 2020-10-19 | End: 2021-01-18 | Stop reason: SDUPTHER

## 2020-11-02 ENCOUNTER — TRANSCRIBE ORDERS (OUTPATIENT)
Dept: MAMMOGRAPHY | Facility: CLINIC | Age: 80
End: 2020-11-02

## 2020-11-02 ENCOUNTER — HOSPITAL ENCOUNTER (OUTPATIENT)
Dept: ULTRASOUND IMAGING | Facility: CLINIC | Age: 80
Discharge: HOME | End: 2020-11-02
Payer: COMMERCIAL

## 2020-11-02 DIAGNOSIS — R92.8 MAMMOGRAM ABNORMAL: Primary | ICD-10-CM

## 2020-11-02 DIAGNOSIS — R92.8 ABNORMAL MAMMOGRAM: ICD-10-CM

## 2020-11-02 PROCEDURE — 76642 ULTRASOUND BREAST LIMITED: CPT

## 2020-11-30 ENCOUNTER — TELEPHONE (OUTPATIENT)
Dept: CARDIOLOGY CLINIC | Facility: CLINIC | Age: 80
End: 2020-11-30

## 2021-01-13 ENCOUNTER — LAB (OUTPATIENT)
Dept: LAB | Facility: MEDICAL CENTER | Age: 81
End: 2021-01-13
Payer: COMMERCIAL

## 2021-01-13 DIAGNOSIS — N18.30 CKD (CHRONIC KIDNEY DISEASE) STAGE 3, GFR 30-59 ML/MIN (HCC): ICD-10-CM

## 2021-01-13 DIAGNOSIS — R73.01 IFG (IMPAIRED FASTING GLUCOSE): ICD-10-CM

## 2021-01-13 DIAGNOSIS — I10 ESSENTIAL HYPERTENSION: ICD-10-CM

## 2021-01-13 DIAGNOSIS — E78.49 OTHER HYPERLIPIDEMIA: ICD-10-CM

## 2021-01-13 LAB
ALBUMIN SERPL BCP-MCNC: 4.1 G/DL (ref 3.5–5)
ALP SERPL-CCNC: 65 U/L (ref 46–116)
ALT SERPL W P-5'-P-CCNC: 24 U/L (ref 12–78)
ANION GAP SERPL CALCULATED.3IONS-SCNC: 0 MMOL/L (ref 4–13)
AST SERPL W P-5'-P-CCNC: 15 U/L (ref 5–45)
BILIRUB SERPL-MCNC: 0.58 MG/DL (ref 0.2–1)
BUN SERPL-MCNC: 21 MG/DL (ref 5–25)
CALCIUM SERPL-MCNC: 10.3 MG/DL (ref 8.3–10.1)
CHLORIDE SERPL-SCNC: 108 MMOL/L (ref 100–108)
CHOLEST SERPL-MCNC: 146 MG/DL (ref 50–200)
CO2 SERPL-SCNC: 32 MMOL/L (ref 21–32)
CREAT SERPL-MCNC: 0.89 MG/DL (ref 0.6–1.3)
CREAT UR-MCNC: 96 MG/DL
ERYTHROCYTE [DISTWIDTH] IN BLOOD BY AUTOMATED COUNT: 13.2 % (ref 11.6–15.1)
EST. AVERAGE GLUCOSE BLD GHB EST-MCNC: 134 MG/DL
GFR SERPL CREATININE-BSD FRML MDRD: 62 ML/MIN/1.73SQ M
GLUCOSE P FAST SERPL-MCNC: 103 MG/DL (ref 65–99)
HBA1C MFR BLD: 6.3 %
HCT VFR BLD AUTO: 39.3 % (ref 34.8–46.1)
HDLC SERPL-MCNC: 62 MG/DL
HGB BLD-MCNC: 11.7 G/DL (ref 11.5–15.4)
LDLC SERPL CALC-MCNC: 56 MG/DL (ref 0–100)
MAGNESIUM SERPL-MCNC: 2.3 MG/DL (ref 1.6–2.6)
MCH RBC QN AUTO: 29.9 PG (ref 26.8–34.3)
MCHC RBC AUTO-ENTMCNC: 29.8 G/DL (ref 31.4–37.4)
MCV RBC AUTO: 101 FL (ref 82–98)
MICROALBUMIN UR-MCNC: 10.9 MG/L (ref 0–20)
MICROALBUMIN/CREAT 24H UR: 11 MG/G CREATININE (ref 0–30)
PHOSPHATE SERPL-MCNC: 3.8 MG/DL (ref 2.3–4.1)
PLATELET # BLD AUTO: 236 THOUSANDS/UL (ref 149–390)
PMV BLD AUTO: 12.3 FL (ref 8.9–12.7)
POTASSIUM SERPL-SCNC: 4.6 MMOL/L (ref 3.5–5.3)
PROT SERPL-MCNC: 7.1 G/DL (ref 6.4–8.2)
PTH-INTACT SERPL-MCNC: 44.6 PG/ML (ref 18.4–80.1)
RBC # BLD AUTO: 3.91 MILLION/UL (ref 3.81–5.12)
SODIUM SERPL-SCNC: 140 MMOL/L (ref 136–145)
TRIGL SERPL-MCNC: 141 MG/DL
WBC # BLD AUTO: 6.41 THOUSAND/UL (ref 4.31–10.16)

## 2021-01-13 PROCEDURE — 82043 UR ALBUMIN QUANTITATIVE: CPT | Performed by: FAMILY MEDICINE

## 2021-01-13 PROCEDURE — 83036 HEMOGLOBIN GLYCOSYLATED A1C: CPT

## 2021-01-13 PROCEDURE — 80053 COMPREHEN METABOLIC PANEL: CPT

## 2021-01-13 PROCEDURE — 85027 COMPLETE CBC AUTOMATED: CPT

## 2021-01-13 PROCEDURE — 82570 ASSAY OF URINE CREATININE: CPT | Performed by: FAMILY MEDICINE

## 2021-01-13 PROCEDURE — 83735 ASSAY OF MAGNESIUM: CPT

## 2021-01-13 PROCEDURE — 83970 ASSAY OF PARATHORMONE: CPT

## 2021-01-13 PROCEDURE — 36415 COLL VENOUS BLD VENIPUNCTURE: CPT

## 2021-01-13 PROCEDURE — 84100 ASSAY OF PHOSPHORUS: CPT

## 2021-01-13 PROCEDURE — 80061 LIPID PANEL: CPT

## 2021-01-17 PROBLEM — R73.03 PREDIABETES: Status: ACTIVE | Noted: 2020-07-13

## 2021-01-17 NOTE — ASSESSMENT & PLAN NOTE
Lab Results   Component Value Date    EGFR 62 01/13/2021    EGFR 55 07/09/2020    EGFR 55 01/09/2020    CREATININE 0 89 01/13/2021    CREATININE 0 98 07/09/2020    CREATININE 0 98 01/09/2020     Renal function overall stable, chronic kidney disease labs are within normal limits  Will repeat BMP prior to follow-up appointment 6 months, other renal labs in 1 year

## 2021-01-17 NOTE — PROGRESS NOTES
FAMILY MEDICINE PROGRESS NOTE    Date of Service: 21  Primary Care Provider:   Anat Titus MD     Name: Yue Webb       : 1940       Age:80 y o  Sex: female      MRN: [id number]      Chief Complaint:Medicare Wellness Visit       ASSESSMENT and PLAN:  Yue Webb is a 79 y o  female with:     Problem List Items Addressed This Visit        Digestive    GERD without esophagitis - Primary     Controlled with Pepcid         Relevant Medications    famotidine (PEPCID) 20 mg tablet       Cardiovascular and Mediastinum    Essential hypertension     BP Readings from Last 3 Encounters:   21 122/60   20 120/52   20 134/58     Lab Results   Component Value Date    CREATININE 0 89 2021    EGFR 62 2021     Controlled, continue current regimen Lisinopril 20mg, Carvedilol 6 25mg BID         Relevant Medications    lisinopril (ZESTRIL) 20 mg tablet    Other Relevant Orders    Basic metabolic panel    Coronary artery disease involving native coronary artery of native heart without angina pectoris     She follows with Cardiology (Dr Fatimah Narayanan)  She underwent cardiac catheterization in 2019 after having several rates of stable and then unstable angina    Catheterization showed LAD blockage, and she underwent successful balloon angioplasty and GABY on 90% lesion of the ostial LAD  She is on guideline directed medical therapy with Carvedilol 6 25 mg twice daily, ASA 81 mg (she completed 1 year of dual antiplatelet therapy is no longer on Brilinta), lisinopril 20 mg, Atorvastatin 40 mg           Atherosclerosis of native coronary artery of native heart with angina pectoris (HCC)       Genitourinary    CKD (chronic kidney disease) stage 3, GFR 30-59 ml/min     Lab Results   Component Value Date    EGFR 62 2021    EGFR 55 2020    EGFR 55 2020    CREATININE 0 89 2021    CREATININE 0 98 2020    CREATININE 0 98 2020     Renal function overall stable, chronic kidney disease labs are within normal limits  Will repeat BMP prior to follow-up appointment 6 months, other renal labs in 1 year  Relevant Orders    Basic metabolic panel       Other    Other hyperlipidemia     Lab Results   Component Value Date    CHOLESTEROL 146 01/13/2021    HDL 62 01/13/2021    LDLCALC 56 01/13/2021    TRIG 141 01/13/2021   Well controlled, at goal  H/o CAD, continue Atorvastatin 40mg daily  Reviewed healthy, low cholesterol diet           Class 2 severe obesity due to excess calories with serious comorbidity and body mass index (BMI) of 35 0 to 35 9 in adult [facility])    Prediabetes     Lab Results   Component Value Date    HGBA1C 6 3 (H) 01/13/2021    HGBA1C 5 9 (H) 05/16/2017    HGBA1C 6 3 (H) 07/27/2016     Lab Results   Component Value Date    GLUF 103 (H) 01/13/2021    LDLCALC 56 01/13/2021    CREATININE 0 89 01/13/2021       Counseled on importance of healthy eating, specifically discussed eating a primarily whole food, plant based diet that is low in processed food, and low in saturated fat  Relevant Orders    Basic metabolic panel      Other Visit Diagnoses     Medicare annual wellness visit, subsequent              SUBJECTIVE:  Carl Carmona is a 79 y o  female with hypertension, hyperlipidemia, coronary artery disease, GERD, prediabetes who presents today with a chief complaint of Medicare Wellness Visit  HPI     She has two children, three grandchildren  He granddaughter is pregnant and due in April  She is asking about Tdap  She will be getting her first COVID vaccine on Wednesday  GERD  She is taking Pepcid once in the morning  She reports that this helps her symptoms  Hypertension  Currently on lisinopril 20 mg and carvedilol 6 25 mg twice daily  She reports normal blood pressure readings at home, she denies symptoms of hyper of hypotension       Coronary Artery Disease  She underwent balloon angioplasty and drug eluting stent to ostial LAD in August 2019  She is on carvedilol, baby aspirin, ACE, statin  She completed her one year of DAPT with Courtneyilinta  She tries to follow a healthy diet, she limits processed food, she eats fruits and vegetables  She is active around her house  Review of Systems   Eyes: Negative for visual disturbance  Respiratory: Negative for shortness of breath  Cardiovascular: Negative for chest pain  Gastrointestinal: Negative for abdominal pain, constipation and diarrhea  Neurological: Negative for dizziness, light-headedness and headaches  Psychiatric/Behavioral: Negative for confusion, dysphoric mood and sleep disturbance  The patient is not nervous/anxious  I have reviewed the patient's Past Medical History  Current Outpatient Medications:     aspirin 81 MG tablet, Take 81 mg by mouth daily , Disp: , Rfl:     atorvastatin (LIPITOR) 40 mg tablet, Take 1 tablet (40 mg total) by mouth daily, Disp: 90 tablet, Rfl: 3    carvedilol (COREG) 6 25 mg tablet, Take 1 tablet (6 25 mg total) by mouth 2 (two) times a day with meals, Disp: 180 tablet, Rfl: 3    cholecalciferol (VITAMIN D3) 1,000 units tablet, Take 2,000 Units by mouth daily, Disp: , Rfl:     Co-Enzyme Q-10 100 MG CAPS, Take 200 mg by mouth daily  , Disp: , Rfl:     famotidine (PEPCID) 20 mg tablet, Take 20 mg by mouth daily OTC, Disp: , Rfl:     latanoprost (XALATAN) 0 005 % ophthalmic solution, Administer 1 drop to both eyes daily, Disp: , Rfl: 3    lisinopril (ZESTRIL) 20 mg tablet, Take 1 tablet (20 mg total) by mouth daily, Disp: 90 tablet, Rfl: 1    Multiple Vitamins-Minerals (PRESERVISION AREDS) CAPS, Take 2 capsules by mouth daily, Disp: , Rfl:     nitroglycerin (NITROSTAT) 0 4 mg SL tablet, Place 1 tablet (0 4 mg total) under the tongue every 5 (five) minutes as needed for chest pain, Disp: 90 tablet, Rfl: 0    OBJECTIVE:  /60   Pulse 74   Temp (!) 96 9 °F (36 1 °C)   Resp 14   Ht 5' 4" (1 626 m)   Wt 92 1 kg (203 lb) BMI 34 84 kg/m²    BP Readings from Last 3 Encounters:   01/18/21 122/60   07/13/20 120/52   03/03/20 134/58      Wt Readings from Last 3 Encounters:   01/18/21 92 1 kg (203 lb)   07/17/20 92 5 kg (204 lb)   07/13/20 92 5 kg (204 lb)      Physical Exam  Constitutional:       General: She is not in acute distress  Appearance: Normal appearance  She is normal weight  She is not ill-appearing or toxic-appearing  HENT:      Head: Normocephalic and atraumatic  Right Ear: Tympanic membrane and external ear normal       Left Ear: Tympanic membrane and external ear normal    Eyes:      Extraocular Movements: Extraocular movements intact  Conjunctiva/sclera: Conjunctivae normal    Neck:      Musculoskeletal: Normal range of motion and neck supple  Cardiovascular:      Rate and Rhythm: Normal rate  Pulses: Normal pulses  Heart sounds: Normal heart sounds  No murmur  No friction rub  No gallop  Pulmonary:      Effort: Pulmonary effort is normal  No respiratory distress  Abdominal:      General: There is no distension  Palpations: Abdomen is soft  Musculoskeletal: Normal range of motion  Right lower leg: No edema  Left lower leg: No edema  Skin:     General: Skin is warm and dry  Findings: No erythema or rash  Neurological:      General: No focal deficit present  Mental Status: She is alert and oriented to person, place, and time     Psychiatric:         Mood and Affect: Mood normal          Behavior: Behavior normal          Lab Results   Component Value Date    WBC 6 41 01/13/2021    HGB 11 7 01/13/2021    HCT 39 3 01/13/2021     (H) 01/13/2021     01/13/2021     Lab Results   Component Value Date     12/14/2017    SODIUM 140 01/13/2021    K 4 6 01/13/2021     01/13/2021    CO2 32 01/13/2021    AGAP 0 (L) 01/13/2021    BUN 21 01/13/2021    CREATININE 0 89 01/13/2021    GLUC 89 08/03/2019    GLUF 103 (H) 01/13/2021    CALCIUM 10 3 (H) 01/13/2021    AST 15 01/13/2021    ALT 24 01/13/2021    ALKPHOS 65 01/13/2021    PROT 6 8 12/14/2017    TP 7 1 01/13/2021    BILITOT 0 5 12/14/2017    TBILI 0 58 01/13/2021    EGFR 62 01/13/2021     Lab Results   Component Value Date    PTH 44 6 01/13/2021    CALCIUM 10 3 (H) 01/13/2021    PHOS 3 8 01/13/2021     Magnesium 2 3    Vit D, 25-Hydroxy   Date Value Ref Range Status   07/09/2020 33 3 30 0 - 100 0 ng/mL Final   12/14/2017 39 30 - 100 ng/mL Final     Comment:     Result Comment: Vitamin D Status         25-OH Vitamin D:     Deficiency:                    <20 ng/mL  Insufficiency:             20 - 29 ng/mL  Optimal:                 > or = 30 ng/mL     For 25-OH Vitamin D testing on patients on   D2-supplementation and patients for whom quantitation   of D2 and D3 fractions is required, the QuestAssureD(TM)  25-OH VIT D, (D2,D3), LC/MS/MS is recommended: order   code 93475 (patients >2yrs)  For more information on this test, go to:  http://[employer] [place]/faq/MGJ228  (This link is being provided for   informational/educational purposes only )  Performed at: [id number] [facility], [place], [address] [facility]  [address]             Return in 6 months (on 7/18/2021)  Dana Sawant MD    Note: Portions of the record have been created with voice recognition software  Occasional wrong word or "sound a like" substitutions may have occurred due to the inherent limitations of voice recognition software  Read the chart carefully and recognize, using context, where substitutions have occurred

## 2021-01-17 NOTE — ASSESSMENT & PLAN NOTE
Lab Results   Component Value Date    CHOLESTEROL 146 01/13/2021    HDL 62 01/13/2021    LDLCALC 56 01/13/2021    TRIG 141 01/13/2021   Well controlled, at goal  H/o CAD, continue Atorvastatin 40mg daily  Reviewed healthy, low cholesterol diet

## 2021-01-17 NOTE — ASSESSMENT & PLAN NOTE
Lab Results   Component Value Date    HGBA1C 6 3 (H) 01/13/2021    HGBA1C 5 9 (H) 05/16/2017    HGBA1C 6 3 (H) 07/27/2016     Lab Results   Component Value Date    GLUF 103 (H) 01/13/2021    LDLCALC 56 01/13/2021    CREATININE 0 89 01/13/2021       Counseled on importance of healthy eating, specifically discussed eating a primarily whole food, plant based diet that is low in processed food, and low in saturated fat

## 2021-01-17 NOTE — ASSESSMENT & PLAN NOTE
BP Readings from Last 3 Encounters:   01/18/21 122/60   07/13/20 120/52   03/03/20 134/58     Lab Results   Component Value Date    CREATININE 0 89 01/13/2021    EGFR 62 01/13/2021     Controlled, continue current regimen Lisinopril 20mg, Carvedilol 6 25mg BID

## 2021-01-18 ENCOUNTER — OFFICE VISIT (OUTPATIENT)
Dept: FAMILY MEDICINE CLINIC | Facility: CLINIC | Age: 81
End: 2021-01-18
Payer: COMMERCIAL

## 2021-01-18 VITALS
HEIGHT: 64 IN | HEART RATE: 74 BPM | SYSTOLIC BLOOD PRESSURE: 122 MMHG | RESPIRATION RATE: 14 BRPM | WEIGHT: 203 LBS | DIASTOLIC BLOOD PRESSURE: 60 MMHG | TEMPERATURE: 96.9 F | BODY MASS INDEX: 34.66 KG/M2

## 2021-01-18 DIAGNOSIS — E66.01 CLASS 2 SEVERE OBESITY DUE TO EXCESS CALORIES WITH SERIOUS COMORBIDITY AND BODY MASS INDEX (BMI) OF 35.0 TO 35.9 IN ADULT (HCC): ICD-10-CM

## 2021-01-18 DIAGNOSIS — Z00.00 MEDICARE ANNUAL WELLNESS VISIT, SUBSEQUENT: ICD-10-CM

## 2021-01-18 DIAGNOSIS — E78.49 OTHER HYPERLIPIDEMIA: ICD-10-CM

## 2021-01-18 DIAGNOSIS — I25.119 ATHEROSCLEROSIS OF NATIVE CORONARY ARTERY OF NATIVE HEART WITH ANGINA PECTORIS (HCC): ICD-10-CM

## 2021-01-18 DIAGNOSIS — K21.9 GERD WITHOUT ESOPHAGITIS: Primary | ICD-10-CM

## 2021-01-18 DIAGNOSIS — I25.10 CORONARY ARTERY DISEASE INVOLVING NATIVE CORONARY ARTERY OF NATIVE HEART WITHOUT ANGINA PECTORIS: ICD-10-CM

## 2021-01-18 DIAGNOSIS — R73.03 PREDIABETES: ICD-10-CM

## 2021-01-18 DIAGNOSIS — N18.31 STAGE 3A CHRONIC KIDNEY DISEASE (HCC): ICD-10-CM

## 2021-01-18 DIAGNOSIS — I10 ESSENTIAL HYPERTENSION: ICD-10-CM

## 2021-01-18 PROBLEM — E55.9 VITAMIN D DEFICIENCY: Status: RESOLVED | Noted: 2019-01-04 | Resolved: 2021-01-18

## 2021-01-18 PROCEDURE — 3074F SYST BP LT 130 MM HG: CPT | Performed by: FAMILY MEDICINE

## 2021-01-18 PROCEDURE — 1125F AMNT PAIN NOTED PAIN PRSNT: CPT | Performed by: FAMILY MEDICINE

## 2021-01-18 PROCEDURE — 1170F FXNL STATUS ASSESSED: CPT | Performed by: FAMILY MEDICINE

## 2021-01-18 PROCEDURE — G0439 PPPS, SUBSEQ VISIT: HCPCS | Performed by: FAMILY MEDICINE

## 2021-01-18 PROCEDURE — 3288F FALL RISK ASSESSMENT DOCD: CPT | Performed by: FAMILY MEDICINE

## 2021-01-18 PROCEDURE — 3078F DIAST BP <80 MM HG: CPT | Performed by: FAMILY MEDICINE

## 2021-01-18 PROCEDURE — 99214 OFFICE O/P EST MOD 30 MIN: CPT | Performed by: FAMILY MEDICINE

## 2021-01-18 PROCEDURE — 1101F PT FALLS ASSESS-DOCD LE1/YR: CPT | Performed by: FAMILY MEDICINE

## 2021-01-18 RX ORDER — FAMOTIDINE 20 MG/1
20 TABLET, FILM COATED ORAL DAILY
COMMUNITY

## 2021-01-18 RX ORDER — LISINOPRIL 20 MG/1
20 TABLET ORAL DAILY
Qty: 90 TABLET | Refills: 1 | Status: SHIPPED | OUTPATIENT
Start: 2021-01-18 | End: 2021-07-20 | Stop reason: SDUPTHER

## 2021-01-18 NOTE — ASSESSMENT & PLAN NOTE
She follows with Cardiology (Dr Dominique Kim)  She underwent cardiac catheterization in August 2019 after having several rates of stable and then unstable angina    Catheterization showed LAD blockage, and she underwent successful balloon angioplasty and GABY on 90% lesion of the ostial LAD  She is on guideline directed medical therapy with Carvedilol 6 25 mg twice daily, ASA 81 mg (she completed 1 year of dual antiplatelet therapy is no longer on Brilinta), lisinopril 20 mg, Atorvastatin 40 mg

## 2021-01-18 NOTE — PATIENT INSTRUCTIONS
Medicare Preventive Visit Patient Instructions  Thank you for completing your Welcome to Medicare Visit or Medicare Annual Wellness Visit today  Your next wellness visit will be due in one year (1/18/2022)  The screening/preventive services that you may require over the next 5-10 years are detailed below  Some tests may not apply to you based off risk factors and/or age  Screening tests ordered at today's visit but not completed yet may show as past due  Also, please note that scanned in results may not display below  Preventive Screenings:  Service Recommendations Previous Testing/Comments   Colorectal Cancer Screening  * Colonoscopy    * Fecal Occult Blood Test (FOBT)/Fecal Immunochemical Test (FIT)  * Fecal DNA/Cologuard Test  * Flexible Sigmoidoscopy Age: 54-65 years old   Colonoscopy: every 10 years (may be performed more frequently if at higher risk)  OR  FOBT/FIT: every 1 year  OR  Cologuard: every 3 years  OR  Sigmoidoscopy: every 5 years  Screening may be recommended earlier than age 48 if at higher risk for colorectal cancer  Also, an individualized decision between you and your healthcare provider will decide whether screening between the ages of 74-80 would be appropriate  Colonoscopy: 09/13/2018  FOBT/FIT: Not on file  Cologuard: 07/16/2018  Sigmoidoscopy: Not on file         Breast Cancer Screening Age: 36 years old  Frequency: every 1-2 years  Not required if history of left and right mastectomy Mammogram: 07/17/2020    Screening Current   Cervical Cancer Screening Between the ages of 21-29, pap smear recommended once every 3 years  Between the ages of 33-67, can perform pap smear with HPV co-testing every 5 years     Recommendations may differ for women with a history of total hysterectomy, cervical cancer, or abnormal pap smears in past  Pap Smear: 07/03/2019    Screening Not Indicated   Hepatitis C Screening Once for adults born between 1945 and 1965  More frequently in patients at high risk for Hepatitis C Hep C Antibody: Not on file       Diabetes Screening 1-2 times per year if you're at risk for diabetes or have pre-diabetes Fasting glucose: 103 mg/dL   A1C: 6 3 %    Screening Current   Cholesterol Screening Once every 5 years if you don't have a lipid disorder  May order more often based on risk factors  Lipid panel: 01/13/2021    Screening Not Indicated  History Lipid Disorder     Other Preventive Screenings Covered by Medicare:  1  Abdominal Aortic Aneurysm (AAA) Screening: covered once if your at risk  You're considered to be at risk if you have a family history of AAA  2  Lung Cancer Screening: covers low dose CT scan once per year if you meet all of the following conditions: (1) Age 50-69; (2) No signs or symptoms of lung cancer; (3) Current smoker or have quit smoking within the last 15 years; (4) You have a tobacco smoking history of at least 30 pack years (packs per day multiplied by number of years you smoked); (5) You get a written order from a healthcare provider  3  Glaucoma Screening: covered annually if you're considered high risk: (1) You have diabetes OR (2) Family history of glaucoma OR (3)  aged 48 and older OR (3)  American aged 72 and older  3  Osteoporosis Screening: covered every 2 years if you meet one of the following conditions: (1) You're estrogen deficient and at risk for osteoporosis based off medical history and other findings; (2) Have a vertebral abnormality; (3) On glucocorticoid therapy for more than 3 months; (4) Have primary hyperparathyroidism; (5) On osteoporosis medications and need to assess response to drug therapy  · Last bone density test (DXA Scan): 07/01/2019   5  HIV Screening: covered annually if you're between the age of 15-65  Also covered annually if you are younger than 13 and older than 72 with risk factors for HIV infection   For pregnant patients, it is covered up to 3 times per pregnancy  Immunizations:  Immunization Recommendations   Influenza Vaccine Annual influenza vaccination during flu season is recommended for all persons aged >= 6 months who do not have contraindications   Pneumococcal Vaccine (Prevnar and Pneumovax)  * Prevnar = PCV13  * Pneumovax = PPSV23   Adults 25-60 years old: 1-3 doses may be recommended based on certain risk factors  Adults 72 years old: Prevnar (PCV13) vaccine recommended followed by Pneumovax (PPSV23) vaccine  If already received PPSV23 since turning 65, then PCV13 recommended at least one year after PPSV23 dose  Hepatitis B Vaccine 3 dose series if at intermediate or high risk (ex: diabetes, end stage renal disease, liver disease)   Tetanus (Td) Vaccine - COST NOT COVERED BY MEDICARE PART B Following completion of primary series, a booster dose should be given every 10 years to maintain immunity against tetanus  Td may also be given as tetanus wound prophylaxis  Tdap Vaccine - COST NOT COVERED BY MEDICARE PART B Recommended at least once for all adults  For pregnant patients, recommended with each pregnancy  Shingles Vaccine (Shingrix) - COST NOT COVERED BY MEDICARE PART B  2 shot series recommended in those aged 48 and above     Health Maintenance Due:  There are no preventive care reminders to display for this patient  Immunizations Due:      Topic Date Due    DTaP,Tdap,and Td Vaccines (1 - Tdap) 04/23/1961     Advance Directives   What are advance directives? Advance directives are legal documents that state your wishes and plans for medical care  These plans are made ahead of time in case you lose your ability to make decisions for yourself  Advance directives can apply to any medical decision, such as the treatments you want, and if you want to donate organs  What are the types of advance directives? There are many types of advance directives, and each state has rules about how to use them   You may choose a combination of any of the following:  · Living will: This is a written record of the treatment you want  You can also choose which treatments you do not want, which to limit, and which to stop at a certain time  This includes surgery, medicine, IV fluid, and tube feedings  · Durable power of  for healthcare [facility]): This is a written record that states who you want to make healthcare choices for you when you are unable to make them for yourself  This person, called a proxy, is usually a family member or a friend  You may choose more than 1 proxy  · Do not resuscitate (DNR) order:  A DNR order is used in case your heart stops beating or you stop breathing  It is a request not to have certain forms of treatment, such as CPR  A DNR order may be included in other types of advance directives  · Medical directive: This covers the care that you want if you are in a coma, near death, or unable to make decisions for yourself  You can list the treatments you want for each condition  Treatment may include pain medicine, surgery, blood transfusions, dialysis, IV or tube feedings, and a ventilator (breathing machine)  · Values history: This document has questions about your views, beliefs, and how you feel and think about life  This information can help others choose the care that you would choose  Why are advance directives important? An advance directive helps you control your care  Although spoken wishes may be used, it is better to have your wishes written down  Spoken wishes can be misunderstood, or not followed  Treatments may be given even if you do not want them  An advance directive may make it easier for your family to make difficult choices about your care  Weight Management   Why it is important to manage your weight:  Being overweight increases your risk of health conditions such as heart disease, high blood pressure, type 2 diabetes, and certain types of cancer   It can also increase your risk for osteoarthritis, sleep apnea, and other respiratory problems  Aim for a slow, steady weight loss  Even a small amount of weight loss can lower your risk of health problems  How to lose weight safely:  A safe and healthy way to lose weight is to eat fewer calories and get regular exercise  You can lose up about 1 pound a week by decreasing the number of calories you eat by 500 calories each day  Healthy meal plan for weight management:  A healthy meal plan includes a variety of foods, contains fewer calories, and helps you stay healthy  A healthy meal plan includes the following:  · Eat whole-grain foods more often  A healthy meal plan should contain fiber  Fiber is the part of grains, fruits, and vegetables that is not broken down by your body  Whole-grain foods are healthy and provide extra fiber in your diet  Some examples of whole-grain foods are whole-wheat breads and pastas, oatmeal, brown rice, and bulgur  · Eat a variety of vegetables every day  Include dark, leafy greens such as spinach, kale, abimael greens, and mustard greens  Eat yellow and orange vegetables such as carrots, sweet potatoes, and winter squash  · Eat a variety of fruits every day  Choose fresh or canned fruit (canned in its own juice or light syrup) instead of juice  Fruit juice has very little or no fiber  · Eat low-fat dairy foods  Drink fat-free (skim) milk or 1% milk  Eat fat-free yogurt and low-fat cottage cheese  Try low-fat cheeses such as mozzarella and other reduced-fat cheeses  · Choose meat and other protein foods that are low in fat  Choose beans or other legumes such as split peas or lentils  Choose fish, skinless poultry (chicken or turkey), or lean cuts of red meat (beef or pork)  Before you cook meat or poultry, cut off any visible fat  · Use less fat and oil  Try baking foods instead of frying them  Add less fat, such as margarine, sour cream, regular salad dressing and mayonnaise to foods  Eat fewer high-fat foods   Some examples of high-fat foods include french fries, doughnuts, ice cream, and cakes  · Eat fewer sweets  Limit foods and drinks that are high in sugar  This includes candy, cookies, regular soda, and sweetened drinks  Exercise:  Exercise at least 30 minutes per day on most days of the week  Some examples of exercise include walking, biking, dancing, and swimming  You can also fit in more physical activity by taking the stairs instead of the elevator or parking farther away from stores  Ask your healthcare provider about the best exercise plan for you  © Copyright [employer] 2018 Information is for End User's use only and may not be sold, redistributed or otherwise used for commercial purposes   All illustrations and images included in CareNotes® are the copyrighted property of A D A M , Inc  or [address] [place], [place] [address]leno yusuf

## 2021-01-18 NOTE — PROGRESS NOTES
Assessment and Plan:     Problem List Items Addressed This Visit        Digestive    GERD without esophagitis - Primary     Controlled with Pepcid         Relevant Medications    famotidine (PEPCID) 20 mg tablet       Cardiovascular and Mediastinum    Essential hypertension     BP Readings from Last 3 Encounters:   01/18/21 122/60   07/13/20 120/52   03/03/20 134/58     Lab Results   Component Value Date    CREATININE 0 89 01/13/2021    EGFR 62 01/13/2021     Controlled, continue current regimen Lisinopril 20mg, Carvedilol 6 25mg BID         Relevant Medications    lisinopril (ZESTRIL) 20 mg tablet    Other Relevant Orders    Basic metabolic panel    Coronary artery disease involving native coronary artery of native heart without angina pectoris     She follows with Cardiology (Dr Stefania Conklin)  She underwent cardiac catheterization in August 2019 after having several rates of stable and then unstable angina  Catheterization showed LAD blockage, and she underwent successful balloon angioplasty and GABY on 90% lesion of the ostial LAD  She is on guideline directed medical therapy with Carvedilol 6 25 mg twice daily, ASA 81 mg (she completed 1 year of dual antiplatelet therapy is no longer on Brilinta), lisinopril 20 mg, Atorvastatin 40 mg           Atherosclerosis of native coronary artery of native heart with angina pectoris (HCC)       Genitourinary    CKD (chronic kidney disease) stage 3, GFR 30-59 ml/min     Lab Results   Component Value Date    EGFR 62 01/13/2021    EGFR 55 07/09/2020    EGFR 55 01/09/2020    CREATININE 0 89 01/13/2021    CREATININE 0 98 07/09/2020    CREATININE 0 98 01/09/2020     Renal function overall stable, chronic kidney disease labs are within normal limits  Will repeat BMP prior to follow-up appointment 6 months, other renal labs in 1 year           Relevant Orders    Basic metabolic panel       Other    Other hyperlipidemia     Lab Results   Component Value Date    CHOLESTEROL 146 01/13/2021 HDL 62 01/13/2021    LDLCALC 56 01/13/2021    TRIG 141 01/13/2021   Well controlled, at goal  H/o CAD, continue Atorvastatin 40mg daily  Reviewed healthy, low cholesterol diet           Class 2 severe obesity due to excess calories with serious comorbidity and body mass index (BMI) of 35 0 to 35 9 in adult [facility])    Prediabetes     Lab Results   Component Value Date    HGBA1C 6 3 (H) 01/13/2021    HGBA1C 5 9 (H) 05/16/2017    HGBA1C 6 3 (H) 07/27/2016     Lab Results   Component Value Date    GLUF 103 (H) 01/13/2021    LDLCALC 56 01/13/2021    CREATININE 0 89 01/13/2021       Counseled on importance of healthy eating, specifically discussed eating a primarily whole food, plant based diet that is low in processed food, and low in saturated fat  Relevant Orders    Basic metabolic panel      Other Visit Diagnoses     Medicare annual wellness visit, subsequent            BMI Counseling: Body mass index is 34 84 kg/m²  The BMI is above normal  Nutrition recommendations include encouraging healthy choices of fruits and vegetables, consuming healthier snacks and reducing intake of saturated and trans fat  Exercise recommendations include exercising 3-5 times per week  Preventive health issues were discussed with patient, and age appropriate screening tests were ordered as noted in patient's After Visit Summary  Personalized health advice and appropriate referrals for health education or preventive services given if needed, as noted in patient's After Visit Summary  History of Present Illness:     Patient presents for Medicare Annual Wellness visit    Patient Care Team:  Ledy Cortez MD as PCP - General (Family Medicine)  [facility] as PCP - [facility] Isabel-Jose Daniel Field MD     Problem List:     Patient Active Problem List   Diagnosis    Chronic calculous cholecystitis    Essential hypertension    GERD without esophagitis    Other hyperlipidemia    Vitamin D deficiency    Class 2 severe obesity due to excess calories with serious comorbidity and body mass index (BMI) of 35 0 to 35 9 in adult [facility])    CKD (chronic kidney disease) stage 3, GFR 30-59 ml/min    Coronary artery disease involving native coronary artery of native heart without angina pectoris    Atherosclerosis of native coronary artery of native heart with angina pectoris (HCC)    Prediabetes      Past Medical and Surgical History:     Past Medical History:   Diagnosis Date    AVB (atrioventricular block)     first degree    CAD (coronary artery disease)     GERD (gastroesophageal reflux disease) 7/1/2017    HLD (hyperlipidemia) 7/1/2017    Hypertension     LVH (left ventricular hypertrophy)     Osteopenia     Skin cancer 2000    nose    Ventral hernia without obstruction or gangrene 7/1/2017     Past Surgical History:   Procedure Laterality Date    CARDIAC CATHETERIZATION      GALLBLADDER SURGERY      HEMORRHOID SURGERY      HYSTERECTOMY  1987    age 56 w/ left oopherectomy    NOSE SURGERY      basal cell    OOPHORECTOMY Left 1987    age 56    AL LAP,CHOLECYSTECTOMY N/A 8/25/2016    Procedure: LAPAROSCOPIC CHOLECYSTECTOMY ;  Surgeon: Joby Bernard MD;  [facility];  Service: General    AL REPAIR INCISIONAL HERNIA,REDUCIBLE N/A 11/30/2017    Procedure: Makayla Abdi;  Surgeon: Joby Bernard MD;  [facility];  Service: General    ROTATOR CUFF REPAIR Right       Family History:     Family History   Problem Relation Age of Onset    No Known Problems Mother     No Known Problems Father     No Known Problems Daughter     No Known Problems Maternal Grandmother     No Known Problems Maternal Grandfather     No Known Problems Paternal Grandmother     No Known Problems Paternal Grandfather     No Known Problems Half-Sister     No Known Problems Maternal Aunt     Squamous cell carcinoma Brother     No Known Problems Son       Social History: E-Cigarette/Vaping    E-Cigarette Use Never User      E-Cigarette/Vaping Substances    Nicotine No     THC No     CBD No     Flavoring No     Other No     Unknown No      Social History     Socioeconomic History    Marital status:       Spouse name: Not on file    Number of children: 2    Years of education: Not on file    Highest education level: Not on file   Occupational History    Occupation: retired   Social Needs    Financial resource strain: Not on file    Food insecurity     Worry: Not on file     Inability: Not on file   [employer] needs     Medical: Not on file     Non-medical: Not on file   Tobacco Use    Smoking status: Never Smoker    Smokeless tobacco: Never Used   Substance and Sexual Activity    Alcohol use: Yes     Frequency: Monthly or less     Comment: socially    Drug use: No    Sexual activity: Never   Lifestyle    Physical activity     Days per week: Not on file     Minutes per session: Not on file    Stress: Not on file   Relationships    Social connections     Talks on phone: Not on file     Gets together: Not on file     Attends Restorationism service: Not on file     Active member of club or organization: Not on file     Attends meetings of clubs or organizations: Not on file     Relationship status: Not on file    Intimate partner violence     Fear of current or ex partner: Not on file     Emotionally abused: Not on file     Physically abused: Not on file     Forced sexual activity: Not on file   Other Topics Concern    Not on file   Social History Narrative    Caffeine use    Moderate exercising less than 3 times a week      Medications and Allergies:     Current Outpatient Medications   Medication Sig Dispense Refill    aspirin 81 MG tablet Take 81 mg by mouth daily       atorvastatin (LIPITOR) 40 mg tablet Take 1 tablet (40 mg total) by mouth daily 90 tablet 3    carvedilol (COREG) 6 25 mg tablet Take 1 tablet (6 25 mg total) by mouth 2 (two) times a day with meals 180 tablet 3    cholecalciferol (VITAMIN D3) 1,000 units tablet Take 2,000 Units by mouth daily      Co-Enzyme Q-10 100 MG CAPS Take 200 mg by mouth daily   famotidine (PEPCID) 20 mg tablet Take 20 mg by mouth daily OTC      latanoprost (XALATAN) 0 005 % ophthalmic solution Administer 1 drop to both eyes daily  3    lisinopril (ZESTRIL) 20 mg tablet Take 1 tablet (20 mg total) by mouth daily 90 tablet 1    Multiple Vitamins-Minerals (PRESERVISION AREDS) CAPS Take 2 capsules by mouth daily      nitroglycerin (NITROSTAT) 0 4 mg SL tablet Place 1 tablet (0 4 mg total) under the tongue every 5 (five) minutes as needed for chest pain 90 tablet 0     No current facility-administered medications for this visit  Allergies   Allergen Reactions    Codeine      Reaction Date: 19Jul2011;     Other      darvocet      Immunizations:     Immunization History   Administered Date(s) Administered    INFLUENZA 10/05/2017, 10/24/2018, 11/02/2019, 08/26/2020    Influenza Split High Dose Preservative Free IM 10/05/2012, 10/14/2013, 10/16/2014, 10/22/2015, 11/15/2016, 10/24/2018, 11/02/2019    Influenza, high dose seasonal 0 7 mL 08/26/2020    Influenza, seasonal, injectable 10/21/2011, 10/05/2017    Influenza, seasonal, injectable, preservative free 11/02/2019    Pneumococcal Conjugate 13-Valent 04/22/2015, 07/28/2020    Pneumococcal Polysaccharide PPV23 10/05/2012    Td (adult), adsorbed 11/01/2006    Varicella 01/01/2012    Zoster 10/23/2020    Zoster Vaccine Recombinant 07/28/2020, 10/23/2020      Health Maintenance: There are no preventive care reminders to display for this patient  Topic Date Due    DTaP,Tdap,and Td Vaccines (1 - Tdap) 04/[birth year]/1962      Medicare Health Risk Assessment:     /60   Pulse 74   Temp (!) 96 9 °F (36 1 °C)   Resp 14   Ht 5' 4" (1 626 m)   Wt 92 1 kg (203 lb)   BMI 34 84 kg/m²      Rajesh Schofield is here for her Subsequent Wellness visit   Last Medicare Wellness visit information reviewed, patient interviewed and updates made to the record today  Health Risk Assessment:   Patient rates overall health as very good  Patient feels that their physical health rating is same  Eyesight was rated as same  Hearing was rated as same  Patient feels that their emotional and mental health rating is same  Pain experienced in the last 7 days has been none  Patient states that she has experienced no weight loss or gain in last 6 months  Fall Risk Screening: In the past year, patient has experienced: no history of falling in past year      Urinary Incontinence Screening:   Patient has not leaked urine accidently in the last six months  Home Safety:  Patient does not have trouble with stairs inside or outside of their home  Patient has working smoke alarms and has no working carbon monoxide detector  Home safety hazards include: none  Nutrition:   Current diet is Regular, No Added Salt, Low Cholesterol and Limited junk food  Medications:   Patient is currently taking over-the-counter supplements  OTC medications include: see medication list  Patient is able to manage medications  Activities of Daily Living (ADLs)/Instrumental Activities of Daily Living (IADLs):   Walk and transfer into and out of bed and chair?: Yes  Dress and groom yourself?: Yes    Bathe or shower yourself?: Yes    Feed yourself? Yes  Do your laundry/housekeeping?: Yes  Manage your money, pay your bills and track your expenses?: Yes  Make your own meals?: Yes    Do your own shopping?: Yes    Previous Hospitalizations:   Any hospitalizations or ED visits within the last 12 months?: No      Advance Care Planning:   Living will: Yes    Durable POA for healthcare: Yes    Advanced directive: Yes    Advanced directive counseling given: Yes      Comments: Son is healthcare power of       PREVENTIVE SCREENINGS      Cardiovascular Screening:    General: Screening Not Indicated and History Lipid Disorder      Diabetes Screening:     General: Screening Current      Colorectal Cancer Screening:     General: Screening Current      Breast Cancer Screening:     General: Screening Current      Cervical Cancer Screening:    General: Screening Not Indicated      Lung Cancer Screening:     General: Screening Not Indicated    Other Counseling Topics:   Alcohol use counseling and calcium and vitamin D intake and regular weightbearing exercise         Kandi Kearns MD

## 2021-01-20 ENCOUNTER — IMMUNIZATIONS (OUTPATIENT)
Dept: FAMILY MEDICINE CLINIC | Facility: HOSPITAL | Age: 81
End: 2021-01-20

## 2021-01-20 DIAGNOSIS — Z23 ENCOUNTER FOR IMMUNIZATION: Primary | ICD-10-CM

## 2021-01-20 PROCEDURE — 91300 SARS-COV-2 / COVID-19 MRNA VACCINE (PFIZER-BIONTECH) 30 MCG: CPT

## 2021-01-20 PROCEDURE — 0001A SARS-COV-2 / COVID-19 MRNA VACCINE (PFIZER-BIONTECH) 30 MCG: CPT

## 2021-02-10 ENCOUNTER — IMMUNIZATIONS (OUTPATIENT)
Dept: FAMILY MEDICINE CLINIC | Facility: HOSPITAL | Age: 81
End: 2021-02-10

## 2021-02-10 DIAGNOSIS — Z23 ENCOUNTER FOR IMMUNIZATION: Primary | ICD-10-CM

## 2021-02-10 PROCEDURE — 0002A SARS-COV-2 / COVID-19 MRNA VACCINE (PFIZER-BIONTECH) 30 MCG: CPT | Performed by: PHYSICIAN ASSISTANT

## 2021-02-10 PROCEDURE — 91300 SARS-COV-2 / COVID-19 MRNA VACCINE (PFIZER-BIONTECH) 30 MCG: CPT | Performed by: PHYSICIAN ASSISTANT

## 2021-02-12 DIAGNOSIS — I21.4 NSTEMI, INITIAL EPISODE OF CARE (HCC): ICD-10-CM

## 2021-02-12 RX ORDER — CARVEDILOL 6.25 MG/1
6.25 TABLET ORAL 2 TIMES DAILY WITH MEALS
Qty: 180 TABLET | Refills: 3 | Status: SHIPPED | OUTPATIENT
Start: 2021-02-12 | End: 2022-02-14 | Stop reason: SDUPTHER

## 2021-03-11 ENCOUNTER — OFFICE VISIT (OUTPATIENT)
Dept: CARDIOLOGY CLINIC | Facility: MEDICAL CENTER | Age: 81
End: 2021-03-11
Payer: COMMERCIAL

## 2021-03-11 VITALS
DIASTOLIC BLOOD PRESSURE: 62 MMHG | TEMPERATURE: 97.5 F | WEIGHT: 203.4 LBS | SYSTOLIC BLOOD PRESSURE: 122 MMHG | OXYGEN SATURATION: 97 % | HEART RATE: 61 BPM | BODY MASS INDEX: 34.72 KG/M2 | HEIGHT: 64 IN

## 2021-03-11 DIAGNOSIS — I10 ESSENTIAL HYPERTENSION: Primary | ICD-10-CM

## 2021-03-11 DIAGNOSIS — E78.2 MIXED HYPERLIPIDEMIA: ICD-10-CM

## 2021-03-11 DIAGNOSIS — I25.118 CORONARY ARTERY DISEASE OF NATIVE ARTERY OF NATIVE HEART WITH STABLE ANGINA PECTORIS (HCC): ICD-10-CM

## 2021-03-11 PROBLEM — E78.49 OTHER HYPERLIPIDEMIA: Status: RESOLVED | Noted: 2017-07-01 | Resolved: 2021-03-11

## 2021-03-11 PROBLEM — I25.119 ATHEROSCLEROSIS OF NATIVE CORONARY ARTERY OF NATIVE HEART WITH ANGINA PECTORIS (HCC): Status: RESOLVED | Noted: 2020-01-13 | Resolved: 2021-03-11

## 2021-03-11 PROCEDURE — 3078F DIAST BP <80 MM HG: CPT | Performed by: INTERNAL MEDICINE

## 2021-03-11 PROCEDURE — 93000 ELECTROCARDIOGRAM COMPLETE: CPT | Performed by: INTERNAL MEDICINE

## 2021-03-11 PROCEDURE — 3074F SYST BP LT 130 MM HG: CPT | Performed by: INTERNAL MEDICINE

## 2021-03-11 PROCEDURE — 99213 OFFICE O/P EST LOW 20 MIN: CPT | Performed by: INTERNAL MEDICINE

## 2021-03-11 PROCEDURE — 1160F RVW MEDS BY RX/DR IN RCRD: CPT | Performed by: INTERNAL MEDICINE

## 2021-03-11 PROCEDURE — 1036F TOBACCO NON-USER: CPT | Performed by: INTERNAL MEDICINE

## 2021-03-11 NOTE — PROGRESS NOTES
[facility] [place]  [address] [address] [address]  Phone#  [phone]  Fax#  [phone]  [facility] Cardiology Office Consultation             NAME: Mark Melendez  AGE: 79 y o  SEX: female   : 1940   MRN: [id number]    DATE: 3/11/2021  TIME: 2:14 PM    Assessment and plan:    1  Essential hypertension  Assessment & Plan:  BP Readings from Last 3 Encounters:   21 122/60   20 120/52   20 134/58       BP is well controlled at home  Continue current medications  Lifestyle modification including increased physical activity, low-salt diet rich in fruits and vegetables, avoidance of alcohol intake and maintaining healthy weight  Orders:  -     POCT ECG    2  Mixed hyperlipidemia  Assessment & Plan:  Lipid profile reviewed  Lab Results   Component Value Date    LDLCALC 56 2021       Continue current statin dose  Plant based or Mediterranean style diet which is typically high in vegetables, fruits, whole grains, beans, nut and seeds, and olive oil is beneficial         3  Coronary artery disease of native artery of native heart with stable angina pectoris [facility])  Assessment & Plan:  Functional capacity is unchanged  Stable anginal symptoms  Continue current cardiac medications  Continue cardiac risk factor modification  Discussion:  As above  Continue current cardiac medications  Continue modification of cardiac risk factors including increase in physical activity, plant  based or Mediterranean style start diet, maintenance of healthy body weight and avoidance of tobacco products  Report any worsening cardiac symptoms  Keep follow-up as planned with primary care and other specialists  Chief Complaint   Patient presents with    Follow-up     Previous Dr Karly Presley patient- No cardiac symptoms at this time         HPI:    Coronary Artery Disease follow up:   Patient is a 79y o  year old female who presents for routine coronary artery disease follow-up  Patient was previously a patient of Dr Karely Ng and was last seen in 2020 and is here to establish care with me  She has single vessel CAD manifested as NSTEMI in 2019 s/p PCI of proximal LAD with GABY  Took DAPT for one year  Now on aspirin  Echo 7/31/19 showed normal LV size and function, EF 65%, no RWMA, normal diastolic function  Sigmoid septum  Normal RV size and function  LA mildly dilated  Trace MR  Trace AI  Current symptoms: No significant angina or limiting dyspnea  No change in exercise capacity  Compliant to medications  No recent hospitalizations for cardiac causes  BP Readings from Last 4 Encounters:  01/18/21 : 122/60  07/13/20 : 120/52  03/03/20 : 134/58  01/13/20 : 122/54    Creatinine       Date                     Value               Ref Range           Status                01/13/2021               0 89                0 60 - 1 30 mg*     Final                Lab Results       Component                Value               Date                       LDLCALC                  56                  01/13/2021                         Cardiology Problem list:  CAD: 7/19: NSTEMI:  Cath: LM nl, LAD ostial 90%, LCX nl, RCA nl: PCI of LAD with 4 0x15 Xience GABY  ECHO: 7/19: EF 65, LAE, valves OK  Dyslipidemia  HTN    ALLERGIES:  Allergies   Allergen Reactions    Codeine      Reaction Date: 19Jul2011;     Other      darvocet         Current Outpatient Medications:     aspirin 81 MG tablet, Take 81 mg by mouth daily , Disp: , Rfl:     atorvastatin (LIPITOR) 40 mg tablet, Take 1 tablet (40 mg total) by mouth daily, Disp: 90 tablet, Rfl: 3    carvedilol (COREG) 6 25 mg tablet, Take 1 tablet (6 25 mg total) by mouth 2 (two) times a day with meals, Disp: 180 tablet, Rfl: 3    cholecalciferol (VITAMIN D3) 1,000 units tablet, Take 2,000 Units by mouth daily, Disp: , Rfl:     Co-Enzyme Q-10 100 MG CAPS, Take 200 mg by mouth daily  , Disp: , Rfl:    famotidine (PEPCID) 20 mg tablet, Take 20 mg by mouth daily OTC, Disp: , Rfl:     latanoprost (XALATAN) 0 005 % ophthalmic solution, Administer 1 drop to both eyes daily, Disp: , Rfl: 3    lisinopril (ZESTRIL) 20 mg tablet, Take 1 tablet (20 mg total) by mouth daily, Disp: 90 tablet, Rfl: 1    Multiple Vitamins-Minerals (PRESERVISION AREDS) CAPS, Take 2 capsules by mouth daily, Disp: , Rfl:     nitroglycerin (NITROSTAT) 0 4 mg SL tablet, Place 1 tablet (0 4 mg total) under the tongue every 5 (five) minutes as needed for chest pain (Patient not taking: Reported on 3/11/2021), Disp: 90 tablet, Rfl: 0      Review of Systems   Constitutional: Negative for activity change, appetite change and unexpected weight change  HENT: Negative for trouble swallowing  Eyes: Negative for visual disturbance  Respiratory: Negative for chest tightness, shortness of breath and wheezing  Cardiovascular: Negative for chest pain, palpitations and leg swelling  Gastrointestinal: Negative for blood in stool  Endocrine: Negative for polyuria  Genitourinary: Negative for hematuria  Musculoskeletal: Positive for arthralgias  Skin: Negative for rash  Neurological: Negative for dizziness and weakness  Psychiatric/Behavioral: Negative for behavioral problems         Past Medical History:   Diagnosis Date    AVB (atrioventricular block)     first degree    CAD (coronary artery disease)     GERD (gastroesophageal reflux disease) 7/1/2017    HLD (hyperlipidemia) 7/1/2017    Hypertension     LVH (left ventricular hypertrophy)     Osteopenia     Skin cancer 2000    nose    Ventral hernia without obstruction or gangrene 7/1/2017       Past Surgical History:   Procedure Laterality Date    CARDIAC CATHETERIZATION      GALLBLADDER SURGERY      HEMORRHOID SURGERY      HYSTERECTOMY  1987    age 56 w/ left oopherectomy    NOSE SURGERY      basal cell    OOPHORECTOMY Left 1987    age 56    SD LAP,CHOLECYSTECTOMY N/A 8/25/2016    Procedure: LAPAROSCOPIC CHOLECYSTECTOMY ;  Surgeon: Julito Brown MD;  [facility];  Service: General    [address] [place] [address] N/A 11/30/2017    Procedure: INCISIONAL HERNIA REPAIR;  Surgeon: Julito Brown MD;  [facility];  Service: General    ROTATOR CUFF REPAIR Right        Family History   Problem Relation Age of Onset    No Known Problems Mother     No Known Problems Father     No Known Problems Daughter     No Known Problems Maternal Grandmother     No Known Problems Maternal Grandfather     No Known Problems Paternal Grandmother     No Known Problems Paternal Grandfather     No Known Problems Half-Sister     No Known Problems Maternal Aunt     Squamous cell carcinoma Brother     No Known Problems Son        Social History     Socioeconomic History    Marital status:       Spouse name: Not on file    Number of children: 2    Years of education: Not on file    Highest education level: Not on file   Occupational History    Occupation: retired   Social Needs    Financial resource strain: Not on file    Food insecurity     Worry: Not on file     Inability: Not on file   [employer] needs     Medical: Not on file     Non-medical: Not on file   Tobacco Use    Smoking status: Never Smoker    Smokeless tobacco: Never Used   Substance and Sexual Activity    Alcohol use: Yes     Frequency: Monthly or less     Comment: socially    Drug use: No    Sexual activity: Never   Lifestyle    Physical activity     Days per week: Not on file     Minutes per session: Not on file    Stress: Not on file   Relationships    Social connections     Talks on phone: Not on file     Gets together: Not on file     Attends Muslim service: Not on file     Active member of club or organization: Not on file     Attends meetings of clubs or organizations: Not on file     Relationship status: Not on file    Intimate partner violence     Fear of current or ex partner: Not on file     Emotionally abused: Not on file     Physically abused: Not on file     Forced sexual activity: Not on file   Other Topics Concern    Not on file   Social History Narrative    Caffeine use    Moderate exercising less than 3 times a week         Objective:     Vitals:    03/11/21 1352   BP: 122/62   Pulse: 61   Temp: 97 5 °F (36 4 °C)   SpO2: 97%     Wt Readings from Last 3 Encounters:   03/11/21 92 3 kg (203 lb 6 4 oz)   01/18/21 92 1 kg (203 lb)   07/17/20 92 5 kg (204 lb)     BP Readings from Last 3 Encounters:   03/11/21 122/62   01/18/21 122/60   07/13/20 120/52         Physical Exam  Constitutional:       General: She is not in acute distress  Appearance: Normal appearance  HENT:      Head: Normocephalic  Mouth/Throat:      Mouth: Mucous membranes are moist    Eyes:      Conjunctiva/sclera: Conjunctivae normal    Neck:      Vascular: No carotid bruit  Cardiovascular:      Rate and Rhythm: Normal rate and regular rhythm  Heart sounds: Normal heart sounds  No murmur  Pulmonary:      Effort: Pulmonary effort is normal       Breath sounds: Normal breath sounds  Abdominal:      General: Bowel sounds are normal  There is no distension  Musculoskeletal: Normal range of motion  Right lower leg: No edema  Left lower leg: No edema  Skin:     General: Skin is warm  Neurological:      General: No focal deficit present  Psychiatric:         Mood and Affect: Mood normal          Pertinent Laboratory/Diagnostic Studies:    Laboratory studies reviewed personally by Venancio Shaw MD    Imaging Studies: No results found      Lab Results   Component Value Date    HGBA1C 6 3 (H) 01/13/2021    HGBA1C 5 9 (H) 05/16/2017    HGBA1C 6 3 (H) 07/27/2016    HGBA1C 5 9 (H) 10/10/2013     12/14/2017     05/16/2017     11/10/2016    K 4 6 01/13/2021    K 5 1 07/09/2020    K 4 5 01/09/2020    K 4 7 01/10/2019    K 4 5 06/12/2018    K 4 5 12/14/2017    K 5 3 05/16/2017    K 4 5 11/10/2016     01/13/2021     07/09/2020     (H) 01/09/2020     01/10/2019     06/12/2018     12/14/2017     05/16/2017     11/10/2016    CO2 32 01/13/2021    CO2 27 07/09/2020    CO2 27 01/09/2020    CO2 29 01/10/2019    CO2 27 06/12/2018    CO2 27 12/14/2017    CO2 28 05/16/2017    CO2 28 11/10/2016    CREATININE 0 89 01/13/2021    CREATININE 0 98 07/09/2020    CREATININE 0 98 01/09/2020    CREATININE 0 90 12/14/2017    CREATININE 0 92 05/16/2017    CREATININE 1 00 (H) 11/10/2016    BUN 21 01/13/2021    BUN 22 07/09/2020    BUN 23 01/09/2020    BUN 24 01/10/2019    BUN 18 06/12/2018    BUN 19 12/14/2017    BUN 20 05/16/2017    BUN 23 11/10/2016    MG 2 3 01/13/2021    MG 2 6 07/09/2020    MG 2 5 01/09/2020    PHOS 3 8 01/13/2021    PHOS 3 5 07/09/2020    PHOS 4 0 01/09/2020     NT-proBNP: No results for input(s): NTBNP in the last 72 hours  BNP: Invalid input(s): BNPP  CBC:  Lab Results   Component Value Date    WBC 6 41 01/13/2021    WBC 5 3 07/19/2016    RBC 3 91 01/13/2021    RBC 4 24 07/19/2016    HGB 11 7 01/13/2021    HGB 12 6 07/19/2016    HCT 39 3 01/13/2021    HCT 38 7 07/19/2016     (H) 01/13/2021    MCV 91 1 07/19/2016    MCH 29 9 01/13/2021    MCH 29 8 07/19/2016    RDW 13 2 01/13/2021    RDW 16 3 (H) 07/19/2016     01/13/2021     07/19/2016     Coags:    Lipid Profile:   Lab Results   Component Value Date    CHOL 201 (H) 12/14/2017     Lab Results   Component Value Date    HDL 62 01/13/2021     Lab Results   Component Value Date    LDLCALC 56 01/13/2021     Lab Results   Component Value Date    TRIG 141 01/13/2021      Lab Results   Component Value Date    WPD6HLIDEWID 2 730 07/09/2020         Cardiac testing:   EKG reviewed personally: sinus bradycardia, 1st degree AV block        Orders Placed This Encounter   Procedures    POCT ECG         Lauri Velasco MD, [facility]    Portions of the record may have been created with voice recognition software  Occasional wrong word or "sound a like" substitutions may have occurred due to the inherent limitations of voice recognition software  Read the chart carefully and recognize, using context, where substitutions have occurred

## 2021-03-11 NOTE — ASSESSMENT & PLAN NOTE
BP Readings from Last 3 Encounters:   01/18/21 122/60   07/13/20 120/52   03/03/20 134/58       BP is well controlled at home  Continue current medications  Lifestyle modification including increased physical activity, low-salt diet rich in fruits and vegetables, avoidance of alcohol intake and maintaining healthy weight

## 2021-03-11 NOTE — ASSESSMENT & PLAN NOTE
Lipid profile reviewed  Lab Results   Component Value Date    LDLCALC 56 01/13/2021       Continue current statin dose    Plant based or Mediterranean style diet which is typically high in vegetables, fruits, whole grains, beans, nut and seeds, and olive oil is beneficial

## 2021-03-11 NOTE — PATIENT INSTRUCTIONS
Continue current cardiac medications  Continue modification of cardiac risk factors including increase in physical activity, plant  based or Mediterranean style start diet, maintenance of healthy body weight and avoidance of tobacco products  Report any worsening cardiac symptoms  Keep follow-up as planned with primary care and other specialists

## 2021-03-11 NOTE — ASSESSMENT & PLAN NOTE
Functional capacity is unchanged  Stable anginal symptoms  Continue current cardiac medications  Continue cardiac risk factor modification

## 2021-04-12 DIAGNOSIS — E78.49 OTHER HYPERLIPIDEMIA: ICD-10-CM

## 2021-04-12 RX ORDER — ATORVASTATIN CALCIUM 40 MG/1
40 TABLET, FILM COATED ORAL DAILY
Qty: 90 TABLET | Refills: 4 | Status: SHIPPED | OUTPATIENT
Start: 2021-04-12 | End: 2022-01-17 | Stop reason: SDUPTHER

## 2021-07-08 ENCOUNTER — HOSPITAL ENCOUNTER (OUTPATIENT)
Dept: ULTRASOUND IMAGING | Facility: CLINIC | Age: 81
Discharge: HOME | End: 2021-07-08
Payer: COMMERCIAL

## 2021-07-08 ENCOUNTER — HOSPITAL ENCOUNTER (OUTPATIENT)
Dept: MAMMOGRAPHY | Facility: CLINIC | Age: 81
Discharge: HOME | End: 2021-07-08
Payer: COMMERCIAL

## 2021-07-08 VITALS — BODY MASS INDEX: 34.66 KG/M2 | HEIGHT: 64 IN | WEIGHT: 203 LBS

## 2021-07-08 DIAGNOSIS — R92.8 MAMMOGRAM ABNORMAL: ICD-10-CM

## 2021-07-08 PROCEDURE — G0279 TOMOSYNTHESIS, MAMMO: HCPCS

## 2021-07-08 PROCEDURE — 77066 DX MAMMO INCL CAD BI: CPT

## 2021-07-12 NOTE — PROGRESS NOTES
Assessment & Plan:     K21 9 GERD without esophagitis  I have evaluated the patient and found the condition to be: Worsening  I have evaluated the patient and: Recommended continue with same treatment plan    I10 Essential hypertension  I have evaluated the patient and found the condition to be: Stable  I have evaluated the patient and: Recommended continue with same treatment plan    I25 118 Coronary artery disease of native artery of native heart with stable angina pectoris ([facility] )  I have evaluated the patient and found the condition to be: Stable  I have evaluated the patient and: Recommended continue with same treatment plan  The patient should continue treatment and follow-up with: Cardiology     N18 30 CKD (chronic kidney disease) stage 3, GFR 30-59 ml/min ([facility] )  I have evaluated the patient and found the condition to be: Stable  I have evaluated the patient and: Ordered additional services/ studies    E78 2 Mixed hyperlipidemia  I have evaluated the patient and found the condition to be: Stable  I have evaluated the patient and: Recommended continue with same treatment plan    R73 03 Prediabetes  I have evaluated the patient and found the condition to be: Stable  I have evaluated the patient and: Recommended continue with same treatment plan    Problem List Items Addressed This Visit        Digestive    GERD without esophagitis - Primary     Overall controlled, though worsens with eating tomatoes and spicy foods  Recommended Tums for break through symptoms or additional Pepcid at bedtime               Cardiovascular and Mediastinum    Essential hypertension     BP Readings from Last 3 Encounters:   07/20/21 112/64   03/11/21 122/62   01/18/21 122/60     Lab Results   Component Value Date    CREATININE 0 95 07/15/2021    EGFR 57 07/15/2021     Controlled, continue current regimen Lisinopril 20mg, Carvedilol 6 25mg twice daily         Relevant Medications    lisinopril (ZESTRIL) 20 mg tablet    Other Relevant Orders    Basic metabolic panel    Coronary artery disease of native artery of native heart with stable angina pectoris [facility])     She follows with Cardiology (Dr Nuvia Mejía)  She underwent cardiac catheterization in August 2019 with LAD blockage  She underwent successful balloon angioplasty and GABY on 90% lesion of the ostial LAD  She is on guideline directed medical therapy with Carvedilol 6 25 mg twice daily, ASA 81 mg, lisinopril 20 mg, Atorvastatin 40 mg           Relevant Medications    nitroglycerin (NITROSTAT) 0 4 mg SL tablet       Genitourinary    CKD (chronic kidney disease) stage 3, GFR 30-59 ml/min ([facility])     Lab Results   Component Value Date    EGFR 57 07/15/2021    EGFR 62 01/13/2021    EGFR 55 07/09/2020    CREATININE 0 95 07/15/2021    CREATININE 0 89 01/13/2021    CREATININE 0 98 07/09/2020     Renal function overall stable, chronic kidney disease labs are within normal limits  Relevant Orders    Basic metabolic panel    CBC    Magnesium    Phosphorus    PTH, intact       Other    Mixed hyperlipidemia     Lab Results   Component Value Date    CHOLESTEROL 146 01/13/2021    HDL 62 01/13/2021    LDLCALC 56 01/13/2021    TRIG 141 01/13/2021     Well controlled, at goal  H/o CAD, continue Atorvastatin 40mg daily           Relevant Orders    Lipid Panel with Direct LDL reflex    Prediabetes     Lab Results   Component Value Date    HGBA1C 6 3 (H) 01/13/2021    HGBA1C 5 9 (H) 05/16/2017    HGBA1C 6 3 (H) 07/27/2016     Lab Results   Component Value Date    GLUF 103 (H) 07/15/2021    LDLCALC 56 01/13/2021    CREATININE 0 95 07/15/2021     Counseled patient on the importance of lifestyle interventions, specifically discussed a whole food, plant based diet low in saturated fat and processed foods  Discussed the importance of a diet that is rich in whole grains, fruits and vegetables, beans and legumes       Repeat labs prior to next visit           Relevant Orders    Basic metabolic panel    Hemoglobin A1C               Subjective:     Patient ID: Annika Lawrence is a [de-identified] y o  female with history of with hypertension, hyperlipidemia, coronary artery disease, GERD, prediabetes     Chief Complaint   Patient presents with    Follow-up      HPI    Her granddaughter had a baby in April, so she is now a great grandmother       GERD  She is taking Pepcid once in the morning  She reports that if she eats tomatoes or spicy foods it makes symptoms worse       Hypertension  She is currently on lisinopril 20 mg and carvedilol 6 25 mg twice daily  She does not check her blood pressure at home  She denies symptoms of hypertension or hypotension       Coronary Artery Disease  She underwent balloon angioplasty and drug eluting stent to ostial LAD in August 2019  She is on carvedilol, baby aspirin, ACE, statin  She completed her one year of DAPT with Elise  She last saw cardiology in March, she established with Dr Jyothi Manzo  Review of Systems   Constitutional: Negative for activity change, appetite change, chills, fatigue, fever and unexpected weight change  Eyes: Negative for photophobia, pain, redness and visual disturbance  Respiratory: Negative for shortness of breath  Cardiovascular: Negative for chest pain, palpitations and leg swelling  Gastrointestinal: Negative for constipation and diarrhea  Neurological: Negative for dizziness, light-headedness and headaches  Psychiatric/Behavioral: Negative for dysphoric mood and sleep disturbance  The patient is not nervous/anxious  Objective:      /64   Pulse 60   Temp (!) 95 9 °F (35 5 °C)   Resp 16   Ht 5' 4" (1 626 m)   Wt 92 5 kg (204 lb)   BMI 35 02 kg/m²       Physical Exam  Constitutional:       General: She is not in acute distress  Appearance: Normal appearance  She is obese  She is not ill-appearing or toxic-appearing  HENT:      Head: Normocephalic and atraumatic        Right Ear: External ear normal       Left Ear: External ear normal  Eyes:      Extraocular Movements: Extraocular movements intact  Conjunctiva/sclera: Conjunctivae normal    Cardiovascular:      Rate and Rhythm: Regular rhythm  Bradycardia present  Pulses: Normal pulses  Heart sounds: Normal heart sounds  No murmur heard  No friction rub  No gallop  Pulmonary:      Effort: Pulmonary effort is normal  No respiratory distress  Breath sounds: Normal breath sounds  Abdominal:      General: There is no distension  Palpations: Abdomen is soft  Musculoskeletal:         General: Normal range of motion  Cervical back: Normal range of motion and neck supple  No rigidity  Right lower leg: No edema  Left lower leg: No edema  Skin:     General: Skin is warm and dry  Findings: No erythema or rash  Neurological:      General: No focal deficit present  Mental Status: She is alert and oriented to person, place, and time     Psychiatric:         Mood and Affect: Mood normal          Behavior: Behavior normal           Lab Results   Component Value Date    WBC 6 41 01/13/2021    HGB 11 7 01/13/2021    HCT 39 3 01/13/2021     (H) 01/13/2021     01/13/2021     Lab Results   Component Value Date    SODIUM 139 07/15/2021    K 5 2 07/15/2021     07/15/2021    CO2 29 07/15/2021    BUN 22 07/15/2021    CREATININE 0 95 07/15/2021    GLUC 89 08/03/2019    CALCIUM 9 7 07/15/2021     Lab Results   Component Value Date    PTH 44 6 01/13/2021    CALCIUM 9 7 07/15/2021    PHOS 3 8 01/13/2021     Lab Results   Component Value Date    CHOLESTEROL 146 01/13/2021    CHOLESTEROL 143 07/09/2020    CHOLESTEROL 148 01/09/2020     Lab Results   Component Value Date    HDL 62 01/13/2021    HDL 55 07/09/2020    HDL 63 01/09/2020     Lab Results   Component Value Date    TRIG 141 01/13/2021    TRIG 130 07/09/2020    TRIG 127 01/09/2020     Lab Results   Component Value Date    NONHDLC 144 (H) 12/14/2017    [facility] 121 05/16/2017    [facility] 123 11/10/2016     Lab Results   Component Value Date    LDLCALC 56 01/13/2021     Vit D, 25-Hydroxy   Date Value Ref Range Status   07/09/2020 33 3 30 0 - 100 0 ng/mL Final   12/14/2017 39 30 - 100 ng/mL Final     Comment:     Result Comment: Vitamin D Status         25-OH Vitamin D:     Deficiency:                    <20 ng/mL  Insufficiency:             20 - 29 ng/mL  Optimal:                 > or = 30 ng/mL     For 25-OH Vitamin D testing on patients on   D2-supplementation and patients for whom quantitation   of D2 and D3 fractions is required, the QuestAssureD(TM)  25-OH VIT D, (D2,D3), LC/MS/MS is recommended: order   code 96111 (patients >2yrs)  For more information on this test, go to:  http://VideoNot.es/faq/ODF747  (This link is being provided for   informational/educational purposes only )  Performed at: [address] [facility],[address], [place], [address]  [address]

## 2021-07-13 ENCOUNTER — RA CDI HCC (OUTPATIENT)
Dept: OTHER | Facility: HOSPITAL | Age: 81
End: 2021-07-13

## 2021-07-13 NOTE — PROGRESS NOTES
[facility]ks[facility]  coding opportunities          Chart reviewed, no opportunity found: CHART REVIEWED, NO OPPORTUNITY FOUND      gfr doesn't meet for ckd 3 as it's in the 60's , but it's already documented this year-unsure on if I'm supposed to ask for ckd 2-at this time-need another set of labs for trends LM               Patients insurance company: [email or web address]

## 2021-07-15 ENCOUNTER — APPOINTMENT (OUTPATIENT)
Dept: LAB | Facility: MEDICAL CENTER | Age: 81
End: 2021-07-15
Payer: COMMERCIAL

## 2021-07-15 DIAGNOSIS — N18.31 STAGE 3A CHRONIC KIDNEY DISEASE (HCC): ICD-10-CM

## 2021-07-15 DIAGNOSIS — R73.03 PREDIABETES: ICD-10-CM

## 2021-07-15 DIAGNOSIS — I10 ESSENTIAL HYPERTENSION: ICD-10-CM

## 2021-07-15 LAB
ANION GAP SERPL CALCULATED.3IONS-SCNC: 3 MMOL/L (ref 4–13)
BUN SERPL-MCNC: 22 MG/DL (ref 5–25)
CALCIUM SERPL-MCNC: 9.7 MG/DL (ref 8.3–10.1)
CHLORIDE SERPL-SCNC: 107 MMOL/L (ref 100–108)
CO2 SERPL-SCNC: 29 MMOL/L (ref 21–32)
CREAT SERPL-MCNC: 0.95 MG/DL (ref 0.6–1.3)
GFR SERPL CREATININE-BSD FRML MDRD: 57 ML/MIN/1.73SQ M
GLUCOSE P FAST SERPL-MCNC: 103 MG/DL (ref 65–99)
POTASSIUM SERPL-SCNC: 5.2 MMOL/L (ref 3.5–5.3)
SODIUM SERPL-SCNC: 139 MMOL/L (ref 136–145)

## 2021-07-15 PROCEDURE — 36415 COLL VENOUS BLD VENIPUNCTURE: CPT

## 2021-07-15 PROCEDURE — 80048 BASIC METABOLIC PNL TOTAL CA: CPT

## 2021-07-20 ENCOUNTER — OFFICE VISIT (OUTPATIENT)
Dept: FAMILY MEDICINE CLINIC | Facility: CLINIC | Age: 81
End: 2021-07-20
Payer: COMMERCIAL

## 2021-07-20 VITALS
HEART RATE: 60 BPM | TEMPERATURE: 95.9 F | BODY MASS INDEX: 34.83 KG/M2 | DIASTOLIC BLOOD PRESSURE: 64 MMHG | HEIGHT: 64 IN | RESPIRATION RATE: 16 BRPM | WEIGHT: 204 LBS | SYSTOLIC BLOOD PRESSURE: 112 MMHG

## 2021-07-20 DIAGNOSIS — I25.118 CORONARY ARTERY DISEASE OF NATIVE ARTERY OF NATIVE HEART WITH STABLE ANGINA PECTORIS (HCC): ICD-10-CM

## 2021-07-20 DIAGNOSIS — I10 ESSENTIAL HYPERTENSION: ICD-10-CM

## 2021-07-20 DIAGNOSIS — K21.9 GERD WITHOUT ESOPHAGITIS: Primary | ICD-10-CM

## 2021-07-20 DIAGNOSIS — E78.2 MIXED HYPERLIPIDEMIA: ICD-10-CM

## 2021-07-20 DIAGNOSIS — R73.03 PREDIABETES: ICD-10-CM

## 2021-07-20 DIAGNOSIS — N18.31 STAGE 3A CHRONIC KIDNEY DISEASE (HCC): ICD-10-CM

## 2021-07-20 PROCEDURE — 1036F TOBACCO NON-USER: CPT | Performed by: FAMILY MEDICINE

## 2021-07-20 PROCEDURE — T1015 CLINIC SERVICE: HCPCS | Performed by: FAMILY MEDICINE

## 2021-07-20 PROCEDURE — 3074F SYST BP LT 130 MM HG: CPT | Performed by: FAMILY MEDICINE

## 2021-07-20 PROCEDURE — 99214 OFFICE O/P EST MOD 30 MIN: CPT | Performed by: FAMILY MEDICINE

## 2021-07-20 PROCEDURE — 3078F DIAST BP <80 MM HG: CPT | Performed by: FAMILY MEDICINE

## 2021-07-20 PROCEDURE — 1160F RVW MEDS BY RX/DR IN RCRD: CPT | Performed by: FAMILY MEDICINE

## 2021-07-20 PROCEDURE — 3288F FALL RISK ASSESSMENT DOCD: CPT | Performed by: FAMILY MEDICINE

## 2021-07-20 PROCEDURE — 3725F SCREEN DEPRESSION PERFORMED: CPT | Performed by: FAMILY MEDICINE

## 2021-07-20 PROCEDURE — 1101F PT FALLS ASSESS-DOCD LE1/YR: CPT | Performed by: FAMILY MEDICINE

## 2021-07-20 RX ORDER — NITROGLYCERIN 0.4 MG/1
0.4 TABLET SUBLINGUAL
Qty: 10 TABLET | Refills: 0 | Status: SHIPPED | OUTPATIENT
Start: 2021-07-20 | End: 2021-09-23 | Stop reason: SDUPTHER

## 2021-07-20 RX ORDER — LISINOPRIL 20 MG/1
20 TABLET ORAL DAILY
Qty: 90 TABLET | Refills: 1 | Status: SHIPPED | OUTPATIENT
Start: 2021-07-20 | End: 2022-04-11 | Stop reason: SDUPTHER

## 2021-07-20 NOTE — ASSESSMENT & PLAN NOTE
BP Readings from Last 3 Encounters:   07/20/21 112/64   03/11/21 122/62   01/18/21 122/60     Lab Results   Component Value Date    CREATININE 0 95 07/15/2021    EGFR 57 07/15/2021     Controlled, continue current regimen Lisinopril 20mg, Carvedilol 6 25mg twice daily

## 2021-07-20 NOTE — ASSESSMENT & PLAN NOTE
Overall controlled, though worsens with eating tomatoes and spicy foods  Recommended Tums for break through symptoms or additional Pepcid at bedtime

## 2021-07-20 NOTE — ASSESSMENT & PLAN NOTE
Lab Results   Component Value Date    HGBA1C 6 3 (H) 01/13/2021    HGBA1C 5 9 (H) 05/16/2017    HGBA1C 6 3 (H) 07/27/2016     Lab Results   Component Value Date    GLUF 103 (H) 07/15/2021    LDLCALC 56 01/13/2021    CREATININE 0 95 07/15/2021     Counseled patient on the importance of lifestyle interventions, specifically discussed a whole food, plant based diet low in saturated fat and processed foods  Discussed the importance of a diet that is rich in whole grains, fruits and vegetables, beans and legumes       Repeat labs prior to next visit

## 2021-07-20 NOTE — ASSESSMENT & PLAN NOTE
She follows with Cardiology (Dr Gricelda Howard)      She underwent cardiac catheterization in August 2019 with LAD blockage  She underwent successful balloon angioplasty and GABY on 90% lesion of the ostial LAD  She is on guideline directed medical therapy with Carvedilol 6 25 mg twice daily, ASA 81 mg, lisinopril 20 mg, Atorvastatin 40 mg

## 2021-07-20 NOTE — ASSESSMENT & PLAN NOTE
Lab Results   Component Value Date    EGFR 57 07/15/2021    EGFR 62 01/13/2021    EGFR 55 07/09/2020    CREATININE 0 95 07/15/2021    CREATININE 0 89 01/13/2021    CREATININE 0 98 07/09/2020     Renal function overall stable, chronic kidney disease labs are within normal limits

## 2021-07-20 NOTE — ASSESSMENT & PLAN NOTE
Lab Results   Component Value Date    CHOLESTEROL 146 01/13/2021    HDL 62 01/13/2021    LDLCALC 56 01/13/2021    TRIG 141 01/13/2021     Well controlled, at goal  H/o CAD, continue Atorvastatin 40mg daily

## 2021-09-23 ENCOUNTER — OFFICE VISIT (OUTPATIENT)
Dept: CARDIOLOGY CLINIC | Facility: MEDICAL CENTER | Age: 81
End: 2021-09-23
Payer: COMMERCIAL

## 2021-09-23 VITALS
WEIGHT: 200.7 LBS | HEIGHT: 64 IN | HEART RATE: 88 BPM | BODY MASS INDEX: 34.27 KG/M2 | SYSTOLIC BLOOD PRESSURE: 122 MMHG | DIASTOLIC BLOOD PRESSURE: 50 MMHG | OXYGEN SATURATION: 96 %

## 2021-09-23 DIAGNOSIS — E78.2 MIXED HYPERLIPIDEMIA: ICD-10-CM

## 2021-09-23 DIAGNOSIS — I10 ESSENTIAL HYPERTENSION: ICD-10-CM

## 2021-09-23 DIAGNOSIS — I25.118 CORONARY ARTERY DISEASE OF NATIVE ARTERY OF NATIVE HEART WITH STABLE ANGINA PECTORIS (HCC): Primary | ICD-10-CM

## 2021-09-23 PROCEDURE — 1036F TOBACCO NON-USER: CPT | Performed by: INTERNAL MEDICINE

## 2021-09-23 PROCEDURE — 3078F DIAST BP <80 MM HG: CPT | Performed by: INTERNAL MEDICINE

## 2021-09-23 PROCEDURE — 1160F RVW MEDS BY RX/DR IN RCRD: CPT | Performed by: INTERNAL MEDICINE

## 2021-09-23 PROCEDURE — 3074F SYST BP LT 130 MM HG: CPT | Performed by: INTERNAL MEDICINE

## 2021-09-23 PROCEDURE — 99213 OFFICE O/P EST LOW 20 MIN: CPT | Performed by: INTERNAL MEDICINE

## 2021-09-23 RX ORDER — NITROGLYCERIN 0.4 MG/1
0.4 TABLET SUBLINGUAL
Qty: 25 TABLET | Refills: 11 | Status: SHIPPED | OUTPATIENT
Start: 2021-09-23

## 2021-09-23 RX ORDER — NITROGLYCERIN 0.4 MG/1
0.4 TABLET SUBLINGUAL
Qty: 25 TABLET | Refills: 11 | Status: SHIPPED | OUTPATIENT
Start: 2021-09-23 | End: 2021-09-23 | Stop reason: SDUPTHER

## 2021-09-23 NOTE — PATIENT INSTRUCTIONS
Continue current cardiac medications  Continue modification of cardiac risk factors including increase in physical activity, plant  based or Mediterranean style start diet, maintenance of healthy body weight  Report any worsening cardiac symptoms (chest pain,shortness of breath or fainting)  Keep follow-up as planned with primary care and other specialists      Lab Results   Component Value Date    CREATININE 0 95 07/15/2021

## 2021-09-23 NOTE — ASSESSMENT & PLAN NOTE
BP Readings from Last 3 Encounters:   09/23/21 122/50   07/20/21 112/64   03/11/21 122/62       BP is well controlled at home  Continue current medications  Lifestyle modification including increased physical activity, low-salt diet rich in fruits and vegetables, avoidance of alcohol intake and maintaining healthy weight

## 2021-09-23 NOTE — PROGRESS NOTES
[facility] LIZETH Rodriguez [address] [place] [id number]  Phone#  [phone]  Fax#  [phone]  [facility] Office Follow-up Visit             NAME: Violeta Hayes  AGE: [de-identified] y o  SEX: female   : 1940   MRN: [id number]    DATE: 2021  TIME: 9:15 AM    Assessment and plan:    1  Coronary artery disease of native artery of native heart with stable angina pectoris [facility])  Assessment & Plan:  Functional capacity is unchanged  Stable anginal symptoms  Continue current cardiac medications  Continue cardiac risk factor modification  Orders:  -     nitroglycerin (NITROSTAT) 0 4 mg SL tablet; Place 1 tablet (0 4 mg total) under the tongue every 5 (five) minutes as needed for chest pain    2  Essential hypertension  Assessment & Plan:  BP Readings from Last 3 Encounters:   21 122/50   21 112/64   21 122/62       BP is well controlled at home  Continue current medications  Lifestyle modification including increased physical activity, low-salt diet rich in fruits and vegetables, avoidance of alcohol intake and maintaining healthy weight  3  Mixed hyperlipidemia  Assessment & Plan:  Lab Results   Component Value Date     [address] 2021     On statins  Chief Complaint   Patient presents with    Follow-up     6 month f/up       HPI:    Violeta Hayes is a [de-identified]y o -year-old female who presents to the cardiology clinic for follow up  She has single vessel CAD manifested as NSTEMI in 2019 s/p PCI of proximal LAD with GABY  Echo 19 showed normal LV size and function, EF 65%, no RWMA, normal diastolic function  Trace MR  Trace AI  She remains compliant to medical therapy  Patient is doing well from a cardiovascular standpoint  Denies recent hospitalizations  Current medications reviewed  Denies chest pain on exertion  Denies shortness of breath  Denies sustained palpitations  No syncope    She does not have DM   Limited by back pain but still very active  Cardiology Problem list:  CAD: 7/19: NSTEMI:  Cath: LM nl, LAD ostial 90%, LCX nl, RCA nl: PCI of LAD with 4 0x15 Xience GABY  ECHO: 7/19: EF 65, LAE, valves OK  Dyslipidemia  HTN    Past history, family history, social history, current medications, vital signs, recent lab and imaging studies and  prior cardiology studies reviewed independently on this visit  BP Readings from Last 4 Encounters:   09/23/21 122/50   07/20/21 112/64   03/11/21 122/62   01/18/21 122/60      Wt Readings from Last 3 Encounters:   09/23/21 91 kg (200 lb 11 2 oz)   07/20/21 92 5 kg (204 lb)   07/08/21 92 1 kg (203 lb)       Lab Results   Component Value Date    LDLCALC 56 01/13/2021     Lab Results   Component Value Date    CREATININE 0 95 07/15/2021        ALLERGIES:  Allergies   Allergen Reactions    Codeine      Reaction Date: 19Jul2011;     Other      darvocet         Current Outpatient Medications:     aspirin 81 MG tablet, Take 81 mg by mouth daily , Disp: , Rfl:     atorvastatin (LIPITOR) 40 mg tablet, Take 1 tablet (40 mg total) by mouth daily, Disp: 90 tablet, Rfl: 4    carvedilol (COREG) 6 25 mg tablet, Take 1 tablet (6 25 mg total) by mouth 2 (two) times a day with meals, Disp: 180 tablet, Rfl: 3    cholecalciferol (VITAMIN D3) 1,000 units tablet, Take 2,000 Units by mouth daily, Disp: , Rfl:     Co-Enzyme Q-10 100 MG CAPS, Take 200 mg by mouth daily  , Disp: , Rfl:     famotidine (PEPCID) 20 mg tablet, Take 20 mg by mouth daily OTC, Disp: , Rfl:     latanoprost (XALATAN) 0 005 % ophthalmic solution, Administer 1 drop to both eyes daily, Disp: , Rfl: 3    lisinopril (ZESTRIL) 20 mg tablet, Take 1 tablet (20 mg total) by mouth daily, Disp: 90 tablet, Rfl: 1    Multiple Vitamins-Minerals (PRESERVISION AREDS) CAPS, Take 2 capsules by mouth daily, Disp: , Rfl:     nitroglycerin (NITROSTAT) 0 4 mg SL tablet, Place 1 tablet (0 4 mg total) under the tongue every 5 (five) minutes as needed for chest pain, Disp: 25 tablet, Rfl: 11    Review of Systems   Constitutional: Negative for fever  Respiratory: Negative for chest tightness, shortness of breath and wheezing  Cardiovascular: Negative for chest pain, palpitations and leg swelling  Musculoskeletal: Positive for back pain  Skin: Negative for rash  Neurological: Negative for syncope  Hematological: Does not bruise/bleed easily         Past Medical History:   Diagnosis Date    AVB (atrioventricular block)     first degree    CAD (coronary artery disease)     GERD (gastroesophageal reflux disease) 7/1/2017    HLD (hyperlipidemia) 7/1/2017    Hypertension     LVH (left ventricular hypertrophy)     Osteopenia     Skin cancer 2000    nose    Ventral hernia without obstruction or gangrene 7/1/2017     Past Surgical History:   Procedure Laterality Date    CARDIAC CATHETERIZATION      GALLBLADDER SURGERY      HEMORRHOID SURGERY      HYSTERECTOMY  1987    age 56 w/ left oopherectomy    NOSE SURGERY      basal cell    OOPHORECTOMY Left 1987    age 56    IN LAP,CHOLECYSTECTOMY N/A 8/25/2016    Procedure: LAPAROSCOPIC CHOLECYSTECTOMY ;  Surgeon: Tyrone Moon MD;  [facility];  Service: General    [address] [place] [address] N/A 11/30/2017    Procedure: INCISIONAL HERNIA REPAIR;  Surgeon: Tyrone Moon MD;  [facility];  Service: General    ROTATOR CUFF REPAIR Right      Family History   Problem Relation Age of Onset    No Known Problems Mother     No Known Problems Father     No Known Problems Daughter     No Known Problems Maternal Grandmother     No Known Problems Maternal Grandfather     No Known Problems Paternal Grandmother     No Known Problems Paternal Grandfather     No Known Problems Half-Sister     No Known Problems Maternal Aunt     Squamous cell carcinoma Brother     No Known Problems Son     No Known Problems Paternal Aunt     Breast cancer Neg Hx      Social History     Socioeconomic History    Marital status:      Spouse name: Not on file    Number of children: 2    Years of education: Not on file    Highest education level: Not on file   Occupational History    Occupation: retired   Tobacco Use    Smoking status: Never Smoker    Smokeless tobacco: Never Used   Vaping Use    Vaping Use: Never used   Substance and Sexual Activity    Alcohol use: Yes     Comment: socially    Drug use: No    Sexual activity: Never   Other Topics Concern    Not on file   Social History Narrative    Caffeine use    Moderate exercising less than 3 times a week     Social Determinants of Health     Financial Resource Strain:     Difficulty of Paying Living Expenses:    Food Insecurity:     Worried About Running Out of Food in the Last Year:     [address] in the Last Year:    Transportation Needs:     Lack of Transportation (Medical):      Lack of Transportation (Non-Medical):    Physical Activity:     Days of Exercise per Week:     Minutes of Exercise per Session:    Stress:     Feeling of Stress :    Social Connections:     Frequency of Communication with Friends and Family:     Frequency of Social Gatherings with Friends and Family:     Attends Sabianism Services:     Active Member of Clubs or Organizations:     Attends Club or Organization Meetings:     Marital Status:    Intimate Partner Violence:     Fear of Current or Ex-Partner:     Emotionally Abused:     Physically Abused:     Sexually Abused:        Objective:     Vitals:    09/23/21 0908   BP: 122/50   Pulse: 88   SpO2: 96%     Wt Readings from Last 3 Encounters:   09/23/21 91 kg (200 lb 11 2 oz)   07/20/21 92 5 kg (204 lb)   07/08/21 92 1 kg (203 lb)     Pulse Readings from Last 3 Encounters:   09/23/21 88   07/20/21 60   03/11/21 61     BP Readings from Last 3 Encounters:   09/23/21 122/50   07/20/21 112/64   03/11/21 122/62       Physical Exam  Constitutional:       General: She is not in acute distress  HENT:      Head: Normocephalic  Neck:      Vascular: No carotid bruit  Cardiovascular:      Rate and Rhythm: Normal rate and regular rhythm  Heart sounds: S1 normal and S2 normal  Murmur heard  Crescendo systolic murmur is present with a grade of 2/6  Pulmonary:      Effort: Pulmonary effort is normal       Breath sounds: Normal breath sounds  Musculoskeletal:      Right lower leg: No edema  Left lower leg: No edema  Skin:     General: Skin is warm  Neurological:      General: No focal deficit present  Mental Status: She is alert  Psychiatric:         Mood and Affect: Mood normal          Pertinent Laboratory/Diagnostic Studies:    Laboratory studies reviewed personally by Alberto Gilman MD    BMP:   Lab Results   Component Value Date    SODIUM 139 07/15/2021    K 5 2 07/15/2021     07/15/2021    CO2 29 07/15/2021    BUN 22 07/15/2021    CREATININE 0 95 07/15/2021    GLUC 89 08/03/2019    CALCIUM 9 7 07/15/2021     CBC:  Lab Results   Component Value Date    WBC 6 41 01/13/2021    WBC 5 3 07/19/2016    RBC 3 91 01/13/2021    RBC 4 24 07/19/2016    HGB 11 7 01/13/2021    HGB 12 6 07/19/2016    HCT 39 3 01/13/2021    HCT 38 7 07/19/2016     (H) 01/13/2021    MCV 91 1 07/19/2016    MCH 29 9 01/13/2021    MCH 29 8 07/19/2016    RDW 13 2 01/13/2021    RDW 16 3 (H) 07/19/2016     01/13/2021     07/19/2016     Coags:    Lipid Profile:   Lab Results   Component Value Date    CHOL 201 (H) 12/14/2017     Lab Results   Component Value Date    HDL 62 01/13/2021     Lab Results   Component Value Date    LDLCALC 56 01/13/2021     Lab Results   Component Value Date    TRIG 141 01/13/2021      Lab Results   Component Value Date    TBJ6HJPLVCHS 2 730 07/09/2020     Lab Results   Component Value Date    ALT 24 01/13/2021    AST 15 01/13/2021         Imaging Studies:   No results found          Terence Barksdale MD, [facility]    Portions of the record may have been created with voice recognition software  Occasional wrong word or "sound a like" substitutions may have occurred due to the inherent limitations of voice recognition software  Read the chart carefully and recognize, using context, where substitutions have occurred

## 2021-10-16 ENCOUNTER — IMMUNIZATIONS (OUTPATIENT)
Dept: FAMILY MEDICINE CLINIC | Facility: HOSPITAL | Age: 81
End: 2021-10-16

## 2021-10-16 DIAGNOSIS — Z23 ENCOUNTER FOR IMMUNIZATION: Primary | ICD-10-CM

## 2021-10-16 PROCEDURE — 0001A SARS-COV-2 / COVID-19 MRNA VACCINE (PFIZER-BIONTECH) 30 MCG: CPT

## 2021-10-16 PROCEDURE — 91300 SARS-COV-2 / COVID-19 MRNA VACCINE (PFIZER-BIONTECH) 30 MCG: CPT

## 2022-01-02 ENCOUNTER — HOSPITAL ENCOUNTER (EMERGENCY)
Facility: HOSPITAL | Age: 82
Discharge: HOME | End: 2022-01-02
Attending: EMERGENCY MEDICINE
Payer: COMMERCIAL

## 2022-01-02 VITALS
TEMPERATURE: 98.5 F | RESPIRATION RATE: 20 BRPM | HEART RATE: 59 BPM | SYSTOLIC BLOOD PRESSURE: 217 MMHG | BODY MASS INDEX: 35 KG/M2 | DIASTOLIC BLOOD PRESSURE: 92 MMHG | OXYGEN SATURATION: 96 % | WEIGHT: 203.93 LBS

## 2022-01-02 DIAGNOSIS — I10 HYPERTENSION: Primary | ICD-10-CM

## 2022-01-02 LAB
ALBUMIN SERPL BCP-MCNC: 4.2 G/DL (ref 3.5–5)
ALP SERPL-CCNC: 71 U/L (ref 46–116)
ALT SERPL W P-5'-P-CCNC: 29 U/L (ref 12–78)
ANION GAP SERPL CALCULATED.3IONS-SCNC: 9 MMOL/L (ref 4–13)
AST SERPL W P-5'-P-CCNC: 17 U/L (ref 5–45)
BASOPHILS # BLD AUTO: 0.05 THOUSANDS/ΜL (ref 0–0.1)
BASOPHILS NFR BLD AUTO: 1 % (ref 0–1)
BILIRUB SERPL-MCNC: 0.62 MG/DL (ref 0.2–1)
BUN SERPL-MCNC: 24 MG/DL (ref 5–25)
CALCIUM SERPL-MCNC: 9.6 MG/DL (ref 8.3–10.1)
CARDIAC TROPONIN I PNL SERPL HS: 4 NG/L
CHLORIDE SERPL-SCNC: 103 MMOL/L (ref 100–108)
CO2 SERPL-SCNC: 25 MMOL/L (ref 21–32)
CREAT SERPL-MCNC: 0.94 MG/DL (ref 0.6–1.3)
EOSINOPHIL # BLD AUTO: 0.05 THOUSAND/ΜL (ref 0–0.61)
EOSINOPHIL NFR BLD AUTO: 1 % (ref 0–6)
ERYTHROCYTE [DISTWIDTH] IN BLOOD BY AUTOMATED COUNT: 13.2 % (ref 11.6–15.1)
GFR SERPL CREATININE-BSD FRML MDRD: 57 ML/MIN/1.73SQ M
GLUCOSE SERPL-MCNC: 118 MG/DL (ref 65–140)
HCT VFR BLD AUTO: 37.9 % (ref 34.8–46.1)
HGB BLD-MCNC: 12.2 G/DL (ref 11.5–15.4)
IMM GRANULOCYTES # BLD AUTO: 0.03 THOUSAND/UL (ref 0–0.2)
IMM GRANULOCYTES NFR BLD AUTO: 0 % (ref 0–2)
LYMPHOCYTES # BLD AUTO: 1.51 THOUSANDS/ΜL (ref 0.6–4.47)
LYMPHOCYTES NFR BLD AUTO: 17 % (ref 14–44)
MCH RBC QN AUTO: 30.3 PG (ref 26.8–34.3)
MCHC RBC AUTO-ENTMCNC: 32.2 G/DL (ref 31.4–37.4)
MCV RBC AUTO: 94 FL (ref 82–98)
MONOCYTES # BLD AUTO: 0.44 THOUSAND/ΜL (ref 0.17–1.22)
MONOCYTES NFR BLD AUTO: 5 % (ref 4–12)
NEUTROPHILS # BLD AUTO: 6.64 THOUSANDS/ΜL (ref 1.85–7.62)
NEUTS SEG NFR BLD AUTO: 76 % (ref 43–75)
NRBC BLD AUTO-RTO: 0 /100 WBCS
PLATELET # BLD AUTO: 271 THOUSANDS/UL (ref 149–390)
PMV BLD AUTO: 10.9 FL (ref 8.9–12.7)
POTASSIUM SERPL-SCNC: 4.6 MMOL/L (ref 3.5–5.3)
PROT SERPL-MCNC: 7.7 G/DL (ref 6.4–8.2)
RBC # BLD AUTO: 4.03 MILLION/UL (ref 3.81–5.12)
SODIUM SERPL-SCNC: 137 MMOL/L (ref 136–145)
WBC # BLD AUTO: 8.72 THOUSAND/UL (ref 4.31–10.16)

## 2022-01-02 PROCEDURE — 80053 COMPREHEN METABOLIC PANEL: CPT | Performed by: EMERGENCY MEDICINE

## 2022-01-02 PROCEDURE — 99285 EMERGENCY DEPT VISIT HI MDM: CPT | Performed by: EMERGENCY MEDICINE

## 2022-01-02 PROCEDURE — 85025 COMPLETE CBC W/AUTO DIFF WBC: CPT | Performed by: EMERGENCY MEDICINE

## 2022-01-02 PROCEDURE — 84484 ASSAY OF TROPONIN QUANT: CPT | Performed by: EMERGENCY MEDICINE

## 2022-01-02 PROCEDURE — 36415 COLL VENOUS BLD VENIPUNCTURE: CPT

## 2022-01-02 PROCEDURE — 99283 EMERGENCY DEPT VISIT LOW MDM: CPT

## 2022-01-02 PROCEDURE — 93005 ELECTROCARDIOGRAM TRACING: CPT

## 2022-01-02 RX ORDER — CARVEDILOL 12.5 MG/1
12.5 TABLET ORAL 2 TIMES DAILY WITH MEALS
Status: DISCONTINUED | OUTPATIENT
Start: 2022-01-03 | End: 2022-01-02

## 2022-01-02 RX ORDER — CARVEDILOL 12.5 MG/1
12.5 TABLET ORAL ONCE
Status: COMPLETED | OUTPATIENT
Start: 2022-01-02 | End: 2022-01-02

## 2022-01-02 RX ORDER — LISINOPRIL 5 MG/1
20 TABLET ORAL ONCE
Status: COMPLETED | OUTPATIENT
Start: 2022-01-02 | End: 2022-01-02

## 2022-01-02 RX ADMIN — CARVEDILOL 12.5 MG: 12.5 TABLET, FILM COATED ORAL at 20:27

## 2022-01-02 RX ADMIN — LISINOPRIL 20 MG: 5 TABLET ORAL at 20:03

## 2022-01-03 NOTE — ED PROVIDER NOTES
History  Chief Complaint   Patient presents with    Hypertension     Pt presents to the ED with dizziness onset this morning after waking up  Reports headache  Denies nausea or vomiting  Denies visual changes  On coreg and lisinopril  No changes to medications recently  Has been taking them accordingly  Patient is an 69-year-old female with a past medical history of hypertension, hyperlipidemia and coronary artery disease who presents to the emergency department with a complaint of elevated blood pressure  The patient is accompanied by her granddaughter who is a nurse  The patient reports that this morning she awoke with lightheadedness and mild headache that was continuous for most of the day  She checked her blood pressure at home and noticed that it was 210 over 93  Her granddaughter says that she checked it earlier this evening and her blood pressure was 224/100 so they decided to come to the emergency department  She says that her blood pressure normally runs 120/70 on home checks  She denies any additional complaints at this time including chest pain, shortness of breath, change in vision, lightheadedness, numbness, tingling, weakness, abdominal pain, nausea, vomiting or fever  Prior to Admission Medications   Prescriptions Last Dose Informant Patient Reported? Taking? Co-Enzyme Q-10 100 MG CAPS  Self Yes No   Sig: Take 200 mg by mouth daily     Multiple Vitamins-Minerals (PRESERVISION AREDS) CAPS  Self Yes No   Sig: Take 2 capsules by mouth daily   aspirin 81 MG tablet  Self Yes No   Sig: Take 81 mg by mouth daily    atorvastatin (LIPITOR) 40 mg tablet  Self No No   Sig: Take 1 tablet (40 mg total) by mouth daily   carvedilol (COREG) 6 25 mg tablet  Self No No   Sig: Take 1 tablet (6 25 mg total) by mouth 2 (two) times a day with meals   cholecalciferol (VITAMIN D3) 1,000 units tablet  Self Yes No   Sig: Take 2,000 Units by mouth daily   famotidine (PEPCID) 20 mg tablet  Self Yes No Sig: Take 20 mg by mouth daily OTC   latanoprost (XALATAN) 0 005 % ophthalmic solution  Self Yes No   Sig: Administer 1 drop to both eyes daily   lisinopril (ZESTRIL) 20 mg tablet  Self No No   Sig: Take 1 tablet (20 mg total) by mouth daily   nitroglycerin (NITROSTAT) 0 4 mg SL tablet   No No   Sig: Place 1 tablet (0 4 mg total) under the tongue every 5 (five) minutes as needed for chest pain      Facility-Administered Medications: None       Past Medical History:   Diagnosis Date    AVB (atrioventricular block)     first degree    CAD (coronary artery disease)     GERD (gastroesophageal reflux disease) 7/1/2017    HLD (hyperlipidemia) 7/1/2017    Hypertension     LVH (left ventricular hypertrophy)     Osteopenia     Skin cancer 2000    nose    Ventral hernia without obstruction or gangrene 7/1/2017       Past Surgical History:   Procedure Laterality Date    CARDIAC CATHETERIZATION      GALLBLADDER SURGERY      HEMORRHOID SURGERY      HYSTERECTOMY  1987    age 56 w/ left oopherectomy    NOSE SURGERY      basal cell    OOPHORECTOMY Left 1987    age 56    NM LAP,CHOLECYSTECTOMY N/A 8/25/2016    Procedure: LAPAROSCOPIC CHOLECYSTECTOMY ;  Surgeon: Denilson Esteban MD;  [facility];  Service: General    NM REPAIR INCISIONAL [address] N/A 11/30/2017    Procedure: INCISIONAL HERNIA REPAIR;  Surgeon: Denilson Esteban MD;  [facility];  Service: General    ROTATOR CUFF REPAIR Right        Family History   Problem Relation Age of Onset    No Known Problems Mother     No Known Problems Father     No Known Problems Daughter     No Known Problems Maternal Grandmother     No Known Problems Maternal Grandfather     No Known Problems Paternal Grandmother     No Known Problems Paternal Grandfather     No Known Problems Half-Sister     No Known Problems Maternal Aunt     Squamous cell carcinoma Brother     No Known Problems Son     No Known Problems Paternal Aunt     Breast cancer Neg Hx      I have reviewed and agree with the history as documented  E-Cigarette/Vaping    E-Cigarette Use Never User      E-Cigarette/Vaping Substances    Nicotine No     THC No     CBD No     Flavoring No     Other No     Unknown No      Social History     Tobacco Use    Smoking status: Never Smoker    Smokeless tobacco: Never Used   Vaping Use    Vaping Use: Never used   Substance Use Topics    Alcohol use: Yes     Comment: socially    Drug use: No        Review of Systems   Constitutional: Negative for chills and fever  HENT: Negative for congestion, ear pain, rhinorrhea and sore throat  Eyes: Negative for pain and visual disturbance  Respiratory: Negative for cough and shortness of breath  Cardiovascular: Negative for chest pain, palpitations and leg swelling  Gastrointestinal: Negative for abdominal distention, abdominal pain, diarrhea, nausea and vomiting  Endocrine: Negative  Genitourinary: Negative for dysuria, frequency, hematuria and urgency  Musculoskeletal: Negative for arthralgias, back pain, neck pain and neck stiffness  Skin: Negative for color change and rash  Allergic/Immunologic: Negative  Neurological: Positive for light-headedness and headaches  Negative for dizziness, seizures, syncope, speech difficulty, weakness and numbness  All other systems reviewed and are negative        Physical Exam  ED Triage Vitals   Temperature Pulse Respirations Blood Pressure SpO2   01/02/22 1646 01/02/22 1646 01/02/22 1646 01/02/22 1646 01/02/22 1646   98 5 °F (36 9 °C) 59 20 (!) 204/91 96 %      Temp Source Heart Rate Source Patient Position - Orthostatic VS BP Location FiO2 (%)   01/02/22 1646 01/02/22 2005 01/02/22 1646 01/02/22 1646 --   Oral Monitor Sitting Left arm       Pain Score       --                    Orthostatic Vital Signs  Vitals:    01/02/22 1646 01/02/22 2005   BP: (!) 204/91 (!) 217/92   Pulse: 59    Patient Position - Orthostatic VS: Sitting Sitting Physical Exam  Vitals and nursing note reviewed  Constitutional:       General: She is not in acute distress  Appearance: Normal appearance  She is well-developed and normal weight  She is not ill-appearing  HENT:      Head: Normocephalic and atraumatic  Nose: Nose normal  No congestion or rhinorrhea  Mouth/Throat:      Mouth: Mucous membranes are moist       Pharynx: Oropharynx is clear  No oropharyngeal exudate or posterior oropharyngeal erythema  Eyes:      Extraocular Movements: Extraocular movements intact  Conjunctiva/sclera: Conjunctivae normal       Pupils: Pupils are equal, round, and reactive to light  Cardiovascular:      Rate and Rhythm: Normal rate and regular rhythm  Pulses: Normal pulses  Heart sounds: Normal heart sounds  No murmur heard  No friction rub  Pulmonary:      Effort: Pulmonary effort is normal  No respiratory distress  Breath sounds: Normal breath sounds  No wheezing, rhonchi or rales  Chest:      Chest wall: No tenderness  Abdominal:      General: Abdomen is flat  Bowel sounds are normal  There is no distension  Palpations: Abdomen is soft  Tenderness: There is no abdominal tenderness  Musculoskeletal:         General: No swelling or tenderness  Normal range of motion  Cervical back: Normal range of motion and neck supple  No rigidity or tenderness  Right lower leg: No edema  Left lower leg: No edema  Lymphadenopathy:      Cervical: No cervical adenopathy  Skin:     General: Skin is warm and dry  Capillary Refill: Capillary refill takes 2 to 3 seconds  Neurological:      General: No focal deficit present  Mental Status: She is alert and oriented to person, place, and time  Mental status is at baseline  Cranial Nerves: No cranial nerve deficit  Sensory: No sensory deficit  Motor: No weakness           ED Medications  Medications   lisinopril (ZESTRIL) tablet 20 mg (20 mg Oral Given 1/2/22 2003)   carvedilol (COREG) tablet 12 5 mg (12 5 mg Oral Given 1/2/22 2027)       Diagnostic Studies  Results Reviewed     Procedure Component Value Units Date/Time    HS Troponin 0hr (reflex protocol) [[id number]]  (Normal) Collected: 01/02/22 1657    Lab Status: Final result Specimen: Blood from Arm, Left Updated: 01/02/22 1749     hs TnI 0hr 4 ng/L     Comprehensive metabolic panel [[id number]] Collected: 01/02/22 1657    Lab Status: Final result Specimen: Blood from Arm, Left Updated: 01/02/22 1741     Sodium 137 mmol/L      Potassium 4 6 mmol/L      Chloride 103 mmol/L      CO2 25 mmol/L      ANION GAP 9 mmol/L      BUN 24 mg/dL      Creatinine 0 94 mg/dL      Glucose 118 mg/dL      Calcium 9 6 mg/dL      AST 17 U/L      ALT 29 U/L      Alkaline Phosphatase 71 U/L      Total Protein 7 7 g/dL      Albumin 4 2 g/dL      Total Bilirubin 0 62 mg/dL      eGFR 57 ml/min/1 73sq m     Narrative:      [place] guidelines for Chronic Kidney Disease (CKD):     Stage 1 with normal or high GFR (GFR > 90 mL/min/1 73 square meters)    Stage 2 Mild CKD (GFR = 60-89 mL/min/1 73 square meters)    Stage 3A Moderate CKD (GFR = 45-59 mL/min/1 73 square meters)    Stage 3B Moderate CKD (GFR = 30-44 mL/min/1 73 square meters)    Stage 4 Severe CKD (GFR = 15-29 mL/min/1 73 square meters)    Stage 5 End Stage CKD (GFR <15 mL/min/1 73 square meters)  Note: GFR calculation is accurate only with a steady state creatinine    CBC and differential [[id number]]  (Abnormal) Collected: 01/02/22 1657    Lab Status: Final result Specimen: Blood from Arm, Left Updated: 01/02/22 1709     WBC 8 72 Thousand/uL      RBC 4 03 Million/uL      Hemoglobin 12 2 g/dL      Hematocrit 37 9 %      MCV 94 fL      MCH 30 3 pg      MCHC 32 2 g/dL      RDW 13 2 %      MPV 10 9 fL      Platelets 009 Thousands/uL      nRBC 0 /100 WBCs      Neutrophils Relative 76 %      Immat GRANS % 0 %      Lymphocytes Relative 17 % Monocytes Relative 5 %      Eosinophils Relative 1 %      Basophils Relative 1 %      Neutrophils Absolute 6 64 Thousands/µL      Immature Grans Absolute 0 03 Thousand/uL      Lymphocytes Absolute 1 51 Thousands/µL      Monocytes Absolute 0 44 Thousand/µL      Eosinophils Absolute 0 05 Thousand/µL      Basophils Absolute 0 05 Thousands/µL                  No orders to display         Procedures  ECG 12 Lead Documentation Only    Date/Time: 1/2/2022 5:02 PM  Performed by: Coni Fabry, DO  Authorized by: Coni Fabry, DO     Indications / Diagnosis:  Hypertension  ECG reviewed by me, the ED Provider: yes    Patient location:  ED  Previous ECG:     Previous ECG:  Compared to current    Similarity:  No change    Comparison to cardiac monitor: No    Interpretation:     Interpretation: abnormal    Quality:     Tracing quality:  Limited by artifact  Rate:     ECG rate:  53    ECG rate assessment: normal    Rhythm:     Rhythm: sinus bradycardia    Ectopy:     Ectopy: none    QRS:     QRS axis:  Normal    QRS intervals:  Normal  Conduction:     Conduction: normal    ST segments:     ST segments:  Normal  T waves:     T waves: normal    Comments:      No acute ischemia  ED Course                                       MDM  Number of Diagnoses or Management Options  Hypertension  Diagnosis management comments: Patient is an 71-year-old female with a past medical history of hypertension, hyperlipidemia and coronary artery disease who presents to the emergency department with a complaint of elevated blood pressure  On physical exam the patient was unremarkable  CBC, CMP, troponin, and EKG were all ordered in triage and found to be unremarkable  The patient was given Coreg and lisinopril in the emergency department        With electrolytes being normal and her troponin EKG unremarkable we have high clinical suspicion of hypertensive urgency and the patient will be instructed to follow-up with her primary care physician on an outpatient basis for chronic hypertension management  Further instructions per discharge orders  Disposition  Final diagnoses:   Hypertension     Time reflects when diagnosis was documented in both MDM as applicable and the Disposition within this note     Time User Action Codes Description Comment    1/2/2022  8:02 PM Barbie Zack Add [I10] Hypertension       ED Disposition     ED Disposition Condition Date/Time Comment    Discharge Stable Sun Jan 2, 2022  8:03 PM [place] discharge to home/self care  Follow-up Information     Follow up With Specialties Details Why Nicole Mccollum MD Family Medicine Schedule an appointment as soon as possible for a visit   [address] [place] [address]  [phone]            Discharge Medication List as of 1/2/2022  8:03 PM      CONTINUE these medications which have NOT CHANGED    Details   aspirin 81 MG tablet Take 81 mg by mouth daily , Historical Med      atorvastatin (LIPITOR) 40 mg tablet Take 1 tablet (40 mg total) by mouth daily, Starting Mon 4/12/2021, Normal      carvedilol (COREG) 6 25 mg tablet Take 1 tablet (6 25 mg total) by mouth 2 (two) times a day with meals, Starting Fri 2/12/2021, Normal      cholecalciferol (VITAMIN D3) 1,000 units tablet Take 2,000 Units by mouth daily, Historical Med      Co-Enzyme Q-10 100 MG CAPS Take 200 mg by mouth daily  , Historical Med      famotidine (PEPCID) 20 mg tablet Take 20 mg by mouth daily OTC, Historical Med      latanoprost (XALATAN) 0 005 % ophthalmic solution Administer 1 drop to both eyes daily, Starting Thu 4/19/2018, Historical Med      lisinopril (ZESTRIL) 20 mg tablet Take 1 tablet (20 mg total) by mouth daily, Starting Tue 7/20/2021, Normal      Multiple Vitamins-Minerals (PRESERVISION AREDS) CAPS Take 2 capsules by mouth daily, Historical Med      nitroglycerin (NITROSTAT) 0 4 mg SL tablet Place 1 tablet (0 4 mg total) under the tongue every 5 (five) minutes as needed for chest pain, Starting Thu 9/23/2021, Normal           No discharge procedures on file  PDMP Review     None           ED Provider  Attending physically available and evaluated Juliana Dominguez I managed the patient along with the ED Attending      Electronically Signed by         Smiley King DO  01/02/22 6647

## 2022-01-04 LAB
ATRIAL RATE: 55 BPM
QRS AXIS: -8 DEGREES
QRSD INTERVAL: 80 MS
QT INTERVAL: 440 MS
QTC INTERVAL: 414 MS
T WAVE AXIS: 45 DEGREES
VENTRICULAR RATE: 53 BPM

## 2022-01-04 PROCEDURE — 93010 ELECTROCARDIOGRAM REPORT: CPT | Performed by: INTERNAL MEDICINE

## 2022-01-04 NOTE — ASSESSMENT & PLAN NOTE
BP Readings from Last 3 Encounters:   01/05/22 126/56   01/02/22 (!) 217/92   09/23/21 122/50     Patient with normal blood pressure on several rechecks in the office today, both with office cuff and patient's home cuff  Suspect she has not been placing home cuff appropriately, and discussed that this is likely causing high readings  Discussed episode of dizziness possibly due to vertigo  Other lightheaded symptoms sounds more like positional/orthostatic lightheadedness  Will continue current management, she has follow-up with cardiology next week as well as AWV later this month

## 2022-01-04 NOTE — PROGRESS NOTES
FAMILY MEDICINE PROGRESS NOTE    Date of Service: 22  Primary Care Provider:   Varinder Heard MD       Name: Bishop Tejada       : 1940       Age:81 y o  Sex: female      MRN: [id number]      Chief Complaint:Follow-up (ER visit) and Hypertension       ASSESSMENT and PLAN:  Bishop Tejada is a [de-identified] y o  female with:     Problem List Items Addressed This Visit        Cardiovascular and Mediastinum    Essential hypertension - Primary     BP Readings from Last 3 Encounters:   22 126/56   22 (!) 217/92   21 122/50     Patient with normal blood pressure on several rechecks in the office today, both with office cuff and patient's home cuff  Suspect she has not been placing home cuff appropriately, and discussed that this is likely causing high readings  Discussed episode of dizziness possibly due to vertigo  Other lightheaded symptoms sounds more like positional/orthostatic lightheadedness  Will continue current management, she has follow-up with cardiology next week as well as AWV later this month  Coronary artery disease of native artery of native heart with stable angina pectoris ([address] )          SUBJECTIVE:  Bishop Tejada is a [de-identified] y o  female who presents today with a chief complaint of Follow-up (ER visit) and Hypertension  HPI     Patient was seen in the ER on  with hypertension  She had woken up with headache, dizziness and had elevated blood pressure at home  Her blood pressure at home was 224/100  Her initial blood pressure in the ER was 204/91  She was continued on her current medications and discharged home  She has been checking her blood pressure at home, it remains elevated  She feels lightheaded when she bends forward and sits back up  Ranges for blood pressure at home with home cuff  180-203/58-70         Checked in the office with patient cuff 152/72 (left arm)  Check with office cuff 126/56 (left arm)    Recheck in office with office cuff in right arm 132/60  Patient cuff right arm 142/60    Review of Systems   Eyes: Negative for visual disturbance  Respiratory: Negative for shortness of breath  Cardiovascular: Negative for chest pain  Neurological: Positive for light-headedness and headaches  Negative for dizziness  I have reviewed the patient's Past Medical History  Current Outpatient Medications:     aspirin 81 MG tablet, Take 81 mg by mouth daily , Disp: , Rfl:     atorvastatin (LIPITOR) 40 mg tablet, Take 1 tablet (40 mg total) by mouth daily, Disp: 90 tablet, Rfl: 4    carvedilol (COREG) 6 25 mg tablet, Take 1 tablet (6 25 mg total) by mouth 2 (two) times a day with meals, Disp: 180 tablet, Rfl: 3    cholecalciferol (VITAMIN D3) 1,000 units tablet, Take 2,000 Units by mouth daily, Disp: , Rfl:     Co-Enzyme Q-10 100 MG CAPS, Take 200 mg by mouth daily  , Disp: , Rfl:     famotidine (PEPCID) 20 mg tablet, Take 20 mg by mouth daily OTC, Disp: , Rfl:     latanoprost (XALATAN) 0 005 % ophthalmic solution, Administer 1 drop to both eyes daily, Disp: , Rfl: 3    lisinopril (ZESTRIL) 20 mg tablet, Take 1 tablet (20 mg total) by mouth daily, Disp: 90 tablet, Rfl: 1    Multiple Vitamins-Minerals (PRESERVISION AREDS) CAPS, Take 2 capsules by mouth daily, Disp: , Rfl:     nitroglycerin (NITROSTAT) 0 4 mg SL tablet, Place 1 tablet (0 4 mg total) under the tongue every 5 (five) minutes as needed for chest pain, Disp: 25 tablet, Rfl: 11    OBJECTIVE:  /56 (BP Location: Left arm, Patient Position: Sitting)   Pulse 60   Temp (!) 96 3 °F (35 7 °C)   Ht 5' 4" (1 626 m)   Wt 91 6 kg (202 lb)   SpO2 96%   BMI 34 67 kg/m²    BP Readings from Last 3 Encounters:   01/05/22 126/56   01/02/22 (!) 217/92   09/23/21 122/50      Wt Readings from Last 3 Encounters:   01/05/22 91 6 kg (202 lb)   01/02/22 92 5 kg (203 lb 14 8 oz)   09/23/21 91 kg (200 lb 11 2 oz)      Physical Exam  Constitutional:       General: She is not in acute distress  Appearance: Normal appearance  She is normal weight  She is not ill-appearing or toxic-appearing  HENT:      Head: Normocephalic and atraumatic  Right Ear: External ear normal       Left Ear: External ear normal    Eyes:      Extraocular Movements: Extraocular movements intact  Conjunctiva/sclera: Conjunctivae normal    Neck:      Vascular: No carotid bruit  Cardiovascular:      Rate and Rhythm: Normal rate  Pulses: Normal pulses  Heart sounds: Normal heart sounds  No murmur heard  No friction rub  No gallop  Pulmonary:      Effort: Pulmonary effort is normal  No respiratory distress  Breath sounds: Normal breath sounds  No stridor  No wheezing or rales  Abdominal:      General: There is no distension  Palpations: Abdomen is soft  Musculoskeletal:         General: Normal range of motion  Cervical back: Normal range of motion and neck supple  Right lower leg: No edema  Left lower leg: No edema  Skin:     General: Skin is warm and dry  Findings: No erythema or rash  Neurological:      General: No focal deficit present  Mental Status: She is alert and oriented to person, place, and time     Psychiatric:         Mood and Affect: Mood normal          Behavior: Behavior normal          Lab Results   Component Value Date    WBC 8 72 01/02/2022    HGB 12 2 01/02/2022    HCT 37 9 01/02/2022    MCV 94 01/02/2022     01/02/2022     Lab Results   Component Value Date    SODIUM 137 01/02/2022    K 4 6 01/02/2022     01/02/2022    CO2 25 01/02/2022    BUN 24 01/02/2022    CREATININE 0 94 01/02/2022    GLUC 118 01/02/2022    CALCIUM 9 6 01/02/2022     Lab Results   Component Value Date    ALT 29 01/02/2022    AST 17 01/02/2022    ALKPHOS 71 01/02/2022    BILITOT 0 5 12/14/2017     Lab Results   Component Value Date    CHOLESTEROL 146 01/13/2021    CHOLESTEROL 143 07/09/2020    CHOLESTEROL 148 01/09/2020     Lab Results   Component Value Date    HDL 62 01/13/2021    HDL 55 07/09/2020    HDL 63 01/09/2020     Lab Results   Component Value Date    TRIG 141 01/13/2021    TRIG 130 07/09/2020    TRIG 127 01/09/2020     Lab Results   Component Value Date    [place] 144 (H) 12/14/2017    [place] 121 05/16/2017    [place] 123 11/10/2016     Lab Results   Component Value Date    LDLCALC 56 01/13/2021              Return for Next scheduled follow up  Dayron Gan MD    Note: Portions of the record have been created with voice recognition software  Occasional wrong word or "sound a like" substitutions may have occurred due to the inherent limitations of voice recognition software  Read the chart carefully and recognize, using context, where substitutions have occurred

## 2022-01-05 ENCOUNTER — OFFICE VISIT (OUTPATIENT)
Dept: FAMILY MEDICINE CLINIC | Facility: CLINIC | Age: 82
End: 2022-01-05
Payer: COMMERCIAL

## 2022-01-05 VITALS
HEART RATE: 60 BPM | WEIGHT: 202 LBS | BODY MASS INDEX: 34.49 KG/M2 | TEMPERATURE: 96.3 F | DIASTOLIC BLOOD PRESSURE: 56 MMHG | SYSTOLIC BLOOD PRESSURE: 126 MMHG | HEIGHT: 64 IN | OXYGEN SATURATION: 96 %

## 2022-01-05 DIAGNOSIS — I10 ESSENTIAL HYPERTENSION: Primary | ICD-10-CM

## 2022-01-05 DIAGNOSIS — I25.118 CORONARY ARTERY DISEASE OF NATIVE ARTERY OF NATIVE HEART WITH STABLE ANGINA PECTORIS (HCC): ICD-10-CM

## 2022-01-05 PROCEDURE — 99214 OFFICE O/P EST MOD 30 MIN: CPT | Performed by: FAMILY MEDICINE

## 2022-01-10 ENCOUNTER — OFFICE VISIT (OUTPATIENT)
Dept: CARDIOLOGY CLINIC | Facility: MEDICAL CENTER | Age: 82
End: 2022-01-10
Payer: COMMERCIAL

## 2022-01-10 VITALS
SYSTOLIC BLOOD PRESSURE: 120 MMHG | WEIGHT: 200 LBS | HEIGHT: 64 IN | OXYGEN SATURATION: 97 % | DIASTOLIC BLOOD PRESSURE: 50 MMHG | HEART RATE: 62 BPM | BODY MASS INDEX: 34.15 KG/M2

## 2022-01-10 DIAGNOSIS — I77.9 BILATERAL CAROTID ARTERY DISEASE, UNSPECIFIED TYPE (HCC): ICD-10-CM

## 2022-01-10 DIAGNOSIS — I10 ESSENTIAL HYPERTENSION: ICD-10-CM

## 2022-01-10 DIAGNOSIS — N18.31 STAGE 3A CHRONIC KIDNEY DISEASE (HCC): ICD-10-CM

## 2022-01-10 DIAGNOSIS — I25.118 CORONARY ARTERY DISEASE OF NATIVE ARTERY OF NATIVE HEART WITH STABLE ANGINA PECTORIS (HCC): Primary | ICD-10-CM

## 2022-01-10 DIAGNOSIS — E66.01 CLASS 2 SEVERE OBESITY DUE TO EXCESS CALORIES WITH SERIOUS COMORBIDITY AND BODY MASS INDEX (BMI) OF 35.0 TO 35.9 IN ADULT (HCC): ICD-10-CM

## 2022-01-10 DIAGNOSIS — E78.2 MIXED HYPERLIPIDEMIA: ICD-10-CM

## 2022-01-10 PROCEDURE — 3078F DIAST BP <80 MM HG: CPT | Performed by: INTERNAL MEDICINE

## 2022-01-10 PROCEDURE — 99214 OFFICE O/P EST MOD 30 MIN: CPT | Performed by: INTERNAL MEDICINE

## 2022-01-10 PROCEDURE — 1160F RVW MEDS BY RX/DR IN RCRD: CPT | Performed by: INTERNAL MEDICINE

## 2022-01-10 PROCEDURE — 1036F TOBACCO NON-USER: CPT | Performed by: INTERNAL MEDICINE

## 2022-01-10 PROCEDURE — 3074F SYST BP LT 130 MM HG: CPT | Performed by: INTERNAL MEDICINE

## 2022-01-10 NOTE — PROGRESS NOTES
[facility] LIZETH Rodriguez [address] [place] [id number]  Phone#  [phone]  Fax#  [phone]  [facility] Cardiology Office Follow-up Visit             NAME: Javier Sutton  AGE: [de-identified] y o  SEX: female   : 1940   MRN: [id number]    DATE: 1/10/2022  TIME: 8:30 AM    Assessment and plan:    1  Coronary artery disease of native artery of native heart with stable angina pectoris [facility])  Assessment & Plan:  Functional capacity is unchanged  Stable anginal symptoms  Continue current cardiac medications  Continue cardiac risk factor modification  2  Essential hypertension  Assessment & Plan:  Recent evaluation for hypertensive emergency  Subsequent follow-up blood pressures at primary care clinic checked several times with the patient's own machine and office machine were within normal range  BP Readings from Last 3 Encounters:   01/10/22 120/50   22 126/56   22 (!) 217/92     Blood pressure is normal today as well on my check  I checked with her machine and BP was 150/80 and then down to 134/66  Home blood pressure monitoring  Continue carvedilol and lisinopril at the current doses  Lifestyle modification including increased physical activity, low-salt diet rich in fruits and vegetables, avoidance of alcohol intake and maintaining healthy weight  3  Mixed hyperlipidemia  Assessment & Plan:  Lab Results   Component Value Date    LDLCALC 56 2021   Continue current dose of statins  4  Bilateral carotid artery disease, unspecified type ([address] )  -     VAS carotid complete study; Future; Expected date: 01/10/2022    5  Class 2 severe obesity due to excess calories with serious comorbidity and body mass index (BMI) of 35 0 to 35 9 in adult [facility])  Assessment & Plan:  Continue efforts at weight loss including diet modification, increased physical activity and avoidance of calorie dense foods  Mediterranean style diet            6  Stage 3a chronic kidney disease [facility])  Assessment & Plan:  Lab Results   Component Value Date    EGFR 57 01/02/2022    EGFR 57 07/15/2021    EGFR 62 01/13/2021    CREATININE 0 94 01/02/2022    CREATININE 0 95 07/15/2021    CREATININE 0 89 01/13/2021              Chief Complaint   Patient presents with    Follow-up     hospital f/up, high blood pressure issues    Dizziness     pt was dizzy when BP issues are going on       HPI:    Gisella Greer is a [de-identified]y o -year-old female who presents to the cardiology clinic for follow up  He comes in for evaluation coronary artery disease and hypertension after recent ER visit  She presented to the ER last week with complaints of headache and dizziness and found to have elevated blood pressure  Her blood pressure at home before presenting to the ER was 224/100  ER blood pressure was 204/91  EKG showed sinus bradycardia  Troponin was negative  She was seen in primary care in follow-up last week and her blood pressure that time was close to normal: "Checked in the office with patient cuff 152/72 (left arm) Check with office cuff 126/56 (left arm)  Recheck in office with office cuff in right arm 132/60, Patient cuff right arm 142/60"  There were concerns that she was not placing a blood pressure cuff appropriately  Furthermore it was felt like her symptoms of dizziness were because of vertigo  She also has some positional lightheadedness  Emergency room records and pertinent test independently reviewed  Findings discussed with the patient  Blood pressure is normal today as well  Her BP at home was 152/72  Prior EKG showed sinus rhythm, first-degree AV block and nonspecific T-wave abnormality  She is requesting a carotid doppler as she is concerned about carotid blockages (was told that she had plaque in the past)  Past Hx:  She has single vessel CAD manifested as NSTEMI in 2019 s/p PCI of proximal LAD with GABY   Echo 7/31/19 showed normal LV size and function, EF 65%, no RWMA, normal diastolic function  Sigmoid septum  Normal RV size and function  LA mildly dilated  Trace MR  Trace AI         Cardiology Problem list:  CAD: 7/19: NSTEMI:  Cath: LM nl, LAD ostial 90%, LCX nl, RCA nl: PCI of LAD with 4 0x15 Xience GABY  ECHO: 7/19: EF 65, LAE, valves OK  Dyslipidemia  HTN    Past history, family history, social history, current medications, vital signs, recent lab and imaging studies and  prior cardiology studies reviewed independently on this visit  BP Readings from Last 4 Encounters:   01/10/22 120/50   01/05/22 126/56   01/02/22 (!) 217/92   09/23/21 122/50      Wt Readings from Last 3 Encounters:   01/10/22 90 7 kg (200 lb)   01/05/22 91 6 kg (202 lb)   01/02/22 92 5 kg (203 lb 14 8 oz)       Lab Results   Component Value Date    LDLCALC 56 01/13/2021     Lab Results   Component Value Date    CREATININE 0 94 01/02/2022        ALLERGIES:  Allergies   Allergen Reactions    Codeine      Reaction Date: 19Jul2011;     Other      darvocet         Current Outpatient Medications:     aspirin 81 MG tablet, Take 81 mg by mouth daily , Disp: , Rfl:     atorvastatin (LIPITOR) 40 mg tablet, Take 1 tablet (40 mg total) by mouth daily, Disp: 90 tablet, Rfl: 4    carvedilol (COREG) 6 25 mg tablet, Take 1 tablet (6 25 mg total) by mouth 2 (two) times a day with meals, Disp: 180 tablet, Rfl: 3    cholecalciferol (VITAMIN D3) 1,000 units tablet, Take 2,000 Units by mouth daily, Disp: , Rfl:     Co-Enzyme Q-10 100 MG CAPS, Take 200 mg by mouth daily  , Disp: , Rfl:     famotidine (PEPCID) 20 mg tablet, Take 20 mg by mouth daily OTC, Disp: , Rfl:     latanoprost (XALATAN) 0 005 % ophthalmic solution, Administer 1 drop to both eyes daily, Disp: , Rfl: 3    lisinopril (ZESTRIL) 20 mg tablet, Take 1 tablet (20 mg total) by mouth daily, Disp: 90 tablet, Rfl: 1    Multiple Vitamins-Minerals (PRESERVISION AREDS) CAPS, Take 2 capsules by mouth daily, Disp: , Rfl:    nitroglycerin (NITROSTAT) 0 4 mg SL tablet, Place 1 tablet (0 4 mg total) under the tongue every 5 (five) minutes as needed for chest pain, Disp: 25 tablet, Rfl: 11    Review of Systems   Constitutional: Negative for fever and unexpected weight change  HENT: Negative for congestion and trouble swallowing  Eyes: Negative for visual disturbance  Respiratory: Negative for chest tightness, shortness of breath and wheezing  Cardiovascular: Negative for chest pain, palpitations and leg swelling  Gastrointestinal: Negative for abdominal pain and blood in stool  Endocrine: Negative for polyuria  Genitourinary: Negative for hematuria  Musculoskeletal: Negative for arthralgias and myalgias  Skin: Negative for rash  Neurological: Positive for dizziness and light-headedness  Negative for tremors and weakness  Hematological: Does not bruise/bleed easily  Psychiatric/Behavioral: Negative for sleep disturbance         Past Medical History:   Diagnosis Date    AVB (atrioventricular block)     first degree    CAD (coronary artery disease)     GERD (gastroesophageal reflux disease) 7/1/2017    HLD (hyperlipidemia) 7/1/2017    Hypertension     LVH (left ventricular hypertrophy)     Osteopenia     Skin cancer 2000    nose    Ventral hernia without obstruction or gangrene 7/1/2017     Past Surgical History:   Procedure Laterality Date    CARDIAC CATHETERIZATION      GALLBLADDER SURGERY      HEMORRHOID SURGERY      HYSTERECTOMY  1987    age 56 w/ left oopherectomy    NOSE SURGERY      basal cell    OOPHORECTOMY Left 1987    age 56    OH LAP,CHOLECYSTECTOMY N/A 8/25/2016    Procedure: LAPAROSCOPIC CHOLECYSTECTOMY ;  Surgeon: Nancy Li MD;  [facility];  Service: General    [address] [place] [address] N/A 11/30/2017    Procedure: INCISIONAL HERNIA REPAIR;  Surgeon: Nancy Li MD;  [facility];  Service: General    ROTATOR CUFF REPAIR Right      Family History Problem Relation Age of Onset    No Known Problems Mother     No Known Problems Father     No Known Problems Daughter     No Known Problems Maternal Grandmother     No Known Problems Maternal Grandfather     No Known Problems Paternal Grandmother     No Known Problems Paternal Grandfather     No Known Problems Half-Sister     No Known Problems Maternal Aunt     Squamous cell carcinoma Brother     No Known Problems Son     No Known Problems Paternal Aunt     Breast cancer Neg Hx        Social History   reports that she has never smoked  She has never used smokeless tobacco  She reports current alcohol use  She reports that she does not use drugs  Objective:     Vitals:    01/10/22 0809   BP: 120/50   Pulse: 62   SpO2: 97%     Wt Readings from Last 3 Encounters:   01/10/22 90 7 kg (200 lb)   01/05/22 91 6 kg (202 lb)   01/02/22 92 5 kg (203 lb 14 8 oz)     Pulse Readings from Last 3 Encounters:   01/10/22 62   01/05/22 60   01/02/22 59     BP Readings from Last 3 Encounters:   01/10/22 120/50   01/05/22 126/56   01/02/22 (!) 217/92       Physical Exam  Constitutional:       General: She is not in acute distress  HENT:      Head: Normocephalic  Eyes:      Conjunctiva/sclera: Conjunctivae normal    Neck:      Vascular: No carotid bruit  Cardiovascular:      Rate and Rhythm: Normal rate and regular rhythm  Heart sounds: S1 normal and S2 normal  Murmur heard  Pulmonary:      Effort: Pulmonary effort is normal       Breath sounds: Normal breath sounds  Abdominal:      General: Bowel sounds are normal  There is no distension  Musculoskeletal:      Right lower leg: No edema  Left lower leg: No edema  Skin:     General: Skin is warm  Neurological:      General: No focal deficit present     Psychiatric:         Mood and Affect: Mood normal          Pertinent Laboratory/Diagnostic Studies:    Laboratory studies reviewed personally by Shaunna King MD    BMP:   Lab Results Component Value Date    SODIUM 137 01/02/2022    K 4 6 01/02/2022     01/02/2022    CO2 25 01/02/2022    BUN 24 01/02/2022    CREATININE 0 94 01/02/2022    GLUC 118 01/02/2022    CALCIUM 9 6 01/02/2022     CBC:  Lab Results   Component Value Date    WBC 8 72 01/02/2022    WBC 5 3 07/19/2016    RBC 4 03 01/02/2022    RBC 4 24 07/19/2016    HGB 12 2 01/02/2022    HGB 12 6 07/19/2016    HCT 37 9 01/02/2022    HCT 38 7 07/19/2016    MCV 94 01/02/2022    MCV 91 1 07/19/2016    MCH 30 3 01/02/2022    MCH 29 8 07/19/2016    RDW 13 2 01/02/2022    RDW 16 3 (H) 07/19/2016     01/02/2022     07/19/2016     Coags:    Lipid Profile:   Lab Results   Component Value Date    CHOL 201 (H) 12/14/2017     Lab Results   Component Value Date    HDL 62 01/13/2021     Lab Results   Component Value Date    LDLCALC 56 01/13/2021     Lab Results   Component Value Date    TRIG 141 01/13/2021      Lab Results   Component Value Date    CRE6QEMORSWI 2 730 07/09/2020     Lab Results   Component Value Date    ALT 29 01/02/2022    AST 17 01/02/2022       Imaging Studies:     No results found  Vernon Browne MD, [facility]    Portions of the record may have been created with voice recognition software  Occasional wrong word or "sound a like" substitutions may have occurred due to the inherent limitations of voice recognition software  Read the chart carefully and recognize, using context, where substitutions have occurred

## 2022-01-10 NOTE — ASSESSMENT & PLAN NOTE
Lab Results   Component Value Date    EGFR 57 01/02/2022    EGFR 57 07/15/2021    EGFR 62 01/13/2021    CREATININE 0 94 01/02/2022    CREATININE 0 95 07/15/2021    CREATININE 0 89 01/13/2021

## 2022-01-10 NOTE — ASSESSMENT & PLAN NOTE
Recent evaluation for hypertensive emergency  Subsequent follow-up blood pressures at primary care clinic checked several times with the patient's own machine and office machine were within normal range  BP Readings from Last 3 Encounters:   01/10/22 120/50   01/05/22 126/56   01/02/22 (!) 217/92     Blood pressure is normal today as well on my check  I checked with her machine and BP was 150/80 and then down to 134/66  Home blood pressure monitoring  Continue carvedilol and lisinopril at the current doses  Lifestyle modification including increased physical activity, low-salt diet rich in fruits and vegetables, avoidance of alcohol intake and maintaining healthy weight

## 2022-01-10 NOTE — PATIENT INSTRUCTIONS
Following healthy lifestyle will help lower your blood pressure:   Continue carvedilol and lisinopril at the current doses  Increase physical activity  Low-salt diet rich in fruits and vegetables  Maintain healthy weight  Do not smoke or use alcohol  Take medications as instructed  Practice meditation and stress reduction  Carotid doppler  Planned

## 2022-01-10 NOTE — ASSESSMENT & PLAN NOTE
Continue efforts at weight loss including diet modification, increased physical activity and avoidance of calorie dense foods  Mediterranean style diet

## 2022-01-11 ENCOUNTER — RA CDI HCC (OUTPATIENT)
Dept: OTHER | Facility: HOSPITAL | Age: 82
End: 2022-01-11

## 2022-01-11 NOTE — PROGRESS NOTES
[facility]  coding opportunities       Chart reviewed, no opportunity found: CHART REVIEWED, NO OPPORTUNITY FOUND           Patients insurance company: [employer] (Medicare Advantage and Commercial)

## 2022-01-14 ENCOUNTER — HOSPITAL ENCOUNTER (OUTPATIENT)
Dept: RADIOLOGY | Facility: MEDICAL CENTER | Age: 82
Discharge: HOME | End: 2022-01-14
Payer: COMMERCIAL

## 2022-01-14 DIAGNOSIS — I77.9 BILATERAL CAROTID ARTERY DISEASE, UNSPECIFIED TYPE (HCC): ICD-10-CM

## 2022-01-14 PROCEDURE — 93880 EXTRACRANIAL BILAT STUDY: CPT | Performed by: SURGERY

## 2022-01-14 PROCEDURE — 93880 EXTRACRANIAL BILAT STUDY: CPT

## 2022-01-14 NOTE — RESULT ENCOUNTER NOTE
Carotid doppler reviewed  Mild  plaque noted in the carotid arteries  No blockages over 50% noted  Overall these results are reassuring  Continue current medications  Please call patient with test results

## 2022-01-17 DIAGNOSIS — E78.49 OTHER HYPERLIPIDEMIA: ICD-10-CM

## 2022-01-17 RX ORDER — ATORVASTATIN CALCIUM 40 MG/1
40 TABLET, FILM COATED ORAL DAILY
Qty: 90 TABLET | Refills: 4 | Status: SHIPPED | OUTPATIENT
Start: 2022-01-17

## 2022-01-25 ENCOUNTER — TELEPHONE (OUTPATIENT)
Dept: FAMILY MEDICINE CLINIC | Facility: CLINIC | Age: 82
End: 2022-01-25

## 2022-01-27 ENCOUNTER — RA CDI HCC (OUTPATIENT)
Dept: OTHER | Facility: HOSPITAL | Age: 82
End: 2022-01-27

## 2022-01-27 NOTE — PROGRESS NOTES
Aleksandra [facility]  coding opportunities       Chart reviewed, no opportunity found: CHART REVIEWED, NO OPPORTUNITY FOUND                        Patients insurance company: [employer] (Medicare Advantage and Commercial)

## 2022-02-02 NOTE — ASSESSMENT & PLAN NOTE
BP Readings from Last 3 Encounters:   02/03/22 138/64   01/10/22 120/50   01/05/22 126/56     Controlled, continue lisinopril 20 mg daily and carvedilol 6 25 mg twice daily  She reports normal home readings, she denies symptoms of hyper of hypotension

## 2022-02-02 NOTE — ASSESSMENT & PLAN NOTE
Lab Results   Component Value Date    HGBA1C 6 3 (H) 01/13/2021    HGBA1C 5 9 (H) 05/16/2017    HGBA1C 6 3 (H) 07/27/2016     Lab Results   Component Value Date    GLUF 103 (H) 07/15/2021    LDLCALC 56 01/13/2021    CREATININE 0 94 01/02/2022     Counseled patient on the importance of lifestyle interventions

## 2022-02-02 NOTE — ASSESSMENT & PLAN NOTE
She follows with Cardiology (Dr Lisa Ho)      She underwent cardiac catheterization in August 2019 with LAD blockage  She underwent successful balloon angioplasty and GABY on 90% lesion of the ostial LAD  Continue guideline directed medical therapy

## 2022-02-02 NOTE — ASSESSMENT & PLAN NOTE
Lab Results   Component Value Date    EGFR 57 01/02/2022    EGFR 57 07/15/2021    EGFR 62 01/13/2021    CREATININE 0 94 01/02/2022    CREATININE 0 95 07/15/2021    CREATININE 0 89 01/13/2021     Renal function stable, continue to monitor

## 2022-02-03 ENCOUNTER — OFFICE VISIT (OUTPATIENT)
Dept: FAMILY MEDICINE CLINIC | Facility: CLINIC | Age: 82
End: 2022-02-03
Payer: COMMERCIAL

## 2022-02-03 VITALS
RESPIRATION RATE: 17 BRPM | SYSTOLIC BLOOD PRESSURE: 138 MMHG | DIASTOLIC BLOOD PRESSURE: 64 MMHG | BODY MASS INDEX: 34.15 KG/M2 | WEIGHT: 200 LBS | TEMPERATURE: 96.6 F | OXYGEN SATURATION: 99 % | HEART RATE: 56 BPM | HEIGHT: 64 IN

## 2022-02-03 DIAGNOSIS — I25.118 CORONARY ARTERY DISEASE OF NATIVE ARTERY OF NATIVE HEART WITH STABLE ANGINA PECTORIS (HCC): ICD-10-CM

## 2022-02-03 DIAGNOSIS — E66.01 CLASS 2 SEVERE OBESITY DUE TO EXCESS CALORIES WITH SERIOUS COMORBIDITY AND BODY MASS INDEX (BMI) OF 35.0 TO 35.9 IN ADULT (HCC): ICD-10-CM

## 2022-02-03 DIAGNOSIS — R73.03 PREDIABETES: ICD-10-CM

## 2022-02-03 DIAGNOSIS — Z00.00 MEDICARE ANNUAL WELLNESS VISIT, SUBSEQUENT: Primary | ICD-10-CM

## 2022-02-03 DIAGNOSIS — I10 ESSENTIAL HYPERTENSION: ICD-10-CM

## 2022-02-03 DIAGNOSIS — I21.4 NSTEMI, INITIAL EPISODE OF CARE (HCC): ICD-10-CM

## 2022-02-03 DIAGNOSIS — K21.9 GERD WITHOUT ESOPHAGITIS: ICD-10-CM

## 2022-02-03 DIAGNOSIS — Z13.89 ENCOUNTER FOR SCREENING FOR OTHER DISORDER: ICD-10-CM

## 2022-02-03 DIAGNOSIS — N18.31 STAGE 3A CHRONIC KIDNEY DISEASE (HCC): ICD-10-CM

## 2022-02-03 DIAGNOSIS — H40.9 GLAUCOMA OF BOTH EYES, UNSPECIFIED GLAUCOMA TYPE: ICD-10-CM

## 2022-02-03 DIAGNOSIS — E78.2 MIXED HYPERLIPIDEMIA: ICD-10-CM

## 2022-02-03 PROCEDURE — G0439 PPPS, SUBSEQ VISIT: HCPCS | Performed by: FAMILY MEDICINE

## 2022-02-03 PROCEDURE — 3075F SYST BP GE 130 - 139MM HG: CPT | Performed by: FAMILY MEDICINE

## 2022-02-03 PROCEDURE — 3078F DIAST BP <80 MM HG: CPT | Performed by: FAMILY MEDICINE

## 2022-02-03 PROCEDURE — 1036F TOBACCO NON-USER: CPT | Performed by: FAMILY MEDICINE

## 2022-02-03 PROCEDURE — 1160F RVW MEDS BY RX/DR IN RCRD: CPT | Performed by: FAMILY MEDICINE

## 2022-02-03 PROCEDURE — G0444 DEPRESSION SCREEN ANNUAL: HCPCS | Performed by: FAMILY MEDICINE

## 2022-02-03 PROCEDURE — 1125F AMNT PAIN NOTED PAIN PRSNT: CPT | Performed by: FAMILY MEDICINE

## 2022-02-03 PROCEDURE — 3288F FALL RISK ASSESSMENT DOCD: CPT | Performed by: FAMILY MEDICINE

## 2022-02-03 PROCEDURE — 1170F FXNL STATUS ASSESSED: CPT | Performed by: FAMILY MEDICINE

## 2022-02-03 NOTE — PROGRESS NOTES
Assessment and Plan:     Problem List Items Addressed This Visit        Digestive    GERD without esophagitis     Continue Pepcid as needed            Cardiovascular and Mediastinum    Essential hypertension     BP Readings from Last 3 Encounters:   02/03/22 138/64   01/10/22 120/50   01/05/22 126/56     Controlled, continue lisinopril 20 mg daily and carvedilol 6 25 mg twice daily  She reports normal home readings, she denies symptoms of hyper of hypotension  Coronary artery disease of native artery of native heart with stable angina pectoris [facility])     She follows with Cardiology (Dr Aristides Teague)      She underwent cardiac catheterization in August 2019 with LAD blockage  She underwent successful balloon angioplasty and GABY on 90% lesion of the ostial LAD  Continue guideline directed medical therapy             Genitourinary    CKD (chronic kidney disease) stage 3, GFR 30-59 ml/min ([facility])     Lab Results   Component Value Date    EGFR 57 01/02/2022    EGFR 57 07/15/2021    EGFR 62 01/13/2021    CREATININE 0 94 01/02/2022    CREATININE 0 95 07/15/2021    CREATININE 0 89 01/13/2021     Renal function stable, continue to monitor             Other    Mixed hyperlipidemia     Continue atorvastatin 40 mg daily         Class 2 severe obesity due to excess calories with serious comorbidity and body mass index (BMI) of 35 0 to 35 9 in adult [facility])    Prediabetes     Lab Results   Component Value Date    HGBA1C 6 3 (H) 01/13/2021    HGBA1C 5 9 (H) 05/16/2017    HGBA1C 6 3 (H) 07/27/2016     Lab Results   Component Value Date    GLUF 103 (H) 07/15/2021    LDLCALC 56 01/13/2021    CREATININE 0 94 01/02/2022     Counseled patient on the importance of lifestyle interventions           Glaucoma of both eyes     She follows with Dr Pamela Cordon every 3 to 4 months  She uses eye drops and takes Preservision              Other Visit Diagnoses     Medicare annual wellness visit, subsequent    -  Primary    NSTEMI, initial episode of care ([place] [id number] )            BMI Counseling: Body mass index is 34 22 kg/m²  The BMI is above normal  Nutrition recommendations include encouraging healthy choices of fruits and vegetables, moderation in carbohydrate intake and reducing intake of saturated and trans fat  Exercise recommendations include exercising 3-5 times per week  Rationale for BMI follow-up plan is due to patient being overweight or obese  Depression Screening and Follow-up Plan: Patient was screened for depression during today's encounter  They screened negative with a PHQ-2 score of 0  Preventive health issues were discussed with patient, and age appropriate screening tests were ordered as noted in patient's After Visit Summary  Personalized health advice and appropriate referrals for health education or preventive services given if needed, as noted in patient's After Visit Summary  History of Present Illness:     Patient presents for Medicare Annual Wellness visit    Patient Care Team:  Rogelio Fry MD as PCP - General (Family Medicine)  Alejandra Box MD as PCP - PCP-[facility]  Melany De La Cruz MD     Problem List:     Patient Active Problem List   Diagnosis    Chronic calculous cholecystitis    Essential hypertension    GERD without esophagitis    Mixed hyperlipidemia    Class 2 severe obesity due to excess calories with serious comorbidity and body mass index (BMI) of 35 0 to 35 9 in adult [facility])    CKD (chronic kidney disease) stage 3, GFR 30-59 ml/min ([facility])    Coronary artery disease of native artery of native heart with stable angina pectoris ([place] [id number] )    Prediabetes    Glaucoma of both eyes      Past Medical and Surgical History:     Past Medical History:   Diagnosis Date    AVB (atrioventricular block)     first degree    CAD (coronary artery disease)     GERD (gastroesophageal reflux disease) 7/1/2017    HLD (hyperlipidemia) 7/1/2017    Hypertension     LVH (left ventricular hypertrophy)     Osteopenia     Skin cancer 2000    nose    Ventral hernia without obstruction or gangrene 7/1/2017     Past Surgical History:   Procedure Laterality Date    CARDIAC CATHETERIZATION      GALLBLADDER SURGERY      HEMORRHOID SURGERY      HYSTERECTOMY  1987    age 56 w/ left oopherectomy    NOSE SURGERY      basal cell    OOPHORECTOMY Left 1987    age 56    NH LAP,CHOLECYSTECTOMY N/A 8/25/2016    Procedure: LAPAROSCOPIC CHOLECYSTECTOMY ;  Surgeon: Melvin Archibald MD;  [facility];  Service: General    [address] [place] [address] N/A 11/30/2017    Procedure: INCISIONAL HERNIA REPAIR;  Surgeon: Melvin Archibald MD;  [facility];  Service: General    ROTATOR CUFF REPAIR Right       Family History:     Family History   Problem Relation Age of Onset    No Known Problems Mother     No Known Problems Father     No Known Problems Daughter     No Known Problems Maternal Grandmother     No Known Problems Maternal Grandfather     No Known Problems Paternal Grandmother     No Known Problems Paternal Grandfather     No Known Problems Half-Sister     No Known Problems Maternal Aunt     Squamous cell carcinoma Brother     No Known Problems Son     No Known Problems Paternal Aunt     Breast cancer Neg Hx       Social History:     Social History     Socioeconomic History    Marital status:       Spouse name: None    Number of children: 2    Years of education: None    Highest education level: None   Occupational History    Occupation: retired   Tobacco Use    Smoking status: Never Smoker    Smokeless tobacco: Never Used   Vaping Use    Vaping Use: Never used   Substance and Sexual Activity    Alcohol use: Yes     Comment: socially    Drug use: No    Sexual activity: Never   Other Topics Concern    None   Social History Narrative    Caffeine use    Moderate exercising less than 3 times a week     Social Determinants of Health     Financial Resource Strain: Not on file   Food Insecurity: Not on file   Transportation Needs: Not on file   Physical Activity: Not on file   Stress: Not on file   Social Connections: Not on file   Intimate Partner Violence: Not on file   Housing Stability: Not on file      Medications and Allergies:     Current Outpatient Medications   Medication Sig Dispense Refill    aspirin 81 MG tablet Take 81 mg by mouth daily       atorvastatin (LIPITOR) 40 mg tablet Take 1 tablet (40 mg total) by mouth daily 90 tablet 4    carvedilol (COREG) 6 25 mg tablet Take 1 tablet (6 25 mg total) by mouth 2 (two) times a day with meals 180 tablet 3    cholecalciferol (VITAMIN D3) 1,000 units tablet Take 2,000 Units by mouth daily      Co-Enzyme Q-10 100 MG CAPS Take 200 mg by mouth daily   famotidine (PEPCID) 20 mg tablet Take 20 mg by mouth daily OTC      latanoprost (XALATAN) 0 005 % ophthalmic solution Administer 1 drop to both eyes daily  3    lisinopril (ZESTRIL) 20 mg tablet Take 1 tablet (20 mg total) by mouth daily 90 tablet 1    Multiple Vitamins-Minerals (PRESERVISION AREDS) CAPS Take 2 capsules by mouth daily      nitroglycerin (NITROSTAT) 0 4 mg SL tablet Place 1 tablet (0 4 mg total) under the tongue every 5 (five) minutes as needed for chest pain 25 tablet 11     No current facility-administered medications for this visit       Allergies   Allergen Reactions    Codeine      Reaction Date: 19Jul2011;     Other      darvocet      Immunizations:     Immunization History   Administered Date(s) Administered    COVID-19 PFIZER VACCINE 0 3 ML IM 01/20/2021, 02/10/2021, 10/16/2021    INFLUENZA 10/05/2017, 10/24/2018, 11/02/2019, 08/26/2020, 09/29/2021    Influenza Split High Dose Preservative Free IM 10/05/2012, 10/14/2013, 10/16/2014, 10/22/2015, 11/15/2016, 10/24/2018, 11/02/2019    Influenza, high dose seasonal 0 7 mL 08/26/2020    Influenza, seasonal, injectable 10/21/2011, 10/05/2017    Influenza, seasonal, injectable, preservative free 11/02/2019    Pneumococcal Conjugate 13-Valent 04/22/2015, 07/28/2020    Pneumococcal Polysaccharide PPV23 10/05/2012    Td (adult), adsorbed 11/01/2006    Tdap 03/30/2021    Varicella 01/01/2012    Zoster 10/23/2020    Zoster Vaccine Recombinant 07/28/2020, 10/23/2020      Health Maintenance:         Topic Date Due    Breast Cancer Screening: Mammogram  07/08/2022     There are no preventive care reminders to display for this patient  Medicare Health Risk Assessment:     /64 (BP Location: Left arm, Patient Position: Sitting, Cuff Size: Large)   Pulse 56   Temp (!) 96 6 °F (35 9 °C)   Resp 17   Ht 5' 4 1" (1 628 m)   Wt 90 7 kg (200 lb)   SpO2 99%   Breastfeeding No   BMI 34 22 kg/m²      Luly Tanner is here for her Subsequent Wellness visit  Last Medicare Wellness visit information reviewed, patient interviewed and updates made to the record today  Health Risk Assessment:   Patient rates overall health as very good  Patient feels that their physical health rating is same  Patient is satisfied with their life  Eyesight was rated as same  Hearing was rated as same  Patient feels that their emotional and mental health rating is same  Patients states they are never, rarely angry  Patient states they are sometimes unusually tired/fatigued  Pain experienced in the last 7 days has been none  Patient states that she has experienced no weight loss or gain in last 6 months  Depression Screening:   PHQ-2 Score: 0      Fall Risk Screening: In the past year, patient has experienced: no history of falling in past year      Urinary Incontinence Screening:   Patient has not leaked urine accidently in the last six months  Home Safety:  Patient does not have trouble with stairs inside or outside of their home  Patient has working smoke alarms and has no working carbon monoxide detector  Home safety hazards include: none  Nutrition:   Current diet is Regular       Medications:   Patient is currently taking over-the-counter supplements  OTC medications include: see medication list  Patient is able to manage medications  Activities of Daily Living (ADLs)/Instrumental Activities of Daily Living (IADLs):   Walk and transfer into and out of bed and chair?: Yes  Dress and groom yourself?: Yes    Bathe or shower yourself?: Yes    Feed yourself? Yes  Do your laundry/housekeeping?: Yes  Manage your money, pay your bills and track your expenses?: Yes  Make your own meals?: Yes    Do your own shopping?: Yes    Previous Hospitalizations:   Any hospitalizations or ED visits within the last 12 months?: No      Advance Care Planning:   Living will: Yes    Durable POA for healthcare: Yes    Advanced directive: Yes      Comments: Her son, Skye Heard is her HCPOA    Cognitive Screening:   Provider or family/friend/caregiver concerned regarding cognition?: No    PREVENTIVE SCREENINGS      Cardiovascular Screening:    General: Screening Not Indicated and History Lipid Disorder      Diabetes Screening:     General: Screening Current      Breast Cancer Screening:     General: Screening Current and Screening Not Indicated      Cervical Cancer Screening:    General: Screening Not Indicated      Lung Cancer Screening:     General: Screening Not Indicated    Screening, Brief Intervention, and Referral to Treatment (SBIRT)    Screening  Typical number of drinks in a day: 0  Typical number of drinks in a week: 0  Interpretation: Low risk drinking behavior  Single Item Drug Screening:  How often have you used an illegal drug (including marijuana) or a prescription medication for non-medical reasons in the past year? never    Single Item Drug Screen Score: 0  Interpretation: Negative screen for possible drug use disorder    Brief Intervention  Alcohol & drug use screenings were reviewed  No concerns regarding substance use disorder identified       Other Counseling Topics:   Calcium and vitamin D intake and regular weightbearing exercise         Varinder Heard MD

## 2022-02-03 NOTE — PATIENT INSTRUCTIONS
Medicare Preventive Visit Patient Instructions  Thank you for completing your Welcome to Medicare Visit or Medicare Annual Wellness Visit today  Your next wellness visit will be due in one year (2/4/2023)  The screening/preventive services that you may require over the next 5-10 years are detailed below  Some tests may not apply to you based off risk factors and/or age  Screening tests ordered at today's visit but not completed yet may show as past due  Also, please note that scanned in results may not display below  Preventive Screenings:  Service Recommendations Previous Testing/Comments   Colorectal Cancer Screening  * Colonoscopy    * Fecal Occult Blood Test (FOBT)/Fecal Immunochemical Test (FIT)  * Fecal DNA/Cologuard Test  * Flexible Sigmoidoscopy Age: 54-65 years old   Colonoscopy: every 10 years (may be performed more frequently if at higher risk)  OR  FOBT/FIT: every 1 year  OR  Cologuard: every 3 years  OR  Sigmoidoscopy: every 5 years  Screening may be recommended earlier than age 48 if at higher risk for colorectal cancer  Also, an individualized decision between you and your healthcare provider will decide whether screening between the ages of 74-80 would be appropriate  Colonoscopy: 09/13/2018  FOBT/FIT: Not on file  Cologuard: Not on file  Sigmoidoscopy: Not on file          Breast Cancer Screening Age: 36 years old  Frequency: every 1-2 years  Not required if history of left and right mastectomy Mammogram: 07/08/2021    Screening Current   Cervical Cancer Screening Between the ages of 21-29, pap smear recommended once every 3 years  Between the ages of 33-67, can perform pap smear with HPV co-testing every 5 years     Recommendations may differ for women with a history of total hysterectomy, cervical cancer, or abnormal pap smears in past  Pap Smear: 07/03/2019    Screening Not Indicated   Hepatitis C Screening Once for adults born between 1945 and 1965  More frequently in patients at high risk for Hepatitis C Hep C Antibody: Not on file        Diabetes Screening 1-2 times per year if you're at risk for diabetes or have pre-diabetes Fasting glucose: 103 mg/dL   A1C: 6 3 %    Screening Current   Cholesterol Screening Once every 5 years if you don't have a lipid disorder  May order more often based on risk factors  Lipid panel: 01/13/2021    Screening Not Indicated  History Lipid Disorder     Other Preventive Screenings Covered by Medicare:  1  Abdominal Aortic Aneurysm (AAA) Screening: covered once if your at risk  You're considered to be at risk if you have a family history of AAA  2  Lung Cancer Screening: covers low dose CT scan once per year if you meet all of the following conditions: (1) Age 50-69; (2) No signs or symptoms of lung cancer; (3) Current smoker or have quit smoking within the last 15 years; (4) You have a tobacco smoking history of at least 30 pack years (packs per day multiplied by number of years you smoked); (5) You get a written order from a healthcare provider  3  Glaucoma Screening: covered annually if you're considered high risk: (1) You have diabetes OR (2) Family history of glaucoma OR (3)  aged 48 and older OR (3)  American aged 72 and older  3  Osteoporosis Screening: covered every 2 years if you meet one of the following conditions: (1) You're estrogen deficient and at risk for osteoporosis based off medical history and other findings; (2) Have a vertebral abnormality; (3) On glucocorticoid therapy for more than 3 months; (4) Have primary hyperparathyroidism; (5) On osteoporosis medications and need to assess response to drug therapy  · Last bone density test (DXA Scan): 07/01/2019   5  HIV Screening: covered annually if you're between the age of 15-65  Also covered annually if you are younger than 13 and older than 72 with risk factors for HIV infection   For pregnant patients, it is covered up to 3 times per pregnancy  Immunizations:  Immunization Recommendations   Influenza Vaccine Annual influenza vaccination during flu season is recommended for all persons aged >= 6 months who do not have contraindications   Pneumococcal Vaccine (Prevnar and Pneumovax)  * Prevnar = PCV13  * Pneumovax = PPSV23   Adults 25-60 years old: 1-3 doses may be recommended based on certain risk factors  Adults 72 years old: Prevnar (PCV13) vaccine recommended followed by Pneumovax (PPSV23) vaccine  If already received PPSV23 since turning 65, then PCV13 recommended at least one year after PPSV23 dose  Hepatitis B Vaccine 3 dose series if at intermediate or high risk (ex: diabetes, end stage renal disease, liver disease)   Tetanus (Td) Vaccine - COST NOT COVERED BY MEDICARE PART B Following completion of primary series, a booster dose should be given every 10 years to maintain immunity against tetanus  Td may also be given as tetanus wound prophylaxis  Tdap Vaccine - COST NOT COVERED BY MEDICARE PART B Recommended at least once for all adults  For pregnant patients, recommended with each pregnancy  Shingles Vaccine (Shingrix) - COST NOT COVERED BY MEDICARE PART B  2 shot series recommended in those aged 48 and above     Health Maintenance Due:      Topic Date Due    Breast Cancer Screening: Mammogram  07/08/2022     Immunizations Due:  There are no preventive care reminders to display for this patient  Advance Directives   What are advance directives? Advance directives are legal documents that state your wishes and plans for medical care  These plans are made ahead of time in case you lose your ability to make decisions for yourself  Advance directives can apply to any medical decision, such as the treatments you want, and if you want to donate organs  What are the types of advance directives? There are many types of advance directives, and each state has rules about how to use them   You may choose a combination of any of the following:  · Living will: This is a written record of the treatment you want  You can also choose which treatments you do not want, which to limit, and which to stop at a certain time  This includes surgery, medicine, IV fluid, and tube feedings  · Durable power of  for healthcare [place]): This is a written record that states who you want to make healthcare choices for you when you are unable to make them for yourself  This person, called a proxy, is usually a family member or a friend  You may choose more than 1 proxy  · Do not resuscitate (DNR) order:  A DNR order is used in case your heart stops beating or you stop breathing  It is a request not to have certain forms of treatment, such as CPR  A DNR order may be included in other types of advance directives  · Medical directive: This covers the care that you want if you are in a coma, near death, or unable to make decisions for yourself  You can list the treatments you want for each condition  Treatment may include pain medicine, surgery, blood transfusions, dialysis, IV or tube feedings, and a ventilator (breathing machine)  · Values history: This document has questions about your views, beliefs, and how you feel and think about life  This information can help others choose the care that you would choose  Why are advance directives important? An advance directive helps you control your care  Although spoken wishes may be used, it is better to have your wishes written down  Spoken wishes can be misunderstood, or not followed  Treatments may be given even if you do not want them  An advance directive may make it easier for your family to make difficult choices about your care  Weight Management   Why it is important to manage your weight:  Being overweight increases your risk of health conditions such as heart disease, high blood pressure, type 2 diabetes, and certain types of cancer   It can also increase your risk for osteoarthritis, sleep apnea, and other respiratory problems  Aim for a slow, steady weight loss  Even a small amount of weight loss can lower your risk of health problems  How to lose weight safely:  A safe and healthy way to lose weight is to eat fewer calories and get regular exercise  You can lose up about 1 pound a week by decreasing the number of calories you eat by 500 calories each day  Healthy meal plan for weight management:  A healthy meal plan includes a variety of foods, contains fewer calories, and helps you stay healthy  A healthy meal plan includes the following:  · Eat whole-grain foods more often  A healthy meal plan should contain fiber  Fiber is the part of grains, fruits, and vegetables that is not broken down by your body  Whole-grain foods are healthy and provide extra fiber in your diet  Some examples of whole-grain foods are whole-wheat breads and pastas, oatmeal, brown rice, and bulgur  · Eat a variety of vegetables every day  Include dark, leafy greens such as spinach, kale, abimael greens, and mustard greens  Eat yellow and orange vegetables such as carrots, sweet potatoes, and winter squash  · Eat a variety of fruits every day  Choose fresh or canned fruit (canned in its own juice or light syrup) instead of juice  Fruit juice has very little or no fiber  · Eat low-fat dairy foods  Drink fat-free (skim) milk or 1% milk  Eat fat-free yogurt and low-fat cottage cheese  Try low-fat cheeses such as mozzarella and other reduced-fat cheeses  · Choose meat and other protein foods that are low in fat  Choose beans or other legumes such as split peas or lentils  Choose fish, skinless poultry (chicken or turkey), or lean cuts of red meat (beef or pork)  Before you cook meat or poultry, cut off any visible fat  · Use less fat and oil  Try baking foods instead of frying them  Add less fat, such as margarine, sour cream, regular salad dressing and mayonnaise to foods  Eat fewer high-fat foods   Some examples of high-fat foods include french fries, doughnuts, ice cream, and cakes  · Eat fewer sweets  Limit foods and drinks that are high in sugar  This includes candy, cookies, regular soda, and sweetened drinks  Exercise:  Exercise at least 30 minutes per day on most days of the week  Some examples of exercise include walking, biking, dancing, and swimming  You can also fit in more physical activity by taking the stairs instead of the elevator or parking farther away from stores  Ask your healthcare provider about the best exercise plan for you  © Copyright [employer] 2018 Information is for End User's use only and may not be sold, redistributed or otherwise used for commercial purposes   All illustrations and images included in CareNotes® are the copyrighted property of [employer]  or [address] [place], [place] [address]leno yusuf

## 2022-02-14 DIAGNOSIS — I21.4 NSTEMI, INITIAL EPISODE OF CARE (HCC): ICD-10-CM

## 2022-02-14 RX ORDER — CARVEDILOL 6.25 MG/1
6.25 TABLET ORAL 2 TIMES DAILY WITH MEALS
Qty: 180 TABLET | Refills: 3 | Status: SHIPPED | OUTPATIENT
Start: 2022-02-14

## 2022-04-11 DIAGNOSIS — I10 ESSENTIAL HYPERTENSION: ICD-10-CM

## 2022-04-11 RX ORDER — LISINOPRIL 20 MG/1
20 TABLET ORAL DAILY
Qty: 90 TABLET | Refills: 1 | Status: SHIPPED | OUTPATIENT
Start: 2022-04-11

## 2022-04-14 ENCOUNTER — OFFICE VISIT (OUTPATIENT)
Dept: CARDIOLOGY CLINIC | Facility: MEDICAL CENTER | Age: 82
End: 2022-04-14
Payer: COMMERCIAL

## 2022-04-14 VITALS
DIASTOLIC BLOOD PRESSURE: 56 MMHG | HEART RATE: 68 BPM | HEIGHT: 64 IN | SYSTOLIC BLOOD PRESSURE: 122 MMHG | OXYGEN SATURATION: 97 % | BODY MASS INDEX: 34.45 KG/M2 | WEIGHT: 201.8 LBS

## 2022-04-14 DIAGNOSIS — I10 ESSENTIAL HYPERTENSION: ICD-10-CM

## 2022-04-14 DIAGNOSIS — E78.2 MIXED HYPERLIPIDEMIA: ICD-10-CM

## 2022-04-14 DIAGNOSIS — I25.118 CORONARY ARTERY DISEASE OF NATIVE ARTERY OF NATIVE HEART WITH STABLE ANGINA PECTORIS (HCC): Primary | ICD-10-CM

## 2022-04-14 PROCEDURE — 1036F TOBACCO NON-USER: CPT | Performed by: INTERNAL MEDICINE

## 2022-04-14 PROCEDURE — 3078F DIAST BP <80 MM HG: CPT | Performed by: INTERNAL MEDICINE

## 2022-04-14 PROCEDURE — 99213 OFFICE O/P EST LOW 20 MIN: CPT | Performed by: INTERNAL MEDICINE

## 2022-04-14 PROCEDURE — 1160F RVW MEDS BY RX/DR IN RCRD: CPT | Performed by: INTERNAL MEDICINE

## 2022-04-14 NOTE — ASSESSMENT & PLAN NOTE
BP Readings from Last 3 Encounters:   04/14/22 122/56   02/03/22 138/64   01/10/22 120/50       Home blood pressure monitoring  Continue current medications  Lifestyle modification including increased physical activity, low-salt diet rich in fruits and vegetables, avoidance of alcohol intake and maintaining healthy weight

## 2022-04-14 NOTE — PROGRESS NOTES
[facility] LIZETH Rodriguez [address] [place] [id number]  Phone#  [phone]  Fax#  [phone]  [facility] Cardiology Office Follow-up Visit             NAME: Rick Toure  AGE: [de-identified] y o  SEX: female   : 1940   MRN: [id number]    DATE: 2022  TIME: 8:01 AM    Assessment and plan:    Coronary artery disease of native artery of native heart with stable angina pectoris (HCC)  Functional capacity is unchanged  Stable anginal symptoms  Continue current cardiac medications  Continue cardiac risk factor modification  Report any worsening exertional symptoms  Essential hypertension  BP Readings from Last 3 Encounters:   22 122/56   22 138/64   01/10/22 120/50       Home blood pressure monitoring  Continue current medications  Lifestyle modification including increased physical activity, low-salt diet rich in fruits and vegetables, avoidance of alcohol intake and maintaining healthy weight  Mixed hyperlipidemia  Lab Results   Component Value Date    LDLCALC 56 2021     On statins with no myalgias  Lab Results   Component Value Date    ALT 29 2022    AST 17 2022    ALKPHOS 71 2022    BILITOT 0 5 2017              Chief Complaint   Patient presents with    Follow-up     6 month f/up       HPI:    Rick Toure is a [de-identified]y o -year-old female who presents to the cardiology clinic for follow up  She comes in for a follow-up on her coronary artery disease, hypertension and dyslipidemia  Last intervention was in 2019  Echo that time showed preserved EF  She had only single-vessel disease  LDL is excellent  Blood pressure is well controlled  Lives by herself, very independent  Son lives close by  Patient is doing well from a cardiovascular standpoint  Denies recent hospitalizations  Current medications reviewed  Reports compliance to medicines  No side effects reported  Denies chest pain on exertion  Denies worsening shortness of breath  Denies sustained palpitations  She reports some back pain and stiffness at times  She reports occasional bruising but no significant bleeding  Cardiology Problem list:  CAD: 7/19: NSTEMI:  Cath: LM nl, LAD ostial 90%, LCX nl, RCA nl: PCI of LAD with 4 0x15 Xience GABY  ECHO: 7/19: EF 65, LAE, valves OK  Dyslipidemia  HTN  Carotid disease:  1/22:  Doppler less than 50% stenosis    Past history, family history, social history, current medications, vital signs, recent lab and imaging studies and  prior cardiology studies reviewed independently on this visit  BP Readings from Last 4 Encounters:   04/14/22 122/56   02/03/22 138/64   01/10/22 120/50   01/05/22 126/56      Wt Readings from Last 3 Encounters:   04/14/22 91 5 kg (201 lb 12 8 oz)   02/03/22 90 7 kg (200 lb)   01/10/22 90 7 kg (200 lb)       Lab Results   Component Value Date    LDLCALC 56 01/13/2021     Lab Results   Component Value Date    CREATININE 0 94 01/02/2022        ALLERGIES:  Allergies   Allergen Reactions    Codeine      Reaction Date: 19Jul2011;     Other      darvocet         Current Outpatient Medications:     aspirin 81 MG tablet, Take 81 mg by mouth daily , Disp: , Rfl:     atorvastatin (LIPITOR) 40 mg tablet, Take 1 tablet (40 mg total) by mouth daily, Disp: 90 tablet, Rfl: 4    carvedilol (COREG) 6 25 mg tablet, Take 1 tablet (6 25 mg total) by mouth 2 (two) times a day with meals, Disp: 180 tablet, Rfl: 3    cholecalciferol (VITAMIN D3) 1,000 units tablet, Take 2,000 Units by mouth daily, Disp: , Rfl:     Co-Enzyme Q-10 100 MG CAPS, Take 200 mg by mouth daily  , Disp: , Rfl:     famotidine (PEPCID) 20 mg tablet, Take 20 mg by mouth daily OTC, Disp: , Rfl:     latanoprost (XALATAN) 0 005 % ophthalmic solution, Administer 1 drop to both eyes daily, Disp: , Rfl: 3    lisinopril (ZESTRIL) 20 mg tablet, Take 1 tablet (20 mg total) by mouth daily, Disp: 90 tablet, Rfl: 1    Multiple Vitamins-Minerals (PRESERVISION AREDS) CAPS, Take 2 capsules by mouth daily, Disp: , Rfl:     nitroglycerin (NITROSTAT) 0 4 mg SL tablet, Place 1 tablet (0 4 mg total) under the tongue every 5 (five) minutes as needed for chest pain, Disp: 25 tablet, Rfl: 11    Review of Systems   Constitutional: Negative  HENT: Negative  Eyes: Negative  Respiratory: Negative for cough and shortness of breath  Cardiovascular: Negative for chest pain and palpitations  Gastrointestinal: Negative  Endocrine: Negative  Genitourinary: Negative  Musculoskeletal: Positive for arthralgias and back pain  Skin: Negative  Allergic/Immunologic: Negative  Neurological: Negative  Hematological: Bruises/bleeds easily  Psychiatric/Behavioral: Negative          Past Medical History:   Diagnosis Date    AVB (atrioventricular block)     first degree    CAD (coronary artery disease)     GERD (gastroesophageal reflux disease) 7/1/2017    HLD (hyperlipidemia) 7/1/2017    Hypertension     LVH (left ventricular hypertrophy)     Osteopenia     Skin cancer 2000    nose    Ventral hernia without obstruction or gangrene 7/1/2017     Past Surgical History:   Procedure Laterality Date    CARDIAC CATHETERIZATION      GALLBLADDER SURGERY      HEMORRHOID SURGERY      HYSTERECTOMY  1987    age 56 w/ left oopherectomy    NOSE SURGERY      basal cell    OOPHORECTOMY Left 1987    age 56    UT LAP,CHOLECYSTECTOMY N/A 8/25/2016    Procedure: LAPAROSCOPIC CHOLECYSTECTOMY ;  Surgeon: Sabrina Ibanez MD;  [facility];  Service: General    UT REPAIR INCISIONAL HERNIA,REDUCIBLE N/A 11/30/2017    Procedure: Low [place];  Surgeon: Sabrina Ibanez MD;  [facility];  Service: General    ROTATOR CUFF REPAIR Right      Family History   Problem Relation Age of Onset    No Known Problems Mother     No Known Problems Father     No Known Problems Daughter     No Known Problems Maternal Grandmother     No Known Problems Maternal Grandfather     No Known Problems Paternal Grandmother     No Known Problems Paternal Grandfather     No Known Problems Half-Sister     No Known Problems Maternal Aunt     Squamous cell carcinoma Brother     No Known Problems Son     No Known Problems Paternal Aunt     Breast cancer Neg Hx        Social History   reports that she has never smoked  She has never used smokeless tobacco  She reports current alcohol use  She reports that she does not use drugs  Objective:     Vitals:    04/14/22 0755   BP: 122/56   Pulse: 68   SpO2: 97%     Wt Readings from Last 3 Encounters:   04/14/22 91 5 kg (201 lb 12 8 oz)   02/03/22 90 7 kg (200 lb)   01/10/22 90 7 kg (200 lb)     Pulse Readings from Last 3 Encounters:   04/14/22 68   02/03/22 56   01/10/22 62     BP Readings from Last 3 Encounters:   04/14/22 122/56   02/03/22 138/64   01/10/22 120/50       Physical Exam  Vitals reviewed  Constitutional:       General: She is not in acute distress  Appearance: She is obese  HENT:      Head: Normocephalic  Right Ear: External ear normal       Left Ear: External ear normal    Eyes:      General: No scleral icterus  Neck:      Vascular: No carotid bruit  Cardiovascular:      Rate and Rhythm: Normal rate and regular rhythm  Heart sounds: S1 normal and S2 normal  Heart sounds are distant  No murmur heard  Pulmonary:      Breath sounds: Normal breath sounds  No wheezing or rhonchi  Abdominal:      General: There is no distension  Musculoskeletal:      Right lower leg: No edema  Left lower leg: No edema  Skin:     General: Skin is warm  Findings: No lesion  Neurological:      General: No focal deficit present  Mental Status: She is alert     Psychiatric:         Mood and Affect: Mood normal          Pertinent Laboratory/Diagnostic Studies:    Laboratory studies reviewed personally by Deya De Jesus MD    BMP:   Lab Results   Component Value Date SODIUM 137 01/02/2022    K 4 6 01/02/2022     01/02/2022    CO2 25 01/02/2022    BUN 24 01/02/2022    CREATININE 0 94 01/02/2022    GLUC 118 01/02/2022    CALCIUM 9 6 01/02/2022     CBC:  Lab Results   Component Value Date    WBC 8 72 01/02/2022    WBC 5 3 07/19/2016    RBC 4 03 01/02/2022    RBC 4 24 07/19/2016    HGB 12 2 01/02/2022    HGB 12 6 07/19/2016    HCT 37 9 01/02/2022    HCT 38 7 07/19/2016    MCV 94 01/02/2022    MCV 91 1 07/19/2016    MCH 30 3 01/02/2022    MCH 29 8 07/19/2016    RDW 13 2 01/02/2022    RDW 16 3 (H) 07/19/2016     01/02/2022     07/19/2016     Coags:    Lipid Profile:   Lab Results   Component Value Date    CHOL 201 (H) 12/14/2017     Lab Results   Component Value Date    HDL 62 01/13/2021     Lab Results   Component Value Date    LDLCALC 56 01/13/2021     Lab Results   Component Value Date    TRIG 141 01/13/2021      Lab Results   Component Value Date    [id number] 2 730 07/09/2020     Lab Results   Component Value Date    ALT 29 01/02/2022    AST 17 01/02/2022       Imaging Studies:     No results found  Piper Anderson MD, [facility] [place]    Portions of the record may have been created with voice recognition software  Occasional wrong word or "sound a like" substitutions may have occurred due to the inherent limitations of voice recognition software  Read the chart carefully and recognize, using context, where substitutions have occurred

## 2022-04-14 NOTE — ASSESSMENT & PLAN NOTE
Functional capacity is unchanged  Stable anginal symptoms  Continue current cardiac medications  Continue cardiac risk factor modification  Report any worsening exertional symptoms

## 2022-04-14 NOTE — ASSESSMENT & PLAN NOTE
Lab Results   Component Value Date    LDLCALC 56 01/13/2021     On statins with no myalgias    Lab Results   Component Value Date    ALT 29 01/02/2022    AST 17 01/02/2022    ALKPHOS 71 01/02/2022    BILITOT 0 5 12/14/2017

## 2022-04-14 NOTE — PATIENT INSTRUCTIONS
Continue current cardiac medications  Continue modification of cardiac risk factors including control of blood glucose, cholesterol, blood pressure and body weight  Cardiac risk can be lowered with increase in physical activity, plant-based or Mediterranean style start diet, and avoidance of tobacco products  Report any worsening cardiac symptoms (chest pain,shortness of breath with exertion or fainting)  Keep follow-up as planned with primary care and other specialists  [address] diet: A heart-healthy eating plan    What is the Mediterranean diet? The Mediterranean diet is a way of eating based on the traditional cuisine of countries [facility]  While there is no single definition of the Mediterranean diet, it is typically high in vegetables, fruits, whole grains, beans, nut and seeds, and olive oil  The main components of Mediterranean diet include:    Daily consumption of vegetables, fruits, whole grains and healthy fats  Weekly intake of fish, poultry, beans and eggs  Moderate portions of dairy products  Limited intake of red meat  Other important elements of the Mediterranean diet are sharing meals with family and friends    Plant based, not meat based  The foundation of the Mediterranean diet is vegetables, fruits, herbs, nuts, beans and whole grains  Meals are built around these plant-based foods  Small amounts of dairy, poultry and eggs are also central to the Mediterranean Diet, as is seafood  In contrast, red meat is eaten only occasionally  Healthy fats  Healthy fats are a mainstay of the Mediterranean diet  They're eaten instead of less healthy fats, such as saturated and trans fats, which contribute to heart disease  Olive oil is the primary source of added fat in the Mediterranean diet   Olive oil provides monounsaturated fat, which has been found to lower total cholesterol and low-density lipoprotein (LDL or "bad") cholesterol levels  Nuts and seeds also contain monounsaturated fat  Fish are also important in the [address]  Fatty fish -- such as mackerel, herring, sardines, albacore tuna, salmon and lake trout -- are rich in omega-3 fatty acids, a type of polyunsaturated fat that may reduce inflammation in the body  Omega-3 fatty acids also help decrease triglycerides, reduce blood clotting, and decrease the risk of stroke and heart failure  Eating the [address]  Interested in trying the [address]? These tips will help you get started:    Eat more fruits and vegetables  Aim for 7 to 10 servings a day of fruit and vegetables  Opt for whole grains  Switch to whole-grain bread, cereal and pasta  [place] with other whole grains  Use healthy fats  Try olive oil as a replacement for butter when cooking  Instead of putting butter or margarine on bread, try dipping it in flavored olive oil  Eat more seafood  Eat fish twice a week  Fresh or water-packed tuna, salmon, trout, mackerel and herring are healthy choices  Grilled fish tastes good and requires little cleanup  Avoid deep-fried fish  Reduce red meat  Substitute fish, poultry or beans for meat  If you eat meat, make sure it's lean and keep portions small  Spice it up  Herbs and spices boost flavor and lessen the need for salt  The Mediterranean diet is a delicious and healthy way to eat  Many people who switch to this style of eating say they'll never eat any other way      Source: http://www rehana-marisol org/

## 2022-07-22 ENCOUNTER — RA CDI HCC (OUTPATIENT)
Dept: OTHER | Facility: HOSPITAL | Age: 82
End: 2022-07-22

## 2022-07-22 NOTE — PROGRESS NOTES
[facility] [place] [facility]  coding opportunities       Chart reviewed, no opportunity found: CHART REVIEWED, NO OPPORTUNITY FOUND        Patients Insurance     Medicare Insurance: [employer] [facility] Advantage

## 2022-07-29 ENCOUNTER — APPOINTMENT (OUTPATIENT)
Dept: LAB | Facility: MEDICAL CENTER | Age: 82
End: 2022-07-29
Payer: COMMERCIAL

## 2022-07-29 DIAGNOSIS — R73.03 PREDIABETES: ICD-10-CM

## 2022-07-29 DIAGNOSIS — E78.2 MIXED HYPERLIPIDEMIA: ICD-10-CM

## 2022-07-29 LAB
CHOLEST SERPL-MCNC: 124 MG/DL
HDLC SERPL-MCNC: 53 MG/DL
LDLC SERPL CALC-MCNC: 52 MG/DL (ref 0–100)
TRIGL SERPL-MCNC: 97 MG/DL

## 2022-07-29 PROCEDURE — 80061 LIPID PANEL: CPT

## 2022-07-29 PROCEDURE — 83036 HEMOGLOBIN GLYCOSYLATED A1C: CPT

## 2022-07-29 PROCEDURE — 36415 COLL VENOUS BLD VENIPUNCTURE: CPT

## 2022-07-30 LAB
EST. AVERAGE GLUCOSE BLD GHB EST-MCNC: 134 MG/DL
HBA1C MFR BLD: 6.3 %

## 2022-08-02 NOTE — ASSESSMENT & PLAN NOTE
She follows with Cardiology  No symptoms at present   She underwent cardiac catheterization in August 2019 with LAD blockage  She underwent successful balloon angioplasty and GABY on 90% lesion of the ostial LAD  She is on guideline directed medical therapy with Carvedilol 6 25 mg twice daily, ASA 81 mg, lisinopril 20 mg, Atorvastatin 40 mg

## 2022-08-02 NOTE — ASSESSMENT & PLAN NOTE
Lab Results   Component Value Date    HGBA1C 6 3 (H) 07/29/2022    HGBA1C 6 3 (H) 01/13/2021    HGBA1C 5 9 (H) 05/16/2017     Lab Results   Component Value Date    GLUF 103 (H) 07/15/2021    LDLCALC 52 07/29/2022    CREATININE 0 94 01/02/2022     Stable, continue healthy lifestyle

## 2022-08-02 NOTE — ASSESSMENT & PLAN NOTE
BP Readings from Last 3 Encounters:   08/03/22 128/62   04/14/22 122/56   02/03/22 138/64     Controlled, continue lisinopril 20 mg daily and carvedilol 6 25 mg twice daily

## 2022-08-02 NOTE — ASSESSMENT & PLAN NOTE
Lab Results   Component Value Date    EGFR 57 01/02/2022    EGFR 57 07/15/2021    EGFR 62 01/13/2021    CREATININE 0 94 01/02/2022    CREATININE 0 95 07/15/2021    CREATININE 0 89 01/13/2021     Renal function stable, continue to monitor  Continue lisinopril 20 mg daily

## 2022-08-02 NOTE — PROGRESS NOTES
FAMILY MEDICINE PROGRESS NOTE    Date of Service: 22  Primary Care Provider:   Ramandeep Ely MD       Name: Gisella Greer       : 1940       Age:82 y o  Sex: female      MRN: [id number]      Chief Complaint:Follow-up (Patient stated that she has dry mouth, she does use biotin but is asking if Dr Destinee Cordova recommends any other prescription for dry mouth )       ASSESSMENT and PLAN:  Gisella Greer is a 81 y o  female with:     Problem List Items Addressed This Visit        Digestive    GERD without esophagitis    Dry mouth     Recommended supportive care  Due for dental visit            Cardiovascular and Mediastinum    Essential hypertension - Primary     BP Readings from Last 3 Encounters:   22 128/62   22 122/56   22 138/64     Controlled, continue lisinopril 20 mg daily and carvedilol 6 25 mg twice daily            Coronary artery disease of native artery of native heart with stable angina pectoris [facility])     She follows with Cardiology  No symptoms at present   She underwent cardiac catheterization in 2019 with LAD blockage  She underwent successful balloon angioplasty and GABY on 90% lesion of the ostial LAD  She is on guideline directed medical therapy with Carvedilol 6 25 mg twice daily, ASA 81 mg, lisinopril 20 mg, Atorvastatin 40 mg              Genitourinary    CKD (chronic kidney disease) stage 3, GFR 30-59 ml/min ([facility])     Lab Results   Component Value Date    EGFR 57 2022    EGFR 57 07/15/2021    EGFR 62 2021    CREATININE 0 94 2022    CREATININE 0 95 07/15/2021    CREATININE 0 89 2021     Renal function stable, continue to monitor  Continue lisinopril 20 mg daily         Relevant Orders    Basic metabolic panel    Microalbumin / creatinine urine ratio       Other    Mixed hyperlipidemia    Class 1 obesity due to excess calories with serious comorbidity and body mass index (BMI) of 34 0 to 34 9 in adult    Prediabetes     Lab Results Component Value Date    HGBA1C 6 3 (H) 07/29/2022    HGBA1C 6 3 (H) 01/13/2021    HGBA1C 5 9 (H) 05/16/2017     Lab Results   Component Value Date    GLUF 103 (H) 07/15/2021    LDLCALC 52 07/29/2022    CREATININE 0 94 01/02/2022     Stable, continue healthy lifestyle          Glaucoma of both eyes          SUBJECTIVE:  Reji Myles is a 81 y o  female who presents today with a chief complaint of Follow-up (Patient stated that she has dry mouth, she does use biotin but is asking if Dr Wu Diane recommends any other prescription for dry mouth )  HPI     Patient presents for follow-up today  She was last seen in February for AWV  Her main complaint today is dry mouth  She uses biotine daily  She reports that she drinks adequate water, and brushes her teeth daily  She denies significant acid reflux  She does have some dry eyes, and uses eye drops several times a day  She recently saw her eye doctor and no reported problems  She helps with her young grandsons ages 1 months and 17 months  Hypertension  She is currently on lisinopril 20 mg daily and carvedilol 6 25 mg daily  She does check her blood pressure at home, she reports normal reading  She denies chest pain, shortness of breath, headaches, vision changes  She is active with cleaning, cooking, house work  She has not exertional symptoms  CAD  She follows with cardiology, last saw in April  Review of Systems   Constitutional: Negative for activity change, appetite change, chills, fatigue, fever and unexpected weight change  HENT:        Dry mouth    Eyes: Negative for pain, discharge, redness, itching and visual disturbance  Dry eyes   Respiratory: Negative for shortness of breath  Cardiovascular: Negative for chest pain, palpitations and leg swelling  Musculoskeletal: Negative for arthralgias  Neurological: Negative for dizziness, light-headedness and headaches  Psychiatric/Behavioral: Negative for sleep disturbance       I have reviewed the patient's Past Medical History  Current Outpatient Medications:     aspirin 81 MG tablet, Take 81 mg by mouth daily , Disp: , Rfl:     atorvastatin (LIPITOR) 40 mg tablet, Take 1 tablet (40 mg total) by mouth daily, Disp: 90 tablet, Rfl: 4    carvedilol (COREG) 6 25 mg tablet, Take 1 tablet (6 25 mg total) by mouth 2 (two) times a day with meals, Disp: 180 tablet, Rfl: 3    cholecalciferol (VITAMIN D3) 1,000 units tablet, Take 2,000 Units by mouth daily, Disp: , Rfl:     Co-Enzyme Q-10 100 MG CAPS, Take 200 mg by mouth daily  , Disp: , Rfl:     famotidine (PEPCID) 20 mg tablet, Take 20 mg by mouth daily OTC, Disp: , Rfl:     latanoprost (XALATAN) 0 005 % ophthalmic solution, Administer 1 drop to both eyes daily, Disp: , Rfl: 3    lisinopril (ZESTRIL) 20 mg tablet, Take 1 tablet (20 mg total) by mouth daily, Disp: 90 tablet, Rfl: 1    Multiple Vitamins-Minerals (PRESERVISION AREDS) CAPS, Take 2 capsules by mouth daily, Disp: , Rfl:     nitroglycerin (NITROSTAT) 0 4 mg SL tablet, Place 1 tablet (0 4 mg total) under the tongue every 5 (five) minutes as needed for chest pain, Disp: 25 tablet, Rfl: 11    OBJECTIVE:  /62   Pulse 62   Temp (!) 96 9 °F (36 1 °C)   Resp 16   Ht 5' 4 1" (1 628 m)   Wt 90 3 kg (199 lb)   SpO2 97%   BMI 34 05 kg/m²    BP Readings from Last 3 Encounters:   08/03/22 128/62   04/14/22 122/56   02/03/22 138/64      Wt Readings from Last 3 Encounters:   08/03/22 90 3 kg (199 lb)   04/14/22 91 5 kg (201 lb 12 8 oz)   02/03/22 90 7 kg (200 lb)      Physical Exam  Constitutional:       General: She is not in acute distress  Appearance: Normal appearance  She is normal weight  She is not ill-appearing or toxic-appearing  HENT:      Head: Normocephalic and atraumatic        Right Ear: External ear normal       Left Ear: External ear normal       Nose: Nose normal       Mouth/Throat:      Mouth: Mucous membranes are moist    Eyes:      Extraocular Movements: Extraocular movements intact  Conjunctiva/sclera: Conjunctivae normal    Cardiovascular:      Rate and Rhythm: Normal rate and regular rhythm  Pulses: Normal pulses  Heart sounds: Normal heart sounds  No murmur heard  No friction rub  No gallop  Pulmonary:      Effort: Pulmonary effort is normal  No respiratory distress  Musculoskeletal:         General: Normal range of motion  Cervical back: Normal range of motion and neck supple  Skin:     Findings: No erythema or rash  Neurological:      General: No focal deficit present  Mental Status: She is alert and oriented to person, place, and time  Psychiatric:         Mood and Affect: Mood normal          Behavior: Behavior normal        Lab Results   Component Value Date    SODIUM 137 01/02/2022    K 4 6 01/02/2022     01/02/2022    CO2 25 01/02/2022    BUN 24 01/02/2022    CREATININE 0 94 01/02/2022    GLUC 118 01/02/2022    CALCIUM 9 6 01/02/2022     Lab Results   Component Value Date    ALT 29 01/02/2022    AST 17 01/02/2022    ALKPHOS 71 01/02/2022    BILITOT 0 5 12/14/2017     Lab Results   Component Value Date    HGBA1C 6 3 (H) 07/29/2022     Lab Results   Component Value Date    CHOLESTEROL 124 07/29/2022    CHOLESTEROL 146 01/13/2021    CHOLESTEROL 143 07/09/2020     Lab Results   Component Value Date    HDL 53 07/29/2022    HDL 62 01/13/2021    HDL 55 07/09/2020     Lab Results   Component Value Date    TRIG 97 07/29/2022    TRIG 141 01/13/2021    TRIG 130 07/09/2020     Lab Results   Component Value Date    NONHDLC 144 (H) 12/14/2017    [place] 121 05/16/2017    [place] 123 11/10/2016     Lab Results   Component Value Date    LDLCALC 52 07/29/2022              Return in about 6 months (around 2/3/2023) for AWV, labs prior   Ramandeep Ely MD    Note: Portions of the record have been created with voice recognition software    Occasional wrong word or "sound a like" substitutions may have occurred due to the inherent limitations of voice recognition software  Read the chart carefully and recognize, using context, where substitutions have occurred

## 2022-08-03 ENCOUNTER — OFFICE VISIT (OUTPATIENT)
Dept: FAMILY MEDICINE CLINIC | Facility: CLINIC | Age: 82
End: 2022-08-03
Payer: COMMERCIAL

## 2022-08-03 VITALS
HEIGHT: 64 IN | TEMPERATURE: 96.9 F | DIASTOLIC BLOOD PRESSURE: 62 MMHG | RESPIRATION RATE: 16 BRPM | OXYGEN SATURATION: 97 % | HEART RATE: 62 BPM | BODY MASS INDEX: 33.97 KG/M2 | SYSTOLIC BLOOD PRESSURE: 128 MMHG | WEIGHT: 199 LBS

## 2022-08-03 DIAGNOSIS — N18.31 STAGE 3A CHRONIC KIDNEY DISEASE (HCC): ICD-10-CM

## 2022-08-03 DIAGNOSIS — R73.03 PREDIABETES: ICD-10-CM

## 2022-08-03 DIAGNOSIS — I10 ESSENTIAL HYPERTENSION: Primary | ICD-10-CM

## 2022-08-03 DIAGNOSIS — R68.2 DRY MOUTH: ICD-10-CM

## 2022-08-03 DIAGNOSIS — E66.09 CLASS 1 OBESITY DUE TO EXCESS CALORIES WITH SERIOUS COMORBIDITY AND BODY MASS INDEX (BMI) OF 34.0 TO 34.9 IN ADULT: ICD-10-CM

## 2022-08-03 DIAGNOSIS — H40.9 GLAUCOMA OF BOTH EYES, UNSPECIFIED GLAUCOMA TYPE: ICD-10-CM

## 2022-08-03 DIAGNOSIS — E78.2 MIXED HYPERLIPIDEMIA: ICD-10-CM

## 2022-08-03 DIAGNOSIS — I25.118 CORONARY ARTERY DISEASE OF NATIVE ARTERY OF NATIVE HEART WITH STABLE ANGINA PECTORIS (HCC): ICD-10-CM

## 2022-08-03 DIAGNOSIS — K21.9 GERD WITHOUT ESOPHAGITIS: ICD-10-CM

## 2022-08-03 PROCEDURE — 99214 OFFICE O/P EST MOD 30 MIN: CPT | Performed by: FAMILY MEDICINE

## 2022-10-03 ENCOUNTER — VBI (OUTPATIENT)
Dept: ADMINISTRATIVE | Facility: OTHER | Age: 82
End: 2022-10-03

## 2022-10-10 DIAGNOSIS — I10 ESSENTIAL HYPERTENSION: ICD-10-CM

## 2022-10-10 RX ORDER — LISINOPRIL 20 MG/1
20 TABLET ORAL DAILY
Qty: 90 TABLET | Refills: 3 | Status: SHIPPED | OUTPATIENT
Start: 2022-10-10

## 2022-10-26 ENCOUNTER — TELEPHONE (OUTPATIENT)
Dept: FAMILY MEDICINE CLINIC | Facility: CLINIC | Age: 82
End: 2022-10-26

## 2022-10-26 NOTE — TELEPHONE ENCOUNTER
Patient tested this am with Covid home test  She started with 'scratchy throat" on Monday  Now she has mild head congestion  Patient does not want paxalovid will treat mild symptoms at home  Instructed to call back if symptoms worsen

## 2022-10-26 NOTE — TELEPHONE ENCOUNTER
Reviewed    Please be sure patient understands home care    You need to stay home, isolate in a "sick room" or area and away from other people  Use a separate bathroom, if available  Wear a mask when leaving your room  Guidelines for isolation are for 5 days from onset of symptoms, as long as you are fever free  You should then wear your mask for 5 days  You can take over the counter medications  It is important to rest, stay hydrated  Please schedule a virtual visit if you have further questions

## 2022-11-14 ENCOUNTER — VBI (OUTPATIENT)
Dept: ADMINISTRATIVE | Facility: OTHER | Age: 82
End: 2022-11-14

## 2022-11-14 ENCOUNTER — OFFICE VISIT (OUTPATIENT)
Dept: CARDIOLOGY CLINIC | Facility: MEDICAL CENTER | Age: 82
End: 2022-11-14

## 2022-11-14 VITALS
SYSTOLIC BLOOD PRESSURE: 128 MMHG | BODY MASS INDEX: 34.15 KG/M2 | WEIGHT: 200 LBS | HEART RATE: 68 BPM | HEIGHT: 64 IN | DIASTOLIC BLOOD PRESSURE: 60 MMHG | OXYGEN SATURATION: 98 %

## 2022-11-14 DIAGNOSIS — E78.2 MIXED HYPERLIPIDEMIA: ICD-10-CM

## 2022-11-14 DIAGNOSIS — E66.09 CLASS 1 OBESITY DUE TO EXCESS CALORIES WITH SERIOUS COMORBIDITY AND BODY MASS INDEX (BMI) OF 34.0 TO 34.9 IN ADULT: ICD-10-CM

## 2022-11-14 DIAGNOSIS — I10 ESSENTIAL HYPERTENSION: ICD-10-CM

## 2022-11-14 DIAGNOSIS — I25.118 CORONARY ARTERY DISEASE OF NATIVE ARTERY OF NATIVE HEART WITH STABLE ANGINA PECTORIS (HCC): Primary | ICD-10-CM

## 2022-11-14 NOTE — PROGRESS NOTES
[facility] LIZETH Matias[address] [address]  Phone#  [phone]  Fax#  [phone]  [facility] [address] Cardiology Office Follow-up Visit             NAME: Florentin Jackson  AGE: 81 y o  SEX: female   : 1940   MRN: [id number]    DATE: 2022  TIME: 1:07 PM    Assessment and plan:    Cardiology Problem list:  CAD: : NSTEMI:  Cath: LM nl, LAD ostial 90%, LCX nl, RCA nl: PCI of LAD with 4 0x15 Xience GABY  ECHO: : EF 65, LAE, valves OK  Dyslipidemia  HTN  Carotid disease:  :  Doppler less than 50% stenosis    Current diagnoses:  1  Coronary artery disease of native artery of native heart with stable angina pectoris ([address] )     2  Essential hypertension     3  Mixed hyperlipidemia     4  Class 1 obesity due to excess calories with serious comorbidity and body mass index (BMI) of 34 0 to 34 9 in adult         Discussion/recommendations:  Coronary artery disease of native artery of native heart with stable angina pectoris:  Functional capacity is unchanged  Active at [employer]  Had Covid in October, but was not sick  Got flu shot  No anginal symptoms  Continue current cardiac medications  Continue cardiac risk factor modification  Report any worsening exertional symptoms       Essential hypertension  BP Readings from Last 3 Encounters:   22 128/60   22 128/62   22 122/56   Advised home blood pressure monitoring  Continue current medications         Mixed hyperlipidemia  Lab Results   Component Value Date    LDLCALC 52 2022   Continue current statin therapy  Obesity:  Continue efforts at weight loss including diet modification, increased physical activity and avoidance of calorie dense foods      Mediterranean style plant based diet and calorie restriction will be beneficial           Chief Complaint   Patient presents with   • Follow-up     6 month f/up       HPI:    Florentin Jackson is a 81y o -year-old female who presents to the cardiology clinic for follow up for the above-listed problems  She comes in for follow-up on coronary artery disease  Last intervention was in 7/19  No interim hospitalizations for unstable angina  She denies any chest pain or shortness of breath on exertion  Very active at [employer]  No bleeding reported  Medication compliance discussed  Last LDL was 52 and HDL 53  Triglycerides were 97  Prior carotid Doppler showed only minimal plaque with no stenosis  Past history, family history, social history, current medications, vital signs, recent lab and imaging studies and  prior cardiology studies reviewed independently on this visit  Patient Active Problem List   Diagnosis   • Chronic calculous cholecystitis   • Essential hypertension   • GERD without esophagitis   • Mixed hyperlipidemia   • Class 1 obesity due to excess calories with serious comorbidity and body mass index (BMI) of 34 0 to 34 9 in adult   • CKD (chronic kidney disease) stage 3, GFR 30-59 ml/min ([facility])   • Coronary artery disease of native artery of native heart with stable angina pectoris ([facility])   • Prediabetes   • Glaucoma of both eyes   • Dry mouth      Allergies   Allergen Reactions   • Codeine      Reaction Date: 19Jul2011;    • Other      darvocet       Current Outpatient Medications:   •  aspirin 81 MG tablet, Take 81 mg by mouth daily , Disp: , Rfl:   •  atorvastatin (LIPITOR) 40 mg tablet, Take 1 tablet (40 mg total) by mouth daily, Disp: 90 tablet, Rfl: 4  •  carvedilol (COREG) 6 25 mg tablet, Take 1 tablet (6 25 mg total) by mouth 2 (two) times a day with meals, Disp: 180 tablet, Rfl: 3  •  cholecalciferol (VITAMIN D3) 1,000 units tablet, Take 2,000 Units by mouth daily, Disp: , Rfl:   •  Co-Enzyme Q-10 100 MG CAPS, Take 200 mg by mouth daily  , Disp: , Rfl:   •  famotidine (PEPCID) 20 mg tablet, Take 20 mg by mouth daily OTC, Disp: , Rfl:   •  latanoprost (XALATAN) 0 005 % ophthalmic solution, Administer 1 drop to both eyes daily, Disp: , Rfl: 3  •  lisinopril (ZESTRIL) 20 mg tablet, Take 1 tablet (20 mg total) by mouth daily, Disp: 90 tablet, Rfl: 3  •  Multiple Vitamins-Minerals (PRESERVISION AREDS) CAPS, Take 2 capsules by mouth daily, Disp: , Rfl:   •  nitroglycerin (NITROSTAT) 0 4 mg SL tablet, Place 1 tablet (0 4 mg total) under the tongue every 5 (five) minutes as needed for chest pain, Disp: 25 tablet, Rfl: 11    Past Medical History:   Diagnosis Date   • AVB (atrioventricular block)     first degree   • CAD (coronary artery disease)    • GERD (gastroesophageal reflux disease) 7/1/2017   • HLD (hyperlipidemia) 7/1/2017   • Hypertension    • LVH (left ventricular hypertrophy)    • Osteopenia    • Skin cancer 2000    nose   • Ventral hernia without obstruction or gangrene 7/1/2017     Past Surgical History:   Procedure Laterality Date   • CARDIAC CATHETERIZATION     • GALLBLADDER SURGERY     • HEMORRHOID SURGERY     • HYSTERECTOMY  27    age 56 w/ left oopherectomy   • NOSE SURGERY      basal cell   • OOPHORECTOMY Left 1987    age 56   • WA LAP,CHOLECYSTECTOMY N/A 8/25/2016    Procedure: LAPAROSCOPIC CHOLECYSTECTOMY ;  Surgeon: Whitley Walls MD;  [facility];  Service: General   • WA REPAIR INCISIONAL HERNIA,REDUCIBLE N/A 11/30/2017    Procedure: [address];  Surgeon: Whitley Walls MD;  [facility];  Service: General   • ROTATOR CUFF REPAIR Right      Family History   Problem Relation Age of Onset   • No Known Problems Mother    • No Known Problems Father    • No Known Problems Daughter    • No Known Problems Maternal Grandmother    • No Known Problems Maternal Grandfather    • No Known Problems Paternal Grandmother    • No Known Problems Paternal Grandfather    • No Known Problems Half-Sister    • No Known Problems Maternal Aunt    • Squamous cell carcinoma Brother    • No Known Problems Son    • No Known Problems Paternal Aunt    • Breast cancer Neg Hx      Social History   reports that she has never smoked  She has never used smokeless tobacco  She reports current alcohol use  She reports that she does not use drugs  Review of Systems   Constitutional: Negative for fever  Respiratory: Negative for chest tightness, shortness of breath and wheezing  Cardiovascular: Negative for chest pain, palpitations and leg swelling  Skin: Negative for rash  Neurological: Negative for syncope  Hematological: Does not bruise/bleed easily  Objective:     Vitals:    11/14/22 1254   BP: 128/60   Pulse: 68   SpO2: 98%     Wt Readings from Last 3 Encounters:   11/14/22 90 7 kg (200 lb)   08/03/22 90 3 kg (199 lb)   04/14/22 91 5 kg (201 lb 12 8 oz)     Pulse Readings from Last 3 Encounters:   11/14/22 68   08/03/22 62   04/14/22 68     BP Readings from Last 3 Encounters:   11/14/22 128/60   08/03/22 128/62   04/14/22 122/56       Physical Exam  Constitutional:       General: She is not in acute distress  Cardiovascular:      Rate and Rhythm: Normal rate and regular rhythm  Heart sounds: S1 normal and S2 normal  No murmur heard  Pulmonary:      Breath sounds: No wheezing or rhonchi  Musculoskeletal:      Right lower leg: No edema  Left lower leg: No edema  Neurological:      Mental Status: Mental status is at baseline     Psychiatric:         Mood and Affect: Mood normal          Pertinent Laboratory/Diagnostic Studies:    Laboratory studies reviewed personally by Harinder Persaud MD    BMP:   Lab Results   Component Value Date    SODIUM 137 01/02/2022    K 4 6 01/02/2022     01/02/2022    CO2 25 01/02/2022    BUN 24 01/02/2022    CREATININE 0 94 01/02/2022    GLUC 118 01/02/2022    CALCIUM 9 6 01/02/2022     CBC:  Lab Results   Component Value Date    WBC 8 72 01/02/2022    WBC 5 3 07/19/2016    RBC 4 03 01/02/2022    RBC 4 24 07/19/2016    HGB 12 2 01/02/2022    HGB 12 6 07/19/2016    HCT 37 9 01/02/2022    HCT 38 7 07/19/2016    MCV 94 01/02/2022    MCV 91 1 07/19/2016    MCH 30 3 01/02/2022 MCH 29 8 07/19/2016    RDW 13 2 01/02/2022    RDW 16 3 (H) 07/19/2016     01/02/2022     07/19/2016     Coags:    Lipid Profile:   Lab Results   Component Value Date    CHOL 201 (H) 12/14/2017     Lab Results   Component Value Date    HDL 53 07/29/2022     Lab Results   Component Value Date    LDLCALC 52 07/29/2022     Lab Results   Component Value Date    TRIG 97 07/29/2022      Lab Results   Component Value Date    MUI5RYJBHOTA 2 730 07/09/2020     Lab Results   Component Value Date    ALT 29 01/02/2022    AST 17 01/02/2022     No results found for: BNP   No results for input(s): NTBNP in the last 72 hours  Lab Results   Component Value Date    TROPONINI 0 08 (H) 08/02/2019       Imaging Studies:     No results found  Vernon Browne MD, [facility]    Portions of the record may have been created with voice recognition software  Occasional wrong word or "sound a like" substitutions may have occurred due to the inherent limitations of voice recognition software  Read the chart carefully and recognize, using context, where substitutions have occurred

## 2022-11-14 NOTE — PATIENT INSTRUCTIONS
Continue current cardiac medications  Continue modification of cardiac risk factors including control of blood glucose, cholesterol, blood pressure and body weight  Cardiac risk can be lowered with increase in physical activity, plant-based or Mediterranean style start diet, and avoidance of tobacco products  Report any worsening cardiac symptoms (chest pain,shortness of breath with exertion or fainting)  Keep follow-up as planned with primary care and other specialists  [address] diet: A heart-healthy eating plan    What is the Mediterranean diet? The Mediterranean diet is a way of eating based on the traditional cuisine of [facility]  While there is no single definition of the Mediterranean diet, it is typically high in vegetables, fruits, whole grains, beans, nut and seeds, and olive oil  The main components of Mediterranean diet include:    Daily consumption of vegetables, fruits, whole grains and healthy fats  Weekly intake of fish, poultry, beans and eggs  Moderate portions of dairy products  Limited intake of red meat  Other important elements of the Mediterranean diet are sharing meals with family and friends    Plant based, not meat based  The foundation of the Mediterranean diet is vegetables, fruits, herbs, nuts, beans and whole grains  Meals are built around these plant-based foods  Small amounts of dairy, poultry and eggs are also central to the Mediterranean Diet, as is seafood  In contrast, red meat is eaten only occasionally  Healthy fats  Healthy fats are a mainstay of the Mediterranean diet  They're eaten instead of less healthy fats, such as saturated and trans fats, which contribute to heart disease  Olive oil is the primary source of added fat in the Mediterranean diet   Olive oil provides monounsaturated fat, which has been found to lower total cholesterol and low-density lipoprotein (LDL or "bad") cholesterol levels  Nuts and seeds also contain monounsaturated fat  Fish are also important in the [address]  Fatty fish -- such as mackerel, herring, sardines, albacore tuna, salmon and lake trout -- are rich in omega-3 fatty acids, a type of polyunsaturated fat that may reduce inflammation in the body  Omega-3 fatty acids also help decrease triglycerides, reduce blood clotting, and decrease the risk of stroke and heart failure  Eating the [address]  Interested in trying the [address]? These tips will help you get started:    Eat more fruits and vegetables  Aim for 7 to 10 servings a day of fruit and vegetables  Opt for whole grains  Switch to whole-grain bread, cereal and pasta  [place] with other whole grains  Use healthy fats  Try olive oil as a replacement for butter when cooking  Instead of putting butter or margarine on bread, try dipping it in flavored olive oil  Eat more seafood  Eat fish twice a week  Fresh or water-packed tuna, salmon, trout, mackerel and herring are healthy choices  Grilled fish tastes good and requires little cleanup  Avoid deep-fried fish  Reduce red meat  Substitute fish, poultry or beans for meat  If you eat meat, make sure it's lean and keep portions small  Spice it up  Herbs and spices boost flavor and lessen the need for salt  The Mediterranean diet is a delicious and healthy way to eat  Many people who switch to this style of eating say they'll never eat any other way      Source: http://www rehana-marisol org/

## 2023-02-23 DIAGNOSIS — I21.4 NSTEMI, INITIAL EPISODE OF CARE (HCC): ICD-10-CM

## 2023-02-23 RX ORDER — CARVEDILOL 6.25 MG/1
6.25 TABLET ORAL 2 TIMES DAILY WITH MEALS
Qty: 180 TABLET | Refills: 3 | Status: SHIPPED | OUTPATIENT
Start: 2023-02-23

## 2023-03-09 ENCOUNTER — OFFICE VISIT (OUTPATIENT)
Dept: FAMILY MEDICINE CLINIC | Facility: CLINIC | Age: 83
End: 2023-03-09

## 2023-03-09 VITALS
DIASTOLIC BLOOD PRESSURE: 80 MMHG | WEIGHT: 205 LBS | SYSTOLIC BLOOD PRESSURE: 140 MMHG | TEMPERATURE: 97.2 F | HEART RATE: 66 BPM | BODY MASS INDEX: 35 KG/M2 | HEIGHT: 64 IN | OXYGEN SATURATION: 96 %

## 2023-03-09 DIAGNOSIS — G56.02 CARPAL TUNNEL SYNDROME ON LEFT: Primary | ICD-10-CM

## 2023-03-09 NOTE — PROGRESS NOTES
Name: Cynthia Rene      : 1940      MRN: [id number]  Encounter Provider: Gina Underwood MD  Encounter Date: 3/9/2023   Encounter department: [address] [place] [address] St     1  Carpal tunnel syndrome on left    Continue with nighttime bracing at this time  Can take Tylenol for pain  Patient should avoid NSAIDs due to underlying stage III kidney disease  If nighttime bracing fails to relieve patient's symptoms we can give her referral to hand surgery for further evaluation  Patient understands and agrees with this plan       Subjective      Patient presents with:  Wrist Pain: Left side  Arm Pain: Left side elbow down wrist into fingers x 1week    Waking her up at night  Fingers feel hot  Patient is taking Aleve  She also bought a brace which she is worn for 2 nights  Did notice some relief with the brace  Review of Systems   Musculoskeletal: Negative for joint swelling and myalgias  Numbness and tingling left wrist and fingers       Current Outpatient Medications on File Prior to Visit   Medication Sig   • aspirin 81 MG tablet Take 81 mg by mouth daily    • atorvastatin (LIPITOR) 40 mg tablet Take 1 tablet (40 mg total) by mouth daily   • carvedilol (COREG) 6 25 mg tablet Take 1 tablet (6 25 mg total) by mouth 2 (two) times a day with meals   • cholecalciferol (VITAMIN D3) 1,000 units tablet Take 2,000 Units by mouth daily   • Co-Enzyme Q-10 100 MG CAPS Take 200 mg by mouth daily     • famotidine (PEPCID) 20 mg tablet Take 20 mg by mouth daily OTC   • latanoprost (XALATAN) 0 005 % ophthalmic solution Administer 1 drop to both eyes daily   • lisinopril (ZESTRIL) 20 mg tablet Take 1 tablet (20 mg total) by mouth daily   • Multiple Vitamins-Minerals (PRESERVISION AREDS) CAPS Take 2 capsules by mouth daily   • nitroglycerin (NITROSTAT) 0 4 mg SL tablet Place 1 tablet (0 4 mg total) under the tongue every 5 (five) minutes as needed for chest pain       Objective     BP 140/80 (BP Location: Left arm, Patient Position: Sitting, Cuff Size: Large)   Pulse 66   Temp (!) 97 2 °F (36 2 °C)   Ht 5' 4 1" (1 628 m)   Wt 93 kg (205 lb)   SpO2 96%   BMI 35 08 kg/m²     Physical Exam  Vitals and nursing note reviewed  Constitutional:       Appearance: She is well-developed  HENT:      Head: Normocephalic and atraumatic  Cardiovascular:      Rate and Rhythm: Normal rate and regular rhythm  Pulmonary:      Effort: Pulmonary effort is normal       Breath sounds: Normal breath sounds  Musculoskeletal:      Comments: Positive Tinel/Phalen left wrist    Skin:     General: Skin is warm and dry  Neurological:      General: No focal deficit present  Mental Status: She is alert  Sensory: No sensory deficit  Motor: No weakness     Psychiatric:         Mood and Affect: Mood normal          Behavior: Behavior normal        Rossi Nichols MD

## 2023-03-27 ENCOUNTER — TELEPHONE (OUTPATIENT)
Dept: FAMILY MEDICINE CLINIC | Facility: CLINIC | Age: 83
End: 2023-03-27

## 2023-04-03 ENCOUNTER — RA CDI HCC (OUTPATIENT)
Dept: OTHER | Facility: HOSPITAL | Age: 83
End: 2023-04-03

## 2023-04-03 ENCOUNTER — OFFICE VISIT (OUTPATIENT)
Dept: OBGYN CLINIC | Facility: CLINIC | Age: 83
End: 2023-04-03

## 2023-04-03 VITALS
HEART RATE: 76 BPM | WEIGHT: 203.4 LBS | SYSTOLIC BLOOD PRESSURE: 170 MMHG | BODY MASS INDEX: 34.72 KG/M2 | OXYGEN SATURATION: 95 % | HEIGHT: 64 IN | DIASTOLIC BLOOD PRESSURE: 75 MMHG

## 2023-04-03 DIAGNOSIS — G56.02 CARPAL TUNNEL SYNDROME ON LEFT: ICD-10-CM

## 2023-04-03 DIAGNOSIS — G56.02 CARPAL TUNNEL SYNDROME ON LEFT: Primary | ICD-10-CM

## 2023-04-03 NOTE — PROGRESS NOTES
ASSESSMENT/PLAN:    Assessment:   Carpal Tunnel Syndrome  left    Plan:   US    Follow Up: After Testing   - Follow up with Dr Erika Ortiz or Dr Bipin Barr      To Do Next Visit:       General Discussions:     Carpal Tunnel Syndrome: The anatomy and physiology of carpal tunnel syndrome was discussed with the patient today  Increase pressure localized under the transverse carpal ligament can cause pain, numbness, tingling, or dysesthesias within the median nerve distribution as well as feelings of fatigue, clumsiness, or awkwardness  These symptoms typically occur at night and worse in the morning upon waking  Eventually, untreated carpal tunnel syndrome can result in weakness and permanent loss of muscle within the thenar compartment of the hand  Treatment options were discussed with the patient  Conservative treatment includes nocturnal resting splints to keep the nerve in a neutral position, ergonomic changes within the work or home environment, activity modification, and tendon gliding exercises  Steroid injections within the carpal canal can help a majority of patients, however this is often self-limited in a majority of patients  Surgical intervention to divide the transverse carpal ligament typically results in a long-lasting relief of the patient's complaints, with the recurrence rate of less than 1%          _____________________________________________________  CHIEF COMPLAINT:  Chief Complaint   Patient presents with   • Left Hand - Numbness, Tingling     1 month         SUBJECTIVE:  Nick Atkinson is a 81 y o  female who presents with tingling of the left hand  This started  6 week(s) ago: Insidious      Radiation: Yes to the  index finger, long finger, ring finger and thumb  Previous Treatments: bracing   Associated symptoms: None  Handedness: right  Work status: ret    PAST MEDICAL HISTORY:  Past Medical History:   Diagnosis Date   • AVB (atrioventricular block)     first degree   • CAD (coronary artery disease)    • GERD (gastroesophageal reflux disease) 7/1/2017   • HLD (hyperlipidemia) 7/1/2017   • Hypertension    • LVH (left ventricular hypertrophy)    • Osteopenia    • Skin cancer 2000    nose   • Ventral hernia without obstruction or gangrene 7/1/2017       PAST SURGICAL HISTORY:  Past Surgical History:   Procedure Laterality Date   • CARDIAC CATHETERIZATION     • GALLBLADDER SURGERY     • HEMORRHOID SURGERY     • HYSTERECTOMY  27    age 56 w/ left oopherectomy   • NOSE SURGERY      basal cell   • OOPHORECTOMY Left 1987    age 56   • MO LAPAROSCOPY SURG CHOLECYSTECTOMY N/A 8/25/2016    Procedure: LAPAROSCOPIC CHOLECYSTECTOMY ;  Surgeon: Nadia Daigle MD;  [facility];  Service: General   • MO REPAIR FIRST ABDOMINAL WALL HERNIA N/A 11/30/2017    Procedure: Twila Galvan;  Surgeon: Nadia Daigle MD;  [facility];  Service: General   • ROTATOR CUFF REPAIR Right        FAMILY HISTORY:  Family History   Problem Relation Age of Onset   • No Known Problems Mother    • No Known Problems Father    • No Known Problems Daughter    • No Known Problems Maternal Grandmother    • No Known Problems Maternal Grandfather    • No Known Problems Paternal Grandmother    • No Known Problems Paternal Grandfather    • No Known Problems Half-Sister    • No Known Problems Maternal Aunt    • Squamous cell carcinoma Brother    • No Known Problems Son    • No Known Problems Paternal Aunt    • Breast cancer Neg Hx        SOCIAL HISTORY:  Social History     Tobacco Use   • Smoking status: Never   • Smokeless tobacco: Never   Vaping Use   • Vaping Use: Never used   Substance Use Topics   • Alcohol use: Yes     Comment: socially   • Drug use: No       MEDICATIONS:    Current Outpatient Medications:   •  aspirin 81 MG tablet, Take 81 mg by mouth daily , Disp: , Rfl:   •  atorvastatin (LIPITOR) 40 mg tablet, Take 1 tablet (40 mg total) by mouth daily, Disp: 90 tablet, Rfl: 4  •  carvedilol (COREG) 6 25 mg "tablet, Take 1 tablet (6 25 mg total) by mouth 2 (two) times a day with meals, Disp: 180 tablet, Rfl: 3  •  cholecalciferol (VITAMIN D3) 1,000 units tablet, Take 2,000 Units by mouth daily, Disp: , Rfl:   •  Co-Enzyme Q-10 100 MG CAPS, Take 200 mg by mouth daily  , Disp: , Rfl:   •  famotidine (PEPCID) 20 mg tablet, Take 20 mg by mouth daily OTC, Disp: , Rfl:   •  latanoprost (XALATAN) 0 005 % ophthalmic solution, Administer 1 drop to both eyes daily, Disp: , Rfl: 3  •  lisinopril (ZESTRIL) 20 mg tablet, Take 1 tablet (20 mg total) by mouth daily, Disp: 90 tablet, Rfl: 3  •  Multiple Vitamins-Minerals (PRESERVISION AREDS) CAPS, Take 2 capsules by mouth daily, Disp: , Rfl:   •  nitroglycerin (NITROSTAT) 0 4 mg SL tablet, Place 1 tablet (0 4 mg total) under the tongue every 5 (five) minutes as needed for chest pain, Disp: 25 tablet, Rfl: 11    ALLERGIES:  Allergies   Allergen Reactions   • Codeine      Reaction Date: 19Jul2011;    • Other      darvocet       REVIEW OF SYSTEMS:  Pertinent items are noted in HPI  A comprehensive review of systems was negative  LABS:  HgA1c:   Lab Results   Component Value Date    HGBA1C 6 3 (H) 07/29/2022     BMP:   Lab Results   Component Value Date    CALCIUM 9 6 01/02/2022     12/14/2017    K 4 6 01/02/2022    CO2 25 01/02/2022     01/02/2022    BUN 24 01/02/2022    CREATININE 0 94 01/02/2022         _____________________________________________________  PHYSICAL EXAMINATION:  Vital signs: /75   Pulse 76   Ht 5' 4 1\" (1 628 m)   Wt 92 3 kg (203 lb 6 4 oz)   SpO2 95%   BMI 34 80 kg/m²   General: well developed and well nourished, alert, oriented times 3 and appears comfortable  Psychiatric: Normal  HEENT: Trachea Midline, No torticollis  Cardiovascular: Regular rate and rhythm  Pulmonary: No audible wheezing   Abdomen: No guarding  Extremities: No lymphedema  Skin: No masses, erythema, lacerations, fluctation, ulcerations  Neurovascular:  Motor Intact to the " Median, Ulnar, Radial Nerve and Pulses Intact    MUSCULOSKELETAL EXAMINATION:  LEFT SIDE:  Carpal tunnel:  No weakness APB, No weakness AIN, No thenar atrophy, + Tinels and + Phalens and Cubital Tunnel:  No tinels, weakness, or ulnar clawing        _____________________________________________________  STUDIES REVIEWED:  No Studies to review      PROCEDURES PERFORMED:  Procedures  No Procedures performed today

## 2023-04-03 NOTE — PROGRESS NOTES
[facility]  coding opportunities       Chart reviewed, no opportunity found: CHART REVIEWED, NO OPPORTUNITY FOUND        Patients Insurance     Medicare Insurance: [employer] [facility] Advantage

## 2023-06-05 ENCOUNTER — HOSPITAL ENCOUNTER (OUTPATIENT)
Dept: RADIOLOGY | Facility: HOSPITAL | Age: 83
Discharge: HOME | End: 2023-06-05
Payer: COMMERCIAL

## 2023-06-05 DIAGNOSIS — G56.02 CARPAL TUNNEL SYNDROME ON LEFT: ICD-10-CM

## 2023-06-05 PROCEDURE — 76882 US LMTD JT/FCL EVL NVASC XTR: CPT

## 2023-06-09 ENCOUNTER — OFFICE VISIT (OUTPATIENT)
Dept: OBGYN CLINIC | Facility: CLINIC | Age: 83
End: 2023-06-09
Payer: COMMERCIAL

## 2023-06-09 VITALS
WEIGHT: 204.4 LBS | HEART RATE: 56 BPM | BODY MASS INDEX: 34.89 KG/M2 | HEIGHT: 64 IN | DIASTOLIC BLOOD PRESSURE: 85 MMHG | SYSTOLIC BLOOD PRESSURE: 197 MMHG

## 2023-06-09 DIAGNOSIS — G56.02 CARPAL TUNNEL SYNDROME OF LEFT WRIST: Primary | ICD-10-CM

## 2023-06-09 PROCEDURE — 20526 THER INJECTION CARP TUNNEL: CPT | Performed by: STUDENT IN AN ORGANIZED HEALTH CARE EDUCATION/TRAINING PROGRAM

## 2023-06-09 PROCEDURE — 99214 OFFICE O/P EST MOD 30 MIN: CPT | Performed by: STUDENT IN AN ORGANIZED HEALTH CARE EDUCATION/TRAINING PROGRAM

## 2023-06-09 RX ADMIN — BUPIVACAINE HYDROCHLORIDE 1 ML: 2.5 INJECTION, SOLUTION EPIDURAL; INFILTRATION; INTRACAUDAL at 09:00

## 2023-06-09 RX ADMIN — BETAMETHASONE SODIUM PHOSPHATE AND BETAMETHASONE ACETATE 6 MG: 3; 3 INJECTION, SUSPENSION INTRA-ARTICULAR; INTRALESIONAL; INTRAMUSCULAR; SOFT TISSUE at 09:00

## 2023-06-09 NOTE — PROGRESS NOTES
ORTHOPAEDIC HAND, WRIST, AND ELBOW OFFICE  VISIT       ASSESSMENT/PLAN:      Diagnoses and all orders for this visit:    Carpal tunnel syndrome of left wrist  -     Hand/upper extremity injection: L carpal tunnel  -     betamethasone acetate-betamethasone sodium phosphate (CELESTONE) injection 6 mg  -     bupivacaine (PF) (MARCAINE) 0 25 % injection 1 mL        81year-old female with left carpal tunnel syndrome    The MSK US was reviewed which ruled in carpal tunnel syndrome  Non operative treatment options were discussed in the form of a steroid injection which she was agreeable to  The patient consented and underwent a left carpal tunnel injection in the office today without any complications  She can continue bracing at night    The patient verbalized understanding of exam findings and treatment plan  We engaged in the shared decision-making process and treatment options were discussed at length with the patient  Surgical and conservative management discussed today along with risks and benefits  Follow Up:  3 months       To Do Next Visit:  Re-evaluation of current issue    General Discussions:  Carpal Tunnel Syndrome: The anatomy and physiology of carpal tunnel syndrome was discussed with the patient today  Increase pressure localized under the transverse carpal ligament can cause pain, numbness, tingling, or dysesthesias within the median nerve distribution as well as feelings of fatigue, clumsiness, or awkwardness  These symptoms typically occur at night and worse in the morning upon waking  Eventually, untreated carpal tunnel syndrome can result in weakness and permanent loss of muscle within the thenar compartment of the hand  Treatment options were discussed with the patient  Conservative treatment includes nocturnal resting splints to keep the nerve in a neutral position, ergonomic changes within the work or home environment, activity modification, and tendon gliding exercises   Vitamin B6 one tablet daily over the counter may helpful to reduce symptoms  Steroid injections within the carpal canal can help a majority of patients, however this is often self-limited in a majority of patients  Surgical intervention to divide the transverse carpal ligament typically results in a long-lasting relief of the patient's complaints, with the recurrence rate of less than 1%  Conor Merchant MD  Attending, Orthopaedic Surgery  Hand, Wrist, and Elbow Surgery  [facility]    ____________________________________________________________________________________________________________________________________________      CHIEF COMPLAINT:  Chief Complaint   Patient presents with   • Left Wrist - Pain       SUBJECTIVE:  Patient is a 81 y o  RHD female who presents today for evaluation and treatment of left hand numbness and tingling  The patient is referred by Marjean Litten  The patient notes intermittent numbness and tingling to her left thumb, index and long fingers  She states this has been ongoing since February  She states this does wake her from sleep at night  She also notes increased numbness with driving  She has tried bracing without relief  The patient was evaluated by Marjean Litten on 4/3/23 and a MSK US was ordered to evaluate for carpal tunnel  The patient presents to the office today to review the [address] [facility]            I have personally reviewed all the relevant PMH, PSH, SH, FH, Medications and allergies      PAST MEDICAL HISTORY:  Past Medical History:   Diagnosis Date   • AVB (atrioventricular block)     first degree   • CAD (coronary artery disease)    • GERD (gastroesophageal reflux disease) 7/1/2017   • HLD (hyperlipidemia) 7/1/2017   • Hypertension    • LVH (left ventricular hypertrophy)    • Osteopenia    • Skin cancer 2000    nose   • Ventral hernia without obstruction or gangrene 7/1/2017       PAST SURGICAL HISTORY:  Past Surgical History:   Procedure Laterality Date   • CARDIAC CATHETERIZATION     • GALLBLADDER SURGERY     • HEMORRHOID SURGERY     • HYSTERECTOMY  27    age 56 w/ left oopherectomy   • NOSE SURGERY      basal cell   • OOPHORECTOMY Left 1987    age 56   • MS LAPAROSCOPY SURG CHOLECYSTECTOMY N/A 8/25/2016    Procedure: LAPAROSCOPIC CHOLECYSTECTOMY ;  Surgeon: Cristina Saint, MD;  [facility];  Service: General   • MS REPAIR FIRST ABDOMINAL WALL HERNIA N/A 11/30/2017    Procedure: [address];  Surgeon: Cristina Saint, MD;  [facility];  Service: General   • ROTATOR CUFF REPAIR Right        FAMILY HISTORY:  Family History   Problem Relation Age of Onset   • No Known Problems Mother    • No Known Problems Father    • No Known Problems Daughter    • No Known Problems Maternal Grandmother    • No Known Problems Maternal Grandfather    • No Known Problems Paternal Grandmother    • No Known Problems Paternal Grandfather    • No Known Problems Half-Sister    • No Known Problems Maternal Aunt    • Squamous cell carcinoma Brother    • No Known Problems Son    • No Known Problems Paternal Aunt    • Breast cancer Neg Hx        SOCIAL HISTORY:  Social History     Tobacco Use   • Smoking status: Never   • Smokeless tobacco: Never   Vaping Use   • Vaping Use: Never used   Substance Use Topics   • Alcohol use: Yes     Comment: socially   • Drug use: No       MEDICATIONS:    Current Outpatient Medications:   •  aspirin 81 MG tablet, Take 81 mg by mouth daily , Disp: , Rfl:   •  atorvastatin (LIPITOR) 40 mg tablet, TAKE 1 TABLET BY MOUTH EVERY DAY, Disp: 90 tablet, Rfl: 1  •  carvedilol (COREG) 6 25 mg tablet, Take 1 tablet (6 25 mg total) by mouth 2 (two) times a day with meals, Disp: 180 tablet, Rfl: 3  •  cholecalciferol (VITAMIN D3) 1,000 units tablet, Take 2,000 Units by mouth daily, Disp: , Rfl:   •  Co-Enzyme Q-10 100 MG CAPS, Take 200 mg by mouth daily  , Disp: , Rfl:   •  famotidine (PEPCID) 20 mg tablet, Take 20 mg by mouth daily OTC, Disp: , Rfl:   •  latanoprost (XALATAN) 0 005 % ophthalmic solution, Administer 1 drop to both eyes daily, Disp: , Rfl: 3  •  lisinopril (ZESTRIL) 20 mg tablet, Take 1 tablet (20 mg total) by mouth daily, Disp: 90 tablet, Rfl: 3  •  Multiple Vitamins-Minerals (PRESERVISION AREDS) CAPS, Take 2 capsules by mouth daily, Disp: , Rfl:   •  nitroglycerin (NITROSTAT) 0 4 mg SL tablet, Place 1 tablet (0 4 mg total) under the tongue every 5 (five) minutes as needed for chest pain, Disp: 25 tablet, Rfl: 11  No current facility-administered medications for this visit  ALLERGIES:  Allergies   Allergen Reactions   • Codeine      Reaction Date: 19Jul2011;    • Other      darvocet           REVIEW OF SYSTEMS:  Musculoskeletal:        As noted in HPI  All other systems reviewed and are negative      VITALS:  Vitals:    06/09/23 0858   BP: (!) 197/85   Pulse: 56       LABS:  HgA1c:   Lab Results   Component Value Date    HGBA1C 6 3 (H) 07/29/2022     BMP:   Lab Results   Component Value Date    BUN 19 04/14/2023    CALCIUM 9 8 04/14/2023     04/14/2023    CO2 28 04/14/2023    CREATININE 1 01 04/14/2023    K 5 2 04/14/2023     12/14/2017       _____________________________________________________  PHYSICAL EXAMINATION:  General: well developed and well nourished, alert, oriented times 3 and appears comfortable  Psychiatric: Normal  HEENT: Normocephalic, Atraumatic Trachea Midline, No torticollis  Pulmonary: No audible wheezing or respiratory distress   Abdomen/GI: Non tender, non distended   Cardiovascular: No pitting edema, 2+ radial pulse   Skin: No masses, erythema, lacerations, fluctation, ulcerations  Neurovascular: Sensation Intact to the Median, Ulnar, Radial Nerve, Motor Intact to the Median, Ulnar, Radial Nerve and Pulses Intact  Musculoskeletal: Normal, except as noted in detailed exam and in HPI  MUSCULOSKELETAL EXAMINATION:  Left hand  5/5 opposition bilaterally   Good thenar musculature bilaterally   + tinel's at the wrist   Compartments soft  Brisk capillary refill   ___________________________________________________  STUDIES REVIEWED:  Ultrasound report was reviewed and demonstrates carpal tunnel ruled in  Prior note by Marc Tomlinson and PCP were reviewed  PROCEDURES PERFORMED:  Hand/upper extremity injection: L carpal tunnel  [place] Protocol:  Consent: Verbal consent obtained    Risks and benefits: risks, benefits and alternatives were discussed  Consent given by: patient  Patient understanding: patient states understanding of the procedure being performed  Patient identity confirmed: verbally with patient    Supporting Documentation  Indications: therapeutic and diagnostic   Procedure Details  Condition:carpal tunnel syndrome Site: L carpal tunnel   Needle size: 25 G  Ultrasound guidance: no  Approach: volar  Medications administered: 6 mg betamethasone acetate-betamethasone sodium phosphate 6 (3-3) mg/mL; 1 mL bupivacaine (PF) 0 25 %    Patient tolerance: patient tolerated the procedure well with no immediate complications  Dressing:  Sterile dressing applied              _____________________________________________________      Scribe Attestation    I,:  Pinky Reaves MA am acting as a scribe while in the presence of the attending physician :       I,:  Delisa Cox MD personally performed the services described in this documentation    as scribed in my presence :

## 2023-06-10 RX ORDER — BETAMETHASONE SODIUM PHOSPHATE AND BETAMETHASONE ACETATE 3; 3 MG/ML; MG/ML
6 INJECTION, SUSPENSION INTRA-ARTICULAR; INTRALESIONAL; INTRAMUSCULAR; SOFT TISSUE
Status: COMPLETED | OUTPATIENT
Start: 2023-06-09 | End: 2023-06-09

## 2023-06-10 RX ORDER — BUPIVACAINE HYDROCHLORIDE 2.5 MG/ML
1 INJECTION, SOLUTION EPIDURAL; INFILTRATION; INTRACAUDAL
Status: COMPLETED | OUTPATIENT
Start: 2023-06-09 | End: 2023-06-09

## 2023-06-19 ENCOUNTER — OFFICE VISIT (OUTPATIENT)
Dept: CARDIOLOGY CLINIC | Facility: MEDICAL CENTER | Age: 83
End: 2023-06-19
Payer: COMMERCIAL

## 2023-06-19 VITALS
HEIGHT: 64 IN | SYSTOLIC BLOOD PRESSURE: 124 MMHG | WEIGHT: 199 LBS | DIASTOLIC BLOOD PRESSURE: 80 MMHG | OXYGEN SATURATION: 97 % | BODY MASS INDEX: 33.97 KG/M2 | HEART RATE: 44 BPM

## 2023-06-19 DIAGNOSIS — I25.118 CORONARY ARTERY DISEASE OF NATIVE ARTERY OF NATIVE HEART WITH STABLE ANGINA PECTORIS (HCC): Primary | ICD-10-CM

## 2023-06-19 DIAGNOSIS — R00.1 BRADYCARDIA: ICD-10-CM

## 2023-06-19 DIAGNOSIS — E78.2 MIXED HYPERLIPIDEMIA: ICD-10-CM

## 2023-06-19 DIAGNOSIS — I10 ESSENTIAL HYPERTENSION: ICD-10-CM

## 2023-06-19 PROCEDURE — 93000 ELECTROCARDIOGRAM COMPLETE: CPT | Performed by: INTERNAL MEDICINE

## 2023-06-19 PROCEDURE — 99214 OFFICE O/P EST MOD 30 MIN: CPT | Performed by: INTERNAL MEDICINE

## 2023-06-19 NOTE — PATIENT INSTRUCTIONS
Stop carvedilol due to low heart rate  Obtain Holter monitor to assess heart rate after stopping the above medication

## 2023-06-19 NOTE — PROGRESS NOTES
[facility] LIZETH Rodriguez [address] [place] [id number]  Phone#  [phone]  Fax#  [phone]  [facility] Office Follow-up Visit             NAME: Lamin Chu  AGE: 81 y o  SEX: female   : 1940   MRN: [id number]    DATE: 2023  TIME: 9:47 AM    Cardiology Problem list:  CAD: : NSTEMI:  Cath: LM nl, LAD ostial 90%, LCX nl, RCA nl: PCI of LAD with 4 0x15 Xience GABY  ECHO: : EF 65, LAE, valves OK  Bradycardia: -Coreg stopped  Dyslipidemia  HTN  Carotid disease:  :  Doppler less than 50% stenosis    Assessment and plan:    Asymptomatic bradycardia  Pulse rate felt to be very low on examination  Currently on carvedilol 6 25 mg twice a day  EKG shows Sinus bradycardia with 1st deg ABV and sinus arrhythmia, baseline artefact  Stop carvedilol  Obtain Holter monitor after stopping carvedilol to assess any further bradycardia and rule out PAF  Close clinic follow-up arranged  Monitor for any symptoms of sick sinus syndrome  Coronary artery disease  No recent angina  Functional capacity is unchanged  Remains very active at the [employer]  Continue cardiac risk factor modification  Discontinue carvedilol  Report any worsening exertional symptoms       HTN  BP controlled now  Carvedilol stopped due to low heart rate  Continue remaining medications  Lifestyle modification  Close blood pressure monitoring      Mixed hyperlipidemia  Lab Results   Component Value Date    LDLCALC 52 2022   Continue current statin therapy      Obesity:  Continue efforts at weight loss including diet modification, increased physical activity and avoidance of calorie dense foods      Mediterranean style plant based diet and calorie restriction will be beneficial               Chief Complaint   Patient presents with   • Follow-up     6 month f/up       HPI:    Lamin Chu is a 81y o -year-old female who presents to the cardiology clinic for follow up for the above-listed problems  Still very active ay [employer]  Cooks a lot  Incidentally found to have a low heart rate examination today  HR low on exam today but she has no symptoms  Patient is doing well from a cardiovascular standpoint  No recent hospitalizations  Current medications reviewed  Reports compliance to medicines  No side effects reported  Denies chest pain on exertion  Denies worsening shortness of breath  Denies sustained palpitations  Past history, family history, social history, current medications, vital signs, recent lab and imaging studies and  prior cardiology studies reviewed independently on this visit  Wt Readings from Last 3 Encounters:   06/19/23 90 3 kg (199 lb)   06/09/23 92 7 kg (204 lb 6 4 oz)   04/18/23 92 5 kg (204 lb)     Pulse Readings from Last 3 Encounters:   06/19/23 (!) 44   06/09/23 56   04/18/23 66     BP Readings from Last 3 Encounters:   06/19/23 124/80   06/09/23 (!) 197/85   04/18/23 146/78       Allergies   Allergen Reactions   • Codeine      Reaction Date: 19Jul2011;    • Other      darvocet       Current Outpatient Medications:   •  aspirin 81 MG tablet, Take 81 mg by mouth daily , Disp: , Rfl:   •  atorvastatin (LIPITOR) 40 mg tablet, TAKE 1 TABLET BY MOUTH EVERY DAY, Disp: 90 tablet, Rfl: 1  •  cholecalciferol (VITAMIN D3) 1,000 units tablet, Take 2,000 Units by mouth daily, Disp: , Rfl:   •  Co-Enzyme Q-10 100 MG CAPS, Take 200 mg by mouth daily  , Disp: , Rfl:   •  famotidine (PEPCID) 20 mg tablet, Take 20 mg by mouth daily OTC, Disp: , Rfl:   •  latanoprost (XALATAN) 0 005 % ophthalmic solution, Administer 1 drop to both eyes daily, Disp: , Rfl: 3  •  lisinopril (ZESTRIL) 20 mg tablet, Take 1 tablet (20 mg total) by mouth daily, Disp: 90 tablet, Rfl: 3  •  Multiple Vitamins-Minerals (PRESERVISION AREDS) CAPS, Take 2 capsules by mouth daily, Disp: , Rfl:   •  nitroglycerin (NITROSTAT) 0 4 mg SL tablet, Place 1 tablet (0 4 mg total) under the tongue every 5 (five) minutes as needed for chest pain, Disp: 25 tablet, Rfl: 11    Past Medical History:   Diagnosis Date   • AVB (atrioventricular block)     first degree   • CAD (coronary artery disease)    • GERD (gastroesophageal reflux disease) 7/1/2017   • HLD (hyperlipidemia) 7/1/2017   • Hypertension    • LVH (left ventricular hypertrophy)    • Osteopenia    • Skin cancer 2000    nose   • Ventral hernia without obstruction or gangrene 7/1/2017     Past Surgical History:   Procedure Laterality Date   • CARDIAC CATHETERIZATION     • GALLBLADDER SURGERY     • HEMORRHOID SURGERY     • HYSTERECTOMY  27    age 56 w/ left oopherectomy   • NOSE SURGERY      basal cell   • OOPHORECTOMY Left 1987    age 56   • HI LAPAROSCOPY SURG CHOLECYSTECTOMY N/A 8/25/2016    Procedure: LAPAROSCOPIC CHOLECYSTECTOMY ;  Surgeon: Gris Granados MD;  [facility];  Service: General   • HI REPAIR FIRST ABDOMINAL WALL HERNIA N/A 11/30/2017    Procedure: [employer];  Surgeon: Gris Granados MD;  [facility];  Service: General   • ROTATOR CUFF REPAIR Right      Family History   Problem Relation Age of Onset   • No Known Problems Mother    • No Known Problems Father    • No Known Problems Daughter    • No Known Problems Maternal Grandmother    • No Known Problems Maternal Grandfather    • No Known Problems Paternal Grandmother    • No Known Problems Paternal Grandfather    • No Known Problems Half-Sister    • No Known Problems Maternal Aunt    • Squamous cell carcinoma Brother    • No Known Problems Son    • No Known Problems Paternal Aunt    • Breast cancer Neg Hx      Social History   reports that she has never smoked  She has never used smokeless tobacco  She reports current alcohol use  She reports that she does not use drugs  Review of Systems   Constitutional: Negative for fever  Respiratory: Negative for chest tightness, shortness of breath and wheezing      Cardiovascular: Negative for chest pain, palpitations and leg swelling  Musculoskeletal: Positive for myalgias  Skin: Negative for rash  Neurological: Positive for numbness  Negative for syncope  Hematological: Does not bruise/bleed easily  Objective:     Vitals:    06/19/23 0920   BP: 124/80   Pulse: (!) 44   SpO2: 97%     Physical Exam  Vitals reviewed  Constitutional:       General: She is not in acute distress  HENT:      Head: Normocephalic  Cardiovascular:      Rate and Rhythm: Normal rate and regular rhythm  Heart sounds: S1 normal and S2 normal  No murmur heard  Pulmonary:      Breath sounds: No wheezing or rhonchi  Musculoskeletal:      Right lower leg: No edema  Left lower leg: No edema  Skin:     General: Skin is warm  Neurological:      Mental Status: She is alert  Mental status is at baseline     Psychiatric:         Mood and Affect: Mood normal          Pertinent Laboratory/Diagnostic Studies:    Laboratory studies reviewed personally by Waleska Lao MD    BMP:   Lab Results   Component Value Date    SODIUM 138 04/14/2023    K 5 2 04/14/2023     04/14/2023    CO2 28 04/14/2023    BUN 19 04/14/2023    CREATININE 1 01 04/14/2023    GLUC 118 01/02/2022    CALCIUM 9 8 04/14/2023     CBC:  Lab Results   Component Value Date    WBC 8 72 01/02/2022    WBC 5 3 07/19/2016    RBC 4 03 01/02/2022    RBC 4 24 07/19/2016    HGB 12 2 01/02/2022    HGB 12 6 07/19/2016    HCT 37 9 01/02/2022    HCT 38 7 07/19/2016    MCV 94 01/02/2022    MCV 91 1 07/19/2016    MCH 30 3 01/02/2022    MCH 29 8 07/19/2016    RDW 13 2 01/02/2022    RDW 16 3 (H) 07/19/2016     01/02/2022     07/19/2016     Coags:    Lipid Profile:   Lab Results   Component Value Date    CHOL 201 (H) 12/14/2017     Lab Results   Component Value Date    HDL 53 07/29/2022     Lab Results   Component Value Date    LDLCALC 52 07/29/2022     Lab Results   Component Value Date    TRIG 97 07/29/2022      Other labs:  Lab Results   Component Value "Date    HYN4LTRHRTFA 2 730 07/09/2020     Lab Results   Component Value Date    ALT 29 01/02/2022    AST 17 01/02/2022           Imaging Studies:     Pertinent imaging studies and cardiac studies were independently reviewed on this visit and findings summarized  Visit diagnoses  1  Coronary artery disease of native artery of native heart with stable angina pectoris (HCC)  POCT ECG      2  Essential hypertension        3  Mixed hyperlipidemia        4  Bradycardia  POCT ECG    Holter monitor          Trina Ruiz MD, [facility]    Portions of the record may have been created with voice recognition software  Occasional wrong word or \"sound alike\" substitutions may have occurred due to the inherent limitations of voice recognition software  Read the chart carefully and recognize, using context, where substitutions have occurred  Please reach out to me directly for any clarifications    "

## 2023-07-11 ENCOUNTER — HOSPITAL ENCOUNTER (OUTPATIENT)
Dept: NON INVASIVE DIAGNOSTICS | Facility: CLINIC | Age: 83
Discharge: HOME | End: 2023-07-11
Payer: COMMERCIAL

## 2023-07-11 DIAGNOSIS — R00.1 BRADYCARDIA: ICD-10-CM

## 2023-07-11 PROCEDURE — 93225 XTRNL ECG REC<48 HRS REC: CPT

## 2023-07-11 PROCEDURE — 93226 XTRNL ECG REC<48 HR SCAN A/R: CPT

## 2023-07-14 PROCEDURE — 93227 XTRNL ECG REC<48 HR R&I: CPT | Performed by: INTERNAL MEDICINE

## 2023-07-14 NOTE — RESULT ENCOUNTER NOTE
Cardiac monitor reviewed. Overall heart rhythm is normal (sinus rhythm). Few skipped beats arising from the upper/lower heart chamber (APCs/PVCs) noted. Since stopping the carvedilol, heart rate has improved. Average heart rate now is 73. No indication for pacemaker. Continue to stay off the carvedilol. These results are reassuring. Please call patient with test results.

## 2023-08-14 ENCOUNTER — OFFICE VISIT (OUTPATIENT)
Dept: CARDIOLOGY CLINIC | Facility: MEDICAL CENTER | Age: 83
End: 2023-08-14
Payer: COMMERCIAL

## 2023-08-14 VITALS
HEART RATE: 95 BPM | DIASTOLIC BLOOD PRESSURE: 70 MMHG | BODY MASS INDEX: 34.31 KG/M2 | HEIGHT: 64 IN | SYSTOLIC BLOOD PRESSURE: 130 MMHG | WEIGHT: 201 LBS | OXYGEN SATURATION: 95 %

## 2023-08-14 DIAGNOSIS — I25.118 CORONARY ARTERY DISEASE OF NATIVE ARTERY OF NATIVE HEART WITH STABLE ANGINA PECTORIS (HCC): ICD-10-CM

## 2023-08-14 DIAGNOSIS — I10 ESSENTIAL HYPERTENSION: Primary | ICD-10-CM

## 2023-08-14 DIAGNOSIS — E78.2 MIXED HYPERLIPIDEMIA: ICD-10-CM

## 2023-08-14 PROCEDURE — 99214 OFFICE O/P EST MOD 30 MIN: CPT | Performed by: INTERNAL MEDICINE

## 2023-08-14 NOTE — PROGRESS NOTES
[facility] [place] [address] [address]  Phone#  [phone]  Fax#  [phone]  [facility] Cardiology Office Follow-up Visit             NAME: Oscar Gregorio  AGE: 81 y.o. SEX: female   : 1940   MRN: [id number]    DATE: 2023  TIME: 11:48 AM    Cardiology Problem list:  CAD: : NSTEMI:  Cath: LM nl, LAD ostial 90%, LCX nl, RCA nl: PCI of LAD with 4.0x15 Xience GABY  ECHO: : EF 65, LAE, valves OK  Bradycardia: -Coreg stopped  : Holter average heart rate 73, lowest heart rate 45, occasional PACs and PVCs  Dyslipidemia  HTN  Carotid disease:  :  Doppler less than 50% stenosis    Assessment and plan:    Asymptomatic bradycardia  Carvedilol was stopped at last visit. Holter monitor revealed average heart rate of 73 and lowest heart rate of 45. Occasional ectopy noted. No current indication for pacemaker. Closely monitor for any symptoms attributable to low heart rate. Continue to avoid beta-blockers. Coronary artery disease  No SL nitro use reported. No interim cardiac hospitalizations. No current anginal symptoms. Good functional capacity. Remains very active at the [employer]. Continue cardiac risk factor modification. Beta-blockers have been stopped. Monitor for  worsening exertional symptoms.      HTN  Continue current medications. BP controlled despite stopping coreg. Lifestyle modification. Close blood pressure monitoring.       Mixed hyperlipidemia  Lab Results   Component Value Date    LDLCALC 52 2022   Continue statin therapy. No side effects. Repeat labs this year with PCP.            Chief Complaint   Patient presents with   • Follow-up     2 month f/up       HPI:    Oscar Gregorio is a 81y.o.-year-old female who presents to the cardiology clinic for follow up for the above-listed problems. She was seen back in  with concerns of low heart rate. She was asymptomatic from the bradycardia.   She was taken off the beta-blockers. Subsequent Holter showed improved heart rate. No high degree AV block or pauses noted. She has been staying off the carvedilol. She continues to be very active at the [employer] and she still cooks a lot. She has had no dizziness, chest pain, dyspnea or syncope. No lipids done this year. Patient is doing well from a cardiovascular standpoint. No recent cardiac hospitalizations. Current medications reviewed. Denies chest pain on exertion. Denies worsening shortness of breath. Denies sustained palpitations. Past history, family history, social history, current medications, vital signs, recent lab and imaging studies and  prior cardiology studies reviewed independently on this visit. Wt Readings from Last 3 Encounters:   08/14/23 91.2 kg (201 lb)   06/19/23 90.3 kg (199 lb)   06/09/23 92.7 kg (204 lb 6.4 oz)     Pulse Readings from Last 3 Encounters:   08/14/23 95   06/19/23 (!) 44   06/09/23 56     BP Readings from Last 3 Encounters:   08/14/23 130/70   06/19/23 124/80   06/09/23 (!) 197/85       Allergies   Allergen Reactions   • Codeine      Reaction Date: 19Jul2011;    • Other      darvocet       Current Outpatient Medications:   •  aspirin 81 MG tablet, Take 81 mg by mouth daily , Disp: , Rfl:   •  atorvastatin (LIPITOR) 40 mg tablet, TAKE 1 TABLET BY MOUTH EVERY DAY, Disp: 90 tablet, Rfl: 1  •  cholecalciferol (VITAMIN D3) 1,000 units tablet, Take 2,000 Units by mouth daily, Disp: , Rfl:   •  Co-Enzyme Q-10 100 MG CAPS, Take 200 mg by mouth daily. , Disp: , Rfl:   •  famotidine (PEPCID) 20 mg tablet, Take 20 mg by mouth daily OTC, Disp: , Rfl:   •  latanoprost (XALATAN) 0.005 % ophthalmic solution, Administer 1 drop to both eyes daily, Disp: , Rfl: 3  •  lisinopril (ZESTRIL) 20 mg tablet, Take 1 tablet (20 mg total) by mouth daily, Disp: 90 tablet, Rfl: 3  •  Multiple Vitamins-Minerals (PRESERVISION AREDS) CAPS, Take 2 capsules by mouth daily, Disp: , Rfl:   • nitroglycerin (NITROSTAT) 0.4 mg SL tablet, Place 1 tablet (0.4 mg total) under the tongue every 5 (five) minutes as needed for chest pain, Disp: 25 tablet, Rfl: 11    Past Medical History:   Diagnosis Date   • AVB (atrioventricular block)     first degree   • CAD (coronary artery disease)    • GERD (gastroesophageal reflux disease) 7/1/2017   • HLD (hyperlipidemia) 7/1/2017   • Hypertension    • LVH (left ventricular hypertrophy)    • Osteopenia    • Skin cancer 2000    nose   • Ventral hernia without obstruction or gangrene 7/1/2017     Past Surgical History:   Procedure Laterality Date   • CARDIAC CATHETERIZATION     • GALLBLADDER SURGERY     • HEMORRHOID SURGERY     • HYSTERECTOMY  27    age 56 w/ left oopherectomy   • NOSE SURGERY      basal cell   • OOPHORECTOMY Left 1987    age 56   • HI LAPAROSCOPY SURG CHOLECYSTECTOMY N/A 8/25/2016    Procedure: LAPAROSCOPIC CHOLECYSTECTOMY ;  Surgeon: Ania Quintana MD;  [facility];  Service: General   • HI REPAIR FIRST ABDOMINAL WALL HERNIA N/A 11/30/2017    Procedure: Ciara Plascencia;  Surgeon: Ania Quintana MD;  [facility];  Service: General   • ROTATOR CUFF REPAIR Right      Family History   Problem Relation Age of Onset   • No Known Problems Mother    • No Known Problems Father    • No Known Problems Daughter    • No Known Problems Maternal Grandmother    • No Known Problems Maternal Grandfather    • No Known Problems Paternal Grandmother    • No Known Problems Paternal Grandfather    • No Known Problems Half-Sister    • No Known Problems Maternal Aunt    • Squamous cell carcinoma Brother    • No Known Problems Son    • No Known Problems Paternal Aunt    • Breast cancer Neg Hx      Social History   reports that she has never smoked. She has never used smokeless tobacco. She reports current alcohol use. She reports that she does not use drugs. Review of Systems   Constitutional: Negative for fever.    Respiratory: Negative for chest tightness, shortness of breath and wheezing. Cardiovascular: Negative for chest pain, palpitations and leg swelling. Musculoskeletal: Positive for myalgias. Skin: Negative for rash. Neurological: Positive for numbness. Negative for syncope. Hematological: Does not bruise/bleed easily. Psychiatric/Behavioral: Negative for sleep disturbance. All other systems reviewed and are negative. Objective:     Vitals:    08/14/23 1135   BP: 130/70   Pulse: 95   SpO2: 95%     Physical Exam  Vitals reviewed. HENT:      Head: Normocephalic. Cardiovascular:      Rate and Rhythm: Normal rate and regular rhythm. Heart sounds: S1 normal and S2 normal. Murmur heard. Crescendo systolic murmur is present with a grade of 2/6. Pulmonary:      Breath sounds: Rhonchi present. No wheezing. Musculoskeletal:      Right lower leg: No edema. Left lower leg: No edema. Skin:     General: Skin is warm. Neurological:      General: No focal deficit present. Mental Status: She is alert.    Psychiatric:         Mood and Affect: Mood normal.         Pertinent Laboratory/Diagnostic Studies:    Laboratory studies reviewed personally by Zeb Marin MD    BMP:   Lab Results   Component Value Date    SODIUM 138 04/14/2023    K 5.2 04/14/2023     04/14/2023    CO2 28 04/14/2023    BUN 19 04/14/2023    CREATININE 1.01 04/14/2023    GLUC 118 01/02/2022    CALCIUM 9.8 04/14/2023     CBC:  Lab Results   Component Value Date    WBC 8.72 01/02/2022    WBC 5.3 07/19/2016    RBC 4.03 01/02/2022    RBC 4.24 07/19/2016    HGB 12.2 01/02/2022    HGB 12.6 07/19/2016    HCT 37.9 01/02/2022    HCT 38.7 07/19/2016    MCV 94 01/02/2022    MCV 91.1 07/19/2016    MCH 30.3 01/02/2022    MCH 29.8 07/19/2016    RDW 13.2 01/02/2022    RDW 16.3 (H) 07/19/2016     01/02/2022     07/19/2016     Coags:    Lipid Profile:     Lab Results   Component Value Date    HDL 53 07/29/2022     Lab Results   Component Value Date    LDLCALC 52 07/29/2022     Lab Results   Component Value Date    TRIG 97 07/29/2022      Other labs:  Lab Results   Component Value Date    GDM6JJGMZGDL 2.730 07/09/2020     Lab Results   Component Value Date    ALT 29 01/02/2022    AST 17 01/02/2022           Imaging Studies:     Pertinent imaging studies and cardiac studies were independently reviewed on this visit and findings summarized. Visit diagnoses  No diagnosis found. John Betts MD, [facility]    Portions of the record may have been created with voice recognition software. Occasional wrong word or "sound alike" substitutions may have occurred due to the inherent limitations of voice recognition software. Read the chart carefully and recognize, using context, where substitutions have occurred. Please reach out to me directly for any clarifications.

## 2023-10-06 ENCOUNTER — RA CDI HCC (OUTPATIENT)
Dept: OTHER | Facility: HOSPITAL | Age: 83
End: 2023-10-06

## 2023-10-06 NOTE — PROGRESS NOTES
[address] [facility] coding opportunities       Chart reviewed, no opportunity found: CHART REVIEWED, NO OPPORTUNITY FOUND     Patients Insurance     Medicare Insurance: [employer]

## 2023-10-11 ENCOUNTER — TELEPHONE (OUTPATIENT)
Age: 83
End: 2023-10-11

## 2023-10-11 DIAGNOSIS — I10 ESSENTIAL HYPERTENSION: ICD-10-CM

## 2023-10-11 DIAGNOSIS — E78.2 MIXED HYPERLIPIDEMIA: ICD-10-CM

## 2023-10-11 DIAGNOSIS — I10 ESSENTIAL HYPERTENSION: Primary | ICD-10-CM

## 2023-10-11 RX ORDER — LISINOPRIL 20 MG/1
20 TABLET ORAL DAILY
Qty: 90 TABLET | Refills: 1 | Status: SHIPPED | OUTPATIENT
Start: 2023-10-11

## 2023-10-11 NOTE — TELEPHONE ENCOUNTER
Reason for call:   [x] Refill   [] Prior Auth  [] Other:     Office:   [x] PCP/Provider - Dr Elenor Goldmann   [] Speciality/Provider -     Medication: lisinopril     Dose/Frequency: 20mg, 1 qd    Quantity: 90    Ph[facility]: [facility]    Does the patient have enough for 3 days?    [x] Yes   [] No - Send as HP to POD

## 2023-10-11 NOTE — TELEPHONE ENCOUNTER
Reason for call:     Patient has an upcoming appt on 10/18/23.  Patient is asking for a lab order so she can have labs done prior to appt      [x] Refill   [] Prior Auth  [] Other:     Office:   [x] PCP/Provider - Dr Ayad Augustin

## 2023-10-13 ENCOUNTER — APPOINTMENT (OUTPATIENT)
Dept: LAB | Facility: MEDICAL CENTER | Age: 83
End: 2023-10-13
Payer: COMMERCIAL

## 2023-10-13 DIAGNOSIS — E78.49 OTHER HYPERLIPIDEMIA: ICD-10-CM

## 2023-10-13 DIAGNOSIS — E78.2 MIXED HYPERLIPIDEMIA: ICD-10-CM

## 2023-10-13 DIAGNOSIS — I10 ESSENTIAL HYPERTENSION: ICD-10-CM

## 2023-10-13 LAB
ANION GAP SERPL CALCULATED.3IONS-SCNC: 13 MMOL/L
BUN SERPL-MCNC: 21 MG/DL (ref 5–25)
CALCIUM SERPL-MCNC: 9.8 MG/DL (ref 8.4–10.2)
CHLORIDE SERPL-SCNC: 105 MMOL/L (ref 96–108)
CHOLEST SERPL-MCNC: 142 MG/DL
CO2 SERPL-SCNC: 25 MMOL/L (ref 21–32)
CREAT SERPL-MCNC: 0.99 MG/DL (ref 0.6–1.3)
ERYTHROCYTE [DISTWIDTH] IN BLOOD BY AUTOMATED COUNT: 13.2 % (ref 11.6–15.1)
GFR SERPL CREATININE-BSD FRML MDRD: 53 ML/MIN/1.73SQ M
GLUCOSE P FAST SERPL-MCNC: 118 MG/DL (ref 65–99)
HCT VFR BLD AUTO: 42.2 % (ref 34.8–46.1)
HDLC SERPL-MCNC: 55 MG/DL
HGB BLD-MCNC: 12.9 G/DL (ref 11.5–15.4)
LDLC SERPL CALC-MCNC: 59 MG/DL (ref 0–100)
MCH RBC QN AUTO: 30.2 PG (ref 26.8–34.3)
MCHC RBC AUTO-ENTMCNC: 30.6 G/DL (ref 31.4–37.4)
MCV RBC AUTO: 99 FL (ref 82–98)
NONHDLC SERPL-MCNC: 87 MG/DL
PLATELET # BLD AUTO: 260 THOUSANDS/UL (ref 149–390)
PMV BLD AUTO: 12 FL (ref 8.9–12.7)
POTASSIUM SERPL-SCNC: 5.4 MMOL/L (ref 3.5–5.3)
RBC # BLD AUTO: 4.27 MILLION/UL (ref 3.81–5.12)
SODIUM SERPL-SCNC: 143 MMOL/L (ref 135–147)
TRIGL SERPL-MCNC: 140 MG/DL
WBC # BLD AUTO: 6.98 THOUSAND/UL (ref 4.31–10.16)

## 2023-10-13 PROCEDURE — 36415 COLL VENOUS BLD VENIPUNCTURE: CPT

## 2023-10-13 PROCEDURE — 85027 COMPLETE CBC AUTOMATED: CPT

## 2023-10-13 PROCEDURE — 80048 BASIC METABOLIC PNL TOTAL CA: CPT

## 2023-10-13 PROCEDURE — 80061 LIPID PANEL: CPT

## 2023-10-13 RX ORDER — ATORVASTATIN CALCIUM 40 MG/1
TABLET, FILM COATED ORAL
Qty: 90 TABLET | Refills: 0 | Status: SHIPPED | OUTPATIENT
Start: 2023-10-13

## 2023-10-18 ENCOUNTER — OFFICE VISIT (OUTPATIENT)
Dept: FAMILY MEDICINE CLINIC | Facility: CLINIC | Age: 83
End: 2023-10-18
Payer: COMMERCIAL

## 2023-10-18 VITALS
BODY MASS INDEX: 34.31 KG/M2 | OXYGEN SATURATION: 96 % | HEART RATE: 63 BPM | RESPIRATION RATE: 16 BRPM | WEIGHT: 201 LBS | SYSTOLIC BLOOD PRESSURE: 130 MMHG | HEIGHT: 64 IN | TEMPERATURE: 98.1 F | DIASTOLIC BLOOD PRESSURE: 72 MMHG

## 2023-10-18 DIAGNOSIS — E78.2 MIXED HYPERLIPIDEMIA: ICD-10-CM

## 2023-10-18 DIAGNOSIS — N18.31 STAGE 3A CHRONIC KIDNEY DISEASE (HCC): ICD-10-CM

## 2023-10-18 DIAGNOSIS — E87.5 HYPERKALEMIA: ICD-10-CM

## 2023-10-18 DIAGNOSIS — R73.03 PREDIABETES: ICD-10-CM

## 2023-10-18 DIAGNOSIS — Z23 ENCOUNTER FOR IMMUNIZATION: ICD-10-CM

## 2023-10-18 DIAGNOSIS — I10 ESSENTIAL HYPERTENSION: Primary | ICD-10-CM

## 2023-10-18 PROCEDURE — G0008 ADMIN INFLUENZA VIRUS VAC: HCPCS | Performed by: FAMILY MEDICINE

## 2023-10-18 PROCEDURE — 99214 OFFICE O/P EST MOD 30 MIN: CPT | Performed by: FAMILY MEDICINE

## 2023-10-18 PROCEDURE — 90662 IIV NO PRSV INCREASED AG IM: CPT | Performed by: FAMILY MEDICINE

## 2023-10-18 NOTE — PROGRESS NOTES
Name: Linda Hoffman      : 1940      MRN: [id number]  Encounter Provider: Sarah Novoa MD  Encounter Date: 10/18/2023   Encounter department: [address] [place] [address]     1. Essential hypertension    2. Stage 3a chronic kidney disease ([address] [facility])    3. Hyperkalemia  -     Basic metabolic panel; Future    4. Prediabetes    5. Mixed hyperlipidemia    6. Encounter for immunization  -     influenza vaccine, high-dose, PF 0.7 mL (FLUZONE HIGH-DOSE)        Recently taken off carvedilol by cardiology due to bradycardia. Labs reviewed today in office. Slightly elevated potassium 5.4. She is on lisinopril and needs bananas daily. Repeat BMP in 2 weeks. Overall doing well. Kidney function is stable. Fasting glucose slightly elevated. Counseled on appropriate diet and exercise. Depression Screening and Follow-up Plan: Patient was screened for depression during today's encounter. They screened negative with a PHQ-2 score of 0. Subjective      Patient presents with: Follow-up: 6 mos follow up Blood pressure, Medications           Review of Systems   Constitutional:  Negative for fatigue and fever. HENT:  Negative for sore throat. Eyes:  Negative for visual disturbance. Respiratory:  Negative for cough, chest tightness and shortness of breath. Cardiovascular:  Negative for chest pain, palpitations and leg swelling. Gastrointestinal:  Negative for abdominal pain, constipation, diarrhea and nausea. Endocrine: Negative for cold intolerance and heat intolerance. Genitourinary:  Negative for flank pain. Musculoskeletal:  Negative for back pain and neck pain. Skin:  Negative for rash. Neurological:  Negative for headaches. Psychiatric/Behavioral:  Negative for behavioral problems and confusion.         Current Outpatient Medications on File Prior to Visit   Medication Sig   • aspirin 81 MG tablet Take 81 mg by mouth daily    • atorvastatin (LIPITOR) 40 mg tablet TAKE 1 TABLET BY MOUTH EVERY DAY   • cholecalciferol (VITAMIN D3) 1,000 units tablet Take 2,000 Units by mouth daily   • Co-Enzyme Q-10 100 MG CAPS Take 200 mg by mouth daily. • famotidine (PEPCID) 20 mg tablet Take 20 mg by mouth daily OTC   • latanoprost (XALATAN) 0.005 % ophthalmic solution Administer 1 drop to both eyes daily   • lisinopril (ZESTRIL) 20 mg tablet Take 1 tablet (20 mg total) by mouth daily   • Multiple Vitamins-Minerals (PRESERVISION AREDS) CAPS Take 2 capsules by mouth daily   • nitroglycerin (NITROSTAT) 0.4 mg SL tablet Place 1 tablet (0.4 mg total) under the tongue every 5 (five) minutes as needed for chest pain   • [DISCONTINUED] carvedilol (COREG) 6.25 mg tablet Take 1 tablet (6.25 mg total) by mouth 2 (two) times a day with meals       Objective     /72 (BP Location: Left arm, Patient Position: Sitting, Cuff Size: Standard)   Pulse 63   Temp 98.1 °F (36.7 °C)   Resp 16   Ht 5' 4.1" (1.628 m)   Wt 91.2 kg (201 lb)   SpO2 96%   BMI 34.39 kg/m²     Physical Exam  Vitals and nursing note reviewed. Constitutional:       Appearance: She is well-developed. HENT:      Head: Normocephalic and atraumatic. Cardiovascular:      Rate and Rhythm: Normal rate and regular rhythm. Pulmonary:      Effort: Pulmonary effort is normal.      Breath sounds: Normal breath sounds. Neurological:      General: No focal deficit present. Mental Status: She is alert.    Psychiatric:         Mood and Affect: Mood normal.         Behavior: Behavior normal.       Kassie Gaxiola MD

## 2023-11-11 ENCOUNTER — APPOINTMENT (OUTPATIENT)
Dept: RADIOLOGY | Facility: MEDICAL CENTER | Age: 83
End: 2023-11-11
Payer: COMMERCIAL

## 2023-11-11 ENCOUNTER — APPOINTMENT (OUTPATIENT)
Dept: LAB | Facility: MEDICAL CENTER | Age: 83
End: 2023-11-11
Payer: COMMERCIAL

## 2023-11-11 DIAGNOSIS — E87.5 HYPERKALEMIA: ICD-10-CM

## 2023-11-11 LAB
ANION GAP SERPL CALCULATED.3IONS-SCNC: 10 MMOL/L
BUN SERPL-MCNC: 20 MG/DL (ref 5–25)
CALCIUM SERPL-MCNC: 9.9 MG/DL (ref 8.4–10.2)
CHLORIDE SERPL-SCNC: 106 MMOL/L (ref 96–108)
CO2 SERPL-SCNC: 27 MMOL/L (ref 21–32)
CREAT SERPL-MCNC: 0.83 MG/DL (ref 0.6–1.3)
GFR SERPL CREATININE-BSD FRML MDRD: 65 ML/MIN/1.73SQ M
GLUCOSE P FAST SERPL-MCNC: 114 MG/DL (ref 65–99)
POTASSIUM SERPL-SCNC: 4.6 MMOL/L (ref 3.5–5.3)
SODIUM SERPL-SCNC: 143 MMOL/L (ref 135–147)

## 2023-11-11 PROCEDURE — 36415 COLL VENOUS BLD VENIPUNCTURE: CPT

## 2023-11-11 PROCEDURE — 80048 BASIC METABOLIC PNL TOTAL CA: CPT

## 2024-01-12 DIAGNOSIS — E78.49 OTHER HYPERLIPIDEMIA: ICD-10-CM

## 2024-01-12 RX ORDER — ATORVASTATIN CALCIUM 40 MG/1
TABLET, FILM COATED ORAL
Qty: 90 TABLET | Refills: 1 | Status: SHIPPED | OUTPATIENT
Start: 2024-01-12

## 2024-01-29 DIAGNOSIS — E78.49 OTHER HYPERLIPIDEMIA: ICD-10-CM

## 2024-01-29 RX ORDER — ATORVASTATIN CALCIUM 40 MG/1
40 TABLET, FILM COATED ORAL DAILY
Qty: 90 TABLET | Refills: 0 | Status: CANCELLED | OUTPATIENT
Start: 2024-01-29

## 2024-01-29 NOTE — TELEPHONE ENCOUNTER
Patient called requesting refill for Atorvastatin 40mg . Patient made aware medication was refilled on 01/12/2024 for 90 with 1 refills to [facility] pharmacy. Patient instructed to contact the pharmacy to obtain refills of medication. Patient verbalized understanding.

## 2024-02-27 ENCOUNTER — OFFICE VISIT (OUTPATIENT)
Dept: CARDIOLOGY CLINIC | Facility: MEDICAL CENTER | Age: 84
End: 2024-02-27
Payer: COMMERCIAL

## 2024-02-27 VITALS
SYSTOLIC BLOOD PRESSURE: 132 MMHG | DIASTOLIC BLOOD PRESSURE: 70 MMHG | WEIGHT: 201 LBS | HEIGHT: 64 IN | OXYGEN SATURATION: 96 % | HEART RATE: 77 BPM | BODY MASS INDEX: 34.31 KG/M2

## 2024-02-27 DIAGNOSIS — I25.118 CORONARY ARTERY DISEASE OF NATIVE ARTERY OF NATIVE HEART WITH STABLE ANGINA PECTORIS (HCC): Primary | ICD-10-CM

## 2024-02-27 DIAGNOSIS — E78.2 MIXED HYPERLIPIDEMIA: ICD-10-CM

## 2024-02-27 DIAGNOSIS — I10 ESSENTIAL HYPERTENSION: ICD-10-CM

## 2024-02-27 DIAGNOSIS — N18.31 STAGE 3A CHRONIC KIDNEY DISEASE (HCC): ICD-10-CM

## 2024-02-27 DIAGNOSIS — R00.1 BRADYCARDIA: ICD-10-CM

## 2024-02-27 PROCEDURE — 99214 OFFICE O/P EST MOD 30 MIN: CPT | Performed by: INTERNAL MEDICINE

## 2024-02-27 RX ORDER — LISINOPRIL AND HYDROCHLOROTHIAZIDE 20; 12.5 MG/1; MG/1
1 TABLET ORAL DAILY
Qty: 90 TABLET | Refills: 3 | Status: SHIPPED | OUTPATIENT
Start: 2024-02-27

## 2024-02-27 NOTE — PROGRESS NOTES
[facility] [place] [address]  [address]  [facility] [address]  Phone#  [phone]  Fax#  [phone]  [facility] Cardiology Office Follow-up Visit             NAME: Sofia Aguirre  AGE: 83 y.o. SEX: female   : 1940   MRN: [id number]    DATE: 2024  TIME: 8:43 AM    Cardiology Problem list:  CAD: : NSTEMI:  Cath: LM nl, LAD ostial 90%, LCX nl, RCA nl: PCI of LAD with 4.0x15 Xience GABY  ECHO: : EF 65, LAE, valves OK  Bradycardia: -Coreg stopped  : Holter average heart rate 73, lowest heart rate 45, occasional PACs and PVCs  Dyslipidemia  HTN  Carotid disease:  :  Doppler less than 50% stenosis    Assessment and plan:    Asymptomatic bradycardia  Coreg was stopped at the last visit.  No further recurrence of bradycardia.  Avoid beta-blockers.    Coronary artery disease  Stable anginal symptoms.  No cardiac hospitalizations.  Remains fairly active.  Continue ASA and statins.     HTN  Reports recently elevated BPs.  Stop Lisinopril  Start lisinopril/HCT 20/12.5mg daily.  Get blood work 1 week after starting this medication to check potassium.  Lifestyle modification.  Close blood pressure monitoring.     Mixed hyperlipidemia  LDL is 59.  Labs personally reviewed.  Continue statin therapy.           Chief Complaint   Patient presents with    Follow-up     6 month f/up       HPI:    Sofia Aguirre is a 83 y.o.-year-old female who presents to the cardiology clinic for follow up for the above-listed problems.  She reports recent mildly elevated blood pressures.  She continues to be fairly active.  She is asymptomatic from the bradycardia.  Previously she was taken off beta-blockers due to bradycardia.  Her follow-up Holter showed improved heart rates.  No dizziness or syncope reported.  She continues to be active at her [employer].  She enjoys cooking.  No interim cardiac hospitalizations. Was told that her K runs high. We discussed adding HCTZ.    Past history,  family history, social history, current medications, vital signs, recent lab and imaging studies and  prior cardiology studies reviewed independently on this visit.    Wt Readings from Last 3 Encounters:   02/27/24 91.2 kg (201 lb)   10/18/23 91.2 kg (201 lb)   08/14/23 91.2 kg (201 lb)     Pulse Readings from Last 3 Encounters:   02/27/24 77   10/18/23 63   08/14/23 95     BP Readings from Last 3 Encounters:   02/27/24 132/70   10/18/23 130/72   08/14/23 130/70       Allergies   Allergen Reactions    Codeine      Reaction Date: 19Jul2011;     Other      darvocet       Current Outpatient Medications:     aspirin 81 MG tablet, Take 81 mg by mouth daily , Disp: , Rfl:     atorvastatin (LIPITOR) 40 mg tablet, TAKE 1 TABLET BY MOUTH EVERY DAY, Disp: 90 tablet, Rfl: 1    cholecalciferol (VITAMIN D3) 1,000 units tablet, Take 2,000 Units by mouth daily, Disp: , Rfl:     Co-Enzyme Q-10 100 MG CAPS, Take 200 mg by mouth daily., Disp: , Rfl:     famotidine (PEPCID) 20 mg tablet, Take 20 mg by mouth daily OTC, Disp: , Rfl:     latanoprost (XALATAN) 0.005 % ophthalmic solution, Administer 1 drop to both eyes daily, Disp: , Rfl: 3    lisinopril (ZESTRIL) 20 mg tablet, Take 1 tablet (20 mg total) by mouth daily, Disp: 90 tablet, Rfl: 1    Multiple Vitamins-Minerals (PRESERVISION AREDS) CAPS, Take 2 capsules by mouth daily, Disp: , Rfl:     nitroglycerin (NITROSTAT) 0.4 mg SL tablet, Place 1 tablet (0.4 mg total) under the tongue every 5 (five) minutes as needed for chest pain, Disp: 25 tablet, Rfl: 11    Past Medical History:   Diagnosis Date    AVB (atrioventricular block)     first degree    CAD (coronary artery disease)     GERD (gastroesophageal reflux disease) 7/1/2017    HLD (hyperlipidemia) 7/1/2017    Hypertension     LVH (left ventricular hypertrophy)     Osteopenia     Skin cancer 2000    nose    Ventral hernia without obstruction or gangrene 7/1/2017     Past Surgical History:   Procedure Laterality Date    CARDIAC  CATHETERIZATION      GALLBLADDER SURGERY      HEMORRHOID SURGERY      HYSTERECTOMY  1987    age 47 w/ left oopherectomy    NOSE SURGERY      basal cell    OOPHORECTOMY Left 1987    age 47    [place] LAPAROSCOPY SURG CHOLECYSTECTOMY N/A 8/25/2016    Procedure: LAPAROSCOPIC CHOLECYSTECTOMY ;  Surgeon: Shahab Lopez MD;  [facility];  Service: General    [place] REPAIR FIRST ABDOMINAL WALL HERNIA N/A 11/30/2017    Procedure: INCISIONAL HERNIA REPAIR;  Surgeon: Shahab Lopez MD;  [facility];  Service: General    ROTATOR CUFF REPAIR Right      Family History   Problem Relation Age of Onset    No Known Problems Mother     No Known Problems Father     No Known Problems Daughter     No Known Problems Maternal Grandmother     No Known Problems Maternal Grandfather     No Known Problems Paternal Grandmother     No Known Problems Paternal Grandfather     No Known Problems Half-Sister     No Known Problems Maternal Aunt     Squamous cell carcinoma Brother     No Known Problems Son     No Known Problems Paternal Aunt     Breast cancer Neg Hx      Social History   reports that she has never smoked. She has never used smokeless tobacco. She reports current alcohol use. She reports that she does not use drugs.     Review of Systems   Constitutional:  Negative for fatigue.   HENT: Negative.     Eyes: Negative.    Respiratory:  Negative for shortness of breath.    Cardiovascular:  Negative for chest pain, palpitations and leg swelling.   Gastrointestinal: Negative.    Endocrine: Negative.    Musculoskeletal:  Positive for myalgias.   Neurological:  Positive for numbness. Negative for syncope.   Hematological: Negative.    All other systems reviewed and are negative.      Objective:     Vitals:    02/27/24 0840   BP: 132/70   Pulse: 77   SpO2: 96%     Physical Exam  Vitals reviewed.   Constitutional:       General: She is not in acute distress.  HENT:      Head: Normocephalic.   Neck:      Vascular: No carotid bruit.    Cardiovascular:      Rate and Rhythm: Normal rate and regular rhythm.      Heart sounds: S1 normal and S2 normal. Murmur heard.   Pulmonary:      Breath sounds: No wheezing or rhonchi.   Musculoskeletal:      Right lower leg: No edema.      Left lower leg: No edema.   Skin:     General: Skin is warm.   Neurological:      Mental Status: She is alert. Mental status is at baseline.   Psychiatric:         Mood and Affect: Mood normal.         Pertinent Laboratory/Diagnostic Studies:    Laboratory studies reviewed personally by Efrain Dolan MD    BMP:   Lab Results   Component Value Date    SODIUM 143 11/11/2023    K 4.6 11/11/2023     11/11/2023    CO2 27 11/11/2023    BUN 20 11/11/2023    CREATININE 0.83 11/11/2023    GLUC 118 01/02/2022    CALCIUM 9.9 11/11/2023     CBC:  Lab Results   Component Value Date    WBC 6.98 10/13/2023    WBC 5.3 07/19/2016    RBC 4.27 10/13/2023    RBC 4.24 07/19/2016    HGB 12.9 10/13/2023    HGB 12.6 07/19/2016    HCT 42.2 10/13/2023    HCT 38.7 07/19/2016    MCV 99 (H) 10/13/2023    MCV 91.1 07/19/2016    MCH 30.2 10/13/2023    MCH 29.8 07/19/2016    RDW 13.2 10/13/2023    RDW 16.3 (H) 07/19/2016     10/13/2023     07/19/2016     Coags:    Lipid Profile:     Lab Results   Component Value Date    HDL 55 10/13/2023     Lab Results   Component Value Date    LDLCALC 59 10/13/2023     Lab Results   Component Value Date    TRIG 140 10/13/2023      Other labs:  Lab Results   Component Value Date    [id number] 2.730 07/09/2020     Lab Results   Component Value Date    ALT 29 01/02/2022    AST 17 01/02/2022           Imaging Studies:     Pertinent imaging studies and cardiac studies were independently reviewed on this visit and findings summarized.    Visit diagnoses  1. Coronary artery disease of native artery of native heart with stable angina pectoris (HCC)        2. Essential hypertension        3. Mixed hyperlipidemia        4. Bradycardia            Efrain Dolan MD,  "[facility]    Portions of the record may have been created with voice recognition software.  Occasional wrong word or \"sound alike\" substitutions may have occurred due to the inherent limitations of voice recognition software.  Read the chart carefully and recognize, using context, where substitutions have occurred. Please reach out to me directly for any clarifications.  "

## 2024-02-27 NOTE — PATIENT INSTRUCTIONS
Stop Lisinopril  Start lisinopril/HCT 20/12.5mg daily.  Low salt diet.  Get blood work 1 week after starting this medication to check potassium.

## 2024-03-04 ENCOUNTER — OFFICE VISIT (OUTPATIENT)
Dept: OBGYN CLINIC | Facility: CLINIC | Age: 84
End: 2024-03-04
Payer: COMMERCIAL

## 2024-03-04 VITALS — WEIGHT: 201 LBS | HEIGHT: 64 IN | BODY MASS INDEX: 34.31 KG/M2

## 2024-03-04 DIAGNOSIS — G56.02 CARPAL TUNNEL SYNDROME OF LEFT WRIST: Primary | ICD-10-CM

## 2024-03-04 PROCEDURE — 99214 OFFICE O/P EST MOD 30 MIN: CPT | Performed by: STUDENT IN AN ORGANIZED HEALTH CARE EDUCATION/TRAINING PROGRAM

## 2024-03-04 RX ORDER — CHLORHEXIDINE GLUCONATE ORAL RINSE 1.2 MG/ML
15 SOLUTION DENTAL ONCE
Status: CANCELLED | OUTPATIENT
Start: 2024-03-04 | End: 2024-03-04

## 2024-03-04 NOTE — H&P (VIEW-ONLY)
ORTHOPAEDIC HAND, WRIST, AND ELBOW OFFICE  VISIT      ASSESSMENT/PLAN:      Diagnoses and all orders for this visit:    Carpal tunnel syndrome of left wrist  -     Case request operating room: RELEASE CARPAL TUNNEL ENDOSCOPIC; Standing  -     Case request operating room: RELEASE CARPAL TUNNEL ENDOSCOPIC    Other orders  -     Nursing Communication Warmimg Interventions Implemented; Standing  -     Nursing Communication CHG bath, have staff wash entire body (neck down) per pre-op bathing protocol. Routine, evening prior to, and day of surgery.; Standing  -     Nursing Communication Swab both nares with Povidone-Iodine solution, EXCLUDE if patient has shellfish/Iodine allergy. Routine, day of surgery, on call to OR; Standing  -     chlorhexidine (PERIDEX) 0.12 % oral rinse 15 mL  -     Void on call to OR; Standing          83 y.o. female with left CTS, possible CuTS.  Patient's US findings discussed and that since she felt much improvement with the use of the injx, this points towards to CTS being the diagnosis.   However, with pt having symptoms in the small finger, there is a possibility there could be some CuTS as well.   Treatment options and expected outcomes were discussed. Discussed proceeding with just the carpal tunnel release, versus doing carpal and cubital tunnel release and the differences between these surgeries. We also discussed getting an EMG first if she would like more answers prior to surgery.  The patient verbalized understanding of exam findings and treatment plan.   The patient was given the opportunity to ask questions.  Questions were answered to the patient's satisfaction.  The patient decided to move forward with left ECTR only to be performed under local anesthsia. She understands that if her CTS is severe, we cannot guarantee resolution of symptoms. She also understands that this surgery will not improve any CuTS that is present. If needed, can proceed with CuTR at a later  date.      Follow Up:  After surgery       To Do Next Visit:  Re-evaluation of current issue      Discussions:  Endoscopic Carpal Tunnel Release:   The anatomy and physiology of carpal tunnel syndrome was discussed with the patient today.  Increase pressure localized under the transverse carpal ligament can cause pain, numbness, tingling, or dysesthesias within the median nerve distribution as well as feelings of fatigue, clumsiness, or awkwardness.  These symptoms typically occur at night and worse in the morning upon waking.  Eventually, untreated carpal tunnel syndrome can result in weakness and permanent loss of muscle within the thenar compartment of the hand.  Treatment options were discussed with the patient.  Conservative treatment includes nocturnal resting splints to keep the nerve in a neutral position, ergonomic changes within the work or home environment, activity modification, and tendon gliding exercises.  Vitamin B6 one tablet daily over the counter may helpful to reduce symptoms.  Steroid injections within the carpal canal can help a majority of patients, however this is often self-limited in a majority of patients.  Surgical intervention to divide the transverse carpal ligament typically results in a long-lasting relief of the patient's complaints, with the recurrence rate of less than 1%. The patient has elected to undergo an endoscopic carpal tunnel release.  The single incision technique was discussed with the patient, which results in approximately a two-week recovery time less wound complications.  In the postoperative period, light activities are allowed immediately, driving is allowed when narcotic medication has stopped, and the bandages may be removed and incision may get wet after 2 days.  Heavy activities (lifting more than approximately 10 pounds) will be allowed after follow up appointment in 1-2 weeks.  While night symptoms (waking from sleep, pain, and discomfort in the hands)  generally improves rapidly, the numbness and tingling as well as the strength will slowly improve over weeks to months depending on the chronicity and severity of the carpal tunnel syndrome.  Pillar pain and scar discomfort were discussed with the patient which are self-limiting conditions.  The risks of bleeding and infection from the surgery are less than 1%.  Risk of recurrence is approximately 0.5%.  The risks of nerve injury or nerve damage or damage to the blood vessels is approximately 1 in 1200. The patient has an understanding of the above mentioned discussion.The risks and benefits of the procedure were explained to the patient, which include, but are not limited to: Bleeding, infection, recurrence, pain, scar, damage to tendons, damage to nerves, and damage to blood vessels, failure to give desired results and complications related to anesthesia.  These risks, along with alternative conservative treatment options, and postoperative protocols were voiced back and understood by the patient.  All questions were answered to the patient's satisfaction.  The patient agrees to comply with a standard postoperative protocol, and is willing to proceed.  Education was provided via written and auditory forms.  There were no barriers to learning. Written handouts regarding wound care, incision and scar care, and general preoperative information was provided to the patient.  Prior to surgery, the patient may be requested to stop all anti-inflammatory medications.  Prophylactic aspirin, Plavix, and Coumadin may be allowed to be continued.  Medications including vitamin E., ginkgo, & fish oil are requested to be stopped about one week.      Mahin Bean MD  Attending, Orthopaedic Surgery  Hand, Wrist, and Elbow Surgery  [facility]    ______________________________________________________________________________________________    CHIEF COMPLAINT:  Chief Complaint   Patient presents with   •  Left Wrist - Follow-up       SUBJECTIVE:  Patient is a 83 y.o. RHD female who presents today for follow up of left CTS. Pt describes n/t to thumb, index and long fingers since February 2023. She was seen in June and at that time was provided a steroid injection. She states that this did work very well and her symptoms were improved about 80%. She's not sure exactly how long this lasted. She was told she did not c/o n/t in October but now she has significant more constant symptoms. States this now involves all the fingers. Describes inability to sleep and feels like she notices her symptoms more with wrist positioning, not with elbow positioning.        I have personally reviewed all the relevant PMH, PSH, SH, FH, Medications and allergies      PAST MEDICAL HISTORY:  Past Medical History:   Diagnosis Date   • AVB (atrioventricular block)     first degree   • CAD (coronary artery disease)    • GERD (gastroesophageal reflux disease) 7/1/2017   • HLD (hyperlipidemia) 7/1/2017   • Hypertension    • LVH (left ventricular hypertrophy)    • Osteopenia    • Skin cancer 2000    nose   • Ventral hernia without obstruction or gangrene 7/1/2017       PAST SURGICAL HISTORY:  Past Surgical History:   Procedure Laterality Date   • CARDIAC CATHETERIZATION     • GALLBLADDER SURGERY     • HEMORRHOID SURGERY     • HYSTERECTOMY  1987    age 47 w/ left oopherectomy   • NOSE SURGERY      basal cell   • OOPHORECTOMY Left 1987    age 47   • [place] LAPAROSCOPY SURG CHOLECYSTECTOMY N/A 8/25/2016    Procedure: LAPAROSCOPIC CHOLECYSTECTOMY ;  Surgeon: Shahab Lopez MD;  [facility];  Service: General   • [place] REPAIR FIRST ABDOMINAL WALL HERNIA N/A 11/30/2017    Procedure: INCISIONAL HERNIA REPAIR;  Surgeon: Shahab Lopez MD;  [facility];  Service: General   • ROTATOR CUFF REPAIR Right        FAMILY HISTORY:  Family History   Problem Relation Age of Onset   • No Known Problems Mother    • No Known Problems Father    • No Known  Problems Daughter    • No Known Problems Maternal Grandmother    • No Known Problems Maternal Grandfather    • No Known Problems Paternal Grandmother    • No Known Problems Paternal Grandfather    • No Known Problems Half-Sister    • No Known Problems Maternal Aunt    • Squamous cell carcinoma Brother    • No Known Problems Son    • No Known Problems Paternal Aunt    • Breast cancer Neg Hx        SOCIAL HISTORY:  Social History     Tobacco Use   • Smoking status: Never   • Smokeless tobacco: Never   Vaping Use   • Vaping status: Never Used   Substance Use Topics   • Alcohol use: Yes     Comment: socially   • Drug use: No       MEDICATIONS:    Current Outpatient Medications:   •  aspirin 81 MG tablet, Take 81 mg by mouth daily , Disp: , Rfl:   •  atorvastatin (LIPITOR) 40 mg tablet, TAKE 1 TABLET BY MOUTH EVERY DAY, Disp: 90 tablet, Rfl: 1  •  cholecalciferol (VITAMIN D3) 1,000 units tablet, Take 2,000 Units by mouth daily, Disp: , Rfl:   •  Co-Enzyme Q-10 100 MG CAPS, Take 200 mg by mouth daily., Disp: , Rfl:   •  famotidine (PEPCID) 20 mg tablet, Take 20 mg by mouth daily OTC, Disp: , Rfl:   •  latanoprost (XALATAN) 0.005 % ophthalmic solution, Administer 1 drop to both eyes daily, Disp: , Rfl: 3  •  lisinopril-hydrochlorothiazide (PRINZIDE,ZESTORETIC) 20-12.5 MG per tablet, Take 1 tablet by mouth daily, Disp: 90 tablet, Rfl: 3  •  Multiple Vitamins-Minerals (PRESERVISION AREDS) CAPS, Take 2 capsules by mouth daily, Disp: , Rfl:   •  nitroglycerin (NITROSTAT) 0.4 mg SL tablet, Place 1 tablet (0.4 mg total) under the tongue every 5 (five) minutes as needed for chest pain, Disp: 25 tablet, Rfl: 11    ALLERGIES:  Allergies   Allergen Reactions   • Codeine      Reaction Date: 19Jul2011;    • Other      darvocet           REVIEW OF SYSTEMS:  Musculoskeletal:        As noted in HPI.   All other systems reviewed and are negative.    VITALS:  There were no vitals filed for this visit.    LABS:  HgA1c:   Lab Results    Component Value Date    HGBA1C 6.3 (H) 07/29/2022     BMP:   Lab Results   Component Value Date    CALCIUM 9.9 11/11/2023     12/14/2017    K 4.6 11/11/2023    CO2 27 11/11/2023     11/11/2023    BUN 20 11/11/2023    CREATININE 0.83 11/11/2023       _____________________________________________________  PHYSICAL EXAMINATION:  General: Well developed and well nourished, alert & oriented x 3, appears comfortable  Psychiatric: Normal  HEENT: Normocephalic, Atraumatic Trachea Midline, No torticollis  Pulmonary: No audible wheezing or respiratory distress   Abdomen/GI: Non tender, non distended   Cardiovascular: No pitting edema, 2+ radial pulse   Skin: No masses, erythema, lacerations, fluctation, ulcerations  Neurovascular: Sensation Intact to the Median, Ulnar, Radial Nerve, Motor Intact to the Median, Ulnar, Radial Nerve, and Pulses Intact  Musculoskeletal: Normal, except as noted in detailed exam and in HPI.      MUSCULOSKELETAL EXAMINATION:  Left Wrist:  APB 4/5  Positive Tinel's at the wrist.   Mildly + Tinel's at elbow.  Pt with some generalized arthritic changes about the fingers.  Compartments soft.  Brisk capillary refill.    Right Wrist:  APB 4/5  Negative Tinel's at the wrist.  Negative Tinel's at the elbow.         Right radial (mm) Right ulnar (mm) Left radial (mm) Left ulnar (mm)   Thumb 5 5 5 5   Index 5 5 5 5   Long 5 5 5 5   Ring 5 5 7 5   Small 5 5 5 5         ___________________________________________________  STUDIES REVIEWED:  No new studies to review . Previous US ruled in CTS on the left.         PROCEDURES PERFORMED:  Procedures  No Procedures performed today    _____________________________________________________      Scribe Attestation    I,:  Maddy Leonardo PA-C am acting as a scribe while in the presence of the attending physician.:       I,:  Mahin Bean MD personally performed the services described in this documentation    as scribed in my presence.:

## 2024-03-04 NOTE — PROGRESS NOTES
ORTHOPAEDIC HAND, WRIST, AND ELBOW OFFICE  VISIT      ASSESSMENT/PLAN:      Diagnoses and all orders for this visit:    Carpal tunnel syndrome of left wrist  -     Case request operating room: RELEASE CARPAL TUNNEL ENDOSCOPIC; Standing  -     Case request operating room: RELEASE CARPAL TUNNEL ENDOSCOPIC    Other orders  -     Nursing Communication Warmimg Interventions Implemented; Standing  -     Nursing Communication CHG bath, have staff wash entire body (neck down) per pre-op bathing protocol. Routine, evening prior to, and day of surgery.; Standing  -     Nursing Communication Swab both nares with Povidone-Iodine solution, EXCLUDE if patient has shellfish/Iodine allergy. Routine, day of surgery, on call to OR; Standing  -     chlorhexidine (PERIDEX) 0.12 % oral rinse 15 mL  -     Void on call to OR; Standing          83 y.o. female with left CTS, possible CuTS.  Patient's US findings discussed and that since she felt much improvement with the use of the injx, this points towards to CTS being the diagnosis.   However, with pt having symptoms in the small finger, there is a possibility there could be some CuTS as well.   Treatment options and expected outcomes were discussed. Discussed proceeding with just the carpal tunnel release, versus doing carpal and cubital tunnel release and the differences between these surgeries. We also discussed getting an EMG first if she would like more answers prior to surgery.  The patient verbalized understanding of exam findings and treatment plan.   The patient was given the opportunity to ask questions.  Questions were answered to the patient's satisfaction.  The patient decided to move forward with left ECTR only to be performed under local anesthsia. She understands that if her CTS is severe, we cannot guarantee resolution of symptoms. She also understands that this surgery will not improve any CuTS that is present. If needed, can proceed with CuTR at a later  date.      Follow Up:  After surgery       To Do Next Visit:  Re-evaluation of current issue      Discussions:  Endoscopic Carpal Tunnel Release:   The anatomy and physiology of carpal tunnel syndrome was discussed with the patient today.  Increase pressure localized under the transverse carpal ligament can cause pain, numbness, tingling, or dysesthesias within the median nerve distribution as well as feelings of fatigue, clumsiness, or awkwardness.  These symptoms typically occur at night and worse in the morning upon waking.  Eventually, untreated carpal tunnel syndrome can result in weakness and permanent loss of muscle within the thenar compartment of the hand.  Treatment options were discussed with the patient.  Conservative treatment includes nocturnal resting splints to keep the nerve in a neutral position, ergonomic changes within the work or home environment, activity modification, and tendon gliding exercises.  Vitamin B6 one tablet daily over the counter may helpful to reduce symptoms.  Steroid injections within the carpal canal can help a majority of patients, however this is often self-limited in a majority of patients.  Surgical intervention to divide the transverse carpal ligament typically results in a long-lasting relief of the patient's complaints, with the recurrence rate of less than 1%. The patient has elected to undergo an endoscopic carpal tunnel release.  The single incision technique was discussed with the patient, which results in approximately a two-week recovery time less wound complications.  In the postoperative period, light activities are allowed immediately, driving is allowed when narcotic medication has stopped, and the bandages may be removed and incision may get wet after 2 days.  Heavy activities (lifting more than approximately 10 pounds) will be allowed after follow up appointment in 1-2 weeks.  While night symptoms (waking from sleep, pain, and discomfort in the hands)  generally improves rapidly, the numbness and tingling as well as the strength will slowly improve over weeks to months depending on the chronicity and severity of the carpal tunnel syndrome.  Pillar pain and scar discomfort were discussed with the patient which are self-limiting conditions.  The risks of bleeding and infection from the surgery are less than 1%.  Risk of recurrence is approximately 0.5%.  The risks of nerve injury or nerve damage or damage to the blood vessels is approximately 1 in 1200. The patient has an understanding of the above mentioned discussion.The risks and benefits of the procedure were explained to the patient, which include, but are not limited to: Bleeding, infection, recurrence, pain, scar, damage to tendons, damage to nerves, and damage to blood vessels, failure to give desired results and complications related to anesthesia.  These risks, along with alternative conservative treatment options, and postoperative protocols were voiced back and understood by the patient.  All questions were answered to the patient's satisfaction.  The patient agrees to comply with a standard postoperative protocol, and is willing to proceed.  Education was provided via written and auditory forms.  There were no barriers to learning. Written handouts regarding wound care, incision and scar care, and general preoperative information was provided to the patient.  Prior to surgery, the patient may be requested to stop all anti-inflammatory medications.  Prophylactic aspirin, Plavix, and Coumadin may be allowed to be continued.  Medications including vitamin E., ginkgo, & fish oil are requested to be stopped about one week.      Mahin Bean MD  Attending, Orthopaedic Surgery  Hand, Wrist, and Elbow Surgery  [facility] Orthopaedic [facility]    ______________________________________________________________________________________________    CHIEF COMPLAINT:  Chief Complaint   Patient presents with   •  Left Wrist - Follow-up       SUBJECTIVE:  Patient is a 83 y.o. RHD female who presents today for follow up of left CTS. Pt describes n/t to thumb, index and long fingers since February 2023. She was seen in June and at that time was provided a steroid injection. She states that this did work very well and her symptoms were improved about 80%. She's not sure exactly how long this lasted. She was told she did not c/o n/t in October but now she has significant more constant symptoms. States this now involves all the fingers. Describes inability to sleep and feels like she notices her symptoms more with wrist positioning, not with elbow positioning.        I have personally reviewed all the relevant PMH, PSH, SH, FH, Medications and allergies      PAST MEDICAL HISTORY:  Past Medical History:   Diagnosis Date   • AVB (atrioventricular block)     first degree   • CAD (coronary artery disease)    • GERD (gastroesophageal reflux disease) 7/1/2017   • HLD (hyperlipidemia) 7/1/2017   • Hypertension    • LVH (left ventricular hypertrophy)    • Osteopenia    • Skin cancer 2000    nose   • Ventral hernia without obstruction or gangrene 7/1/2017       PAST SURGICAL HISTORY:  Past Surgical History:   Procedure Laterality Date   • CARDIAC CATHETERIZATION     • GALLBLADDER SURGERY     • HEMORRHOID SURGERY     • HYSTERECTOMY  1987    age 47 w/ left oopherectomy   • NOSE SURGERY      basal cell   • OOPHORECTOMY Left 1987    age 47   • [place] LAPAROSCOPY SURG CHOLECYSTECTOMY N/A 8/25/2016    Procedure: LAPAROSCOPIC CHOLECYSTECTOMY ;  Surgeon: Shahab Lopez MD;  [facility];  Service: General   • [place] REPAIR FIRST ABDOMINAL WALL HERNIA N/A 11/30/2017    Procedure: INCISIONAL HERNIA REPAIR;  Surgeon: Shahab Lopez MD;  [facility];  Service: General   • ROTATOR CUFF REPAIR Right        FAMILY HISTORY:  Family History   Problem Relation Age of Onset   • No Known Problems Mother    • No Known Problems Father    • No Known  Problems Daughter    • No Known Problems Maternal Grandmother    • No Known Problems Maternal Grandfather    • No Known Problems Paternal Grandmother    • No Known Problems Paternal Grandfather    • No Known Problems Half-Sister    • No Known Problems Maternal Aunt    • Squamous cell carcinoma Brother    • No Known Problems Son    • No Known Problems Paternal Aunt    • Breast cancer Neg Hx        SOCIAL HISTORY:  Social History     Tobacco Use   • Smoking status: Never   • Smokeless tobacco: Never   Vaping Use   • Vaping status: Never Used   Substance Use Topics   • Alcohol use: Yes     Comment: socially   • Drug use: No       MEDICATIONS:    Current Outpatient Medications:   •  aspirin 81 MG tablet, Take 81 mg by mouth daily , Disp: , Rfl:   •  atorvastatin (LIPITOR) 40 mg tablet, TAKE 1 TABLET BY MOUTH EVERY DAY, Disp: 90 tablet, Rfl: 1  •  cholecalciferol (VITAMIN D3) 1,000 units tablet, Take 2,000 Units by mouth daily, Disp: , Rfl:   •  Co-Enzyme Q-10 100 MG CAPS, Take 200 mg by mouth daily., Disp: , Rfl:   •  famotidine (PEPCID) 20 mg tablet, Take 20 mg by mouth daily OTC, Disp: , Rfl:   •  latanoprost (XALATAN) 0.005 % ophthalmic solution, Administer 1 drop to both eyes daily, Disp: , Rfl: 3  •  lisinopril-hydrochlorothiazide (PRINZIDE,ZESTORETIC) 20-12.5 MG per tablet, Take 1 tablet by mouth daily, Disp: 90 tablet, Rfl: 3  •  Multiple Vitamins-Minerals (PRESERVISION AREDS) CAPS, Take 2 capsules by mouth daily, Disp: , Rfl:   •  nitroglycerin (NITROSTAT) 0.4 mg SL tablet, Place 1 tablet (0.4 mg total) under the tongue every 5 (five) minutes as needed for chest pain, Disp: 25 tablet, Rfl: 11    ALLERGIES:  Allergies   Allergen Reactions   • Codeine      Reaction Date: 19Jul2011;    • Other      darvocet           REVIEW OF SYSTEMS:  Musculoskeletal:        As noted in HPI.   All other systems reviewed and are negative.    VITALS:  There were no vitals filed for this visit.    LABS:  HgA1c:   Lab Results    Component Value Date    HGBA1C 6.3 (H) 07/29/2022     BMP:   Lab Results   Component Value Date    CALCIUM 9.9 11/11/2023     12/14/2017    K 4.6 11/11/2023    CO2 27 11/11/2023     11/11/2023    BUN 20 11/11/2023    CREATININE 0.83 11/11/2023       _____________________________________________________  PHYSICAL EXAMINATION:  General: Well developed and well nourished, alert & oriented x 3, appears comfortable  Psychiatric: Normal  HEENT: Normocephalic, Atraumatic Trachea Midline, No torticollis  Pulmonary: No audible wheezing or respiratory distress   Abdomen/GI: Non tender, non distended   Cardiovascular: No pitting edema, 2+ radial pulse   Skin: No masses, erythema, lacerations, fluctation, ulcerations  Neurovascular: Sensation Intact to the Median, Ulnar, Radial Nerve, Motor Intact to the Median, Ulnar, Radial Nerve, and Pulses Intact  Musculoskeletal: Normal, except as noted in detailed exam and in HPI.      MUSCULOSKELETAL EXAMINATION:  Left Wrist:  APB 4/5  Positive Tinel's at the wrist.   Mildly + Tinel's at elbow.  Pt with some generalized arthritic changes about the fingers.  Compartments soft.  Brisk capillary refill.    Right Wrist:  APB 4/5  Negative Tinel's at the wrist.  Negative Tinel's at the elbow.         Right radial (mm) Right ulnar (mm) Left radial (mm) Left ulnar (mm)   Thumb 5 5 5 5   Index 5 5 5 5   Long 5 5 5 5   Ring 5 5 7 5   Small 5 5 5 5         ___________________________________________________  STUDIES REVIEWED:  No new studies to review . Previous US ruled in CTS on the left.         PROCEDURES PERFORMED:  Procedures  No Procedures performed today    _____________________________________________________      Scribe Attestation    I,:  Maddy Leonardo PA-C am acting as a scribe while in the presence of the attending physician.:       I,:  Mahin Bean MD personally performed the services described in this documentation    as scribed in my presence.:

## 2024-03-07 ENCOUNTER — ANESTHESIA EVENT (OUTPATIENT)
Dept: ANESTHESIOLOGY | Facility: HOSPITAL | Age: 84
End: 2024-03-07

## 2024-03-07 ENCOUNTER — ANESTHESIA (OUTPATIENT)
Dept: ANESTHESIOLOGY | Facility: HOSPITAL | Age: 84
End: 2024-03-07

## 2024-03-08 ENCOUNTER — TELEPHONE (OUTPATIENT)
Dept: CARDIOLOGY CLINIC | Facility: CLINIC | Age: 84
End: 2024-03-08

## 2024-03-08 ENCOUNTER — LAB (OUTPATIENT)
Dept: LAB | Facility: MEDICAL CENTER | Age: 84
End: 2024-03-08
Payer: COMMERCIAL

## 2024-03-08 DIAGNOSIS — I10 ESSENTIAL HYPERTENSION: ICD-10-CM

## 2024-03-08 LAB
ANION GAP SERPL CALCULATED.3IONS-SCNC: 9 MMOL/L
BUN SERPL-MCNC: 21 MG/DL (ref 5–25)
CALCIUM SERPL-MCNC: 9.7 MG/DL (ref 8.4–10.2)
CHLORIDE SERPL-SCNC: 103 MMOL/L (ref 96–108)
CO2 SERPL-SCNC: 30 MMOL/L (ref 21–32)
CREAT SERPL-MCNC: 0.88 MG/DL (ref 0.6–1.3)
GFR SERPL CREATININE-BSD FRML MDRD: 61 ML/MIN/1.73SQ M
GLUCOSE P FAST SERPL-MCNC: 114 MG/DL (ref 65–99)
POTASSIUM SERPL-SCNC: 5.1 MMOL/L (ref 3.5–5.3)
SODIUM SERPL-SCNC: 142 MMOL/L (ref 135–147)

## 2024-03-08 PROCEDURE — 80048 BASIC METABOLIC PNL TOTAL CA: CPT

## 2024-03-08 PROCEDURE — 36415 COLL VENOUS BLD VENIPUNCTURE: CPT

## 2024-03-08 NOTE — TELEPHONE ENCOUNTER
Diana Martinez RN  P Cardiology [facility] [place]  This patient is scheduled for surgery 3-13-24. The patient is currently taking Aspirin 81 mg. The anesthesia type is planned for local. Pre-op medication hold instructions are needed for the previously listed medications. Please provide detailed hold instructions (either by replying to this message or by entering in note and/or letters in Epic). Please also have your staff call patient with instructions and document.    Thank you.

## 2024-03-08 NOTE — PRE-PROCEDURE INSTRUCTIONS
"Pre-Surgery Instructions:   Medication Instructions    aspirin 81 MG tablet Per MD    atorvastatin (LIPITOR) 40 mg tablet Take night before surgery    cholecalciferol (VITAMIN D3) 1,000 units tablet Stop taking 7 days prior to surgery.    Co-Enzyme Q-10 100 MG CAPS Stop taking 7 days prior to surgery.    famotidine (PEPCID) 20 mg tablet Take day of surgery.    latanoprost (XALATAN) 0.005 % ophthalmic solution Take day of surgery.    lisinopril-hydrochlorothiazide (PRINZIDE,ZESTORETIC) 20-12.5 MG per tablet Take night before surgery    Multiple Vitamins-Minerals (PRESERVISION AREDS) CAPS Stop taking 7 days prior to surgery.    nitroglycerin (NITROSTAT) 0.4 mg SL tablet Uses PRN- OK to take day of surgery   Reviewed with patient, in detail, instructions from \"My Surgical Experience\". Pre-op, medication, and showering instructions per [facility] protocol reviewed. Patient instructed OK to eat light meal prior to arrival for surgery. No alcohol 24 hours prior to surgery. Instructed to call surgeon's office in meantime with any new illness. Patient verbalized an understanding of all instructions reviewed and offers no concerns at this time.     "

## 2024-03-08 NOTE — RESULT ENCOUNTER NOTE
Lab work reviewed.  Potassium level is in the normal range.  Continue present medication.    These results are reassuring.  Please call patient with normal results.

## 2024-03-13 ENCOUNTER — HOSPITAL ENCOUNTER (OUTPATIENT)
Facility: AMBULARY SURGERY CENTER | Age: 84
Setting detail: OUTPATIENT SURGERY
Discharge: HOME | End: 2024-03-13
Attending: STUDENT IN AN ORGANIZED HEALTH CARE EDUCATION/TRAINING PROGRAM | Admitting: STUDENT IN AN ORGANIZED HEALTH CARE EDUCATION/TRAINING PROGRAM
Payer: COMMERCIAL

## 2024-03-13 VITALS
HEIGHT: 64 IN | BODY MASS INDEX: 33.63 KG/M2 | HEART RATE: 64 BPM | DIASTOLIC BLOOD PRESSURE: 68 MMHG | OXYGEN SATURATION: 97 % | TEMPERATURE: 97.8 F | SYSTOLIC BLOOD PRESSURE: 164 MMHG | WEIGHT: 197 LBS | RESPIRATION RATE: 18 BRPM

## 2024-03-13 DIAGNOSIS — Z98.890 S/P CARPAL TUNNEL RELEASE: Primary | ICD-10-CM

## 2024-03-13 PROCEDURE — 29848 WRIST ENDOSCOPY/SURGERY: CPT | Performed by: STUDENT IN AN ORGANIZED HEALTH CARE EDUCATION/TRAINING PROGRAM

## 2024-03-13 PROCEDURE — 29848 WRIST ENDOSCOPY/SURGERY: CPT | Performed by: PHYSICIAN ASSISTANT

## 2024-03-13 RX ORDER — CHLORHEXIDINE GLUCONATE ORAL RINSE 1.2 MG/ML
15 SOLUTION DENTAL ONCE
Status: DISCONTINUED | OUTPATIENT
Start: 2024-03-13 | End: 2024-03-13 | Stop reason: HOSPADM

## 2024-03-13 RX ORDER — MAGNESIUM HYDROXIDE 1200 MG/15ML
LIQUID ORAL AS NEEDED
Status: DISCONTINUED | OUTPATIENT
Start: 2024-03-13 | End: 2024-03-13 | Stop reason: HOSPADM

## 2024-03-13 RX ORDER — TRAMADOL HYDROCHLORIDE 50 MG/1
50 TABLET ORAL EVERY 6 HOURS PRN
Qty: 5 TABLET | Refills: 0 | Status: SHIPPED | OUTPATIENT
Start: 2024-03-13

## 2024-03-13 NOTE — OP NOTE
OPERATIVE REPORT  PATIENT NAME: Sofia Aguirre    :  1940  MRN: [id number]  Pt Location: [facility] OR [facility] 06    SURGERY DATE: 3/13/2024     Surgeons and Role:     * Mahin Bean MD - Primary     * Maddy Leonardo PA-C - Assisting    Physician Assistant need: A physician assistant was required during the procedure for retraction, tissue handling, dissection, and suturing. No qualified resident was available.    Pre-Op Diagnosis Codes:     * Carpal tunnel syndrome of left wrist [G56.02]    Post-Op Diagnosis Codes:     * Carpal tunnel syndrome of left wrist [G56.02]    Procedure(s) (LRB):  RELEASE CARPAL TUNNEL ENDOSCOPIC (Left)    Specimen(s):  * No specimens in log *    Estimated Blood Loss:   Minimal    Drains:  None    Implants:  * No implants in log *    Anesthesia Type:   Local    Operative Indications:  Patient is a 83 y.o. female evaluated in clinic with symptoms and clinical exam consistent with Left carpal tunnel syndrome.  Ultrasound was performed and consistent with Left carpal tunnel syndrome.  We discussed treatment options with patient including conservative management and surgical decompression. Discussed nonoperative management with splinting, injections, and observation. We discussed with surgery the possibility of continued numbness and tingling after surgery and possible recurrence of symptoms many years down the line. Patient elected for surgical management.      Operative Findings:  Thick transverse carpal ligament that was able to be released endoscopically.       Complications:   None     Procedure and Technique:  Patient was identified in the preoperative holding area. A 50-50 mixture of 1% lidocaine without epinephrine and 0.25% bupivacaine without epinephrine was used for local field block. Surgical sites were marked with patient participation. Patient was taken back to the operating theater.  Extremity was prepped and draped typical fashion.  Formal time-out was performed confirming  site, patient, and procedure. All present were in agreement.      Tourniquet was inflated. Incision made over proximal transverse wrist crease. Palmaris longus tendon was retracted radially.  Blunt dissection was carried to the antebrachial fascia, which was entered bluntly.  The dilator was inserted below the antebrachial fascia and above the median nerve.  The dilator was taken distally. The endoscope with Forsake endoscopic knife was introduced with care to be below antebrachial fascia and above the median nerve. Good visualization was noted.  The transverse carpal ligament fibers were directly visualized.  The transverse carpal ligament was incised using endoscopic knife.  The wound was reinspected.  There was complete release of the transverse carpal ligament.  Attention was turned proximally through the wound.  The distal 1.5 cm aspect of antebrachial fascia was incised under direct visualization.  Wound was inspected and palpated with the hemostat.  There was no further sites of compression. Wound was irrigated. Skin was closed with 4-0 chromic in horizontal mattress fashion.  Wounds were dressed with Xeroform, 4x4s, cast padding, Ace bandage.   Tourniquet was deflated.  Patient was taken to PACU in stable condition.     I was present for the entire procedure     Postoperative Plan:  Patient may range digits as tolerated.  We will limit weightbearing to a couple of pounds for the first 2 weeks.  We will see patient back at the 2-week postoperative chayo.      Patient Disposition:  PACU         SIGNATURE: Mahin Bean MD  DATE: March 13, 2024  TIME: 1:18 PM

## 2024-03-13 NOTE — DISCHARGE INSTR - AVS FIRST PAGE
Post Operative Instructions    You have had surgery on your arm today, please read and follow the information below:  Elevate your hand above your elbow during the next 24-48 hours to help with swelling.  Place your hand and arm over your head with motion at your shoulder three times a day.  Do not apply any cream/ointment/oil to your incisions including antibiotics (I.e.Neosporin)  Do not use peroxide to clean your wound   Do not soak your hands in standing water (dishwater, tubs, Jacuzzi's, pools, etc.) until given permission (typically 2-3 weeks after injury)    Call the office if you notice any:  Increased numbness or tingling of your hand or fingers that is not relieved with elevation.  Increasing pain that is not controlled with medication.  Difficulty chewing, breathing, swallowing.  Chest pains or shortness of breath.  Fever over 101.4 degrees.    Bandage: Do NOT remove bandage until follow-up appointment.    Motion: Move fingers into a fist 5 times a day, DO NOT move any splinted fingers.    Weight bearing status: Avoid heavy lifting (>2 pounds) with the extremity that was operated on until follow up appointment. Normal activities of daily living are OK.    Ice: Ice for 10 minutes every hour as needed for swelling x 24 hours.    Sling: No sling necessary.    Pain medication:   Tramadol 1 tab every 6 hours AS needed for pain  *   You may take Tylenol (acetaminophen) in addition to your pain medication. This is over the counter. Please follow the instructions on the bottle. BE AWARE - your pain medication (hydrocodone/oxycodone) may already include acetaminophen in it. If it does, you must include this in your daily amount and make sure not take more than 3000 mg per day.   *   You may take an antiinflammatory medication (i.e. ibuprofen/naproxen) in addition to your pain medication. These are found over the counter, but higher prescription doses are available if needed. Please follow the dosing instructions  on the bottle and it is recommended you take these with food. DO NOT take these medications if you are on a blood thinner, have history of gastrointestinal bleeding, chronic kidney disease, or if you have ever been told by a physician not to. You CAN take this with Tylenol (acetaminophen), but should only take ONE antiinflammatory at a time.    Antibiotics:  None     Therapy: No therapy needed at this time.    Follow-up Appointment: 10-14 days.        Please call the office if you have any questions or concerns regarding your post-operative care.

## 2024-03-13 NOTE — INTERVAL H&P NOTE
H&P reviewed. After examining the patient I find no changes in the patients condition since the H&P had been written.    Vitals:    03/13/24 1105   BP: 162/70   Pulse: 65   Resp: 18   Temp: 98.2 °F (36.8 °C)   SpO2: 95%

## 2024-03-13 NOTE — INTERVAL H&P NOTE
H&P reviewed. After examining the patient I find no changes in the patients condition since the H&P had been written. ASA 3    Vitals:    03/13/24 1105   BP: 162/70   Pulse: 65   Resp: 18   Temp: 98.2 °F (36.8 °C)   SpO2: 95%

## 2024-03-20 ENCOUNTER — TELEPHONE (OUTPATIENT)
Age: 84
End: 2024-03-20

## 2024-03-20 NOTE — TELEPHONE ENCOUNTER
Caller: Patient     Doctor: Vikas    Reason for call: Patient is asking to reschedule her post op appt scheduled on 3/22/24. She is asking if she can come in the afternoon instead. Please advise.    Call back#:

## 2024-03-22 ENCOUNTER — OFFICE VISIT (OUTPATIENT)
Dept: OBGYN CLINIC | Facility: CLINIC | Age: 84
End: 2024-03-22

## 2024-03-22 VITALS — HEIGHT: 64 IN | WEIGHT: 197 LBS | BODY MASS INDEX: 33.63 KG/M2

## 2024-03-22 DIAGNOSIS — Z98.890 POSTOPERATIVE STATE: Primary | ICD-10-CM

## 2024-03-22 PROCEDURE — 99024 POSTOP FOLLOW-UP VISIT: CPT | Performed by: STUDENT IN AN ORGANIZED HEALTH CARE EDUCATION/TRAINING PROGRAM

## 2024-03-22 NOTE — PROGRESS NOTES
Assessment/Plan:  Patient ID: 83 y.o. female   Surgery: Release Carpal Tunnel Endoscopic - Left  Date of Surgery: 3/13/2024    Pt doing very well.  Can proceed with activities as tolerated.   Call if any questions/concerns.     Follow Up:  PRN        CHIEF COMPLAINT:  Chief Complaint   Patient presents with   • Left Wrist - Post-op         SUBJECTIVE:  Patient is a 83 y.o. year old female who presents for follow up after Release Carpal Tunnel Endoscopic - Left. Today patient states she is doing very well. Her n/t has resolved and she really doesn't have much pain.     PHYSICAL EXAMINATION:  General: Well developed and well nourished, alert and oriented x 3, appears comfortable  Psychiatric: Normal    MUSCULOSKELETAL EXAMINATION:  Incision: Clean, dry, intact  Surgery Site: normal, no evidence of infection   Range of Motion: As expected  Neurovascular status: Neuro intact, good cap refill         STUDIES REVIEWED:  No new studies to review       PROCEDURES PERFORMED:  Procedures  No Procedures performed today    Scribe Attestation    I,:  Maddy Leonardo PA-C am acting as a scribe while in the presence of the attending physician.:       I,:  Mahin Bean MD personally performed the services described in this documentation    as scribed in my presence.:

## 2024-04-26 ENCOUNTER — TELEPHONE (OUTPATIENT)
Dept: FAMILY MEDICINE CLINIC | Facility: CLINIC | Age: 84
End: 2024-04-26

## 2024-04-26 ENCOUNTER — TELEPHONE (OUTPATIENT)
Age: 84
End: 2024-04-26

## 2024-04-26 ENCOUNTER — OFFICE VISIT (OUTPATIENT)
Dept: FAMILY MEDICINE CLINIC | Facility: CLINIC | Age: 84
End: 2024-04-26
Payer: COMMERCIAL

## 2024-04-26 VITALS
HEART RATE: 78 BPM | RESPIRATION RATE: 18 BRPM | OXYGEN SATURATION: 96 % | TEMPERATURE: 97.7 F | HEIGHT: 64 IN | BODY MASS INDEX: 34.83 KG/M2 | SYSTOLIC BLOOD PRESSURE: 140 MMHG | DIASTOLIC BLOOD PRESSURE: 72 MMHG | WEIGHT: 204 LBS

## 2024-04-26 DIAGNOSIS — H60.312 ACUTE DIFFUSE OTITIS EXTERNA OF LEFT EAR: Primary | ICD-10-CM

## 2024-04-26 PROCEDURE — 99213 OFFICE O/P EST LOW 20 MIN: CPT | Performed by: FAMILY MEDICINE

## 2024-04-26 PROCEDURE — G2211 COMPLEX E/M VISIT ADD ON: HCPCS | Performed by: FAMILY MEDICINE

## 2024-04-26 RX ORDER — KETOCONAZOLE 20 MG/G
1 CREAM TOPICAL DAILY
COMMUNITY
Start: 2024-04-23

## 2024-04-26 RX ORDER — CIPROFLOXACIN AND DEXAMETHASONE 3; 1 MG/ML; MG/ML
4 SUSPENSION/ DROPS AURICULAR (OTIC) 2 TIMES DAILY
Qty: 7.5 ML | Refills: 0 | Status: SHIPPED | OUTPATIENT
Start: 2024-04-26 | End: 2024-04-26

## 2024-04-26 NOTE — TELEPHONE ENCOUNTER
Patient called, Rx otic suspension , not covered by insurances. Spoke with pharmacy tech at [facility], no alternatives are listed for medication. Please send new Rx to [facility].

## 2024-04-26 NOTE — PROGRESS NOTES
Name: Sofia Aguirre      : 1940      MRN: [id number]  Encounter Provider: Trevon Berry MD  Encounter Date: 2024   Encounter department: [facility]    Assessment & Plan     1. Acute diffuse otitis externa of left ear  -     ciprofloxacin-dexamethasone (CIPRODEX) otic suspension; Administer 4 drops into both ears 2 (two) times a day      Start Ciprodex eardrops 4 drops into both ears twice a day for the next 7 days  Can take Tylenol for pain  Follow-up if no improvement       Subjective      Patient presents with:  Earache: Left, started Tuesday Evening    Patient reports pain in her left ear for the past week.  Ear is tender to touch.  Denies any fevers    Earache   There is pain in the left ear. This is a new problem. The current episode started in the past 7 days. The problem occurs constantly. The problem has been unchanged. There has been no fever. The pain is at a severity of 8/10. The pain is moderate. Pertinent negatives include no abdominal pain, coughing, diarrhea, ear discharge, headaches, hearing loss, neck pain, rash, rhinorrhea, sore throat or vomiting. She has tried acetaminophen for the symptoms. The treatment provided no relief.     Review of Systems   HENT:  Positive for ear pain. Negative for ear discharge, hearing loss, rhinorrhea and sore throat.    Respiratory:  Negative for cough.    Gastrointestinal:  Negative for abdominal pain, diarrhea and vomiting.   Musculoskeletal:  Negative for neck pain.   Skin:  Negative for rash.   Neurological:  Negative for headaches.       Current Outpatient Medications on File Prior to Visit   Medication Sig   • aspirin 81 MG tablet Take 81 mg by mouth daily    • atorvastatin (LIPITOR) 40 mg tablet TAKE 1 TABLET BY MOUTH EVERY DAY (Patient taking differently: Take 40 mg by mouth every morning)   • cholecalciferol (VITAMIN D3) 1,000 units tablet Take 2,000 Units by mouth daily   • Co-Enzyme Q-10 100 MG CAPS Take 200 mg by  "mouth daily.   • famotidine (PEPCID) 20 mg tablet Take 20 mg by mouth every morning OTC   • ketoconazole (NIZORAL) 2 % cream Apply 1 Application topically daily   • latanoprost (XALATAN) 0.005 % ophthalmic solution Administer 1 drop to both eyes daily   • lisinopril-hydrochlorothiazide (PRINZIDE,ZESTORETIC) 20-12.5 MG per tablet Take 1 tablet by mouth daily (Patient taking differently: Take 1 tablet by mouth every morning)   • Multiple Vitamins-Minerals (PRESERVISION AREDS) CAPS Take 2 capsules by mouth daily   • nitroglycerin (NITROSTAT) 0.4 mg SL tablet Place 1 tablet (0.4 mg total) under the tongue every 5 (five) minutes as needed for chest pain   • traMADol (ULTRAM) 50 mg tablet Take 1 tablet (50 mg total) by mouth every 6 (six) hours as needed for severe pain for up to 5 doses Continuation of therapy   • [DISCONTINUED] carvedilol (COREG) 6.25 mg tablet Take 1 tablet (6.25 mg total) by mouth 2 (two) times a day with meals       Objective     /72 (BP Location: Left arm, Patient Position: Sitting, Cuff Size: Standard)   Pulse 78   Temp 97.7 °F (36.5 °C) (Temporal)   Resp 18   Ht 5' 4.1\" (1.628 m)   Wt 92.5 kg (204 lb)   SpO2 96%   BMI 34.91 kg/m²     Physical Exam  HENT:      Left Ear: Drainage, swelling and tenderness present.      Ears:      Comments: Pinna tenderness      Trevon Berry MD    "

## 2024-04-26 NOTE — TELEPHONE ENCOUNTER
Pt called to say the ear drops are not covered by her insurance.  Can something else be sent in?  Spoke to Fatuma on clinical line, she will call pharmacy.  Advised patient to head home and we can call her when we figure out what is covered and sent over.

## 2024-05-02 ENCOUNTER — RA CDI HCC (OUTPATIENT)
Dept: OTHER | Facility: HOSPITAL | Age: 84
End: 2024-05-02

## 2024-05-13 ENCOUNTER — OFFICE VISIT (OUTPATIENT)
Dept: FAMILY MEDICINE CLINIC | Facility: CLINIC | Age: 84
End: 2024-05-13
Payer: COMMERCIAL

## 2024-05-13 VITALS
HEIGHT: 64 IN | HEART RATE: 78 BPM | SYSTOLIC BLOOD PRESSURE: 134 MMHG | WEIGHT: 205 LBS | BODY MASS INDEX: 35 KG/M2 | RESPIRATION RATE: 18 BRPM | DIASTOLIC BLOOD PRESSURE: 72 MMHG | OXYGEN SATURATION: 97 % | TEMPERATURE: 98.2 F

## 2024-05-13 DIAGNOSIS — N18.31 STAGE 3A CHRONIC KIDNEY DISEASE (HCC): ICD-10-CM

## 2024-05-13 DIAGNOSIS — Z00.00 MEDICARE ANNUAL WELLNESS VISIT, SUBSEQUENT: ICD-10-CM

## 2024-05-13 DIAGNOSIS — I10 ESSENTIAL HYPERTENSION: Primary | ICD-10-CM

## 2024-05-13 DIAGNOSIS — R73.03 PREDIABETES: ICD-10-CM

## 2024-05-13 DIAGNOSIS — K21.9 GERD WITHOUT ESOPHAGITIS: ICD-10-CM

## 2024-05-13 DIAGNOSIS — E78.2 MIXED HYPERLIPIDEMIA: ICD-10-CM

## 2024-05-13 DIAGNOSIS — E66.01 OBESITY, MORBID (HCC): ICD-10-CM

## 2024-05-13 PROCEDURE — G0439 PPPS, SUBSEQ VISIT: HCPCS | Performed by: FAMILY MEDICINE

## 2024-05-13 PROCEDURE — 99214 OFFICE O/P EST MOD 30 MIN: CPT | Performed by: FAMILY MEDICINE

## 2024-05-13 PROCEDURE — G0444 DEPRESSION SCREEN ANNUAL: HCPCS | Performed by: FAMILY MEDICINE

## 2024-05-13 NOTE — ASSESSMENT & PLAN NOTE
Lab Results   Component Value Date    EGFR 61 03/08/2024    EGFR 65 11/11/2023    EGFR 53 10/13/2023    CREATININE 0.88 03/08/2024    CREATININE 0.83 11/11/2023    CREATININE 0.99 10/13/2023

## 2024-05-13 NOTE — ASSESSMENT & PLAN NOTE
Lab Results   Component Value Date    HGBA1C 6.3 (H) 07/29/2022    HGBA1C 6.3 (H) 01/13/2021    HGBA1C 5.9 (H) 05/16/2017     Lab Results   Component Value Date    GLUF 114 (H) 03/08/2024    LDLCALC 59 10/13/2023    CREATININE 0.88 03/08/2024     Stable, counseled on importance of healthy diet and regular physical activity

## 2024-05-13 NOTE — PROGRESS NOTES
Assessment and Plan:     Problem List Items Addressed This Visit        Cardiovascular and Mediastinum    Essential hypertension - Primary     BP Readings from Last 3 Encounters:   05/13/24 134/72   04/26/24 140/72   03/13/24 164/68     Controlled, continue lisinopril-hctz 20-12.5 mg          Relevant Orders    CBC and differential    Comprehensive metabolic panel       Digestive    GERD without esophagitis     Remains on Pepcid 20 mg daily.  Increase to twice daily due to ongoing symptoms.            Genitourinary    CKD (chronic kidney disease) stage 3, GFR 30-59 ml/min ([facility])     Lab Results   Component Value Date    EGFR 61 03/08/2024    EGFR 65 11/11/2023    EGFR 53 10/13/2023    CREATININE 0.88 03/08/2024    CREATININE 0.83 11/11/2023    CREATININE 0.99 10/13/2023             Other    Mixed hyperlipidemia    Relevant Orders    Lipid panel    Prediabetes     Lab Results   Component Value Date    HGBA1C 6.3 (H) 07/29/2022    HGBA1C 6.3 (H) 01/13/2021    HGBA1C 5.9 (H) 05/16/2017     Lab Results   Component Value Date    GLUF 114 (H) 03/08/2024    LDLCALC 59 10/13/2023    CREATININE 0.88 03/08/2024     Stable, counseled on importance of healthy diet and regular physical activity         Obesity, morbid (HCC)   Other Visit Diagnoses     Medicare annual wellness visit, subsequent             AWV and follow-up completed today.  Reviewed previous labs.  Chronic conditions stable.  Continue current medications.  Preventive health issues were discussed with patient, and age appropriate screening tests were ordered as noted in patient's After Visit Summary.  Personalized health advice and appropriate referrals for health education or preventive services given if needed, as noted in patient's After Visit Summary.     History of Present Illness:     Patient presents for a Medicare Wellness Visit    AWV and follow up. Doing well overall. No complains. Follows with cardiology.        Patient Care Team:  Trevon FELICIANO  MD Kristi as PCP - General (Family Medicine)  Yung Espinoza MD as PCP - PCP-[facility] Attributed-Roster     Review of Systems:     Review of Systems   Constitutional:  Negative for activity change, fatigue and fever.   Eyes:  Negative for visual disturbance.   Respiratory:  Negative for shortness of breath.    Cardiovascular:  Negative for chest pain.   Gastrointestinal:  Negative for abdominal pain, constipation, diarrhea and nausea.   Endocrine: Negative for cold intolerance and heat intolerance.   Musculoskeletal:  Negative for back pain.   Skin:  Negative for rash.   Neurological:  Negative for headaches.   Psychiatric/Behavioral:  Negative for confusion.         Problem List:     Patient Active Problem List   Diagnosis   • Chronic calculous cholecystitis   • Essential hypertension   • GERD without esophagitis   • Mixed hyperlipidemia   • Class 1 obesity due to excess calories with serious comorbidity and body mass index (BMI) of 34.0 to 34.9 in adult   • CKD (chronic kidney disease) stage 3, GFR 30-59 ml/min ([facility])   • Coronary artery disease of native artery of native heart with stable angina pectoris ([facility])   • Prediabetes   • Glaucoma of both eyes   • Dry mouth   • Carpal tunnel syndrome on left   • Bradycardia   • Obesity, morbid ([facility])      Past Medical and Surgical History:     Past Medical History:   Diagnosis Date   • AVB (atrioventricular block)     first degree   • CAD (coronary artery disease)    • Coronary artery disease    • GERD (gastroesophageal reflux disease) 07/01/2017   • Glaucoma    • HLD (hyperlipidemia) 07/01/2017   • Hypertension    • LVH (left ventricular hypertrophy)    • Osteopenia    • PONV (postoperative nausea and vomiting)    • Skin cancer 2000    nose   • Ventral hernia without obstruction or gangrene 07/01/2017     Past Surgical History:   Procedure Laterality Date   • CARDIAC CATHETERIZATION     • CHOLECYSTECTOMY     • GALLBLADDER SURGERY     • HEMORRHOID SURGERY      • HYSTERECTOMY  1987    age 47 w/ left oopherectomy   • NOSE SURGERY      basal cell   • OOPHORECTOMY Left 1987    age 47   • MO LAPAROSCOPY SURG CHOLECYSTECTOMY N/A 08/25/2016    Procedure: LAPAROSCOPIC CHOLECYSTECTOMY ;  Surgeon: Shahab Lopez MD;  Location[facility];  Service: General   • MO NDSC WRST SURG W/RLS TRANSVRS CARPL LIGM Left 3/13/2024    Procedure: RELEASE CARPAL TUNNEL ENDOSCOPIC;  Surgeon: Mahin Bean MD;  [facility];  Service: Orthopedics   • MO REPAIR FIRST ABDOMINAL WALL HERNIA N/A 11/30/2017    Procedure: INCISIONAL HERNIA REPAIR;  Surgeon: Shahab Lopez MD;  [facility];  Service: General   • ROTATOR CUFF REPAIR Right       Family History:     Family History   Problem Relation Age of Onset   • No Known Problems Mother    • No Known Problems Father    • No Known Problems Daughter    • No Known Problems Maternal Grandmother    • No Known Problems Maternal Grandfather    • No Known Problems Paternal Grandmother    • No Known Problems Paternal Grandfather    • No Known Problems Half-Sister    • No Known Problems Maternal Aunt    • Squamous cell carcinoma Brother    • No Known Problems Son    • No Known Problems Paternal Aunt    • Breast cancer Neg Hx       Social History:     Social History     Socioeconomic History   • Marital status:      Spouse name: None   • Number of children: 2   • Years of education: None   • Highest education level: None   Occupational History   • Occupation: retired   Tobacco Use   • Smoking status: Never   • Smokeless tobacco: Never   Vaping Use   • Vaping status: Never Used   Substance and Sexual Activity   • Alcohol use: Yes     Comment: socially   • Drug use: No   • Sexual activity: Never   Other Topics Concern   • None   Social History Narrative    Caffeine use    Moderate exercising less than 3 times a week     Social Determinants of Health     Financial Resource Strain: Low Risk  (4/18/2023)    Overall Financial Resource Strain  (CARDIA)    • Difficulty of Paying Living Expenses: Not hard at all   Food Insecurity: No Food Insecurity (5/13/2024)    Hunger Vital Sign    • Worried About Running Out of Food in the Last Year: Never true    • Ran Out of Food in the Last Year: Never true   Transportation Needs: No Transportation Needs (5/13/2024)    PRAPARE - Transportation    • Lack of Transportation (Medical): No    • Lack of Transportation (Non-Medical): No   Physical Activity: Not on file   Stress: Not on file   Social Connections: Not on file   Intimate Partner Violence: Not on file   Housing Stability: Low Risk  (5/13/2024)    Housing Stability Vital Sign    • Unable to Pay for Housing in the Last Year: No    • Number of Places Lived in the Last Year: 1    • Unstable Housing in the Last Year: No      Medications and Allergies:     Current Outpatient Medications   Medication Sig Dispense Refill   • aspirin 81 MG tablet Take 81 mg by mouth daily      • atorvastatin (LIPITOR) 40 mg tablet TAKE 1 TABLET BY MOUTH EVERY DAY (Patient taking differently: Take 40 mg by mouth every morning) 90 tablet 1   • cholecalciferol (VITAMIN D3) 1,000 units tablet Take 2,000 Units by mouth daily     • Co-Enzyme Q-10 100 MG CAPS Take 200 mg by mouth daily.     • famotidine (PEPCID) 20 mg tablet Take 20 mg by mouth every morning OTC     • ketoconazole (NIZORAL) 2 % cream Apply 1 Application topically daily     • latanoprost (XALATAN) 0.005 % ophthalmic solution Administer 1 drop to both eyes daily  3   • lisinopril-hydrochlorothiazide (PRINZIDE,ZESTORETIC) 20-12.5 MG per tablet Take 1 tablet by mouth daily (Patient taking differently: Take 1 tablet by mouth every morning) 90 tablet 3   • Multiple Vitamins-Minerals (PRESERVISION AREDS) CAPS Take 2 capsules by mouth daily     • neomycin-polymyxin-hydrocortisone (CORTISPORIN) otic solution Administer 4 drops into the left ear every 6 (six) hours 10 mL 0   • nitroglycerin (NITROSTAT) 0.4 mg SL tablet Place 1 tablet  (0.4 mg total) under the tongue every 5 (five) minutes as needed for chest pain 25 tablet 11   • traMADol (ULTRAM) 50 mg tablet Take 1 tablet (50 mg total) by mouth every 6 (six) hours as needed for severe pain for up to 5 doses Continuation of therapy 5 tablet 0     No current facility-administered medications for this visit.     Allergies   Allergen Reactions   • Codeine Dizziness     Reaction Date: 19Jul2011;    • Other Hallucinations     darvocet      Immunizations:     Immunization History   Administered Date(s) Administered   • COVID-19 PFIZER VACCINE 0.3 ML IM 01/20/2021, 02/10/2021, 10/16/2021   • COVID-19 Pfizer mRNA vacc PF dulce-sucrose 12 yr and older (Comirnaty) 12/01/2023   • INFLUENZA 10/05/2017, 10/24/2018, 11/02/2019, 08/26/2020, 09/29/2021, 10/12/2022, 10/18/2023   • Influenza Split High Dose Preservative Free IM 10/05/2012, 10/14/2013, 10/16/2014, 10/22/2015, 11/15/2016, 10/24/2018, 11/02/2019   • Influenza, Seasonal Vaccine, Quadrivalent, Adjuvanted, .5e 10/12/2022   • Influenza, high dose seasonal 0.7 mL 08/26/2020, 10/18/2023   • Influenza, seasonal, injectable 10/21/2011, 10/05/2017   • Influenza, seasonal, injectable, preservative free 11/02/2019   • Pneumococcal Conjugate 13-Valent 04/22/2015, 07/28/2020   • Pneumococcal Polysaccharide PPV23 10/05/2012   • Td (adult), adsorbed 11/01/2006   • Tdap 03/30/2021   • Varicella 01/01/2012   • Zoster 10/23/2020   • Zoster Vaccine Recombinant 07/28/2020, 10/23/2020      Health Maintenance:         Topic Date Due   • Breast Cancer Screening: Mammogram  07/08/2022         Topic Date Due   • COVID-19 Vaccine (5 - 2023-24 season) 01/26/2024      Medicare Screening Tests and Risk Assessments:     Sofia is here for her Subsequent Wellness visit. Last Medicare Wellness visit information reviewed, patient interviewed and updates made to the record today.      Health Risk Assessment:   Patient rates overall health as very good. Patient feels that their physical  health rating is same. Patient is very satisfied with their life. Eyesight was rated as same. Hearing was rated as same. Patient feels that their emotional and mental health rating is same. Patients states they are never, rarely angry. Patient states they are never, rarely unusually tired/fatigued. Pain experienced in the last 7 days has been none. Patient states that she has experienced no weight loss or gain in last 6 months.     Depression Screening:   PHQ-2 Score: 0      Fall Risk Screening:   In the past year, patient has experienced: no history of falling in past year      Urinary Incontinence Screening:   Patient has not leaked urine accidently in the last six months.     Home Safety:  Patient does not have trouble with stairs inside or outside of their home. Patient has working smoke alarms and has no working carbon monoxide detector. Home safety hazards include: none.     Nutrition:   Current diet is Regular.     Medications:   Patient is currently taking over-the-counter supplements. OTC medications include: see medication list. Patient is able to manage medications.     Activities of Daily Living (ADLs)/Instrumental Activities of Daily Living (IADLs):   Walk and transfer into and out of bed and chair?: Yes  Dress and groom yourself?: Yes    Bathe or shower yourself?: Yes    Feed yourself? Yes  Do your laundry/housekeeping?: Yes  Manage your money, pay your bills and track your expenses?: Yes  Make your own meals?: Yes    Do your own shopping?: Yes    Previous Hospitalizations:   Any hospitalizations or ED visits within the last 12 months?: No      Advance Care Planning:   Living will: Yes    Advanced directive: Yes      Cognitive Screening:   Provider or family/friend/caregiver concerned regarding cognition?: No    PREVENTIVE SCREENINGS      Cardiovascular Screening:    General: Screening Not Indicated and History Lipid Disorder      Diabetes Screening:     General: Screening Current      Colorectal  "Cancer Screening:     General: Screening Not Indicated      Breast Cancer Screening:     General: Screening Not Indicated      Cervical Cancer Screening:    General: Screening Not Indicated      Lung Cancer Screening:     General: Screening Not Indicated    Screening, Brief Intervention, and Referral to Treatment (SBIRT)    Screening  Typical number of drinks in a day: 0  Typical number of drinks in a week: 0  Interpretation: Low risk drinking behavior.    Single Item Drug Screening:  How often have you used an illegal drug (including marijuana) or a prescription medication for non-medical reasons in the past year? never    Single Item Drug Screen Score: 0  Interpretation: Negative screen for possible drug use disorder    Annual Depression Screening  Time spent screening and evaluating the patient for depression during today's encounter was 5 minutes.    Other Counseling Topics:   Car/seat belt/driving safety, skin self-exam, sunscreen and calcium and vitamin D intake and regular weightbearing exercise.     No results found.     Physical Exam:     /72 (BP Location: Left arm, Patient Position: Sitting, Cuff Size: Standard)   Pulse 78   Temp 98.2 °F (36.8 °C) (Temporal)   Resp 18   Ht 5' 4.1\" (1.628 m)   Wt 93 kg (205 lb)   SpO2 97%   BMI 35.08 kg/m²     Physical Exam  Vitals and nursing note reviewed.   Constitutional:       Appearance: Normal appearance. She is well-developed. She is obese.   HENT:      Head: Normocephalic and atraumatic.   Eyes:      Extraocular Movements: Extraocular movements intact.      Pupils: Pupils are equal, round, and reactive to light.   Cardiovascular:      Rate and Rhythm: Normal rate and regular rhythm.   Pulmonary:      Effort: Pulmonary effort is normal.      Breath sounds: Normal breath sounds.   Abdominal:      General: Bowel sounds are normal.      Palpations: Abdomen is soft.   Musculoskeletal:      Cervical back: Normal range of motion.   Skin:     General: Skin is " warm and dry.   Neurological:      General: No focal deficit present.      Mental Status: She is alert and oriented to person, place, and time.   Psychiatric:         Mood and Affect: Mood normal.         Speech: Speech normal.         Behavior: Behavior normal.          Trevon Berry MD

## 2024-05-13 NOTE — ASSESSMENT & PLAN NOTE
BP Readings from Last 3 Encounters:   05/13/24 134/72   04/26/24 140/72   03/13/24 164/68     Controlled, continue lisinopril-hctz 20-12.5 mg

## 2024-05-13 NOTE — PATIENT INSTRUCTIONS
Medicare Preventive Visit Patient Instructions  Thank you for completing your Welcome to Medicare Visit or Medicare Annual Wellness Visit today. Your next wellness visit will be due in one year (5/14/2025).  The screening/preventive services that you may require over the next 5-10 years are detailed below. Some tests may not apply to you based off risk factors and/or age. Screening tests ordered at today's visit but not completed yet may show as past due. Also, please note that scanned in results may not display below.  Preventive Screenings:  Service Recommendations Previous Testing/Comments   Colorectal Cancer Screening  * Colonoscopy    * Fecal Occult Blood Test (FOBT)/Fecal Immunochemical Test (FIT)  * Fecal DNA/Cologuard Test  * Flexible Sigmoidoscopy Age: 45-75 years old   Colonoscopy: every 10 years (may be performed more frequently if at higher risk)  OR  FOBT/FIT: every 1 year  OR  Cologuard: every 3 years  OR  Sigmoidoscopy: every 5 years  Screening may be recommended earlier than age 45 if at higher risk for colorectal cancer. Also, an individualized decision between you and your healthcare provider will decide whether screening between the ages of 76-85 would be appropriate. Colonoscopy: 09/13/2018  FOBT/FIT: Not on file  Cologuard: Not on file  Sigmoidoscopy: Not on file          Breast Cancer Screening Age: 40+ years old  Frequency: every 1-2 years  Not required if history of left and right mastectomy Mammogram: 07/08/2021        Cervical Cancer Screening Between the ages of 21-29, pap smear recommended once every 3 years.   Between the ages of 30-65, can perform pap smear with HPV co-testing every 5 years.   Recommendations may differ for women with a history of total hysterectomy, cervical cancer, or abnormal pap smears in past. Pap Smear: 07/03/2019        Hepatitis C Screening Once for adults born between 1945 and 1965  More frequently in patients at high risk for Hepatitis C Hep C Antibody: Not on  file        Diabetes Screening 1-2 times per year if you're at risk for diabetes or have pre-diabetes Fasting glucose: 114 mg/dL (3/8/2024)  A1C: 6.3 % (7/29/2022)      Cholesterol Screening Once every 5 years if you don't have a lipid disorder. May order more often based on risk factors. Lipid panel: 10/13/2023          Other Preventive Screenings Covered by Medicare:  Abdominal Aortic Aneurysm (AAA) Screening: covered once if your at risk. You're considered to be at risk if you have a family history of AAA.  Lung Cancer Screening: covers low dose CT scan once per year if you meet all of the following conditions: (1) Age 55-77; (2) No signs or symptoms of lung cancer; (3) Current smoker or have quit smoking within the last 15 years; (4) You have a tobacco smoking history of at least 20 pack years (packs per day multiplied by number of years you smoked); (5) You get a written order from a healthcare provider.  Glaucoma Screening: covered annually if you're considered high risk: (1) You have diabetes OR (2) Family history of glaucoma OR (3)  aged 50 and older OR (4)  American aged 65 and older  Osteoporosis Screening: covered every 2 years if you meet one of the following conditions: (1) You're estrogen deficient and at risk for osteoporosis based off medical history and other findings; (2) Have a vertebral abnormality; (3) On glucocorticoid therapy for more than 3 months; (4) Have primary hyperparathyroidism; (5) On osteoporosis medications and need to assess response to drug therapy.   Last bone density test (DXA Scan): 07/01/2019.  HIV Screening: covered annually if you're between the age of 15-65. Also covered annually if you are younger than 15 and older than 65 with risk factors for HIV infection. For pregnant patients, it is covered up to 3 times per pregnancy.    Immunizations:  Immunization Recommendations   Influenza Vaccine Annual influenza vaccination during flu season is  recommended for all persons aged >= 6 months who do not have contraindications   Pneumococcal Vaccine   * Pneumococcal conjugate vaccine = PCV13 (Prevnar 13), PCV15 (Vaxneuvance), PCV20 (Prevnar 20)  * Pneumococcal polysaccharide vaccine = PPSV23 (Pneumovax) Adults 19-63 yo with certain risk factors or if 65+ yo  If never received any pneumonia vaccine: recommend Prevnar 20 (PCV20)  Give PCV20 if previously received 1 dose of PCV13 or PPSV23   Hepatitis B Vaccine 3 dose series if at intermediate or high risk (ex: diabetes, end stage renal disease, liver disease)   Respiratory syncytial virus (RSV) Vaccine - COVERED BY MEDICARE PART D  * RSVPreF3 (Arexvy) CDC recommends that adults 60 years of age and older may receive a single dose of RSV vaccine using shared clinical decision-making (SCDM)   Tetanus (Td) Vaccine - COST NOT COVERED BY MEDICARE PART B Following completion of primary series, a booster dose should be given every 10 years to maintain immunity against tetanus. Td may also be given as tetanus wound prophylaxis.   Tdap Vaccine - COST NOT COVERED BY MEDICARE PART B Recommended at least once for all adults. For pregnant patients, recommended with each pregnancy.   Shingles Vaccine (Shingrix) - COST NOT COVERED BY MEDICARE PART B  2 shot series recommended in those 19 years and older who have or will have weakened immune systems or those 50 years and older     Health Maintenance Due:      Topic Date Due   • Breast Cancer Screening: Mammogram  07/08/2022     Immunizations Due:      Topic Date Due   • COVID-19 Vaccine (5 - 2023-24 season) 01/26/2024     Advance Directives   What are advance directives?  Advance directives are legal documents that state your wishes and plans for medical care. These plans are made ahead of time in case you lose your ability to make decisions for yourself. Advance directives can apply to any medical decision, such as the treatments you want, and if you want to donate organs.    What are the types of advance directives?  There are many types of advance directives, and each state has rules about how to use them. You may choose a combination of any of the following:  Living will:  This is a written record of the treatment you want. You can also choose which treatments you do not want, which to limit, and which to stop at a certain time. This includes surgery, medicine, IV fluid, and tube feedings.   Durable power of  for healthcare (DPAHC):  This is a written record that states who you want to make healthcare choices for you when you are unable to make them for yourself. This person, called a proxy, is usually a family member or a friend. You may choose more than 1 proxy.  Do not resuscitate (DNR) order:  A DNR order is used in case your heart stops beating or you stop breathing. It is a request not to have certain forms of treatment, such as CPR. A DNR order may be included in other types of advance directives.  Medical directive:  This covers the care that you want if you are in a coma, near death, or unable to make decisions for yourself. You can list the treatments you want for each condition. Treatment may include pain medicine, surgery, blood transfusions, dialysis, IV or tube feedings, and a ventilator (breathing machine).  Values history:  This document has questions about your views, beliefs, and how you feel and think about life. This information can help others choose the care that you would choose.  Why are advance directives important?  An advance directive helps you control your care. Although spoken wishes may be used, it is better to have your wishes written down. Spoken wishes can be misunderstood, or not followed. Treatments may be given even if you do not want them. An advance directive may make it easier for your family to make difficult choices about your care.   Weight Management   Why it is important to manage your weight:  Being overweight increases your risk  of health conditions such as heart disease, high blood pressure, type 2 diabetes, and certain types of cancer. It can also increase your risk for osteoarthritis, sleep apnea, and other respiratory problems. Aim for a slow, steady weight loss. Even a small amount of weight loss can lower your risk of health problems.  How to lose weight safely:  A safe and healthy way to lose weight is to eat fewer calories and get regular exercise. You can lose up about 1 pound a week by decreasing the number of calories you eat by 500 calories each day.   Healthy meal plan for weight management:  A healthy meal plan includes a variety of foods, contains fewer calories, and helps you stay healthy. A healthy meal plan includes the following:  Eat whole-grain foods more often.  A healthy meal plan should contain fiber. Fiber is the part of grains, fruits, and vegetables that is not broken down by your body. Whole-grain foods are healthy and provide extra fiber in your diet. Some examples of whole-grain foods are whole-wheat breads and pastas, oatmeal, brown rice, and bulgur.  Eat a variety of vegetables every day.  Include dark, leafy greens such as spinach, kale, abimael greens, and mustard greens. Eat yellow and orange vegetables such as carrots, sweet potatoes, and winter squash.   Eat a variety of fruits every day.  Choose fresh or canned fruit (canned in its own juice or light syrup) instead of juice. Fruit juice has very little or no fiber.  Eat low-fat dairy foods.  Drink fat-free (skim) milk or 1% milk. Eat fat-free yogurt and low-fat cottage cheese. Try low-fat cheeses such as mozzarella and other reduced-fat cheeses.  Choose meat and other protein foods that are low in fat.  Choose beans or other legumes such as split peas or lentils. Choose fish, skinless poultry (chicken or turkey), or lean cuts of red meat (beef or pork). Before you cook meat or poultry, cut off any visible fat.   Use less fat and oil.  Try baking foods  instead of frying them. Add less fat, such as margarine, sour cream, regular salad dressing and mayonnaise to foods. Eat fewer high-fat foods. Some examples of high-fat foods include french fries, doughnuts, ice cream, and cakes.  Eat fewer sweets.  Limit foods and drinks that are high in sugar. This includes candy, cookies, regular soda, and sweetened drinks.  Exercise:  Exercise at least 30 minutes per day on most days of the week. Some examples of exercise include walking, biking, dancing, and swimming. You can also fit in more physical activity by taking the stairs instead of the elevator or parking farther away from stores. Ask your healthcare provider about the best exercise plan for you.      © Copyright [employer] 2018 Information is for End User's use only and may not be sold, redistributed or otherwise used for commercial purposes. All illustrations and images included in CareNotes® are the copyrighted property of [employer].A[employer] Inc. or [employer]

## 2024-05-14 ENCOUNTER — TELEPHONE (OUTPATIENT)
Dept: ADMINISTRATIVE | Facility: OTHER | Age: 84
End: 2024-05-14

## 2024-05-14 NOTE — TELEPHONE ENCOUNTER
----- Message from Trevon Berry MD sent at 5/13/2024  2:54 PM EDT -----  Regarding: Mammogram  Mammogram no longer indicated based on patient's age.  Please remove from care gaps

## 2024-05-21 ENCOUNTER — OFFICE VISIT (OUTPATIENT)
Dept: FAMILY MEDICINE CLINIC | Facility: CLINIC | Age: 84
End: 2024-05-21
Payer: COMMERCIAL

## 2024-05-21 VITALS
HEIGHT: 64 IN | OXYGEN SATURATION: 97 % | RESPIRATION RATE: 18 BRPM | WEIGHT: 202 LBS | TEMPERATURE: 98.1 F | BODY MASS INDEX: 34.49 KG/M2 | SYSTOLIC BLOOD PRESSURE: 136 MMHG | HEART RATE: 77 BPM | DIASTOLIC BLOOD PRESSURE: 62 MMHG

## 2024-05-21 DIAGNOSIS — H66.93 BILATERAL OTITIS MEDIA, UNSPECIFIED OTITIS MEDIA TYPE: Primary | ICD-10-CM

## 2024-05-21 DIAGNOSIS — H61.23 BILATERAL IMPACTED CERUMEN: ICD-10-CM

## 2024-05-21 PROCEDURE — 69210 REMOVE IMPACTED EAR WAX UNI: CPT | Performed by: FAMILY MEDICINE

## 2024-05-21 PROCEDURE — 99213 OFFICE O/P EST LOW 20 MIN: CPT | Performed by: FAMILY MEDICINE

## 2024-05-21 RX ORDER — AMOXICILLIN AND CLAVULANATE POTASSIUM 875; 125 MG/1; MG/1
1 TABLET, FILM COATED ORAL EVERY 12 HOURS SCHEDULED
Qty: 20 TABLET | Refills: 0 | Status: SHIPPED | OUTPATIENT
Start: 2024-05-21 | End: 2024-05-31

## 2024-05-21 NOTE — PROGRESS NOTES
"Ambulatory Visit  Name: Sofia Aguirre      : 1940      MRN: [id number]  Encounter Provider: Trevon Berry MD  Encounter Date: 2024   Encounter department: [facility]    Assessment & Plan   1. Bilateral otitis media, unspecified otitis media type  -     amoxicillin-clavulanate (AUGMENTIN) 875-125 mg per tablet; Take 1 tablet by mouth every 12 (twelve) hours for 10 days  2. Bilateral impacted cerumen  -     Ear cerumen removal    Ear cerumen removal    Date/Time: 2024 2:00 PM    Performed by: Trevon Berry MD  Authorized by: Trevon Berry MD  Universal Protocol:  Consent: Verbal consent obtained.  Patient understanding: patient states understanding of the procedure being performed  Patient identity confirmed: verbally with patient    Patient location:  Clinic  Procedure details:     Location:  R ear and L ear    Procedure type: curette      Approach:  External  Post-procedure details:     Complication:  None    Hearing quality:  Improved    Patient tolerance of procedure:  Tolerated well, no immediate complications  Cerumen removal completed in office today.  Upon removal patient had bulging erythematous tympanic membranes on both sides.  Consistent with otitis media.  Will start her on Augmentin twice daily for the next 10 days.  Continue taking over-the-counter medications.  Follow-up if no improvement       History of Present Illness       Patient presents today with sore throat which began last Thursday.  Earache on the left side.  Also endorses headache.  She has been using Coricidin and lozenges.    Sore Throat   Associated symptoms include ear pain and headaches.       Review of Systems   HENT:  Positive for ear pain and sore throat.    Neurological:  Positive for headaches.       Objective     /62 (BP Location: Left arm, Patient Position: Sitting, Cuff Size: Large)   Pulse 77   Temp 98.1 °F (36.7 °C) (Temporal)   Resp 18   Ht 5' 4.1\" " (1.628 m)   Wt 91.6 kg (202 lb)   SpO2 97%   BMI 34.57 kg/m²     Physical Exam  HENT:      Right Ear: A middle ear effusion is present. Tympanic membrane is erythematous and bulging.      Left Ear: A middle ear effusion is present. Tympanic membrane is erythematous and bulging.       Administrative Statements

## 2024-06-03 ENCOUNTER — TELEPHONE (OUTPATIENT)
Age: 84
End: 2024-06-03

## 2024-06-03 NOTE — TELEPHONE ENCOUNTER
Pt called, she finished 10 days of amoxil.  Her left ear is crackling and she can feel her heartbeat in her left ear.   No pain, no dizziness, no drainage from ear.  appt made for 06/04/24 @ 9:45am.    Nothing available today.

## 2024-06-04 ENCOUNTER — OFFICE VISIT (OUTPATIENT)
Dept: FAMILY MEDICINE CLINIC | Facility: CLINIC | Age: 84
End: 2024-06-04
Payer: COMMERCIAL

## 2024-06-04 VITALS
TEMPERATURE: 97.9 F | OXYGEN SATURATION: 96 % | HEIGHT: 64 IN | WEIGHT: 203.5 LBS | DIASTOLIC BLOOD PRESSURE: 66 MMHG | RESPIRATION RATE: 16 BRPM | SYSTOLIC BLOOD PRESSURE: 146 MMHG | BODY MASS INDEX: 34.74 KG/M2 | HEART RATE: 64 BPM

## 2024-06-04 DIAGNOSIS — H93.8X2 SENSATION OF FULLNESS IN EAR, LEFT: Primary | ICD-10-CM

## 2024-06-04 PROCEDURE — 99213 OFFICE O/P EST LOW 20 MIN: CPT | Performed by: FAMILY MEDICINE

## 2024-06-04 PROCEDURE — G2211 COMPLEX E/M VISIT ADD ON: HCPCS | Performed by: FAMILY MEDICINE

## 2024-06-04 RX ORDER — FLUTICASONE PROPIONATE 50 MCG
2 SPRAY, SUSPENSION (ML) NASAL DAILY
Qty: 9.9 ML | Refills: 0 | Status: SHIPPED | OUTPATIENT
Start: 2024-06-04

## 2024-06-04 NOTE — PROGRESS NOTES
"Ambulatory Visit  Name: Sofia Aguirre      : 1940      MRN: [id number]  Encounter Provider: Trevon Berry MD  Encounter Date: 2024   Encounter department: [facility]    Assessment & Plan   1. Sensation of fullness in ear, left  -     fluticasone (FLONASE) 50 mcg/act nasal spray; 2 sprays into each nostril daily     No signs of infection on examination.  No need to repeat antibiotics.  Mild effusion.  Trial patient on Flonase 2 sprays in each nostril daily.  If no improvement over the next couple weeks follow-up in office.  History of Present Illness         Patient presents today for ear fullness.  She reports hearing crackles in her heartbeat in her left ear.  Right ear is asymptomatic.  She recently completed antibiotic and cerumen removal a couple weeks ago.    Ear Fullness   Pertinent negatives include no ear discharge or hearing loss.       Review of Systems   HENT:  Negative for ear discharge, ear pain and hearing loss.         Ear fullness/crackling       Objective     /66 (BP Location: Left arm, Patient Position: Sitting, Cuff Size: Large)   Pulse 64   Temp 97.9 °F (36.6 °C) (Temporal)   Resp 16   Ht 5' 4.1\" (1.628 m)   Wt 92.3 kg (203 lb 8 oz)   SpO2 96%   BMI 34.82 kg/m²     Physical Exam  Vitals and nursing note reviewed.   Constitutional:       Appearance: Normal appearance.   HENT:      Head: Normocephalic and atraumatic.      Right Ear: There is no impacted cerumen.      Left Ear: No drainage or tenderness. A middle ear effusion is present. There is no impacted cerumen. Tympanic membrane is not erythematous, retracted or bulging.   Neurological:      Mental Status: She is alert.       Administrative Statements     "

## 2024-06-26 DIAGNOSIS — H93.8X2 SENSATION OF FULLNESS IN EAR, LEFT: ICD-10-CM

## 2024-06-26 RX ORDER — FLUTICASONE PROPIONATE 50 MCG
SPRAY, SUSPENSION (ML) NASAL
Qty: 48 ML | Refills: 1 | Status: SHIPPED | OUTPATIENT
Start: 2024-06-26

## 2024-07-29 DIAGNOSIS — E78.49 OTHER HYPERLIPIDEMIA: ICD-10-CM

## 2024-07-29 RX ORDER — ATORVASTATIN CALCIUM 40 MG/1
TABLET, FILM COATED ORAL
Qty: 90 TABLET | Refills: 1 | Status: SHIPPED | OUTPATIENT
Start: 2024-07-29

## 2024-08-29 ENCOUNTER — OFFICE VISIT (OUTPATIENT)
Dept: CARDIOLOGY CLINIC | Facility: MEDICAL CENTER | Age: 84
End: 2024-08-29
Payer: COMMERCIAL

## 2024-08-29 VITALS
BODY MASS INDEX: 34.31 KG/M2 | SYSTOLIC BLOOD PRESSURE: 140 MMHG | HEART RATE: 80 BPM | DIASTOLIC BLOOD PRESSURE: 74 MMHG | HEIGHT: 64 IN | OXYGEN SATURATION: 97 % | WEIGHT: 201 LBS

## 2024-08-29 DIAGNOSIS — I10 ESSENTIAL HYPERTENSION: ICD-10-CM

## 2024-08-29 DIAGNOSIS — I49.3 PVC (PREMATURE VENTRICULAR CONTRACTION): ICD-10-CM

## 2024-08-29 DIAGNOSIS — R00.1 BRADYCARDIA: ICD-10-CM

## 2024-08-29 DIAGNOSIS — E78.2 MIXED HYPERLIPIDEMIA: ICD-10-CM

## 2024-08-29 DIAGNOSIS — I25.118 CORONARY ARTERY DISEASE OF NATIVE ARTERY OF NATIVE HEART WITH STABLE ANGINA PECTORIS (HCC): Primary | ICD-10-CM

## 2024-08-29 PROCEDURE — 99214 OFFICE O/P EST MOD 30 MIN: CPT | Performed by: INTERNAL MEDICINE

## 2024-08-29 NOTE — ASSESSMENT & PLAN NOTE
Lipids reviewed: Last LDL 59, HDL 55, triglycerides 140.  Continue statin therapy and low-cholesterol diet.

## 2024-08-29 NOTE — PROGRESS NOTES
[facility] [place] [address]  [address]  [facility] [address]  Phone#  [phone]  Fax#  [phone]  [facility] Cardiology Office Follow-up Visit             NAME: Sofia Aguirre  AGE: 84 y.o. SEX: female   : 1940   MRN: [id number]    DATE: 2024  TIME: 10:13 AM    Cardiology Problem list:  CAD: : NSTEMI:  Cath: LM nl, LAD ostial 90%, LCX nl, RCA nl: PCI of LAD with 4.0x15 Xience GABY  ECHO: : EF 65, LAE, valves OK  Bradycardia: -Coreg stopped  : Holter average heart rate 73, lowest heart rate 45, occasional PACs and PVCs  : Zio-  Dyslipidemia  HTN  Carotid disease:  :  Doppler less than 50% stenosis    Assessment and recommendations    Coronary artery disease of native artery of native heart with stable angina pectoris (HCC)  Last MI in 2019.  Status post PCI of the LAD.  LVEF preserved.  Medical therapy reviewed.  Continue aspirin, statins, ACE inhibitor's.  No recent nitroglycerin use.  No change in functional capacity.  No anginal symptoms.    Essential hypertension  BP Readings from Last 3 Encounters:   24 140/74   24 146/66   24 136/62   Continue lifestyle modification.   Medical therapy reviewed.  Close blood pressure monitoring.      Mixed hyperlipidemia  Lipids reviewed: Last LDL 59, HDL 55, triglycerides 140.  Continue statin therapy and low-cholesterol diet.    Bradycardia  Historically not on beta-blockers due to relative bradycardia.  Mild ectopy on exam today. Zio patch requested to rule out AF.        Chief Complaint   Patient presents with    Follow-up     6 month f/up       HPI:    Sofia Aguirre is a 84 y.o.-year-old female who presents to the cardiology clinic for follow up for the above-listed problems.   Patient is doing well from a cardiovascular standpoint.  Weight is down by 2lbs. States her iron was low during blood donation and pulse was irregular once.  No recent cardiac hospitalizations.    Current  medications reviewed.    Reports compliance to medicines.  No side effects reported.  Denies chest pain on exertion.  Denies worsening shortness of breath.  Denies sustained palpitations.    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  Has ectopy on exam, but remains asymptomatic.  Overall no change in functional capacity.  She has no history of atrial fibrillation.  She is tolerating her statin therapy with only very mild generalized myalgias.    Previous Holter showed PVCs and PACs.  Zio patch requested to rule out atrial fibrillation.    Past history, family history, social history, current medications, vital signs, recent lab and imaging studies and  prior cardiology studies reviewed independently on this visit.    Allergies   Allergen Reactions    Codeine Dizziness     Reaction Date: 19Jul2011;     Other Hallucinations     darvocet       Current Outpatient Medications:     aspirin 81 MG tablet, Take 81 mg by mouth daily , Disp: , Rfl:     atorvastatin (LIPITOR) 40 mg tablet, TAKE 1 TABLET BY MOUTH EVERY DAY, Disp: 90 tablet, Rfl: 1    cholecalciferol (VITAMIN D3) 1,000 units tablet, Take 2,000 Units by mouth daily, Disp: , Rfl:     Co-Enzyme Q-10 100 MG CAPS, Take 200 mg by mouth daily., Disp: , Rfl:     famotidine (PEPCID) 20 mg tablet, Take 20 mg by mouth every morning OTC, Disp: , Rfl:     fluticasone (FLONASE) 50 mcg/act nasal spray, SPRAY 2 SPRAYS INTO EACH NOSTRIL EVERY DAY, Disp: 48 mL, Rfl: 1    ketoconazole (NIZORAL) 2 % cream, Apply 1 Application topically daily, Disp: , Rfl:     latanoprost (XALATAN) 0.005 % ophthalmic solution, Administer 1 drop to both eyes daily, Disp: , Rfl: 3    lisinopril-hydrochlorothiazide (PRINZIDE,ZESTORETIC) 20-12.5 MG per tablet, Take 1 tablet by mouth daily (Patient taking differently: Take 1 tablet by mouth every morning), Disp: 90 tablet, Rfl: 3    Multiple Vitamins-Minerals (PRESERVISION AREDS) CAPS, Take 2 capsules by mouth daily, Disp: , Rfl:      neomycin-polymyxin-hydrocortisone (CORTISPORIN) otic solution, Administer 4 drops into the left ear every 6 (six) hours, Disp: 10 mL, Rfl: 0    traMADol (ULTRAM) 50 mg tablet, Take 1 tablet (50 mg total) by mouth every 6 (six) hours as needed for severe pain for up to 5 doses Continuation of therapy, Disp: 5 tablet, Rfl: 0    nitroglycerin (NITROSTAT) 0.4 mg SL tablet, Place 1 tablet (0.4 mg total) under the tongue every 5 (five) minutes as needed for chest pain (Patient not taking: Reported on 5/21/2024), Disp: 25 tablet, Rfl: 11    Review of Systems   Constitutional:  Negative for fatigue.   HENT: Negative.     Eyes: Negative.    Respiratory:  Negative for shortness of breath.    Cardiovascular:  Negative for chest pain, palpitations and leg swelling.   Gastrointestinal: Negative.    Endocrine: Negative.    Musculoskeletal:  Positive for arthralgias and myalgias.   Neurological:  Negative for syncope.   Hematological: Negative.    Psychiatric/Behavioral:  Negative for sleep disturbance.    All other systems reviewed and are negative.      Objective:     Vitals:    08/29/24 0954   BP: 140/74   Pulse: 80   SpO2: 97%     Wt Readings from Last 3 Encounters:   08/29/24 91.2 kg (201 lb)   06/04/24 92.3 kg (203 lb 8 oz)   05/21/24 91.6 kg (202 lb)     Pulse Readings from Last 3 Encounters:   08/29/24 80   06/04/24 64   05/21/24 77     BP Readings from Last 3 Encounters:   08/29/24 140/74   06/04/24 146/66   05/21/24 136/62     Physical Exam  Vitals reviewed.   Constitutional:       General: She is not in acute distress.  HENT:      Head: Normocephalic.   Neck:      Vascular: No carotid bruit.   Cardiovascular:      Rate and Rhythm: Normal rate and regular rhythm. Occasional Extrasystoles are present.     Heart sounds: S1 normal and S2 normal. Murmur heard.      Systolic murmur is present with a grade of 2/6.   Pulmonary:      Breath sounds: No wheezing or rhonchi.   Musculoskeletal:      Right lower leg: No edema.       "Left lower leg: No edema.   Skin:     General: Skin is warm.   Neurological:      Mental Status: She is alert. Mental status is at baseline.   Psychiatric:         Mood and Affect: Mood normal.         Pertinent Laboratory/Diagnostic Studies:    Laboratory studies reviewed personally by Efrain Dolan MD    BMP:   Lab Results   Component Value Date    SODIUM 142 03/08/2024    K 5.1 03/08/2024     03/08/2024    CO2 30 03/08/2024    BUN 21 03/08/2024    CREATININE 0.88 03/08/2024    GLUC 118 01/02/2022    CALCIUM 9.7 03/08/2024     CBC:  Lab Results   Component Value Date    WBC 6.98 10/13/2023    HGB 12.9 10/13/2023    HCT 42.2 10/13/2023    MCV 99 (H) 10/13/2023     10/13/2023     Lipid Profile:   Lab Results   Component Value Date    HDL 55 10/13/2023     Lab Results   Component Value Date    LDLCALC 59 10/13/2023     Lab Results   Component Value Date    TRIG 140 10/13/2023      Other labs:  Lab Results   Component Value Date    QIL9FMSPDOXX 2.730 07/09/2020     Lab Results   Component Value Date    ALT 29 01/02/2022    AST 17 01/02/2022     Lab Results   Component Value Date    HGBA1C 6.3 (H) 07/29/2022         Imaging Studies:     Pertinent imaging studies and cardiac studies were independently reviewed on this visit and findings summarized.    Efrain Dolan MD, [facility]    Portions of the record may have been created with voice recognition software.  Occasional wrong word or \"sound alike\" substitutions may have occurred due to the inherent limitations of voice recognition software.  Read the chart carefully and recognize, using context, where substitutions have occurred. Please reach out to me directly for any clarifications.  "

## 2024-08-29 NOTE — ASSESSMENT & PLAN NOTE
BP Readings from Last 3 Encounters:   08/29/24 140/74   06/04/24 146/66   05/21/24 136/62   Continue lifestyle modification.   Medical therapy reviewed.  Close blood pressure monitoring.

## 2024-08-29 NOTE — ASSESSMENT & PLAN NOTE
Last MI in 2019.  Status post PCI of the LAD.  LVEF preserved.  Medical therapy reviewed.  Continue aspirin, statins, ACE inhibitor's.  No recent nitroglycerin use.  No change in functional capacity.  No anginal symptoms.

## 2024-08-29 NOTE — ASSESSMENT & PLAN NOTE
Historically not on beta-blockers due to relative bradycardia.  Mild ectopy on exam today. Zio patch requested to rule out AF.

## 2024-10-14 ENCOUNTER — EVENT RECORDER/EXTENDED HOLTER (OUTPATIENT)
Dept: CARDIOLOGY CLINIC | Facility: MEDICAL CENTER | Age: 84
End: 2024-10-14
Payer: COMMERCIAL

## 2024-10-14 ENCOUNTER — TELEPHONE (OUTPATIENT)
Age: 84
End: 2024-10-14

## 2024-10-14 DIAGNOSIS — R00.1 BRADYCARDIA: ICD-10-CM

## 2024-10-14 DIAGNOSIS — I49.3 PVC (PREMATURE VENTRICULAR CONTRACTION): ICD-10-CM

## 2024-10-14 DIAGNOSIS — R00.1 BRADYCARDIA: Primary | ICD-10-CM

## 2024-10-14 PROCEDURE — 93244 EXT ECG>48HR<7D REV&INTERPJ: CPT | Performed by: INTERNAL MEDICINE

## 2024-10-14 NOTE — TELEPHONE ENCOUNTER
Caller: Sofia Aguirre    Doctor: Magdaleno    Reason for call: Patient wore a holter monitor and is calling for the results.  Please call her.    Thank you    Call back#: [phone]

## 2024-10-14 NOTE — RESULT ENCOUNTER NOTE
[facility]    Event Recorder Results    Duration:7 days    Indication:Bradycardia    Findings: Patient had a min HR of 31 bpm, max HR of 134 bpm, and avg HR of 73 bpm. Predominant underlying rhythm was Sinus Rhythm. First Degree AV Block was present. Second Degree AV Block-Mobitz I (Wenckebach) was present. Isolated SVEs were rare (<1.0%), SVE Couplets were rare (<1.0%), and SVE Triplets were rare (<1.0%). Isolated VEs were frequent (6.3%, 02090), VE Couplets were rare (<1.0%, 88), and VE Triplets were rare (<1.0%, 2). Ventricular Bigeminy and Trigeminy were present.     Triggered episodes: No triggered episodes.  1 diary episode correlated with ventricular trigeminy.    Conclusions:  Sinus rhythm with nocturnal sinus bradycardia.  Mobitz type I second-degree AV block noted.  No high degree AV block seen.  Rare supraventricular and frequent ventricular ectopy.  PVC burden 6.3%.  Single patient recorded event correlated with PVCs.    Message for staff: Please let the patient know that her average heart rate was normal at 73.  At nighttime her heart rate does drop down below 40.  There was skipped beats recorded from the lower cardiac chamber called as PVCs.  Currently this does not need additional treatment however I would recommend an echocardiogram to make sure that her heart muscle function is normal due to the PVCs noted.  Please call with results.

## 2024-10-15 ENCOUNTER — APPOINTMENT (EMERGENCY)
Dept: CT IMAGING | Facility: HOSPITAL | Age: 84
End: 2024-10-15
Payer: COMMERCIAL

## 2024-10-15 ENCOUNTER — HOSPITAL ENCOUNTER (EMERGENCY)
Facility: HOSPITAL | Age: 84
Discharge: HOME | End: 2024-10-16
Attending: EMERGENCY MEDICINE
Payer: COMMERCIAL

## 2024-10-15 ENCOUNTER — APPOINTMENT (EMERGENCY)
Dept: RADIOLOGY | Facility: HOSPITAL | Age: 84
End: 2024-10-15
Payer: COMMERCIAL

## 2024-10-15 ENCOUNTER — OFFICE VISIT (OUTPATIENT)
Dept: URGENT CARE | Facility: MEDICAL CENTER | Age: 84
End: 2024-10-15
Payer: COMMERCIAL

## 2024-10-15 VITALS
BODY MASS INDEX: 35.25 KG/M2 | OXYGEN SATURATION: 96 % | HEART RATE: 78 BPM | DIASTOLIC BLOOD PRESSURE: 90 MMHG | WEIGHT: 206 LBS | RESPIRATION RATE: 18 BRPM | SYSTOLIC BLOOD PRESSURE: 190 MMHG | TEMPERATURE: 98 F

## 2024-10-15 DIAGNOSIS — I10 HYPERTENSION, UNSPECIFIED TYPE: ICD-10-CM

## 2024-10-15 DIAGNOSIS — R03.0 ELEVATED BLOOD PRESSURE READING: Primary | ICD-10-CM

## 2024-10-15 DIAGNOSIS — R42 DIZZINESS: ICD-10-CM

## 2024-10-15 DIAGNOSIS — J32.0 LEFT MAXILLARY SINUSITIS: ICD-10-CM

## 2024-10-15 DIAGNOSIS — R00.2 PALPITATION: ICD-10-CM

## 2024-10-15 DIAGNOSIS — R42 DIZZINESS AND GIDDINESS: Primary | ICD-10-CM

## 2024-10-15 DIAGNOSIS — R79.89 ELEVATED BRAIN NATRIURETIC PEPTIDE (BNP) LEVEL: ICD-10-CM

## 2024-10-15 LAB
2HR DELTA HS TROPONIN: 7 NG/L
ALBUMIN SERPL BCG-MCNC: 4.3 G/DL (ref 3.5–5)
ALP SERPL-CCNC: 60 U/L (ref 34–104)
ALT SERPL W P-5'-P-CCNC: 35 U/L (ref 7–52)
ANION GAP SERPL CALCULATED.3IONS-SCNC: 9 MMOL/L (ref 4–13)
AST SERPL W P-5'-P-CCNC: 29 U/L (ref 13–39)
ATRIAL RATE: 69 BPM
BASOPHILS # BLD AUTO: 0.06 THOUSANDS/ΜL (ref 0–0.1)
BASOPHILS NFR BLD AUTO: 1 % (ref 0–1)
BILIRUB SERPL-MCNC: 0.65 MG/DL (ref 0.2–1)
BNP SERPL-MCNC: 183 PG/ML (ref 0–100)
BUN SERPL-MCNC: 26 MG/DL (ref 5–25)
CALCIUM SERPL-MCNC: 10.2 MG/DL (ref 8.4–10.2)
CARDIAC TROPONIN I PNL SERPL HS: 15 NG/L
CARDIAC TROPONIN I PNL SERPL HS: 8 NG/L
CHLORIDE SERPL-SCNC: 103 MMOL/L (ref 96–108)
CO2 SERPL-SCNC: 23 MMOL/L (ref 21–32)
CREAT SERPL-MCNC: 0.86 MG/DL (ref 0.6–1.3)
EOSINOPHIL # BLD AUTO: 0.13 THOUSAND/ΜL (ref 0–0.61)
EOSINOPHIL NFR BLD AUTO: 1 % (ref 0–6)
ERYTHROCYTE [DISTWIDTH] IN BLOOD BY AUTOMATED COUNT: 12.5 % (ref 11.6–15.1)
GFR SERPL CREATININE-BSD FRML MDRD: 62 ML/MIN/1.73SQ M
GLUCOSE SERPL-MCNC: 97 MG/DL (ref 65–140)
HCT VFR BLD AUTO: 40.3 % (ref 34.8–46.1)
HGB BLD-MCNC: 12.7 G/DL (ref 11.5–15.4)
IMM GRANULOCYTES # BLD AUTO: 0.04 THOUSAND/UL (ref 0–0.2)
IMM GRANULOCYTES NFR BLD AUTO: 0 % (ref 0–2)
LYMPHOCYTES # BLD AUTO: 3.02 THOUSANDS/ΜL (ref 0.6–4.47)
LYMPHOCYTES NFR BLD AUTO: 32 % (ref 14–44)
MAGNESIUM SERPL-MCNC: 2.1 MG/DL (ref 1.9–2.7)
MCH RBC QN AUTO: 30 PG (ref 26.8–34.3)
MCHC RBC AUTO-ENTMCNC: 31.5 G/DL (ref 31.4–37.4)
MCV RBC AUTO: 95 FL (ref 82–98)
MONOCYTES # BLD AUTO: 0.75 THOUSAND/ΜL (ref 0.17–1.22)
MONOCYTES NFR BLD AUTO: 8 % (ref 4–12)
NEUTROPHILS # BLD AUTO: 5.51 THOUSANDS/ΜL (ref 1.85–7.62)
NEUTS SEG NFR BLD AUTO: 58 % (ref 43–75)
NRBC BLD AUTO-RTO: 0 /100 WBCS
P AXIS: 59 DEGREES
PLATELET # BLD AUTO: 232 THOUSANDS/UL (ref 149–390)
PMV BLD AUTO: 11.5 FL (ref 8.9–12.7)
POTASSIUM SERPL-SCNC: 4 MMOL/L (ref 3.5–5.3)
PR INTERVAL: 290 MS
PROT SERPL-MCNC: 7.1 G/DL (ref 6.4–8.4)
QRS AXIS: -11 DEGREES
QRSD INTERVAL: 90 MS
QT INTERVAL: 404 MS
QTC INTERVAL: 432 MS
RBC # BLD AUTO: 4.23 MILLION/UL (ref 3.81–5.12)
SODIUM SERPL-SCNC: 135 MMOL/L (ref 135–147)
T WAVE AXIS: 61 DEGREES
TSH SERPL DL<=0.05 MIU/L-ACNC: 1.2 UIU/ML (ref 0.45–4.5)
VENTRICULAR RATE: 69 BPM
WBC # BLD AUTO: 9.51 THOUSAND/UL (ref 4.31–10.16)

## 2024-10-15 PROCEDURE — 99285 EMERGENCY DEPT VISIT HI MDM: CPT

## 2024-10-15 PROCEDURE — 93005 ELECTROCARDIOGRAM TRACING: CPT | Performed by: PHYSICIAN ASSISTANT

## 2024-10-15 PROCEDURE — 99285 EMERGENCY DEPT VISIT HI MDM: CPT | Performed by: PHYSICIAN ASSISTANT

## 2024-10-15 PROCEDURE — 85025 COMPLETE CBC W/AUTO DIFF WBC: CPT | Performed by: PHYSICIAN ASSISTANT

## 2024-10-15 PROCEDURE — 93010 ELECTROCARDIOGRAM REPORT: CPT | Performed by: INTERNAL MEDICINE

## 2024-10-15 PROCEDURE — 84484 ASSAY OF TROPONIN QUANT: CPT | Performed by: PHYSICIAN ASSISTANT

## 2024-10-15 PROCEDURE — 71045 X-RAY EXAM CHEST 1 VIEW: CPT

## 2024-10-15 PROCEDURE — 83880 ASSAY OF NATRIURETIC PEPTIDE: CPT | Performed by: PHYSICIAN ASSISTANT

## 2024-10-15 PROCEDURE — 84443 ASSAY THYROID STIM HORMONE: CPT | Performed by: PHYSICIAN ASSISTANT

## 2024-10-15 PROCEDURE — 93005 ELECTROCARDIOGRAM TRACING: CPT

## 2024-10-15 PROCEDURE — 70450 CT HEAD/BRAIN W/O DYE: CPT

## 2024-10-15 PROCEDURE — 80053 COMPREHEN METABOLIC PANEL: CPT | Performed by: PHYSICIAN ASSISTANT

## 2024-10-15 PROCEDURE — 83735 ASSAY OF MAGNESIUM: CPT | Performed by: PHYSICIAN ASSISTANT

## 2024-10-15 PROCEDURE — 36415 COLL VENOUS BLD VENIPUNCTURE: CPT | Performed by: PHYSICIAN ASSISTANT

## 2024-10-15 PROCEDURE — 99214 OFFICE O/P EST MOD 30 MIN: CPT | Performed by: PHYSICIAN ASSISTANT

## 2024-10-15 RX ORDER — MECLIZINE HCL 12.5 MG 12.5 MG/1
25 TABLET ORAL ONCE
Status: COMPLETED | OUTPATIENT
Start: 2024-10-15 | End: 2024-10-15

## 2024-10-15 RX ADMIN — MECLIZINE HYDROCHLORIDE 25 MG: 12.5 TABLET ORAL at 23:27

## 2024-10-15 NOTE — PATIENT INSTRUCTIONS
Pt is agreeable to go to the ER  by ambulance for further evaluation due to notable elevated BP as well as dizziness with palpitations.

## 2024-10-15 NOTE — PROGRESS NOTES
[facility]        NAME: Sofia Aguirre is a 84 y.o. female  : 1940    MRN: [id number]  DATE: October 15, 2024  TIME: 7:49 PM    Assessment and Plan   Dizziness and giddiness [R42]  1. Dizziness and giddiness  Transfer to other facility      2. Palpitation  ECG 12 lead      3. Hypertension, unspecified type              Patient Instructions     Pt is agreeable to go to the ER  by ambulance for further evaluation due to notable elevated BP as well as dizziness with palpitations.    If tests are performed, our office will contact you with results only if changes need to made to the care plan discussed with you at the visit. You can review your full results on [facility].    Chief Complaint     Chief Complaint   Patient presents with    Dizziness     Pt. C/O dizziness, heart palpitations, and nausea that began today,.          History of Present Illness       Pt reports waking this morning at 5:45 am with dizziness described as feeling off balance which is intermittent, occurs with movement and bending over. She also reports feeling her heart beat and some nausea. She denies shortness of breath, vomiting, headache, chest pain, abdominal pain or back pain. Pt reports taking BP medication this morning. Pt reports following with cardiology, last seen last month due to slow heart beat per pt.     Dizziness  Associated symptoms include nausea. Pertinent negatives include no abdominal pain, chest pain, headaches or vomiting.       Review of Systems   Review of Systems   Respiratory:  Negative for shortness of breath.    Cardiovascular:  Positive for palpitations. Negative for chest pain.   Gastrointestinal:  Positive for nausea. Negative for abdominal pain and vomiting.   Neurological:  Positive for dizziness. Negative for syncope and headaches.         Current Medications       Current Outpatient Medications:     aspirin 81 MG tablet, Take 81 mg by mouth daily , Disp: , Rfl:     atorvastatin (LIPITOR)  40 mg tablet, TAKE 1 TABLET BY MOUTH EVERY DAY, Disp: 90 tablet, Rfl: 1    cholecalciferol (VITAMIN D3) 1,000 units tablet, Take 2,000 Units by mouth daily, Disp: , Rfl:     Co-Enzyme Q-10 100 MG CAPS, Take 200 mg by mouth daily., Disp: , Rfl:     famotidine (PEPCID) 20 mg tablet, Take 20 mg by mouth every morning OTC, Disp: , Rfl:     fluticasone (FLONASE) 50 mcg/act nasal spray, SPRAY 2 SPRAYS INTO EACH NOSTRIL EVERY DAY, Disp: 48 mL, Rfl: 1    ketoconazole (NIZORAL) 2 % cream, Apply 1 Application topically daily, Disp: , Rfl:     latanoprost (XALATAN) 0.005 % ophthalmic solution, Administer 1 drop to both eyes daily, Disp: , Rfl: 3    lisinopril-hydrochlorothiazide (PRINZIDE,ZESTORETIC) 20-12.5 MG per tablet, Take 1 tablet by mouth daily (Patient taking differently: Take 1 tablet by mouth every morning), Disp: 90 tablet, Rfl: 3    Multiple Vitamins-Minerals (PRESERVISION AREDS) CAPS, Take 2 capsules by mouth daily, Disp: , Rfl:     traMADol (ULTRAM) 50 mg tablet, Take 1 tablet (50 mg total) by mouth every 6 (six) hours as needed for severe pain for up to 5 doses Continuation of therapy, Disp: 5 tablet, Rfl: 0    neomycin-polymyxin-hydrocortisone (CORTISPORIN) otic solution, Administer 4 drops into the left ear every 6 (six) hours (Patient not taking: Reported on 10/15/2024), Disp: 10 mL, Rfl: 0    nitroglycerin (NITROSTAT) 0.4 mg SL tablet, Place 1 tablet (0.4 mg total) under the tongue every 5 (five) minutes as needed for chest pain (Patient not taking: Reported on 10/15/2024), Disp: 25 tablet, Rfl: 11    Current Allergies     Allergies as of 10/15/2024 - Reviewed 10/15/2024   Allergen Reaction Noted    Codeine Dizziness 04/21/2012    Other Hallucinations 08/25/2016            The following portions of the patient's history were reviewed and updated as appropriate: allergies, current medications, past family history, past medical history, past social history, past surgical history and problem list.     Past  Medical History:   Diagnosis Date    AVB (atrioventricular block)     first degree    CAD (coronary artery disease)     Coronary artery disease     GERD (gastroesophageal reflux disease) 07/01/2017    Glaucoma     HLD (hyperlipidemia) 07/01/2017    Hypertension     LVH (left ventricular hypertrophy)     Osteopenia     PONV (postoperative nausea and vomiting)     Skin cancer 2000    nose    Ventral hernia without obstruction or gangrene 07/01/2017       Past Surgical History:   Procedure Laterality Date    CARDIAC CATHETERIZATION      CHOLECYSTECTOMY      GALLBLADDER SURGERY      HEMORRHOID SURGERY      HYSTERECTOMY  1987    age 47 w/ left oopherectomy    NOSE SURGERY      basal cell    OOPHORECTOMY Left 1987    age 47    AZ LAPAROSCOPY SURG CHOLECYSTECTOMY N/A 08/25/2016    Procedure: LAPAROSCOPIC CHOLECYSTECTOMY ;  Surgeon: Shahab Lopez MD;  Location: [facility];  Service: General    AZ NDSC WRST SURG W/RLS TRANSVRS CARPL LIGM Left 3/13/2024    Procedure: RELEASE CARPAL TUNNEL ENDOSCOPIC;  Surgeon: Mahin Bean MD;  [facility];  Service: Orthopedics    [place] REPAIR FIRST ABDOMINAL WALL HERNIA N/A 11/30/2017    Procedure: INCISIONAL HERNIA REPAIR;  Surgeon: Shahab Lopez MD;  Location: [facility];  Service: General    ROTATOR CUFF REPAIR Right        Family History   Problem Relation Age of Onset    No Known Problems Mother     No Known Problems Father     No Known Problems Daughter     No Known Problems Maternal Grandmother     No Known Problems Maternal Grandfather     No Known Problems Paternal Grandmother     No Known Problems Paternal Grandfather     No Known Problems Half-Sister     No Known Problems Maternal Aunt     Squamous cell carcinoma Brother     No Known Problems Son     No Known Problems Paternal Aunt     Breast cancer Neg Hx          Medications have been verified.        Objective   BP (S) (!) 190/90   Pulse 78   Temp 98 °F (36.7 °C)   Resp 18   Wt 93.4 kg (206 lb)   SpO2 96%    BMI 35.25 kg/m²        Physical Exam     Physical Exam  Constitutional:       General: She is not in acute distress.     Appearance: Normal appearance. She is normal weight. She is not ill-appearing, toxic-appearing or diaphoretic.   HENT:      Head: Normocephalic and atraumatic.      Right Ear: Tympanic membrane, ear canal and external ear normal. There is no impacted cerumen.      Left Ear: Tympanic membrane, ear canal and external ear normal. There is no impacted cerumen.      Mouth/Throat:      Mouth: Mucous membranes are moist.      Pharynx: Oropharynx is clear. No oropharyngeal exudate or posterior oropharyngeal erythema.   Eyes:      General: No scleral icterus.        Right eye: No discharge.         Left eye: No discharge.      Extraocular Movements: Extraocular movements intact.      Conjunctiva/sclera: Conjunctivae normal.      Pupils: Pupils are equal, round, and reactive to light.   Cardiovascular:      Rate and Rhythm: Normal rate. Rhythm irregular.      Heart sounds: Normal heart sounds. No murmur heard.     No friction rub. No gallop.   Pulmonary:      Effort: Pulmonary effort is normal. No respiratory distress.      Breath sounds: Normal breath sounds. No stridor. No wheezing, rhonchi or rales.   Chest:      Chest wall: No tenderness.   Abdominal:      General: Abdomen is flat. There is no distension.      Palpations: There is no mass.      Tenderness: There is no abdominal tenderness. There is no guarding.   Skin:     General: Skin is warm and dry.      Coloration: Skin is not jaundiced or pale.      Findings: No bruising, erythema, lesion or rash.   Neurological:      General: No focal deficit present.      Mental Status: She is alert and oriented to person, place, and time. Mental status is at baseline.      Cranial Nerves: Cranial nerves 2-12 are intact. No cranial nerve deficit, dysarthria or facial asymmetry.      Sensory: No sensory deficit.      Motor: No weakness, tremor, atrophy, abnormal  muscle tone, seizure activity or pronator drift.      Coordination: Coordination normal.   Psychiatric:         Mood and Affect: Mood normal.         Behavior: Behavior normal.         Thought Content: Thought content normal.         Judgment: Judgment normal.

## 2024-10-16 VITALS
RESPIRATION RATE: 16 BRPM | TEMPERATURE: 98.3 F | SYSTOLIC BLOOD PRESSURE: 175 MMHG | HEART RATE: 59 BPM | DIASTOLIC BLOOD PRESSURE: 72 MMHG | OXYGEN SATURATION: 97 %

## 2024-10-16 LAB
4HR DELTA HS TROPONIN: 7 NG/L
ATRIAL RATE: 56 BPM
ATRIAL RATE: 60 BPM
ATRIAL RATE: 60 BPM
CARDIAC TROPONIN I PNL SERPL HS: 15 NG/L
P AXIS: 47 DEGREES
P AXIS: 53 DEGREES
P AXIS: 57 DEGREES
PR INTERVAL: 272 MS
PR INTERVAL: 286 MS
PR INTERVAL: 294 MS
QRS AXIS: -11 DEGREES
QRS AXIS: -12 DEGREES
QRS AXIS: -3 DEGREES
QRSD INTERVAL: 84 MS
QT INTERVAL: 410 MS
QT INTERVAL: 418 MS
QT INTERVAL: 420 MS
QTC INTERVAL: 403 MS
QTC INTERVAL: 410 MS
QTC INTERVAL: 420 MS
T WAVE AXIS: 62 DEGREES
T WAVE AXIS: 65 DEGREES
T WAVE AXIS: 66 DEGREES
VENTRICULAR RATE: 56 BPM
VENTRICULAR RATE: 60 BPM
VENTRICULAR RATE: 60 BPM

## 2024-10-16 PROCEDURE — 84484 ASSAY OF TROPONIN QUANT: CPT | Performed by: PHYSICIAN ASSISTANT

## 2024-10-16 PROCEDURE — 93010 ELECTROCARDIOGRAM REPORT: CPT | Performed by: INTERNAL MEDICINE

## 2024-10-16 PROCEDURE — 36415 COLL VENOUS BLD VENIPUNCTURE: CPT | Performed by: PHYSICIAN ASSISTANT

## 2024-10-16 PROCEDURE — 93005 ELECTROCARDIOGRAM TRACING: CPT

## 2024-10-16 RX ORDER — MECLIZINE HYDROCHLORIDE 25 MG/1
25 TABLET ORAL 3 TIMES DAILY PRN
Qty: 9 TABLET | Refills: 0 | Status: SHIPPED | OUTPATIENT
Start: 2024-10-16 | End: 2024-10-19

## 2024-10-16 NOTE — ED PROVIDER NOTES
"Time reflects when diagnosis was documented in both MDM as applicable and the Disposition within this note       Time User Action Codes Description Comment    10/15/2024 11:07 PM Fernie Worley [J32.0] Left maxillary sinusitis     10/15/2024 11:07 PM Fernie Worley Add [R03.0] Elevated blood pressure reading     10/15/2024 11:07 PM Fernie Worley [R79.89] Elevated brain natriuretic peptide (BNP) level     10/15/2024 11:07 PM Fernie Worley Modify [J32.0] Left maxillary sinusitis     10/15/2024 11:07 PM Fernie Worley Modify [R03.0] Elevated blood pressure reading     10/16/2024  1:33 AM Fernie Worley [R42] Dizziness           ED Disposition       ED Disposition   Discharge    Condition   Stable    Date/Time   Wed Oct 16, 2024  1:32 AM    Comment   Sofia Aguirre discharge to home/self care.                   Assessment & Plan       Medical Decision Making  Patient is an 84-year-old female with history of CAD, GERD, hyperlipidemia, hypertension, LVH, surgical history of cardiac catheterization, cholecystectomy that presents to the emergency department with room swaying dizziness for 1 day.  ECG x 2 with normal sinus rhythm, no ischemic changes; delta troponin 7; heart score 4  Chest x-ray with no acute cardiopulmonary disease on wet read  Head CT with no acute intracranial abnormality.  Air-fluid level and frothy secretions in the left maxillary sinus may reflect acute sinusitis in the appropriate clinical setting.\"  Normal electrolytes, normal kidney function; mild increase in BUN may reflect mild dehydration?  Normal TSH, doubt acute thyroid pathology    No leukocytosis, no bandemia, doubt infectious etiology  GCS 15 alert and oriented x 3, no acute focal neurological deficits on initial and subsequent evaluations by myself  Discussed patient case with internal medicine team with recommendation to perform 4-hour troponin and ECG, patient reports improvement of dizziness symptoms with decrease of blood " pressure that proved to be very labile, no evidence of endorgan damage.  Delivered Antivert with patient verbalized decrease in dizziness symptoms status post medication delivery  4-hour troponin with no change from 2-hour troponin; 15; patient reports no chest pain symptomatology, will have patient follow-up with PCP and cardiologist at time of discharge  Also counseled patient on taking antihypertensive medication daily at the same time and starting a blood pressure diary  Follow-up with cardiology  Follow-up with ENT  Follow-up with PCP  Follow-up with Emergency Department should symptoms persist or exacerbate  Patient demonstrates verbal understanding of all clinical laboratory imaging findings, discharge instructions follow-up and verbalized agreement with patient current treatment plan.    Amount and/or Complexity of Data Reviewed  Labs: ordered. Decision-making details documented in ED Course.  Radiology: ordered and independent interpretation performed. Decision-making details documented in ED Course.  ECG/medicine tests: ordered and independent interpretation performed. Decision-making details documented in ED Course.        ED Course as of 10/16/24 0237   Tue Oct 15, 2024   2120 Blood Pressure(!): 180/74   2139 Blood Pressure: 160/72   2214 BNP(!): 183   2221 Blood pressure improving without treatment.  No evidence of endorgan damage.  Care ongoing, will evaluate delta ECG and second troponin.   2329 Patient with verbal understanding of all clinical laboratory imaging findings at this time.   2345 Delta 2hr hsTnI: 7   2345 ECG x 2 with NSR, no evidence of ischemia, delta 7; will contact internal medicine at this time.   Wed Oct 16, 2024   0008 Text to internal medicine team with recommendation to check 4-hour troponin with ECG; patient updated.  Patient continues to have no chest pain, shortness of breath, or abdominal pain symptoms, no nausea vomiting.  No headaches, improving dizziness symptoms status  post Antivert delivery.   0109 Patient blood pressure very labile without intervention or provocation, patient remains alert and oriented x 3 GCS 15 no acute focal neurological deficits.  Care ongoing with 4-hour troponin running at this time       Medications   meclizine (ANTIVERT) tablet 25 mg (25 mg Oral Given 10/15/24 2327)       ED Risk Strat Scores   HEART Risk Score      Flowsheet Row Most Recent Value   Heart Score Risk Calculator    History 0 Filed at: 10/15/2024 2346   ECG 0 Filed at: 10/15/2024 2346   Age 2 Filed at: 10/15/2024 2346   Risk Factors 2 Filed at: 10/15/2024 2346   Troponin 0 Filed at: 10/15/2024 2346   HEART Score 4 Filed at: 10/15/2024 2346                                                   History of Present Illness       Chief Complaint   Patient presents with    Dizziness     BIBA from [facility] where she found to be hypertensive w/ a systolic of 240. Pt c/o dizziness since she woke up this morning described as feeling like she is on a ship. Hx HTN on lisinopril. Denies CP, SOB, HA, blurry vision, other sx.      Patient is an 84-year-old female with history of CAD, GERD, hyperlipidemia, hypertension, LVH, surgical history of cardiac catheterization, cholecystectomy that presents to the emergency department with room swaying dizziness for 1 day.  Patient went to urgent care today for evaluation of dizziness symptoms, diagnosed with dizziness and giddiness, palpitation, hypertension.  Patient reports waking this morning with dizziness symptoms worsening with movement with accompanying nausea symptoms initially provoked patient to go to urgent care to be evaluated with recommendation to come to the ED at this time for further evaluation.  Patient states that she had taken her prescribed medication lisinopril-hydrochlorothiazide 20-12.5 mg this morning as scheduled.  Patient denies vision changes, thunderclap headache, vertigo, and meningeal symptoms.  Patient denies any new medications.   Patient denies numbness, tingling and loss of power of any or all extremities.  Patient denies palliative and provocative factors.  Patient denies noneffective treatment.  Patient denies fevers, chills, nausea, vomiting, diarrhea, constipation and urinary symptoms.  Patient denies recent fall and recent trauma.  Patient denies sick contacts and recent travel.  Patient denies chest pain, shortness of breath, and abdominal pain.      Past Medical History:   Diagnosis Date    AVB (atrioventricular block)     first degree    CAD (coronary artery disease)     Coronary artery disease     GERD (gastroesophageal reflux disease) 07/01/2017    Glaucoma     HLD (hyperlipidemia) 07/01/2017    Hypertension     LVH (left ventricular hypertrophy)     Osteopenia     PONV (postoperative nausea and vomiting)     Skin cancer 2000    nose    Ventral hernia without obstruction or gangrene 07/01/2017      Past Surgical History:   Procedure Laterality Date    CARDIAC CATHETERIZATION      CHOLECYSTECTOMY      GALLBLADDER SURGERY      HEMORRHOID SURGERY      HYSTERECTOMY  1987    age 47 w/ left oopherectomy    NOSE SURGERY      basal cell    OOPHORECTOMY Left 1987    age 47    [place] LAPAROSCOPY SURG CHOLECYSTECTOMY N/A 08/25/2016    Procedure: LAPAROSCOPIC CHOLECYSTECTOMY ;  Surgeon: Shahab Lopez MD;  Location: [facility];  Service: General    [place] NDSC WRST SURG W/RLS TRANSVRS CARPL LIGM Left 3/13/2024    Procedure: RELEASE CARPAL TUNNEL ENDOSCOPIC;  Surgeon: Mahin Bean MD;  [facility];  Service: Orthopedics    [place] REPAIR FIRST ABDOMINAL WALL HERNIA N/A 11/30/2017    Procedure: INCISIONAL HERNIA REPAIR;  Surgeon: Shahab Lopez MD;  Location[facility];  Service: General    ROTATOR CUFF REPAIR Right       Family History   Problem Relation Age of Onset    No Known Problems Mother     No Known Problems Father     No Known Problems Daughter     No Known Problems Maternal Grandmother     No Known Problems Maternal Grandfather      No Known Problems Paternal Grandmother     No Known Problems Paternal Grandfather     No Known Problems Half-Sister     No Known Problems Maternal Aunt     Squamous cell carcinoma Brother     No Known Problems Son     No Known Problems Paternal Aunt     Breast cancer Neg Hx       Social History     Tobacco Use    Smoking status: Never    Smokeless tobacco: Never   Vaping Use    Vaping status: Never Used   Substance Use Topics    Alcohol use: Yes     Comment: socially    Drug use: No      E-Cigarette/Vaping    E-Cigarette Use Never User       E-Cigarette/Vaping Substances    Nicotine No     THC No     CBD No     Flavoring No     Other No     Unknown No       I have reviewed and agree with the history as documented.       History provided by:  Patient   used: No    Dizziness  Associated symptoms: no chest pain, no diarrhea, no headaches, no nausea, no palpitations, no shortness of breath, no tinnitus, no vomiting and no weakness        Review of Systems   Constitutional:  Negative for activity change, appetite change, chills and fever.   HENT:  Negative for congestion, postnasal drip, rhinorrhea, sinus pressure, sinus pain, sore throat and tinnitus.    Eyes:  Negative for photophobia and visual disturbance.   Respiratory:  Negative for cough, chest tightness and shortness of breath.    Cardiovascular:  Negative for chest pain and palpitations.   Gastrointestinal:  Negative for abdominal pain, constipation, diarrhea, nausea and vomiting.   Genitourinary:  Negative for difficulty urinating, dysuria, flank pain, frequency and urgency.   Musculoskeletal:  Negative for back pain, gait problem, neck pain and neck stiffness.   Skin:  Negative for pallor and rash.   Allergic/Immunologic: Negative for environmental allergies and food allergies.   Neurological:  Positive for dizziness. Negative for weakness, numbness and headaches.   Psychiatric/Behavioral:  Negative for confusion.    All other systems  reviewed and are negative.          Objective       ED Triage Vitals   Temperature Pulse Blood Pressure Respirations SpO2 Patient Position - Orthostatic VS   10/15/24 2047 10/15/24 2039 10/15/24 2046 10/15/24 2039 10/15/24 2039 10/15/24 2115   98.3 °F (36.8 °C) 67 (!) 238/68 16 98 % Lying      Temp Source Heart Rate Source BP Location FiO2 (%) Pain Score    10/15/24 2047 10/15/24 2039 10/15/24 2046 -- 10/15/24 2039    Oral Monitor Right arm  No Pain      Vitals      Date and Time Temp Pulse SpO2 Resp BP Pain Score FACES Pain Rating User   10/16/24 0115 -- 59 97 % -- 175/72 -- --    10/16/24 0104 -- 63 96 % -- 169/75 -- --    10/16/24 0055 -- 67 96 % -- 198/76 -- --    10/16/24 0029 -- 60 94 % -- 154/123 -- --    10/16/24 0015 -- 57 94 % -- 169/71 -- --    10/16/24 0000 -- 60 95 % -- 171/73 -- --    10/15/24 2345 -- 59 95 % -- 162/73 -- --    10/15/24 2330 -- 64 94 % -- 153/70 -- --    10/15/24 2315 -- 62 94 % -- 159/69 -- --    10/15/24 2300 -- 60 95 % -- 163/73 -- --    10/15/24 2245 -- 61 95 % -- 168/70 -- --    10/15/24 2230 -- 59 96 % -- 171/74 -- --    10/15/24 2215 -- 71 95 % -- 151/72 -- --    10/15/24 2200 -- 62 97 % -- 189/80 -- --    10/15/24 2130 -- 63 94 % -- 160/72 -- --    10/15/24 2119 -- -- -- -- -- No Pain --    10/15/24 2115 -- 63 95 % -- 180/74 -- --    10/15/24 2101 -- 61 -- -- 196/84 -- --    10/15/24 2100 -- 65 95 % -- 196/84 -- --    10/15/24 2047 98.3 °F (36.8 °C) -- -- -- -- -- --    10/15/24 2046 -- -- -- -- 238/68 -- --    10/15/24 2039 -- 67 98 % 16 -- No Pain --             Physical Exam  Vitals and nursing note reviewed.   Constitutional:       General: She is awake.      Appearance: Normal appearance. She is well-developed and normal weight. She is not ill-appearing, toxic-appearing or diaphoretic.      Comments: BP (!) 196/84   Pulse 61   Temp 98.3 °F (36.8 °C) (Oral)   Resp 16   SpO2 95%      HENT:      Head: Normocephalic and  atraumatic.      Jaw: There is normal jaw occlusion.      Right Ear: Hearing, tympanic membrane, ear canal and external ear normal. No decreased hearing noted. No drainage, swelling or tenderness. No mastoid tenderness.      Left Ear: Hearing, tympanic membrane, ear canal and external ear normal. No decreased hearing noted. No drainage, swelling or tenderness. No mastoid tenderness.      Nose: Nose normal.      Mouth/Throat:      Lips: Pink.      Mouth: Mucous membranes are moist.      Pharynx: Oropharynx is clear. Uvula midline.   Eyes:      General: Lids are normal. Vision grossly intact. Gaze aligned appropriately.         Right eye: No discharge.         Left eye: No discharge.      Extraocular Movements: Extraocular movements intact.      Conjunctiva/sclera: Conjunctivae normal.      Pupils: Pupils are equal, round, and reactive to light.   Neck:      Vascular: No JVD.      Trachea: Trachea and phonation normal. No tracheal tenderness or tracheal deviation.      Comments: No midline tenderness, no stepoffs  No tenderness with passive and active cervical ROM with head turning approximately 45 degree angles to the left and right  No cervical tenderness with cranial axial loading    Cardiovascular:      Rate and Rhythm: Normal rate and regular rhythm.      Pulses: Normal pulses.           Radial pulses are 2+ on the right side and 2+ on the left side.        Posterior tibial pulses are 2+ on the right side and 2+ on the left side.      Heart sounds: Normal heart sounds.   Pulmonary:      Effort: Pulmonary effort is normal.      Breath sounds: Normal breath sounds and air entry. No stridor. No decreased breath sounds, wheezing, rhonchi or rales.   Chest:      Chest wall: No tenderness.   Abdominal:      General: Abdomen is flat. Bowel sounds are normal. There is no distension.      Palpations: Abdomen is soft. Abdomen is not rigid.      Tenderness: There is no abdominal tenderness. There is no guarding or rebound.    Musculoskeletal:         General: Normal range of motion.      Cervical back: Full passive range of motion without pain, normal range of motion and neck supple. No rigidity. No spinous process tenderness or muscular tenderness. Normal range of motion.      Comments: Passive ROM intact  Upper and lower extremity 5/5 bilaterally  Neurovascularly intact  No grinding or clicking of joints       Feet:      Right foot:      Toenail Condition: Right toenails are normal.      Left foot:      Toenail Condition: Left toenails are normal.   Lymphadenopathy:      Head:      Right side of head: No submental, submandibular, tonsillar, preauricular, posterior auricular or occipital adenopathy.      Left side of head: No submental, submandibular, tonsillar, preauricular, posterior auricular or occipital adenopathy.      Cervical: No cervical adenopathy.      Right cervical: No superficial, deep or posterior cervical adenopathy.     Left cervical: No superficial, deep or posterior cervical adenopathy.   Skin:     General: Skin is warm.      Capillary Refill: Capillary refill takes less than 2 seconds.      Findings: No rash.   Neurological:      General: No focal deficit present.      Mental Status: She is alert and oriented to person, place, and time. Mental status is at baseline.      GCS: GCS eye subscore is 4. GCS verbal subscore is 5. GCS motor subscore is 6.      Cranial Nerves: Cranial nerves 2-12 are intact.      Sensory: Sensation is intact. No sensory deficit.      Motor: Motor function is intact.      Coordination: Coordination is intact.      Gait: Gait is intact.      Deep Tendon Reflexes: Reflexes are normal and symmetric.      Reflex Scores:       Patellar reflexes are 2+ on the right side and 2+ on the left side.  Psychiatric:         Attention and Perception: Attention normal.         Mood and Affect: Mood normal.         Speech: Speech normal.         Behavior: Behavior normal. Behavior is cooperative.          Thought Content: Thought content normal.         Judgment: Judgment normal.         Results Reviewed       Procedure Component Value Units Date/Time    HS Troponin I 4hr [[id number]]  (Normal) Collected: 10/16/24 0055    Lab Status: Final result Specimen: Blood from Arm, Left Updated: 10/16/24 0131     hs TnI 4hr 15 ng/L      Delta 4hr hsTnI 7 ng/L     HS Troponin I 2hr [[id number]]  (Normal) Collected: 10/15/24 2305    Lab Status: Final result Specimen: Blood from Arm, Left Updated: 10/15/24 2337     hs TnI 2hr 15 ng/L      Delta 2hr hsTnI 7 ng/L     B-Type Natriuretic Peptide(BNP) [[id number]]  (Abnormal) Collected: 10/15/24 2056    Lab Status: Final result Specimen: Blood from Arm, Left Updated: 10/15/24 2147      pg/mL     TSH, 3rd generation with Free T4 reflex [[id number]]  (Normal) Collected: 10/15/24 2056    Lab Status: Final result Specimen: Blood from Arm, Left Updated: 10/15/24 2144     TSH 3RD GENERATON 1.198 uIU/mL     Comprehensive metabolic panel [[id number]]  (Abnormal) Collected: 10/15/24 2056    Lab Status: Final result Specimen: Blood from Arm, Left Updated: 10/15/24 2136     Sodium 135 mmol/L      Potassium 4.0 mmol/L      Chloride 103 mmol/L      CO2 23 mmol/L      ANION GAP 9 mmol/L      BUN 26 mg/dL      Creatinine 0.86 mg/dL      Glucose 97 mg/dL      Calcium 10.2 mg/dL      AST 29 U/L      ALT 35 U/L      Alkaline Phosphatase 60 U/L      Total Protein 7.1 g/dL      Albumin 4.3 g/dL      Total Bilirubin 0.65 mg/dL      eGFR 62 ml/min/1.73sq m     Narrative:      National Kid[employer] Disease Foundation guidelines for Chronic Kidney Disease (CKD):     Stage 1 with normal or high GFR (GFR > 90 mL/min/1.73 square meters)    Stage 2 Mild CKD (GFR = 60-89 mL/min/1.73 square meters)    Stage 3A Moderate CKD (GFR = 45-59 mL/min/1.73 square meters)    Stage 3B Moderate CKD (GFR = 30-44 mL/min/1.73 square meters)    Stage 4 Severe CKD (GFR = 15-29 mL/min/1.73 square meters)    Stage 5 End Stage CKD (GFR  <15 mL/min/1.73 square meters)  Note: GFR calculation is accurate only with a steady state creatinine    Magnesium [[id number]]  (Normal) Collected: 10/15/24 2056    Lab Status: Final result Specimen: Blood from Arm, Left Updated: 10/15/24 2136     Magnesium 2.1 mg/dL     HS Troponin 0hr (reflex protocol) [[id number]]  (Normal) Collected: 10/15/24 2056    Lab Status: Final result Specimen: Blood from Arm, Left Updated: 10/15/24 2132     hs TnI 0hr 8 ng/L     CBC and differential [[id number]] Collected: 10/15/24 2056    Lab Status: Final result Specimen: Blood from Arm, Left Updated: 10/15/24 2107     WBC 9.51 Thousand/uL      RBC 4.23 Million/uL      Hemoglobin 12.7 g/dL      Hematocrit 40.3 %      MCV 95 fL      MCH 30.0 pg      MCHC 31.5 g/dL      RDW 12.5 %      MPV 11.5 fL      Platelets 232 Thousands/uL      nRBC 0 /100 WBCs      Segmented % 58 %      Immature Grans % 0 %      Lymphocytes % 32 %      Monocytes % 8 %      Eosinophils Relative 1 %      Basophils Relative 1 %      Absolute Neutrophils 5.51 Thousands/µL      Absolute Immature Grans 0.04 Thousand/uL      Absolute Lymphocytes 3.02 Thousands/µL      Absolute Monocytes 0.75 Thousand/µL      Eosinophils Absolute 0.13 Thousand/µL      Basophils Absolute 0.06 Thousands/µL             CT head without contrast   ED Interpretation by Fernie Worley PA-C (10/15 2306)   Reading Physician Reading Date Result Priority  Erica Vaughan MD  [phone] 10/15/2024     Narrative & Impression  CT BRAIN - WITHOUT CONTRAST     INDICATION:   dizziness and elevated blood pressure reading; reports compliance with antihypertensives.     COMPARISON:  None.     TECHNIQUE:  CT examination of the brain was performed.  Multiplanar 2D reformatted images were created from the source data.     Radiation dose length product (DLP) for this visit:  965 mGy-cm .  This examination, like all CT scans performed in the [facility], was performed utilizing techniques to  minimize radiation dose exposure, including the use of iterative   reconstruction and automated exposure control.     IMAGE QUALITY:  Diagnostic.     FINDINGS:     PARENCHYMA: Decreased attenuation is noted in periventricular and subcortical white matter demonstrating an appearance that is statistically most likely to represent chronic microangiopathic change.     No CT signs of acute infarctio   n.  No intracranial mass, mass effect or midline shift.  No acute parenchymal hemorrhage.     VENTRICLES AND EXTRA-AXIAL SPACES: Ventricles and extra-axial CSF spaces are prominent commensurate with the degree of volume loss.  No hydrocephalus.  No acute extra-axial hemorrhage.     VISUALIZED ORBITS: Normal visualized orbits.     PARANASAL SINUSES: Air-fluid level and frothy secretions in the left maxillary sinus. Mild mucosal thickening of left maxillary sinus. Small retention cyst in the right maxillary sinus.     CALVARIUM AND EXTRACRANIAL SOFT TISSUES: Normal.     IMPRESSION:     No acute intracranial abnormality.     Air-fluid level and frothy secretions in the left maxillary sinus may reflect acute sinusitis in the appropriate clinical setting.                 Workstation performed: [id number]           Final Interpretation by Erica Vaughan MD (10/15 2300)      No acute intracranial abnormality.      Air-fluid level and frothy secretions in the left maxillary sinus may reflect acute sinusitis in the appropriate clinical setting.                  Workstation performed: [id number]         XR chest 1 view portable   ED Interpretation by Fernie Worley PA-C (10/15 2222)   Chest x-ray with no acute cardiopulmonary disease on wet read by me.          ECG 12 Lead Documentation Only    Date/Time: 10/15/2024 9:13 PM    Performed by: Fernie Worley PA-C  Authorized by: Fernie Worley PA-C    Indications / Diagnosis:  Dizziness  ECG reviewed by me, the ED Provider: yes    Patient location:  ED  Previous ECG:     Previous ECG:   Compared to current    Comparison ECG info:  When compared to ECG October 15, 2024 with no acute changes noted    Similarity:  No change    Comparison to cardiac monitor: Yes    Interpretation:     Interpretation: normal    Rate:     ECG rate:  60    ECG rate assessment: normal    Rhythm:     Rhythm: sinus rhythm    Ectopy:     Ectopy: PVCs      PVCs:  Infrequent  QRS:     QRS axis:  Normal    QRS intervals:  Normal  Conduction:     Conduction: abnormal      Abnormal conduction: 1st degree      Abnormal conduction comment:  WI interval 272 ms  ST segments:     ST segments:  Normal  T waves:     T waves: normal    ECG 12 Lead Documentation Only    Date/Time: 10/15/2024 11:02 PM    Performed by: Fernie Worley PA-C  Authorized by: Fernie Worley PA-C    Indications / Diagnosis:  Dizziness; elevated blood pressure reading  ECG reviewed by me, the ED Provider: yes    Patient location:  ED  Previous ECG:     Previous ECG:  Compared to current    Comparison ECG info:  Given with ECG October 15, 2024 2113, no significant changes were noted.    Similarity:  No change    Comparison to cardiac monitor: Yes    Interpretation:     Interpretation: normal    Rate:     ECG rate:  60    ECG rate assessment: normal    Rhythm:     Rhythm: sinus rhythm    Ectopy:     Ectopy: PVCs      PVCs:  Infrequent  QRS:     QRS axis:  Normal    QRS intervals:  Normal  Conduction:     Conduction: normal    ST segments:     ST segments:  Normal  T waves:     T waves: normal    ECG 12 Lead Documentation Only    Date/Time: 10/16/2024 12:55 AM    Performed by: Fernie Worley PA-C  Authorized by: Fernie Worley PA-C    Indications / Diagnosis:  4-hour ECG, elevated blood pressure reading  ECG reviewed by me, the ED Provider: yes    Patient location:  ED  Previous ECG:     Previous ECG:  Compared to current    Comparison ECG info:  When compared with ECG #2 October 15, 2024 at 2302 no significant changes are noted    Similarity:  No change    Comparison to  cardiac monitor: Yes    Interpretation:     Interpretation: normal    Rate:     ECG rate:  56    ECG rate assessment: bradycardic    Rhythm:     Rhythm: sinus bradycardia    Ectopy:     Ectopy: none    QRS:     QRS axis:  Normal    QRS intervals:  Normal  Conduction:     Conduction: abnormal      Abnormal conduction: 1st degree    ST segments:     ST segments:  Normal  T waves:     T waves: normal        ED Medication and Procedure Management   Prior to Admission Medications   Prescriptions Last Dose Informant Patient Reported? Taking?   Co-Enzyme Q-10 100 MG CAPS  Self Yes No   Sig: Take 200 mg by mouth daily.   Multiple Vitamins-Minerals (PRESERVISION AREDS) CAPS  Self Yes No   Sig: Take 2 capsules by mouth daily   aspirin 81 MG tablet  Self Yes No   Sig: Take 81 mg by mouth daily    atorvastatin (LIPITOR) 40 mg tablet  Self No No   Sig: TAKE 1 TABLET BY MOUTH EVERY DAY   cholecalciferol (VITAMIN D3) 1,000 units tablet  Self Yes No   Sig: Take 2,000 Units by mouth daily   famotidine (PEPCID) 20 mg tablet  Self Yes No   Sig: Take 20 mg by mouth every morning OTC   fluticasone (FLONASE) 50 mcg/act nasal spray  Self No No   Sig: SPRAY 2 SPRAYS INTO EACH NOSTRIL EVERY DAY   ketoconazole (NIZORAL) 2 % cream  Self Yes No   Sig: Apply 1 Application topically daily   latanoprost (XALATAN) 0.005 % ophthalmic solution  Self Yes No   Sig: Administer 1 drop to both eyes daily   lisinopril-hydrochlorothiazide (PRINZIDE,ZESTORETIC) 20-12.5 MG per tablet  Self No No   Sig: Take 1 tablet by mouth daily   Patient taking differently: Take 1 tablet by mouth every morning   neomycin-polymyxin-hydrocortisone (CORTISPORIN) otic solution  Self No No   Sig: Administer 4 drops into the left ear every 6 (six) hours   Patient not taking: Reported on 10/15/2024   nitroglycerin (NITROSTAT) 0.4 mg SL tablet  Self No No   Sig: Place 1 tablet (0.4 mg total) under the tongue every 5 (five) minutes as needed for chest pain   Patient not taking:  Reported on 10/15/2024   traMADol (ULTRAM) 50 mg tablet  Self No No   Sig: Take 1 tablet (50 mg total) by mouth every 6 (six) hours as needed for severe pain for up to 5 doses Continuation of therapy      Facility-Administered Medications: None     Discharge Medication List as of 10/16/2024  1:34 AM        START taking these medications    Details   meclizine (ANTIVERT) 25 mg tablet Take 1 tablet (25 mg total) by mouth 3 (three) times a day as needed for dizziness for up to 3 days, Starting Wed 10/16/2024, Until Sat 10/19/2024 at 2359, Normal           CONTINUE these medications which have NOT CHANGED    Details   aspirin 81 MG tablet Take 81 mg by mouth daily , Historical Med      atorvastatin (LIPITOR) 40 mg tablet TAKE 1 TABLET BY MOUTH EVERY DAY, Normal      cholecalciferol (VITAMIN D3) 1,000 units tablet Take 2,000 Units by mouth daily, Historical Med      Co-Enzyme Q-10 100 MG CAPS Take 200 mg by mouth daily., Historical Med      famotidine (PEPCID) 20 mg tablet Take 20 mg by mouth every morning OTC, Historical Med      fluticasone (FLONASE) 50 mcg/act nasal spray SPRAY 2 SPRAYS INTO EACH NOSTRIL EVERY DAY, Normal      ketoconazole (NIZORAL) 2 % cream Apply 1 Application topically daily, Starting Tue 4/23/2024, Historical Med      latanoprost (XALATAN) 0.005 % ophthalmic solution Administer 1 drop to both eyes daily, Starting Thu 4/19/2018, Historical Med      lisinopril-hydrochlorothiazide (PRINZIDE,ZESTORETIC) 20-12.5 MG per tablet Take 1 tablet by mouth daily, Starting Tue 2/27/2024, Normal      Multiple Vitamins-Minerals (PRESERVISION AREDS) CAPS Take 2 capsules by mouth daily, Historical Med      neomycin-polymyxin-hydrocortisone (CORTISPORIN) otic solution Administer 4 drops into the left ear every 6 (six) hours, Starting Fri 4/26/2024, Normal      nitroglycerin (NITROSTAT) 0.4 mg SL tablet Place 1 tablet (0.4 mg total) under the tongue every 5 (five) minutes as needed for chest pain, Starting Thu  9/23/2021, Normal      traMADol (ULTRAM) 50 mg tablet Take 1 tablet (50 mg total) by mouth every 6 (six) hours as needed for severe pain for up to 5 doses Continuation of therapy, Starting Wed 3/13/2024, Normal           No discharge procedures on file.  ED SEPSIS DOCUMENTATION   Time reflects when diagnosis was documented in both MDM as applicable and the Disposition within this note       Time User Action Codes Description Comment    10/15/2024 11:07 PM Fernie Worley [J32.0] Left maxillary sinusitis     10/15/2024 11:07 PM Fernie Worley [R03.0] Elevated blood pressure reading     10/15/2024 11:07 PM Fernie Worley [R79.89] Elevated brain natriuretic peptide (BNP) level     10/15/2024 11:07 PM Fernie Worley Modify [J32.0] Left maxillary sinusitis     10/15/2024 11:07 PM Fernie Worley Modify [R03.0] Elevated blood pressure reading     10/16/2024  1:33 AM Fernie Worley [R42] Dizziness                  Fernie Worley PA-C  10/16/24 0238

## 2024-10-16 NOTE — DISCHARGE INSTRUCTIONS
Reading Physician Reading Date Result Priority   Erica Vaughan MD  [phone] 10/15/2024      Narrative & Impression  CT BRAIN - WITHOUT CONTRAST     INDICATION:   dizziness and elevated blood pressure reading; reports compliance with antihypertensives.     COMPARISON:  None.     TECHNIQUE:  CT examination of the brain was performed.  Multiplanar 2D reformatted images were created from the source data.     Radiation dose length product (DLP) for this visit:  965 mGy-cm .  This examination, like all CT scans performed in the [facility], was performed utilizing techniques to minimize radiation dose exposure, including the use of iterative   reconstruction and automated exposure control.     IMAGE QUALITY:  Diagnostic.     FINDINGS:     PARENCHYMA: Decreased attenuation is noted in periventricular and subcortical white matter demonstrating an appearance that is statistically most likely to represent chronic microangiopathic change.     No CT signs of acute infarction.  No intracranial mass, mass effect or midline shift.  No acute parenchymal hemorrhage.     VENTRICLES AND EXTRA-AXIAL SPACES: Ventricles and extra-axial CSF spaces are prominent commensurate with the degree of volume loss.  No hydrocephalus.  No acute extra-axial hemorrhage.     VISUALIZED ORBITS: Normal visualized orbits.     PARANASAL SINUSES: Air-fluid level and frothy secretions in the left maxillary sinus. Mild mucosal thickening of left maxillary sinus. Small retention cyst in the right maxillary sinus.     CALVARIUM AND EXTRACRANIAL SOFT TISSUES: Normal.     IMPRESSION:     No acute intracranial abnormality.     Air-fluid level and frothy secretions in the left maxillary sinus may reflect acute sinusitis in the appropriate clinical setting.                 Workstation performed: [id number]

## 2024-10-17 ENCOUNTER — DOCUMENTATION (OUTPATIENT)
Dept: ADMINISTRATIVE | Facility: OTHER | Age: 84
End: 2024-10-17

## 2024-10-17 ENCOUNTER — OFFICE VISIT (OUTPATIENT)
Dept: FAMILY MEDICINE CLINIC | Facility: CLINIC | Age: 84
End: 2024-10-17
Payer: COMMERCIAL

## 2024-10-17 VITALS
WEIGHT: 200 LBS | TEMPERATURE: 97.1 F | SYSTOLIC BLOOD PRESSURE: 136 MMHG | DIASTOLIC BLOOD PRESSURE: 63 MMHG | HEART RATE: 68 BPM | HEIGHT: 64 IN | BODY MASS INDEX: 34.15 KG/M2 | OXYGEN SATURATION: 95 %

## 2024-10-17 DIAGNOSIS — I10 ESSENTIAL HYPERTENSION: Primary | ICD-10-CM

## 2024-10-17 PROCEDURE — G2211 COMPLEX E/M VISIT ADD ON: HCPCS | Performed by: FAMILY MEDICINE

## 2024-10-17 PROCEDURE — 99214 OFFICE O/P EST MOD 30 MIN: CPT | Performed by: FAMILY MEDICINE

## 2024-10-17 RX ORDER — AMLODIPINE BESYLATE 2.5 MG/1
2.5 TABLET ORAL DAILY
Qty: 30 TABLET | Refills: 5 | Status: SHIPPED | OUTPATIENT
Start: 2024-10-17

## 2024-10-17 NOTE — ASSESSMENT & PLAN NOTE
BP Readings from Last 3 Encounters:   10/17/24 136/63   10/16/24 (!) 175/72   10/15/24 (S) (!) 190/90   Blood pressure has been running high   Seen in emergency department where blood pressure was 190/90.  Endorses taking her lisinopril-hydrochlorothiazide daily.  Pressure remains elevated so we will add amlodipine 2.5 mg.  Follow-up in 2 weeks for blood pressure recheck    Inconsistent readings on blood pressure cuff.  Multiple errors. Recommend purchasing an Omron 3 blood pressure cuff          Orders:    TSH + Free T4; Future    CBC and Platelet; Future    Comprehensive metabolic panel; Future    amLODIPine (NORVASC) 2.5 mg tablet; Take 1 tablet (2.5 mg total) by mouth daily

## 2024-10-17 NOTE — PROGRESS NOTES
Ambulatory Visit  Name: Sofia Aguirre      : 1940      MRN: [id number]  Encounter Provider: Trevon Berry MD  Encounter Date: 10/17/2024   Encounter department: [facility]    Assessment & Plan  Essential hypertension  BP Readings from Last 3 Encounters:   10/17/24 136/63   10/16/24 (!) 175/72   10/15/24 (S) (!) 190/90   Blood pressure has been running high   Seen in emergency department where blood pressure was 190/90.  Endorses taking her lisinopril-hydrochlorothiazide daily.  Pressure remains elevated so we will add amlodipine 2.5 mg.  Follow-up in 2 weeks for blood pressure recheck    Inconsistent readings on blood pressure cuff.  Multiple errors. Recommend purchasing an Omron 3 blood pressure cuff          Orders:    TSH + Free T4; Future    CBC and Platelet; Future    Comprehensive metabolic panel; Future    amLODIPine (NORVASC) 2.5 mg tablet; Take 1 tablet (2.5 mg total) by mouth daily         History of Present Illness     Patient presents with:  Hypertension: Patient was recently in the ER with elevated blood pressure and still does not feel right.  She feel jittery and overall just not right.         Hypertension  Pertinent negatives include no chest pain, headaches or shortness of breath.         Review of Systems   Constitutional:  Negative for activity change, fatigue and fever.        Feels jittery   Eyes:  Negative for visual disturbance.   Respiratory:  Negative for shortness of breath.    Cardiovascular:  Negative for chest pain.   Gastrointestinal:  Negative for abdominal pain, constipation, diarrhea and nausea.   Endocrine: Negative for cold intolerance and heat intolerance.   Musculoskeletal:  Negative for back pain.   Skin:  Negative for rash.   Neurological:  Negative for headaches.   Psychiatric/Behavioral:  Negative for confusion.            Objective     /63 (BP Location: Left arm, Patient Position: Sitting, Cuff Size: Standard) Comment: with patient's  "machine  Pulse 68   Temp (!) 97.1 °F (36.2 °C)   Ht 5' 4\" (1.626 m)   Wt 90.7 kg (200 lb)   SpO2 95%   BMI 34.33 kg/m²     Physical Exam  Vitals and nursing note reviewed.   Constitutional:       Appearance: Normal appearance. She is well-developed.   HENT:      Head: Normocephalic and atraumatic.   Cardiovascular:      Rate and Rhythm: Normal rate and regular rhythm.   Pulmonary:      Effort: Pulmonary effort is normal.      Breath sounds: Normal breath sounds.   Abdominal:      General: Bowel sounds are normal.      Palpations: Abdomen is soft.   Musculoskeletal:      Cervical back: Normal range of motion.   Skin:     General: Skin is warm.   Neurological:      General: No focal deficit present.      Mental Status: She is alert.   Psychiatric:         Mood and Affect: Mood normal.         Speech: Speech normal.         "

## 2024-10-17 NOTE — PROGRESS NOTES
Blood pressure elevated  Appointment department: [facility] [place]  Appointment provider: * No providers found *  Blood pressure   10/15/24 1944 (S) (!) 190/90   10/15/24 1854 (!) 204/80     10/17/24 11:19 AM    Patient was called after the Urgent Care visit Patient has an upcoming appointment with their primary care provider on 10/17/2024 to follow on an elevated Blood pressure.      Thank you.  Jag Al MA  [facility]

## 2024-10-18 ENCOUNTER — APPOINTMENT (OUTPATIENT)
Dept: LAB | Facility: MEDICAL CENTER | Age: 84
End: 2024-10-18
Payer: COMMERCIAL

## 2024-10-18 DIAGNOSIS — I10 ESSENTIAL HYPERTENSION: ICD-10-CM

## 2024-10-18 DIAGNOSIS — E78.2 MIXED HYPERLIPIDEMIA: ICD-10-CM

## 2024-10-18 LAB
ALBUMIN SERPL BCG-MCNC: 4.2 G/DL (ref 3.5–5)
ALP SERPL-CCNC: 56 U/L (ref 34–104)
ALT SERPL W P-5'-P-CCNC: 47 U/L (ref 7–52)
ANION GAP SERPL CALCULATED.3IONS-SCNC: 12 MMOL/L (ref 4–13)
AST SERPL W P-5'-P-CCNC: 38 U/L (ref 13–39)
BASOPHILS # BLD AUTO: 0.06 THOUSANDS/ΜL (ref 0–0.1)
BASOPHILS NFR BLD AUTO: 1 % (ref 0–1)
BILIRUB SERPL-MCNC: 0.69 MG/DL (ref 0.2–1)
BUN SERPL-MCNC: 27 MG/DL (ref 5–25)
CALCIUM SERPL-MCNC: 9.6 MG/DL (ref 8.4–10.2)
CHLORIDE SERPL-SCNC: 103 MMOL/L (ref 96–108)
CHOLEST SERPL-MCNC: 133 MG/DL
CO2 SERPL-SCNC: 27 MMOL/L (ref 21–32)
CREAT SERPL-MCNC: 1.14 MG/DL (ref 0.6–1.3)
EOSINOPHIL # BLD AUTO: 0.25 THOUSAND/ΜL (ref 0–0.61)
EOSINOPHIL NFR BLD AUTO: 4 % (ref 0–6)
ERYTHROCYTE [DISTWIDTH] IN BLOOD BY AUTOMATED COUNT: 13.1 % (ref 11.6–15.1)
GFR SERPL CREATININE-BSD FRML MDRD: 44 ML/MIN/1.73SQ M
GLUCOSE P FAST SERPL-MCNC: 127 MG/DL (ref 65–99)
HCT VFR BLD AUTO: 42.3 % (ref 34.8–46.1)
HDLC SERPL-MCNC: 52 MG/DL
HGB BLD-MCNC: 12.8 G/DL (ref 11.5–15.4)
IMM GRANULOCYTES # BLD AUTO: 0.02 THOUSAND/UL (ref 0–0.2)
IMM GRANULOCYTES NFR BLD AUTO: 0 % (ref 0–2)
LDLC SERPL CALC-MCNC: 49 MG/DL (ref 0–100)
LYMPHOCYTES # BLD AUTO: 3.25 THOUSANDS/ΜL (ref 0.6–4.47)
LYMPHOCYTES NFR BLD AUTO: 46 % (ref 14–44)
MCH RBC QN AUTO: 29.6 PG (ref 26.8–34.3)
MCHC RBC AUTO-ENTMCNC: 30.3 G/DL (ref 31.4–37.4)
MCV RBC AUTO: 98 FL (ref 82–98)
MONOCYTES # BLD AUTO: 0.79 THOUSAND/ΜL (ref 0.17–1.22)
MONOCYTES NFR BLD AUTO: 11 % (ref 4–12)
NEUTROPHILS # BLD AUTO: 2.64 THOUSANDS/ΜL (ref 1.85–7.62)
NEUTS SEG NFR BLD AUTO: 38 % (ref 43–75)
NONHDLC SERPL-MCNC: 81 MG/DL
NRBC BLD AUTO-RTO: 0 /100 WBCS
PLATELET # BLD AUTO: 258 THOUSANDS/UL (ref 149–390)
PMV BLD AUTO: 12.1 FL (ref 8.9–12.7)
POTASSIUM SERPL-SCNC: 4.5 MMOL/L (ref 3.5–5.3)
PROT SERPL-MCNC: 7.3 G/DL (ref 6.4–8.4)
RBC # BLD AUTO: 4.32 MILLION/UL (ref 3.81–5.12)
SODIUM SERPL-SCNC: 142 MMOL/L (ref 135–147)
TRIGL SERPL-MCNC: 159 MG/DL
WBC # BLD AUTO: 7.01 THOUSAND/UL (ref 4.31–10.16)

## 2024-10-18 PROCEDURE — 36415 COLL VENOUS BLD VENIPUNCTURE: CPT

## 2024-10-18 PROCEDURE — 84439 ASSAY OF FREE THYROXINE: CPT

## 2024-10-18 PROCEDURE — 84443 ASSAY THYROID STIM HORMONE: CPT

## 2024-10-18 PROCEDURE — 85025 COMPLETE CBC W/AUTO DIFF WBC: CPT

## 2024-10-18 PROCEDURE — 80061 LIPID PANEL: CPT

## 2024-10-18 PROCEDURE — 80053 COMPREHEN METABOLIC PANEL: CPT

## 2024-10-19 LAB
T4 FREE SERPL-MCNC: 0.79 NG/DL (ref 0.61–1.12)
TSH SERPL DL<=0.05 MIU/L-ACNC: 3.08 UIU/ML (ref 0.45–4.5)

## 2024-10-31 ENCOUNTER — RA CDI HCC (OUTPATIENT)
Dept: OTHER | Facility: HOSPITAL | Age: 84
End: 2024-10-31

## 2024-11-05 ENCOUNTER — OFFICE VISIT (OUTPATIENT)
Dept: FAMILY MEDICINE CLINIC | Facility: CLINIC | Age: 84
End: 2024-11-05
Payer: COMMERCIAL

## 2024-11-05 VITALS
HEART RATE: 74 BPM | WEIGHT: 200 LBS | TEMPERATURE: 97.6 F | HEIGHT: 64 IN | RESPIRATION RATE: 16 BRPM | DIASTOLIC BLOOD PRESSURE: 70 MMHG | BODY MASS INDEX: 34.15 KG/M2 | SYSTOLIC BLOOD PRESSURE: 132 MMHG | OXYGEN SATURATION: 97 %

## 2024-11-05 DIAGNOSIS — N18.31 STAGE 3A CHRONIC KIDNEY DISEASE (HCC): ICD-10-CM

## 2024-11-05 DIAGNOSIS — I10 ESSENTIAL HYPERTENSION: Primary | ICD-10-CM

## 2024-11-05 PROCEDURE — 99214 OFFICE O/P EST MOD 30 MIN: CPT | Performed by: FAMILY MEDICINE

## 2024-11-05 PROCEDURE — G2211 COMPLEX E/M VISIT ADD ON: HCPCS | Performed by: FAMILY MEDICINE

## 2024-11-05 RX ORDER — AMLODIPINE BESYLATE 2.5 MG/1
2.5 TABLET ORAL DAILY
Qty: 90 TABLET | Refills: 3 | Status: SHIPPED | OUTPATIENT
Start: 2024-11-05

## 2024-11-05 NOTE — ASSESSMENT & PLAN NOTE
BP Readings from Last 3 Encounters:   11/05/24 132/70   10/17/24 136/63   10/16/24 (!) 175/72   Blood pressure significantly improved.  Remains on lisinopril-hydrochlorothiazide 20-12.5 mg.  We added amlodipine 2.5 mg at our last visit and blood pressure has significantly improved.  Denies any side effects of medication.  Plan to repeat BMP.  There was a drop in her overall GFR.      Orders:    Basic metabolic panel; Future    amLODIPine (NORVASC) 2.5 mg tablet; Take 1 tablet (2.5 mg total) by mouth daily

## 2024-11-05 NOTE — PROGRESS NOTES
"Ambulatory Visit  Name: Sofia Aguirre      : 1940      MRN: [id number]  Encounter Provider: Trevon Berry MD  Encounter Date: 2024   Encounter department: [facility]    Assessment & Plan  Essential hypertension  BP Readings from Last 3 Encounters:   24 132/70   10/17/24 136/63   10/16/24 (!) 175/72   Blood pressure significantly improved.  Remains on lisinopril-hydrochlorothiazide 20-12.5 mg.  We added amlodipine 2.5 mg at our last visit and blood pressure has significantly improved.  Denies any side effects of medication.  Plan to repeat BMP.  There was a drop in her overall GFR.      Orders:    Basic metabolic panel; Future    amLODIPine (NORVASC) 2.5 mg tablet; Take 1 tablet (2.5 mg total) by mouth daily      Stage 3a chronic kidney disease (HCC)  Lab Results   Component Value Date    EGFR 44 10/18/2024    EGFR 62 10/15/2024    EGFR 61 2024    CREATININE 1.14 10/18/2024    CREATININE 0.86 10/15/2024    CREATININE 0.88 2024       Orders:    Basic metabolic panel; Future       History of Present Illness         Patient presents today for blood pressure follow-up and blood work.  Tolerating medications well.  Notes an improvement in her blood pressure overall.          Review of Systems   Constitutional:  Negative for activity change, fatigue and fever.   Eyes:  Negative for visual disturbance.   Respiratory:  Negative for shortness of breath.    Cardiovascular:  Negative for chest pain.   Gastrointestinal:  Negative for abdominal pain, constipation, diarrhea and nausea.   Endocrine: Negative for cold intolerance and heat intolerance.   Musculoskeletal:  Negative for back pain.   Skin:  Negative for rash.   Neurological:  Negative for headaches.   Psychiatric/Behavioral:  Negative for confusion.            Objective     /70 (BP Location: Left arm, Patient Position: Sitting, Cuff Size: Standard)   Pulse 74   Temp 97.6 °F (36.4 °C)   Resp 16   Ht 5' 4\" " (1.626 m)   Wt 90.7 kg (200 lb)   SpO2 97%   BMI 34.33 kg/m²     Physical Exam  Vitals and nursing note reviewed.   Constitutional:       Appearance: Normal appearance. She is well-developed.   HENT:      Head: Normocephalic and atraumatic.   Cardiovascular:      Rate and Rhythm: Normal rate and regular rhythm.   Pulmonary:      Effort: Pulmonary effort is normal.      Breath sounds: Normal breath sounds.   Abdominal:      General: Bowel sounds are normal.      Palpations: Abdomen is soft.   Musculoskeletal:      Cervical back: Normal range of motion.   Skin:     General: Skin is warm.   Neurological:      General: No focal deficit present.      Mental Status: She is alert.   Psychiatric:         Mood and Affect: Mood normal.         Speech: Speech normal.

## 2024-11-05 NOTE — ASSESSMENT & PLAN NOTE
Lab Results   Component Value Date    EGFR 44 10/18/2024    EGFR 62 10/15/2024    EGFR 61 03/08/2024    CREATININE 1.14 10/18/2024    CREATININE 0.86 10/15/2024    CREATININE 0.88 03/08/2024       Orders:    Basic metabolic panel; Future

## 2024-11-08 ENCOUNTER — OFFICE VISIT (OUTPATIENT)
Dept: CARDIOLOGY CLINIC | Facility: CLINIC | Age: 84
End: 2024-11-08
Payer: COMMERCIAL

## 2024-11-08 VITALS
DIASTOLIC BLOOD PRESSURE: 54 MMHG | BODY MASS INDEX: 34.15 KG/M2 | HEIGHT: 64 IN | OXYGEN SATURATION: 97 % | HEART RATE: 79 BPM | WEIGHT: 200 LBS | SYSTOLIC BLOOD PRESSURE: 120 MMHG

## 2024-11-08 DIAGNOSIS — R42 DIZZINESS: ICD-10-CM

## 2024-11-08 DIAGNOSIS — R00.1 BRADYCARDIA: ICD-10-CM

## 2024-11-08 DIAGNOSIS — E78.2 MIXED HYPERLIPIDEMIA: ICD-10-CM

## 2024-11-08 DIAGNOSIS — I10 ESSENTIAL HYPERTENSION: ICD-10-CM

## 2024-11-08 DIAGNOSIS — I25.118 CORONARY ARTERY DISEASE OF NATIVE ARTERY OF NATIVE HEART WITH STABLE ANGINA PECTORIS (HCC): ICD-10-CM

## 2024-11-08 PROCEDURE — 99214 OFFICE O/P EST MOD 30 MIN: CPT

## 2024-11-08 NOTE — PROGRESS NOTES
Sofia Aguirre  1940  [id number]  [facility]  [address]  [address]  [facility] [address] [phone]    1. Dizziness        2. Coronary artery disease of native artery of native heart with stable angina pectoris (HCC)        3. Essential hypertension        4. Mixed hyperlipidemia        5. Bradycardia            Summary/Discussion:  Dizziness:  - s/p ED visit on 10/15/2024 with c/o of dizziness that lasted all day   EKG: normal sinus rhythm, no acute ischemic changes  troponin negative x 3  CXR: no acute cardiopulmonary disease   CT head: no acute intracranial abnormality.  Air-fluid level and frothy secretions in the left maxillary sinus may reflect acute sinusitis   normal electrolytes, kidney function; mild increase in BUN may reflect mild dehydration  normal TSH    no leukocytosis, no bandemia   GCS 15 alert and oriented x 3, no acute focal neurological deficits noted  symptoms of dizziness improved s/p meclizine  - no recurrent symptoms of dizziness  - does admit to decrease fluid intake that day-- ? if symptoms were related to dehydration and/or vertigo   - stefany as noted below  - will follow up on repeat echo which is scheduled for 12/26/2024  - advised her to contact us for any recurrent symptoms    Coronary artery disease:  - with prior NSTEMI s/p GABY to the LAD in 7/2019  - without symptoms of angina   - historically not on beta blocker due to relative bradycardia   - continue aspirin and statin. PRN SL nitro prescribed    Hypertension:  - 120/54  - continue present medication regimen  - lifestyle modification   - close blood pressure monitoring     Dyslipidemia:  - Lipid Profile:    Latest Reference Range & Units 10/18/24 07:21   Cholesterol See Comment mg/dL 133   Triglycerides See Comment mg/dL 159 (H)   HDL >=50 mg/dL 52   Non-HDL Cholesterol mg/dl 81   LDL Calculated 0 - 100 mg/dL 49   - on atorvastatin 40 mg daily  - encouraged low cholesterol  diet and annual lipid follow up    Bradycardia:  - hx of   - echo (7/2019): LVEF 65%, no significant valve disease   - zio (10/2024): min HR of 31 bpm, max HR of 134 bpm, and avg HR of 73 bpm. Predominant underlying rhythm was Sinus Rhythm. First Degree AV Block was present. Second Degree AV Block-Mobitz I (Wenckebach) was present. Isolated SVEs were rare (<1.0%), SVE Couplets were rare (<1.0%), and SVE Triplets were rare (<1.0%). Isolated VEs were frequent (6.3%, 07207), VE Couplets were rare (<1.0%, 88), and VE Triplets were rare (<1.0%, 2). Ventricular Bigeminy and Trigeminy were present. 1 diary episode correlated with ventricular trigeminy   - repeat echo scheduled for 12/26/2024    Interval History: Sofia Aguirre is a 84 y.o. year old female with history mentioned in problem list who presents to the office today for a follow up sp ED visit.     Since her ED visit she has not had any recurrent symptoms of dizziness and has been overall feeling well from a cardiac standpoint. She denies any chest pain/pressure/discomfort or shortness of breath. She denies lower extremity edema, orthopnea, and PND. She denies near syncope or syncope. She denies palpitations.        She will RTO on 3/6/2025 with Dr. Dolan or sooner if necessary. She will call with any concerns.       Medical Problems       Problem List       Chronic calculous cholecystitis    Essential hypertension    GERD without esophagitis    Mixed hyperlipidemia    Class 1 obesity due to excess calories with serious comorbidity and body mass index (BMI) of 34.0 to 34.9 in adult    CKD (chronic kidney disease) stage 3, GFR 30-59 ml/min (Formerly [facility])    Lab Results   Component Value Date    EGFR 44 10/18/2024    EGFR 62 10/15/2024    EGFR 61 03/08/2024    CREATININE 1.14 10/18/2024    CREATININE 0.86 10/15/2024    CREATININE 0.88 03/08/2024         Coronary artery disease of native artery of native heart with stable angina pectoris (HCC)    Prediabetes    Glaucoma of  both eyes    Dry mouth    Carpal tunnel syndrome on left    Bradycardia    Obesity, morbid (HCC)        Past Medical History:   Diagnosis Date    AVB (atrioventricular block)     first degree    CAD (coronary artery disease)     Coronary artery disease     GERD (gastroesophageal reflux disease) 07/01/2017    Glaucoma     HLD (hyperlipidemia) 07/01/2017    Hypertension     LVH (left ventricular hypertrophy)     Osteopenia     PONV (postoperative nausea and vomiting)     Skin cancer 2000    nose    Ventral hernia without obstruction or gangrene 07/01/2017     Social History     Socioeconomic History    Marital status:      Spouse name: Not on file    Number of children: 2    Years of education: Not on file    Highest education level: Not on file   Occupational History    Occupation: retired   Tobacco Use    Smoking status: Never    Smokeless tobacco: Never   Vaping Use    Vaping status: Never Used   Substance and Sexual Activity    Alcohol use: Yes     Comment: socially    Drug use: No    Sexual activity: Never   Other Topics Concern    Not on file   Social History Narrative    Caffeine use    Moderate exercising less than 3 times a week     Social Determinants of Health     Financial Resource Strain: Low Risk  (4/18/2023)    Overall Financial Resource Strain (CARDIA)     Difficulty of Paying Living Expenses: Not hard at all   Food Insecurity: No Food Insecurity (5/13/2024)    Nursing - Inadequate Food Risk Classification     Worried About Running Out of Food in the Last Year: Never true     Ran Out of Food in the Last Year: Never true     Ran Out of Food in the Last Year: Not on file   Transportation Needs: No Transportation Needs (5/13/2024)    PRAPARE - Transportation     Lack of Transportation (Medical): No     Lack of Transportation (Non-Medical): No   Physical Activity: Not on file   Stress: Not on file   Social Connections: Not on file   Intimate Partner Violence: Not on file   Housing Stability: Low  Risk  (5/13/2024)    Housing Stability Vital Sign     Unable to Pay for Housing in the Last Year: No     Number of Times Moved in the Last Year: 1     Homeless in the Last Year: No      Family History   Problem Relation Age of Onset    No Known Problems Mother     No Known Problems Father     No Known Problems Daughter     No Known Problems Maternal Grandmother     No Known Problems Maternal Grandfather     No Known Problems Paternal Grandmother     No Known Problems Paternal Grandfather     No Known Problems Half-Sister     No Known Problems Maternal Aunt     Squamous cell carcinoma Brother     No Known Problems Son     No Known Problems Paternal Aunt     Breast cancer Neg Hx      Past Surgical History:   Procedure Laterality Date    CARDIAC CATHETERIZATION      CHOLECYSTECTOMY      GALLBLADDER SURGERY      HEMORRHOID SURGERY      HYSTERECTOMY  1987    age 47 w/ left oopherectomy    NOSE SURGERY      basal cell    OOPHORECTOMY Left 1987    age 47    ND LAPAROSCOPY SURG CHOLECYSTECTOMY N/A 08/25/2016    Procedure: LAPAROSCOPIC CHOLECYSTECTOMY ;  Surgeon: Shahab Lopez MD;  Location: [facility];  Service: General    ND NDSC WRST SURG W/RLS TRANSVRS CARPL LIGM Left 3/13/2024    Procedure: RELEASE CARPAL TUNNEL ENDOSCOPIC;  Surgeon: Mahin Bean MD;  [facility];  Service: Orthopedics    ND REPAIR FIRST ABDOMINAL WALL HERNIA N/A 11/30/2017    Procedure: INCISIONAL HERNIA REPAIR;  Surgeon: Shahab Lopez MD;  Location: [facility];  Service: General    ROTATOR CUFF REPAIR Right        Current Outpatient Medications:     amLODIPine (NORVASC) 2.5 mg tablet, Take 1 tablet (2.5 mg total) by mouth daily, Disp: 90 tablet, Rfl: 3    aspirin 81 MG tablet, Take 81 mg by mouth daily , Disp: , Rfl:     atorvastatin (LIPITOR) 40 mg tablet, TAKE 1 TABLET BY MOUTH EVERY DAY, Disp: 90 tablet, Rfl: 1    cholecalciferol (VITAMIN D3) 1,000 units tablet, Take 2,000 Units by mouth daily, Disp: , Rfl:     Co-Enzyme Q-10 100  MG CAPS, Take 200 mg by mouth daily., Disp: , Rfl:     famotidine (PEPCID) 20 mg tablet, Take 20 mg by mouth every morning OTC, Disp: , Rfl:     fluticasone (FLONASE) 50 mcg/act nasal spray, SPRAY 2 SPRAYS INTO EACH NOSTRIL EVERY DAY, Disp: 48 mL, Rfl: 1    ketoconazole (NIZORAL) 2 % cream, Apply 1 Application topically daily, Disp: , Rfl:     latanoprost (XALATAN) 0.005 % ophthalmic solution, Administer 1 drop to both eyes daily, Disp: , Rfl: 3    lisinopril-hydrochlorothiazide (PRINZIDE,ZESTORETIC) 20-12.5 MG per tablet, Take 1 tablet by mouth daily (Patient taking differently: Take 1 tablet by mouth every morning), Disp: 90 tablet, Rfl: 3    meclizine (ANTIVERT) 25 mg tablet, Take 1 tablet (25 mg total) by mouth 3 (three) times a day as needed for dizziness for up to 3 days (Patient not taking: Reported on 11/8/2024), Disp: 9 tablet, Rfl: 0    Multiple Vitamins-Minerals (PRESERVISION AREDS) CAPS, Take 2 capsules by mouth daily (Patient not taking: Reported on 11/8/2024), Disp: , Rfl:     neomycin-polymyxin-hydrocortisone (CORTISPORIN) otic solution, Administer 4 drops into the left ear every 6 (six) hours (Patient not taking: Reported on 10/15/2024), Disp: 10 mL, Rfl: 0    nitroglycerin (NITROSTAT) 0.4 mg SL tablet, Place 1 tablet (0.4 mg total) under the tongue every 5 (five) minutes as needed for chest pain (Patient not taking: Reported on 10/15/2024), Disp: 25 tablet, Rfl: 11    traMADol (ULTRAM) 50 mg tablet, Take 1 tablet (50 mg total) by mouth every 6 (six) hours as needed for severe pain for up to 5 doses Continuation of therapy (Patient not taking: Reported on 11/8/2024), Disp: 5 tablet, Rfl: 0  Allergies   Allergen Reactions    Codeine Dizziness     Reaction Date: 19Jul2011;     Other Hallucinations     darvocet       Labs:     Chemistry        Component Value Date/Time     12/14/2017 1053    K 4.5 10/18/2024 0721    K 4.7 01/10/2019 0813     10/18/2024 0721     01/10/2019 0813    CO2  "27 10/18/2024 0721    CO2 29 01/10/2019 0813    BUN 27 (H) 10/18/2024 0721    BUN 24 01/10/2019 0813    CREATININE 1.14 10/18/2024 0721    CREATININE 0.90 12/14/2017 1053        Component Value Date/Time    CALCIUM 9.6 10/18/2024 0721    CALCIUM 9.9 01/10/2019 0813    ALKPHOS 56 10/18/2024 0721    ALKPHOS 68 06/12/2018 0927    AST 38 10/18/2024 0721    AST 20 06/12/2018 0927    ALT 47 10/18/2024 0721    ALT 23 06/12/2018 0927    BILITOT 0.5 12/14/2017 1053            Lab Results   Component Value Date    CHOL 201 (H) 12/14/2017    CHOL 179 05/16/2017    CHOL 168 11/10/2016     Lab Results   Component Value Date    HDL 52 10/18/2024    HDL 55 10/13/2023    HDL 53 07/29/2022     Lab Results   Component Value Date    LDLCALC 49 10/18/2024    LDLCALC 59 10/13/2023    LDLCALC 52 07/29/2022     Lab Results   Component Value Date    TRIG 159 (H) 10/18/2024    TRIG 140 10/13/2023    TRIG 97 07/29/2022     No results found for: \"CHOLHDL\"    Imaging: XR chest 1 view portable    Result Date: 10/16/2024  Narrative: XR CHEST PORTABLE INDICATION: elevated blood pressure reading. COMPARISON: CXR 7/31/2019, abdomen CT 7/1/2017. FINDINGS: Clear lungs. No pneumothorax or pleural effusion. Normal cardiomediastinal silhouette. Bones are unremarkable for age. Normal upper abdomen.     Impression: No acute cardiopulmonary disease. Workstation performed: [id number]     CT head without contrast    Result Date: 10/15/2024  Narrative: CT BRAIN - WITHOUT CONTRAST INDICATION:   dizziness and elevated blood pressure reading; reports compliance with antihypertensives. COMPARISON:  None. TECHNIQUE:  CT examination of the brain was performed.  Multiplanar 2D reformatted images were created from the source data. Radiation dose length product (DLP) for this visit:  965 mGy-cm .  This examination, like all CT scans performed in the [facility], was performed utilizing techniques to minimize radiation dose exposure, including the use " "of iterative reconstruction and automated exposure control. IMAGE QUALITY:  Diagnostic. FINDINGS: PARENCHYMA: Decreased attenuation is noted in periventricular and subcortical white matter demonstrating an appearance that is statistically most likely to represent chronic microangiopathic change. No CT signs of acute infarction.  No intracranial mass, mass effect or midline shift.  No acute parenchymal hemorrhage. VENTRICLES AND EXTRA-AXIAL SPACES: Ventricles and extra-axial CSF spaces are prominent commensurate with the degree of volume loss.  No hydrocephalus.  No acute extra-axial hemorrhage. VISUALIZED ORBITS: Normal visualized orbits. PARANASAL SINUSES: Air-fluid level and frothy secretions in the left maxillary sinus. Mild mucosal thickening of left maxillary sinus. Small retention cyst in the right maxillary sinus. CALVARIUM AND EXTRACRANIAL SOFT TISSUES: Normal.     Impression: No acute intracranial abnormality. Air-fluid level and frothy secretions in the left maxillary sinus may reflect acute sinusitis in the appropriate clinical setting. Workstation performed: [id number]       ECG:  n/a    Review of Systems   Neurological:  Positive for dizziness (resolved).   All other systems reviewed and are negative.      Vitals:    11/08/24 1052   BP: 120/54   Pulse: 79   SpO2: 97%     Vitals:    11/08/24 1052   Weight: 90.7 kg (200 lb)     Height: 5' 4\" (162.6 cm)   Body mass index is 34.33 kg/m².    Physical Exam  Vitals and nursing note reviewed.   Constitutional:       General: She is not in acute distress.     Appearance: She is not ill-appearing.   HENT:      Head: Normocephalic.      Nose: Nose normal.      Mouth/Throat:      Mouth: Mucous membranes are moist.      Pharynx: Oropharynx is clear.   Cardiovascular:      Rate and Rhythm: Normal rate and regular rhythm.      Heart sounds: Murmur heard.   Pulmonary:      Breath sounds: Decreased breath sounds present.   Musculoskeletal:         General: Normal range " of motion.      Cervical back: Normal range of motion.      Right lower leg: No edema.      Left lower leg: No edema.   Skin:     General: Skin is warm and dry.   Neurological:      Mental Status: She is alert and oriented to person, place, and time.   Psychiatric:         Mood and Affect: Mood normal.         Behavior: Behavior normal.

## 2024-11-12 ENCOUNTER — TELEPHONE (OUTPATIENT)
Age: 84
End: 2024-11-12

## 2024-11-12 NOTE — TELEPHONE ENCOUNTER
PT said she called [facility] pharmacy, and the recording told her there was no amlodipine there for her. I advised her to call back to get a live person, since it shows on our end it was sent on 11/5 and receipt by pharmacy was confirmed.PT said she will call pharmacy back and will call office back if anything.

## 2024-11-13 DIAGNOSIS — E78.49 OTHER HYPERLIPIDEMIA: ICD-10-CM

## 2024-11-14 RX ORDER — ATORVASTATIN CALCIUM 40 MG/1
40 TABLET, FILM COATED ORAL DAILY
Qty: 90 TABLET | Refills: 1 | Status: SHIPPED | OUTPATIENT
Start: 2024-11-14

## 2024-12-26 ENCOUNTER — RESULTS FOLLOW-UP (OUTPATIENT)
Dept: CARDIOLOGY CLINIC | Facility: CLINIC | Age: 84
End: 2024-12-26

## 2024-12-26 ENCOUNTER — HOSPITAL ENCOUNTER (OUTPATIENT)
Dept: NON INVASIVE DIAGNOSTICS | Facility: MEDICAL CENTER | Age: 84
Discharge: HOME | End: 2024-12-26
Payer: COMMERCIAL

## 2024-12-26 VITALS
SYSTOLIC BLOOD PRESSURE: 120 MMHG | HEIGHT: 64 IN | DIASTOLIC BLOOD PRESSURE: 54 MMHG | WEIGHT: 200 LBS | HEART RATE: 79 BPM | BODY MASS INDEX: 34.15 KG/M2

## 2024-12-26 DIAGNOSIS — R00.1 BRADYCARDIA: ICD-10-CM

## 2024-12-26 DIAGNOSIS — I49.3 PVC (PREMATURE VENTRICULAR CONTRACTION): ICD-10-CM

## 2024-12-26 LAB
AORTIC ROOT: 3.1 CM
APICAL FOUR CHAMBER EJECTION FRACTION: 56 %
ASCENDING AORTA: 2.8 CM
AV LVOT MEAN GRADIENT: 3 MMHG
AV LVOT PEAK GRADIENT: 5 MMHG
BSA FOR ECHO PROCEDURE: 1.96 M2
DOP CALC LVOT PEAK VEL VTI: 26.37 CM
DOP CALC LVOT PEAK VEL: 1.09 M/S
E WAVE DECELERATION TIME: 263 MS
E/A RATIO: 0.84
FRACTIONAL SHORTENING: 30 (ref 28–44)
INTERVENTRICULAR SEPTUM IN DIASTOLE (PARASTERNAL SHORT AXIS VIEW): 1.1 CM
INTERVENTRICULAR SEPTUM: 1.1 CM (ref 0.6–1.1)
LAAS-AP2: 22.7 CM2
LAAS-AP4: 20.1 CM2
LEFT ATRIUM AREA SYSTOLE SINGLE PLANE A4C: 20.5 CM2
LEFT ATRIUM SIZE: 3.6 CM
LEFT ATRIUM VOLUME (MOD BIPLANE): 66 ML
LEFT ATRIUM VOLUME INDEX (MOD BIPLANE): 33.7 ML/M2
LEFT INTERNAL DIMENSION IN SYSTOLE: 2.8 CM (ref 2.1–4)
LEFT VENTRICULAR INTERNAL DIMENSION IN DIASTOLE: 4 CM (ref 3.5–6)
LEFT VENTRICULAR POSTERIOR WALL IN END DIASTOLE: 1 CM
LEFT VENTRICULAR STROKE VOLUME: 39 ML
LVSV (TEICH): 39 ML
MV E'TISSUE VEL-SEP: 11 CM/S
MV PEAK A VEL: 1.02 M/S
MV PEAK E VEL: 86 CM/S
MV STENOSIS PRESSURE HALF TIME: 76 MS
MV VALVE AREA P 1/2 METHOD: 2.89
RIGHT ATRIAL 2D VOLUME: 32 ML
RIGHT ATRIUM AREA SYSTOLE A4C: 12.2 CM2
RIGHT VENTRICLE ID DIMENSION: 3.8 CM
SL CV LEFT ATRIUM LENGTH A2C: 5.4 CM
SL CV LV EF: 60
SL CV PED ECHO LEFT VENTRICLE DIASTOLIC VOLUME (MOD BIPLANE) 2D: 69 ML
SL CV PED ECHO LEFT VENTRICLE SYSTOLIC VOLUME (MOD BIPLANE) 2D: 30 ML
TR MAX PG: 23 MMHG
TR PEAK VELOCITY: 2.4 M/S
TRICUSPID ANNULAR PLANE SYSTOLIC EXCURSION: 2.7 CM
TRICUSPID VALVE PEAK REGURGITATION VELOCITY: 2.39 M/S

## 2024-12-26 PROCEDURE — 93306 TTE W/DOPPLER COMPLETE: CPT | Performed by: STUDENT IN AN ORGANIZED HEALTH CARE EDUCATION/TRAINING PROGRAM

## 2024-12-26 PROCEDURE — 93306 TTE W/DOPPLER COMPLETE: CPT

## 2024-12-26 NOTE — RESULT ENCOUNTER NOTE
ECHO reviewed.  Pumping strength (ejection fraction) is normal at 60%.  Valves: No serious abnormalities noted.  Please call patient with test results as she does not have MyChart access.

## 2024-12-30 NOTE — TELEPHONE ENCOUNTER
Patient informed that she does not need any more pneumonia vaccines
Patient wants to know if she is due for pneuomonia vaccine?
14
No

## 2025-02-24 DIAGNOSIS — I10 ESSENTIAL HYPERTENSION: ICD-10-CM

## 2025-02-24 RX ORDER — LISINOPRIL AND HYDROCHLOROTHIAZIDE 12.5; 2 MG/1; MG/1
1 TABLET ORAL DAILY
Qty: 90 TABLET | Refills: 1 | Status: SHIPPED | OUTPATIENT
Start: 2025-02-24

## 2025-02-24 NOTE — TELEPHONE ENCOUNTER
Reason for call:   [x] Refill   [] Prior Auth  [] Other:     Office:   [x] PCP/Provider -       Efrain Dolan,[facility]      [] Specialty/Provider -     Medication: Prinzide, Zestoric    Dose/Frequency: 20-12.5 mg     Quantity: #90    Pharmacy: [place] [id number] [address]    Does the patient have enough for 3 days?   [] Yes   [x] No - Send as HP to POD

## 2025-04-28 ENCOUNTER — OFFICE VISIT (OUTPATIENT)
Dept: CARDIOLOGY CLINIC | Facility: CLINIC | Age: 85
End: 2025-04-28
Payer: COMMERCIAL

## 2025-04-28 VITALS
HEART RATE: 71 BPM | SYSTOLIC BLOOD PRESSURE: 132 MMHG | OXYGEN SATURATION: 98 % | WEIGHT: 199 LBS | TEMPERATURE: 97.5 F | DIASTOLIC BLOOD PRESSURE: 60 MMHG | BODY MASS INDEX: 33.97 KG/M2 | HEIGHT: 64 IN

## 2025-04-28 DIAGNOSIS — E78.2 MIXED HYPERLIPIDEMIA: ICD-10-CM

## 2025-04-28 DIAGNOSIS — I10 ESSENTIAL HYPERTENSION: ICD-10-CM

## 2025-04-28 DIAGNOSIS — I25.118 CORONARY ARTERY DISEASE OF NATIVE ARTERY OF NATIVE HEART WITH STABLE ANGINA PECTORIS (HCC): ICD-10-CM

## 2025-04-28 DIAGNOSIS — R00.1 BRADYCARDIA: ICD-10-CM

## 2025-04-28 PROCEDURE — 99214 OFFICE O/P EST MOD 30 MIN: CPT

## 2025-04-28 NOTE — PROGRESS NOTES
Sofia Aguirre  1940  [id number]  [facility]  [address]  [address]  [facility] [address] [phone]    1. Essential hypertension        2. Coronary artery disease of native artery of native heart with stable angina pectoris (HCC)        3. Mixed hyperlipidemia        4. Bradycardia            Summary/Discussion:  Dizziness:  - s/p ED visit on 10/15/2024 with c/o of dizziness that lasted all day   EKG: normal sinus rhythm, no acute ischemic changes  troponin negative x 3  CXR: no acute cardiopulmonary disease   CT head: no acute intracranial abnormality.  Air-fluid level and frothy secretions in the left maxillary sinus may reflect acute sinusitis   normal electrolytes, kidney function; mild increase in BUN may reflect mild dehydration  normal TSH    no leukocytosis, no bandemia   GCS 15 alert and oriented x 3, no acute focal neurological deficits noted  symptoms of dizziness improved s/p meclizine  - no recurrent symptoms of dizziness  - stefany as noted below  - echo (12/2024): LVEF 60%, no WMA, normal diastolic function, aortic valve sclerosis   - advised her to contact us for any recurrent symptoms    Coronary artery disease:  - with prior NSTEMI s/p GABY to the LAD in 7/2019  - currently without symptoms of angina   - historically not on beta blocker due to relative bradycardia   - continue aspirin and statin. PRN SL nitro prescribed    Hypertension:  - 132/60  - continue present medication regimen  - lifestyle modification   - close blood pressure monitoring     Dyslipidemia:  - Lipid Profile:    Latest Reference Range & Units 10/18/24 07:21   Cholesterol See Comment mg/dL 133   Triglycerides See Comment mg/dL 159 (H)   HDL >=50 mg/dL 52   Non-HDL Cholesterol mg/dl 81   LDL Calculated 0 - 100 mg/dL 49   - continue atorvastatin 40 mg daily  - encouraged low cholesterol diet and annual lipid follow up    Bradycardia:  - hx of   - echo as noted above  - stefany  (10/2024): min HR of 31 bpm, max HR of 134 bpm, and avg HR of 73 bpm. Predominant underlying rhythm was Sinus Rhythm. First Degree AV Block was present. Second Degree AV Block-Mobitz I (Wenckebach) was present. Isolated SVEs were rare (<1.0%), SVE Couplets were rare (<1.0%), and SVE Triplets were rare (<1.0%). Isolated VEs were frequent (6.3%, 51534), VE Couplets were rare (<1.0%, 88), and VE Triplets were rare (<1.0%, 2). Ventricular Bigeminy and Trigeminy were present. 1 diary episode correlated with ventricular trigeminy   - heart rate today, 71 bpm  - not on any clayton blocking agents     Interval History: Sofia Aguirre is a 85 y.o. year old female with history mentioned in problem list who presents to the office today for a follow up.     Since her last office visit she has been overall feeling well from a cardiac standpoint. She denies any chest pain/pressure/discomfort or shortness of breath. She denies lower extremity edema, orthopnea, and PND. She denies dizziness, lightheadedness, near syncope or syncope. She denies palpitations.        No further cardiac testing indicated at this time.     She will RTO in 6-9 months with Dr. Dolan or sooner if necessary. She will call with any concerns.       Medical Problems       Problem List       Chronic calculous cholecystitis    Essential hypertension    GERD without esophagitis    Mixed hyperlipidemia    Class 1 obesity due to excess calories with serious comorbidity and body mass index (BMI) of 34.0 to 34.9 in adult    CKD (chronic kidney disease) stage 3, GFR 30-59 ml/min ([facility])    Lab Results   Component Value Date    EGFR 44 10/18/2024    EGFR 62 10/15/2024    EGFR 61 03/08/2024    CREATININE 1.14 10/18/2024    CREATININE 0.86 10/15/2024    CREATININE 0.88 03/08/2024         Coronary artery disease of native artery of native heart with stable angina pectoris ([facility])    Prediabetes    Glaucoma of both eyes    Dry mouth    Carpal tunnel syndrome on left    Bradycardia     Obesity, morbid (HCC)        Past Medical History:   Diagnosis Date    AVB (atrioventricular block)     first degree    CAD (coronary artery disease)     Coronary artery disease     GERD (gastroesophageal reflux disease) 07/01/2017    Glaucoma     HLD (hyperlipidemia) 07/01/2017    Hypertension     LVH (left ventricular hypertrophy)     Osteopenia     PONV (postoperative nausea and vomiting)     Skin cancer 2000    nose    Ventral hernia without obstruction or gangrene 07/01/2017     Social History     Socioeconomic History    Marital status:      Spouse name: Not on file    Number of children: 2    Years of education: Not on file    Highest education level: Not on file   Occupational History    Occupation: retired   Tobacco Use    Smoking status: Never    Smokeless tobacco: Never   Vaping Use    Vaping status: Never Used   Substance and Sexual Activity    Alcohol use: Yes     Comment: socially    Drug use: No    Sexual activity: Never   Other Topics Concern    Not on file   Social History Narrative    Caffeine use    Moderate exercising less than 3 times a week     Social Drivers of Health     Financial Resource Strain: Low Risk  (4/18/2023)    Overall Financial Resource Strain (CARDIA)     Difficulty of Paying Living Expenses: Not hard at all   Food Insecurity: No Food Insecurity (5/13/2024)    Nursing - Inadequate Food Risk Classification     Worried About Running Out of Food in the Last Year: Never true     Ran Out of Food in the Last Year: Never true     Ran Out of Food in the Last Year: Not on file   Transportation Needs: No Transportation Needs (5/13/2024)    PRAPARE - Transportation     Lack of Transportation (Medical): No     Lack of Transportation (Non-Medical): No   Physical Activity: Not on file   Stress: Not on file   Social Connections: Not on file   Intimate Partner Violence: Not on file   Housing Stability: Low Risk  (5/13/2024)    Housing Stability Vital Sign     Unable to Pay for Housing  in the Last Year: No     Number of Times Moved in the Last Year: 1     Homeless in the Last Year: No      Family History   Problem Relation Age of Onset    No Known Problems Mother     No Known Problems Father     No Known Problems Daughter     No Known Problems Maternal Grandmother     No Known Problems Maternal Grandfather     No Known Problems Paternal Grandmother     No Known Problems Paternal Grandfather     No Known Problems Half-Sister     No Known Problems Maternal Aunt     Squamous cell carcinoma Brother     No Known Problems Son     No Known Problems Paternal Aunt     Breast cancer Neg Hx      Past Surgical History:   Procedure Laterality Date    CARDIAC CATHETERIZATION      CHOLECYSTECTOMY      GALLBLADDER SURGERY      HEMORRHOID SURGERY      HYSTERECTOMY  1987    age 47 w/ left oopherectomy    NOSE SURGERY      basal cell    OOPHORECTOMY Left 1987    age 47    SD LAPAROSCOPY SURG CHOLECYSTECTOMY N/A 08/25/2016    Procedure: LAPAROSCOPIC CHOLECYSTECTOMY ;  Surgeon: Shahab Lopez MD;  Location: [facility];  Service: General    SD NDSC WRST SURG W/RLS TRANSVRS CARPL LIGM Left 3/13/2024    Procedure: RELEASE CARPAL TUNNEL ENDOSCOPIC;  Surgeon: Mahin Bean MD;  [facility];  Service: Orthopedics    SD REPAIR FIRST ABDOMINAL WALL HERNIA N/A 11/30/2017    Procedure: INCISIONAL HERNIA REPAIR;  Surgeon: Shahab Lopez MD;  Location[facility];  Service: General    ROTATOR CUFF REPAIR Right        Current Outpatient Medications:     amLODIPine (NORVASC) 2.5 mg tablet, Take 1 tablet (2.5 mg total) by mouth daily, Disp: 90 tablet, Rfl: 3    aspirin 81 MG tablet, Take 81 mg by mouth daily , Disp: , Rfl:     atorvastatin (LIPITOR) 40 mg tablet, TAKE 1 TABLET BY MOUTH EVERY DAY, Disp: 90 tablet, Rfl: 1    cholecalciferol (VITAMIN D3) 1,000 units tablet, Take 2,000 Units by mouth daily, Disp: , Rfl:     Co-Enzyme Q-10 100 MG CAPS, Take 200 mg by mouth daily., Disp: , Rfl:     famotidine (PEPCID) 20 mg  tablet, Take 20 mg by mouth every morning OTC, Disp: , Rfl:     fluticasone (FLONASE) 50 mcg/act nasal spray, SPRAY 2 SPRAYS INTO EACH NOSTRIL EVERY DAY, Disp: 48 mL, Rfl: 1    ketoconazole (NIZORAL) 2 % cream, Apply 1 Application topically daily, Disp: , Rfl:     latanoprost (XALATAN) 0.005 % ophthalmic solution, Administer 1 drop to both eyes daily, Disp: , Rfl: 3    lisinopril-hydrochlorothiazide (PRINZIDE,ZESTORETIC) 20-12.5 MG per tablet, Take 1 tablet by mouth daily, Disp: 90 tablet, Rfl: 1    meclizine (ANTIVERT) 25 mg tablet, Take 1 tablet (25 mg total) by mouth 3 (three) times a day as needed for dizziness for up to 3 days, Disp: 9 tablet, Rfl: 0    nitroglycerin (NITROSTAT) 0.4 mg SL tablet, Place 1 tablet (0.4 mg total) under the tongue every 5 (five) minutes as needed for chest pain, Disp: 25 tablet, Rfl: 11    traMADol (ULTRAM) 50 mg tablet, Take 1 tablet (50 mg total) by mouth every 6 (six) hours as needed for severe pain for up to 5 doses Continuation of therapy, Disp: 5 tablet, Rfl: 0    Multiple Vitamins-Minerals (PRESERVISION AREDS) CAPS, Take 2 capsules by mouth daily (Patient not taking: Reported on 11/8/2024), Disp: , Rfl:     neomycin-polymyxin-hydrocortisone (CORTISPORIN) otic solution, Administer 4 drops into the left ear every 6 (six) hours (Patient not taking: Reported on 4/28/2025), Disp: 10 mL, Rfl: 0  Allergies   Allergen Reactions    Codeine Dizziness     Reaction Date: 19Jul2011;     Other Hallucinations     darvocet       Labs:     Chemistry        Component Value Date/Time     12/14/2017 1053    K 4.5 10/18/2024 0721    K 4.7 01/10/2019 0813     10/18/2024 0721     01/10/2019 0813    CO2 27 10/18/2024 0721    CO2 29 01/10/2019 0813    BUN 27 (H) 10/18/2024 0721    BUN 24 01/10/2019 0813    CREATININE 1.14 10/18/2024 0721    CREATININE 0.90 12/14/2017 1053        Component Value Date/Time    CALCIUM 9.6 10/18/2024 0721    CALCIUM 9.9 01/10/2019 0813    ALKPHOS 56  "10/18/2024 0721    ALKPHOS 68 06/12/2018 0927    AST 38 10/18/2024 0721    AST 20 06/12/2018 0927    ALT 47 10/18/2024 0721    ALT 23 06/12/2018 0927    BILITOT 0.5 12/14/2017 1053            Lab Results   Component Value Date    CHOL 201 (H) 12/14/2017    CHOL 179 05/16/2017    CHOL 168 11/10/2016     Lab Results   Component Value Date    HDL 52 10/18/2024    HDL 55 10/13/2023    HDL 53 07/29/2022     Lab Results   Component Value Date    LDLCALC 49 10/18/2024    LDLCALC 59 10/13/2023    LDLCALC 52 07/29/2022     Lab Results   Component Value Date    TRIG 159 (H) 10/18/2024    TRIG 140 10/13/2023    TRIG 97 07/29/2022     No results found for: \"CHOLHDL\"    Imaging: XR chest 1 view portable    Result Date: 10/16/2024  Narrative: XR CHEST PORTABLE INDICATION: elevated blood pressure reading. COMPARISON: CXR 7/31/2019, abdomen CT 7/1/2017. FINDINGS: Clear lungs. No pneumothorax or pleural effusion. Normal cardiomediastinal silhouette. Bones are unremarkable for age. Normal upper abdomen.     Impression: No acute cardiopulmonary disease. Workstation performed: [id number]     CT head without contrast    Result Date: 10/15/2024  Narrative: CT BRAIN - WITHOUT CONTRAST INDICATION:   dizziness and elevated blood pressure reading; reports compliance with antihypertensives. COMPARISON:  None. TECHNIQUE:  CT examination of the brain was performed.  Multiplanar 2D reformatted images were created from the source data. Radiation dose length product (DLP) for this visit:  965 mGy-cm .  This examination, like all CT scans performed in the [facility], was performed utilizing techniques to minimize radiation dose exposure, including the use of iterative reconstruction and automated exposure control. IMAGE QUALITY:  Diagnostic. FINDINGS: PARENCHYMA: Decreased attenuation is noted in periventricular and subcortical white matter demonstrating an appearance that is statistically most likely to represent chronic " "microangiopathic change. No CT signs of acute infarction.  No intracranial mass, mass effect or midline shift.  No acute parenchymal hemorrhage. VENTRICLES AND EXTRA-AXIAL SPACES: Ventricles and extra-axial CSF spaces are prominent commensurate with the degree of volume loss.  No hydrocephalus.  No acute extra-axial hemorrhage. VISUALIZED ORBITS: Normal visualized orbits. PARANASAL SINUSES: Air-fluid level and frothy secretions in the left maxillary sinus. Mild mucosal thickening of left maxillary sinus. Small retention cyst in the right maxillary sinus. CALVARIUM AND EXTRACRANIAL SOFT TISSUES: Normal.     Impression: No acute intracranial abnormality. Air-fluid level and frothy secretions in the left maxillary sinus may reflect acute sinusitis in the appropriate clinical setting. Workstation performed: [id number]       ECG:  n/a    Review of Systems   All other systems reviewed and are negative.      Vitals:    04/28/25 0924   BP: 132/60   Pulse: 71   Temp: 97.5 °F (36.4 °C)   SpO2: 98%     Vitals:    04/28/25 0924   Weight: 90.3 kg (199 lb)     Height: 5' 4\" (162.6 cm)   Body mass index is 34.16 kg/m².    Physical Exam  Vitals and nursing note reviewed.   Constitutional:       General: She is not in acute distress.     Appearance: She is not ill-appearing.   HENT:      Head: Normocephalic.      Nose: Nose normal.      Mouth/Throat:      Mouth: Mucous membranes are moist.      Pharynx: Oropharynx is clear.   Cardiovascular:      Rate and Rhythm: Normal rate and regular rhythm.      Heart sounds: Murmur heard.   Pulmonary:      Breath sounds: Decreased breath sounds present.   Musculoskeletal:         General: Normal range of motion.      Cervical back: Normal range of motion.      Right lower leg: No edema.      Left lower leg: No edema.   Skin:     General: Skin is warm and dry.   Neurological:      Mental Status: She is alert and oriented to person, place, and time.   Psychiatric:         Mood and Affect: Mood " normal.         Behavior: Behavior normal.

## 2025-05-07 ENCOUNTER — RA CDI HCC (OUTPATIENT)
Dept: OTHER | Facility: HOSPITAL | Age: 85
End: 2025-05-07

## 2025-05-12 ENCOUNTER — APPOINTMENT (OUTPATIENT)
Dept: LAB | Facility: MEDICAL CENTER | Age: 85
End: 2025-05-12
Attending: FAMILY MEDICINE
Payer: COMMERCIAL

## 2025-05-12 DIAGNOSIS — N18.31 STAGE 3A CHRONIC KIDNEY DISEASE (HCC): ICD-10-CM

## 2025-05-12 DIAGNOSIS — I10 ESSENTIAL HYPERTENSION: ICD-10-CM

## 2025-05-12 LAB
ANION GAP SERPL CALCULATED.3IONS-SCNC: 12 MMOL/L (ref 4–13)
BUN SERPL-MCNC: 35 MG/DL (ref 5–25)
CALCIUM SERPL-MCNC: 9.8 MG/DL (ref 8.4–10.2)
CHLORIDE SERPL-SCNC: 106 MMOL/L (ref 96–108)
CO2 SERPL-SCNC: 25 MMOL/L (ref 21–32)
CREAT SERPL-MCNC: 1.15 MG/DL (ref 0.6–1.3)
GFR SERPL CREATININE-BSD FRML MDRD: 43 ML/MIN/1.73SQ M
GLUCOSE P FAST SERPL-MCNC: 118 MG/DL (ref 65–99)
POTASSIUM SERPL-SCNC: 5.2 MMOL/L (ref 3.5–5.3)
SODIUM SERPL-SCNC: 143 MMOL/L (ref 135–147)

## 2025-05-12 PROCEDURE — 36415 COLL VENOUS BLD VENIPUNCTURE: CPT

## 2025-05-12 PROCEDURE — 80048 BASIC METABOLIC PNL TOTAL CA: CPT

## 2025-05-14 ENCOUNTER — OFFICE VISIT (OUTPATIENT)
Dept: FAMILY MEDICINE CLINIC | Facility: CLINIC | Age: 85
End: 2025-05-14
Payer: COMMERCIAL

## 2025-05-14 VITALS
TEMPERATURE: 97.7 F | WEIGHT: 200 LBS | HEIGHT: 64 IN | DIASTOLIC BLOOD PRESSURE: 78 MMHG | SYSTOLIC BLOOD PRESSURE: 132 MMHG | HEART RATE: 63 BPM | RESPIRATION RATE: 18 BRPM | OXYGEN SATURATION: 98 % | BODY MASS INDEX: 34.15 KG/M2

## 2025-05-14 DIAGNOSIS — E66.01 OBESITY, MORBID (HCC): ICD-10-CM

## 2025-05-14 DIAGNOSIS — Z00.00 MEDICARE ANNUAL WELLNESS VISIT, SUBSEQUENT: ICD-10-CM

## 2025-05-14 DIAGNOSIS — E78.2 MIXED HYPERLIPIDEMIA: ICD-10-CM

## 2025-05-14 DIAGNOSIS — G47.62 NOCTURNAL LEG CRAMPS: Primary | ICD-10-CM

## 2025-05-14 DIAGNOSIS — N18.31 STAGE 3A CHRONIC KIDNEY DISEASE (HCC): ICD-10-CM

## 2025-05-14 DIAGNOSIS — K21.9 GERD WITHOUT ESOPHAGITIS: ICD-10-CM

## 2025-05-14 DIAGNOSIS — I10 ESSENTIAL HYPERTENSION: ICD-10-CM

## 2025-05-14 PROCEDURE — G2211 COMPLEX E/M VISIT ADD ON: HCPCS | Performed by: FAMILY MEDICINE

## 2025-05-14 PROCEDURE — G0442 ANNUAL ALCOHOL SCREEN 15 MIN: HCPCS | Performed by: FAMILY MEDICINE

## 2025-05-14 PROCEDURE — G0444 DEPRESSION SCREEN ANNUAL: HCPCS | Performed by: FAMILY MEDICINE

## 2025-05-14 PROCEDURE — 99214 OFFICE O/P EST MOD 30 MIN: CPT | Performed by: FAMILY MEDICINE

## 2025-05-14 PROCEDURE — G0439 PPPS, SUBSEQ VISIT: HCPCS | Performed by: FAMILY MEDICINE

## 2025-05-14 RX ORDER — MULTIVITAMIN WITH IRON
1 TABLET ORAL DAILY
Qty: 30 TABLET | Refills: 0 | Status: SHIPPED | OUTPATIENT
Start: 2025-05-14

## 2025-05-14 NOTE — ASSESSMENT & PLAN NOTE
Remains on atorvastatin 40 mg daily.  Repeat lipid panel in 6 months    Orders:    Lipid panel; Future

## 2025-05-14 NOTE — PATIENT INSTRUCTIONS
Medicare Preventive Visit Patient Instructions  Thank you for completing your Welcome to Medicare Visit or Medicare Annual Wellness Visit today. Your next wellness visit will be due in one year (5/15/2026).  The screening/preventive services that you may require over the next 5-10 years are detailed below. Some tests may not apply to you based off risk factors and/or age. Screening tests ordered at today's visit but not completed yet may show as past due. Also, please note that scanned in results may not display below.  Preventive Screenings:  Service Recommendations Previous Testing/Comments   Colorectal Cancer Screening  * Colonoscopy    * Fecal Occult Blood Test (FOBT)/Fecal Immunochemical Test (FIT)  * Fecal DNA/Cologuard Test  * Flexible Sigmoidoscopy Age: 45-75 years old   Colonoscopy: every 10 years (may be performed more frequently if at higher risk)  OR  FOBT/FIT: every 1 year  OR  Cologuard: every 3 years  OR  Sigmoidoscopy: every 5 years  Screening may be recommended earlier than age 45 if at higher risk for colorectal cancer. Also, an individualized decision between you and your healthcare provider will decide whether screening between the ages of 76-85 would be appropriate. Colonoscopy: 09/13/2018  FOBT/FIT: Not on file  Cologuard: Not on file  Sigmoidoscopy: Not on file          Breast Cancer Screening Age: 40+ years old  Frequency: every 1-2 years  Not required if history of left and right mastectomy Mammogram: 07/08/2021        Cervical Cancer Screening Between the ages of 21-29, pap smear recommended once every 3 years.   Between the ages of 30-65, can perform pap smear with HPV co-testing every 5 years.   Recommendations may differ for women with a history of total hysterectomy, cervical cancer, or abnormal pap smears in past. Pap Smear: 07/03/2019    Screening Not Indicated   Hepatitis C Screening Once for adults born between 1945 and 1965  More frequently in patients at high risk for Hepatitis C  Hep C Antibody: Not on file        Diabetes Screening 1-2 times per year if you're at risk for diabetes or have pre-diabetes Fasting glucose: 118 mg/dL (5/12/2025)  A1C: 6.3 % (7/29/2022)  Screening Current   Cholesterol Screening Once every 5 years if you don't have a lipid disorder. May order more often based on risk factors. Lipid panel: 10/18/2024    Screening Not Indicated  History Lipid Disorder     Other Preventive Screenings Covered by Medicare:  Abdominal Aortic Aneurysm (AAA) Screening: covered once if your at risk. You're considered to be at risk if you have a family history of AAA.  Lung Cancer Screening: covers low dose CT scan once per year if you meet all of the following conditions: (1) Age 55-77; (2) No signs or symptoms of lung cancer; (3) Current smoker or have quit smoking within the last 15 years; (4) You have a tobacco smoking history of at least 20 pack years (packs per day multiplied by number of years you smoked); (5) You get a written order from a healthcare provider.  Glaucoma Screening: covered annually if you're considered high risk: (1) You have diabetes OR (2) Family history of glaucoma OR (3)  aged 50 and older OR (4)  American aged 65 and older  Osteoporosis Screening: covered every 2 years if you meet one of the following conditions: (1) You're estrogen deficient and at risk for osteoporosis based off medical history and other findings; (2) Have a vertebral abnormality; (3) On glucocorticoid therapy for more than 3 months; (4) Have primary hyperparathyroidism; (5) On osteoporosis medications and need to assess response to drug therapy.   Last bone density test (DXA Scan): 07/01/2019.  HIV Screening: covered annually if you're between the age of 15-65. Also covered annually if you are younger than 15 and older than 65 with risk factors for HIV infection. For pregnant patients, it is covered up to 3 times per pregnancy.    Immunizations:  Immunization  Recommendations   Influenza Vaccine Annual influenza vaccination during flu season is recommended for all persons aged >= 6 months who do not have contraindications   Pneumococcal Vaccine   * Pneumococcal conjugate vaccine = PCV13 (Prevnar 13), PCV15 (Vaxneuvance), PCV20 (Prevnar 20)  * Pneumococcal polysaccharide vaccine = PPSV23 (Pneumovax) Adults 19-65 yo with certain risk factors or if 65+ yo  If never received any pneumonia vaccine: recommend Prevnar 20 (PCV20)  Give PCV20 if previously received 1 dose of PCV13 or PPSV23   Hepatitis B Vaccine 3 dose series if at intermediate or high risk (ex: diabetes, end stage renal disease, liver disease)   Respiratory syncytial virus (RSV) Vaccine - COVERED BY MEDICARE PART D  * RSVPreF3 (Arexvy) CDC recommends that adults 60 years of age and older may receive a single dose of RSV vaccine using shared clinical decision-making (SCDM)   Tetanus (Td) Vaccine - COST NOT COVERED BY MEDICARE PART B Following completion of primary series, a booster dose should be given every 10 years to maintain immunity against tetanus. Td may also be given as tetanus wound prophylaxis.   Tdap Vaccine - COST NOT COVERED BY MEDICARE PART B Recommended at least once for all adults. For pregnant patients, recommended with each pregnancy.   Shingles Vaccine (Shingrix) - COST NOT COVERED BY MEDICARE PART B  2 shot series recommended in those 19 years and older who have or will have weakened immune systems or those 50 years and older     Health Maintenance Due:  There are no preventive care reminders to display for this patient.  Immunizations Due:      Topic Date Due   • COVID-19 Vaccine (6 - 2024-25 season) 05/16/2025     Advance Directives   What are advance directives?  Advance directives are legal documents that state your wishes and plans for medical care. These plans are made ahead of time in case you lose your ability to make decisions for yourself. Advance directives can apply to any medical  decision, such as the treatments you want, and if you want to donate organs.   What are the types of advance directives?  There are many types of advance directives, and each state has rules about how to use them. You may choose a combination of any of the following:  Living will:  This is a written record of the treatment you want. You can also choose which treatments you do not want, which to limit, and which to stop at a certain time. This includes surgery, medicine, IV fluid, and tube feedings.   Durable power of  for healthcare (DPAHC):  This is a written record that states who you want to make healthcare choices for you when you are unable to make them for yourself. This person, called a proxy, is usually a family member or a friend. You may choose more than 1 proxy.  Do not resuscitate (DNR) order:  A DNR order is used in case your heart stops beating or you stop breathing. It is a request not to have certain forms of treatment, such as CPR. A DNR order may be included in other types of advance directives.  Medical directive:  This covers the care that you want if you are in a coma, near death, or unable to make decisions for yourself. You can list the treatments you want for each condition. Treatment may include pain medicine, surgery, blood transfusions, dialysis, IV or tube feedings, and a ventilator (breathing machine).  Values history:  This document has questions about your views, beliefs, and how you feel and think about life. This information can help others choose the care that you would choose.  Why are advance directives important?  An advance directive helps you control your care. Although spoken wishes may be used, it is better to have your wishes written down. Spoken wishes can be misunderstood, or not followed. Treatments may be given even if you do not want them. An advance directive may make it easier for your family to make difficult choices about your care.   Weight Management   Why  it is important to manage your weight:  Being overweight increases your risk of health conditions such as heart disease, high blood pressure, type 2 diabetes, and certain types of cancer. It can also increase your risk for osteoarthritis, sleep apnea, and other respiratory problems. Aim for a slow, steady weight loss. Even a small amount of weight loss can lower your risk of health problems.  How to lose weight safely:  A safe and healthy way to lose weight is to eat fewer calories and get regular exercise. You can lose up about 1 pound a week by decreasing the number of calories you eat by 500 calories each day.   Healthy meal plan for weight management:  A healthy meal plan includes a variety of foods, contains fewer calories, and helps you stay healthy. A healthy meal plan includes the following:  Eat whole-grain foods more often.  A healthy meal plan should contain fiber. Fiber is the part of grains, fruits, and vegetables that is not broken down by your body. Whole-grain foods are healthy and provide extra fiber in your diet. Some examples of whole-grain foods are whole-wheat breads and pastas, oatmeal, brown rice, and bulgur.  Eat a variety of vegetables every day.  Include dark, leafy greens such as spinach, kale, abimael greens, and mustard greens. Eat yellow and orange vegetables such as carrots, sweet potatoes, and winter squash.   Eat a variety of fruits every day.  Choose fresh or canned fruit (canned in its own juice or light syrup) instead of juice. Fruit juice has very little or no fiber.  Eat low-fat dairy foods.  Drink fat-free (skim) milk or 1% milk. Eat fat-free yogurt and low-fat cottage cheese. Try low-fat cheeses such as mozzarella and other reduced-fat cheeses.  Choose meat and other protein foods that are low in fat.  Choose beans or other legumes such as split peas or lentils. Choose fish, skinless poultry (chicken or turkey), or lean cuts of red meat (beef or pork). Before you cook meat or  poultry, cut off any visible fat.   Use less fat and oil.  Try baking foods instead of frying them. Add less fat, such as margarine, sour cream, regular salad dressing and mayonnaise to foods. Eat fewer high-fat foods. Some examples of high-fat foods include french fries, doughnuts, ice cream, and cakes.  Eat fewer sweets.  Limit foods and drinks that are high in sugar. This includes candy, cookies, regular soda, and sweetened drinks.  Exercise:  Exercise at least 30 minutes per day on most days of the week. Some examples of exercise include walking, biking, dancing, and swimming. You can also fit in more physical activity by taking the stairs instead of the elevator or parking farther away from stores. Ask your healthcare provider about the best exercise plan for you.    © Copyright [employer] 2018 Information is for End User's use only and may not be sold, redistributed or otherwise used for commercial purposes. All illustrations and images included in CareNotes® are the copyrighted property of [employer]M., Inc[employer] or [employer]

## 2025-05-14 NOTE — ASSESSMENT & PLAN NOTE
BP Readings from Last 3 Encounters:   05/14/25 132/78   04/28/25 132/60   12/26/24 120/54   Blood pressure significantly improved.  Remains on lisinopril-hydrochlorothiazide 20-12.5 mg.  Amlodipine 2.5 mg repeat labs prior to next visit.  Kidney function stable  Orders:    CBC and Platelet; Future    Comprehensive metabolic panel; Future

## 2025-05-14 NOTE — ASSESSMENT & PLAN NOTE
Lab Results   Component Value Date    EGFR 43 05/12/2025    EGFR 44 10/18/2024    EGFR 62 10/15/2024    CREATININE 1.15 05/12/2025    CREATININE 1.14 10/18/2024    CREATININE 0.86 10/15/2024     Kidney function stable since October.  Avoid nephrotoxic indications.  Continue current blood pressure medications  Orders:    CBC and Platelet; Future    Albumin / creatinine urine ratio; Future    Comprehensive metabolic panel; Future

## 2025-05-14 NOTE — ASSESSMENT & PLAN NOTE
Internal Medicine History & Physical                                                                  Patient Name: Lauren Ramos    YOB: 1950    MRN: [id number]         Date of Service: 09/23/24    Subjective     Source: patient and EMR     Preferred Language: english    PCP: Cherlele Salas DO    Chief Complaint:   Chief Complaint   Patient presents with    Abdominal Pain    Groin Pain     HPI:  Lauren Ramos is a 74 year old female PMH Intractable chronic migraines, cerebral angiopathy, who was sent to ED from clinic for abdominal and groin pain c/f acute appendicitis. Pt has experienced chronic intermittent RLQ pain for the past 3-6 months. Pain sig worsened over the past 4 days; Radiates down the right leg. +nausea and x4 episodes of vomiting of liquid contents. Pt denies HA, CP, SOB, fevers, urinary sx, chills, diarrhea. Able to pass flatus; Last BM was 2 days ago. States she has not been able to eat anything due to pain. Emergent CTAP in ED sig for an Incarcerated obstructing right obturator hernia containing a bowel loop.     Review of Systems:   Constitutional: Denies fever, chills, sweats   HEENT: Denies recent vision changes, double vision, nasal congestion, sore throat   Cardiovascular: Denies CP, palpitations, peripheral edema, syncope   Respiratory: Denies SOB, wheezing, cough, hemoptysis   GI: +abd pain, nausea, vomiting. Denies D/C,  hematemesis, melena, hematochezia   : Denies dysuria, hematuria   MSK: Denies muscle pain   Skin: Denies rash, pruritus   Heme/lymph: Denies LAD   Neuro: Denies numbness, tingling, weakness, headache     Past Medical History:   Diagnosis Date    Head ache      (Not in a hospital admission)    ALLERGIES:  No Known Allergies   Past Surgical History:   Procedure Laterality Date    No past surgeries       Family History   Problem Relation Age of Onset    Lung Disease Father      Social History     Tobacco Use    Smoking status: Never      Remains on Pepcid 20 mg bid          Passive exposure: Never    Smokeless tobacco: Never   Vaping Use    Vaping status: never used   Substance Use Topics    Alcohol use: Never    Drug use: Never       Objective     Vitals:    09/23/24 1400   BP: (!) 140/82   Pulse: 95   Resp: 16   Temp:        Physical Exam  General: AOx4, NAD, appears stated age, resting in bed   HEENT: normocephalic, atraumatic, EOMI  Neck: supple, nontender, no LAD   CV: RRR, +S1 +S2, no murmurs/rubs/gallops. No JVD   Lungs: CTAB, no crackles/rhonchi/wheezing   Abdomen: Sig TTP to RLQ and R groin. Soft, non distended. Non tender to other quadrants. BS present in all four quadrants, tympanic to percussion. No rigidity, rebound or guarding.   Extremities: no peripheral edema, clubbing, or cyanosis   Neuro: CN 2-12 grossly intact   Skin: warm, dry, intact    No intake or output data in the 24 hours ending 09/23/24 1642     Recent Results (from the past 24 hour(s))   Comprehensive Metabolic Panel    Collection Time: 09/23/24 11:42 AM    Specimen: Blood, Venous   Result Value Ref Range    Fasting Status      Sodium 130 (L) 135 - 145 mmol/L    Potassium 3.9 3.4 - 5.1 mmol/L    Chloride 93 (L) 97 - 110 mmol/L    Carbon Dioxide 27 21 - 32 mmol/L    Anion Gap 14 7 - 19 mmol/L    Glucose 133 (H) 70 - 99 mg/dL    BUN 40 (H) 6 - 20 mg/dL    Creatinine 1.15 (H) 0.51 - 0.95 mg/dL    Glomerular Filtration Rate 50 (L) >=60    BUN/Cr 35 (H) 7 - 25    Calcium 10.1 8.4 - 10.2 mg/dL    Bilirubin, Total 1.4 (H) 0.2 - 1.0 mg/dL    GOT/AST 29 <=37 Units/L    GPT/ALT 34 <64 Units/L    Alkaline Phosphatase 67 45 - 117 Units/L    Albumin 4.1 3.4 - 5.0 g/dL    Protein, Total 7.9 6.4 - 8.2 g/dL    Globulin 3.8 2.0 - 4.0 g/dL    A/G Ratio 1.1 1.0 - 2.4   Lipase    Collection Time: 09/23/24 11:42 AM    Specimen: Blood, Venous   Result Value Ref Range    Lipase 43 15 - 77 Units/L   CBC with Automated Differential (performable only)    Collection Time: 09/23/24 11:42 AM    Specimen: Blood, Venous   Result Value  Ref Range    WBC 13.7 (H) 4.2 - 11.0 K/mcL    RBC 5.01 4.00 - 5.20 mil/mcL    HGB 15.8 (H) 12.0 - 15.5 g/dL    HCT 46.3 36.0 - 46.5 %    MCV 92.4 78.0 - 100.0 fl    MCH 31.5 26.0 - 34.0 pg    MCHC 34.1 32.0 - 36.5 g/dL    RDW-CV 13.0 11.0 - 15.0 %    RDW-SD 44.0 39.0 - 50.0 fL     140 - 450 K/mcL    NRBC 0 <=0 /100 WBC    Neutrophil, Percent 85 %    Lymphocytes, Percent 7 %    Mono, Percent 8 %    Eosinophils, Percent 0 %    Basophils, Percent 0 %    Immature Granulocytes 0 %    Absolute Neutrophils 11.6 (H) 1.8 - 7.7 K/mcL    Absolute Lymphocytes 1.0 1.0 - 4.0 K/mcL    Absolute Monocytes 1.0 (H) 0.3 - 0.9 K/mcL    Absolute Eosinophils  0.0 0.0 - 0.5 K/mcL    Absolute Basophils 0.0 0.0 - 0.3 K/mcL    Absolute Immature Granulocytes 0.1 0.0 - 0.2 K/mcL   TYPE/SCREEN    Collection Time: 09/23/24 11:42 AM    Specimen: Blood, Venous   Result Value Ref Range    ABO/RH(D) AB Rh Positive     ANTIBODY SCREEN Negative     TYPE AND SCREEN EXPIRATION DATE 09/26/2024 23:59    Electrocardiogram 12-Lead    Collection Time: 09/23/24  2:39 PM   Result Value Ref Range    Ventricular Rate EKG/Min (BPM) 88     Atrial Rate (BPM) 88     IN-Interval (MSEC) 124     QRS-Interval (MSEC) 92     QT-Interval (MSEC) 376     QTc 455     P Axis (Degrees) 64     R Axis (Degrees) -20     T Axis (Degrees) 16     REPORT TEXT       Normal sinus rhythm  Moderate voltage criteria for LVH, may be normal variant  (  R in aVL  ,  [place] product  )  Nonspecific ST abnormality  Abnormal ECG  No previous ECGs available     Urinalysis & Reflex Microscopy With Culture If Indicated    Collection Time: 09/23/24  3:43 PM    Specimen: Urine clean catch   Result Value Ref Range    COLOR, URINALYSIS Straw     APPEARANCE, URINALYSIS Clear     GLUCOSE, URINALYSIS Negative Negative mg/dL    BILIRUBIN, URINALYSIS Negative Negative    KETONES, URINALYSIS 20 (A) Negative mg/dL    SPECIFIC GRAVITY, URINALYSIS >1.030 (H) 1.005 - 1.030    OCCULT BLOOD, URINALYSIS Trace  (A) Negative    PH, URINALYSIS 6.0 5.0 - 7.0    PROTEIN, URINALYSIS 30 (A) Negative mg/dL    UROBILINOGEN, URINALYSIS 2.0 (A) 0.2, 1.0 mg/dL    NITRITE, URINALYSIS Negative Negative    LEUKOCYTE ESTERASE, URINALYSIS Small (A) Negative    SQUAMOUS EPITHELIAL, URINALYSIS 1 to 5 None Seen, 1 to 5 /hpf    ERYTHROCYTES, URINALYSIS 1 to 2 None Seen, 1 to 2 /hpf    LEUKOCYTES, URINALYSIS 6 to 10 (A) None Seen, 1 to 5 /hpf    BACTERIA, URINALYSIS Moderate (A) None Seen /hpf    HYALINE CASTS, URINALYSIS 1 to 5 None Seen, 1 to 5 /lpf    MUCUS Present        Current Facility-Administered Medications   Medication    morphine injection 4 mg    sodium chloride 0.9% infusion     Current Outpatient Medications   Medication Sig Dispense Refill    rimegepant sulfate (NURTEC ODT) 75 MG disintegrating tablet Take 1 tablet by mouth daily as needed for Migraine. Max 75 mg/24 hours. 12 tablet 1    acetaminophen (TYLENOL) 500 MG tablet Take 500 mg by mouth every 4 hours as needed for Pain.      ibuprofen (MOTRIN) 600 MG tablet Take 600 mg by mouth every 6 hours as needed for Pain.        Problem List Items Addressed This Visit    None      Assessment and Plan     Lauren Ramos is a 74 year old female PMH Intractable chronic migraines, cerebral angiopathy, admitted for a SBO d/t an incarcerated hernia.     #Small Bowel Obstruction   #Incarcerated R obturator hernia  #RLQ abdominal pain   9/23 CTAP: Incarcerated obstructing right obturator hernia containing a bowel loop measuring 3 cm in diameter. Multiple fluid-filled dilated small bowel loops are noted. A representative small bowel loop in the left mid abdomen measures 4.1 cm in diameter.  General Surgery Following:  -NGT placement, NPO, IVF  -serial abd exam  - pain control prn  - rest per primary  Plan:   - ctm for acute abdomen or signs of worsening abdominal pain   - Morphine, Norco, Tylenol as needed for pain    #Leukocytosis   -Afebrile; WBC 13.7; VSS   Plan:   - monitor WBC  and fever curve   - No abx indicated at this time     #VASU  #Electrolyte abnormalities   Likely prerenal in nature from decreased PO intake and vomiting.   - baseline creat 0.56  Plan:   - IVF  - monitor and replace electrolytes as needed     #Elevated Hgb   - Hgb is acutely elevated at 15.8  Plan:  Ctm and recheck CBC.     #H/o Chronic Intractable Migraines - stable   - Asymptomatic at this time   - Followed by Neurology as an outpatient   FENA  Fluids: 0.09 NaCl @ 125mL/hr   Electrolytes: replace PRN  Nutrition: NPO except medication   Activity: As tolerated     PPx:  VTE: SCDs  GI: not indicated    Code Status: FULL CODE    Dispo: Pending general surgery eval and improvement of abdominal pain.     Discussed with Dr. Oralia Robins MD  Internal Medicine PGY1

## 2025-05-14 NOTE — PROGRESS NOTES
Name: Sofia Aguirre      : 1940      MRN: [id number]  Encounter Provider: Trevon Berry MD  Encounter Date: 2025   Encounter department: [facility]  :  Assessment & Plan  Nocturnal leg cramps  Continue with tonic water at nighttime.  Start B complex vitamins for nocturnal leg cramping.  Consider iron panel if no improvement  Orders:    B Complex-C (B-complex with vitamin C) tablet; Take 1 tablet by mouth before bedtime.    Obesity, morbid (HCC)           Stage 3a chronic kidney disease (HCC)  Lab Results   Component Value Date    EGFR 43 2025    EGFR 44 10/18/2024    EGFR 62 10/15/2024    CREATININE 1.15 2025    CREATININE 1.14 10/18/2024    CREATININE 0.86 10/15/2024     Kidney function stable since October.  Avoid nephrotoxic indications.  Continue current blood pressure medications  Orders:    CBC and Platelet; Future    Albumin / creatinine urine ratio; Future    Comprehensive metabolic panel; Future    Essential hypertension  BP Readings from Last 3 Encounters:   25 132/78   25 132/60   24 120/54   Blood pressure significantly improved.  Remains on lisinopril-hydrochlorothiazide 20-12.5 mg.  Amlodipine 2.5 mg repeat labs prior to next visit.  Kidney function stable  Orders:    CBC and Platelet; Future    Comprehensive metabolic panel; Future        Mixed hyperlipidemia  Remains on atorvastatin 40 mg daily.  Repeat lipid panel in 6 months    Orders:    Lipid panel; Future    GERD without esophagitis  Remains on Pepcid 20 mg bid         Medicare annual wellness visit, subsequent            AWV and follow-up completed today.  Reviewed previous labs.  Chronic conditions stable.  Continue current medications.  Orders above.    Preventive health issues were discussed with patient, and age appropriate screening tests were ordered as noted in patient's After Visit Summary. Personalized health advice and appropriate referrals for health education or  preventive services given if needed, as noted in patient's After Visit Summary.    History of Present Illness     AWV and follow up   Cramping in her legs at night. Comes and goes.   Stable on medications. Labs utd            Patient Care Team:  Trevon Berry MD as PCP - General (Family Medicine)  Yung Espinoza MD as PCP - PCP-[facility] Attributed-Roster    Review of Systems   Constitutional:  Negative for activity change, fatigue and fever.   Eyes:  Negative for visual disturbance.   Respiratory:  Negative for shortness of breath.    Cardiovascular:  Negative for chest pain.   Gastrointestinal:  Negative for abdominal pain, constipation, diarrhea and nausea.   Endocrine: Negative for cold intolerance and heat intolerance.   Musculoskeletal:  Negative for back pain.        Nocturnal leg cramps   Skin:  Negative for rash.   Neurological:  Negative for headaches.   Psychiatric/Behavioral:  Negative for confusion.      Medical History Reviewed by provider this encounter:       Annual Wellness Visit Questionnaire   Sofia is here for her Subsequent Wellness visit. Last Medicare Wellness visit information reviewed, patient interviewed and updates made to the record today.      Health Risk Assessment:   Patient rates overall health as very good. Patient feels that their physical health rating is same. Patient is satisfied with their life. Eyesight was rated as same. Hearing was rated as same. Patient feels that their emotional and mental health rating is same. Patients states they are never, rarely angry. Patient states they are never, rarely unusually tired/fatigued. Pain experienced in the last 7 days has been none. Patient states that she has experienced no weight loss or gain in last 6 months.     Depression Screening:   PHQ-2 Score: 0      Fall Risk Screening:   In the past year, patient has experienced: no history of falling in past year      Urinary Incontinence Screening:   Patient has not  leaked urine accidently in the last six months.     Home Safety:  Patient does not have trouble with stairs inside or outside of their home. Patient has working smoke alarms and has working carbon monoxide detector. Home safety hazards include: none.     Nutrition:   Current diet is Other (please comment). Heart healthy (most of the time)    Medications:   Patient is currently taking over-the-counter supplements. OTC medications include: see medication list. Patient is able to manage medications.     Activities of Daily Living (ADLs)/Instrumental Activities of Daily Living (IADLs):   Walk and transfer into and out of bed and chair?: Yes  Dress and groom yourself?: Yes    Bathe or shower yourself?: Yes    Feed yourself? Yes  Do your laundry/housekeeping?: Yes  Manage your money, pay your bills and track your expenses?: Yes  Make your own meals?: Yes    Do your own shopping?: Yes    Previous Hospitalizations:   Any hospitalizations or ED visits within the last 12 months?: Yes    How many hospitalizations have you had in the last year?: 1-2    Hospitalization Comments: Dizziness due to high blood pressure.    Advance Care Planning:   Living will: Yes    Advanced directive: Yes      Cognitive Screening:   Provider or family/friend/caregiver concerned regarding cognition?: No    Preventive Screenings      Cardiovascular Screening:    General: Screening Not Indicated and History Lipid Disorder      Diabetes Screening:     General: Screening Current      Cervical Cancer Screening:    General: Screening Not Indicated      Lung Cancer Screening:     General: Screening Not Indicated    Immunizations:  - Immunizations due: Zoster (Shingrix)    Screening, Brief Intervention, and Referral to Treatment (SBIRT)     Screening  Typical number of drinks in a day: 0  Typical number of drinks in a week: 0  Interpretation: Low risk drinking behavior.    Single Item Drug Screening:  How often have you used an illegal drug (including  "marijuana) or a prescription medication for non-medical reasons in the past year? never    Single Item Drug Screen Score: 0  Interpretation: Negative screen for possible drug use disorder    Brief Intervention  Alcohol & drug use screenings were reviewed. No concerns regarding substance use disorder identified.     Time Spent  Time spent screening/evaluating the patient for alcohol misuse: 5 minutes.     Annual Depression Screening  Time spent screening and evaluating the patient for depression during today's encounter was 5 minutes.    Social Drivers of Health     Financial Resource Strain: Low Risk  (4/18/2023)    Overall Financial Resource Strain (CARDIA)     Difficulty of Paying Living Expenses: Not hard at all   Food Insecurity: No Food Insecurity (5/14/2025)    Nursing - Inadequate Food Risk Classification     Worried About Running Out of Food in the Last Year: Never true     Ran Out of Food in the Last Year: Never true   Transportation Needs: No Transportation Needs (5/14/2025)    PRAPARE - Transportation     Lack of Transportation (Medical): No     Lack of Transportation (Non-Medical): No   Housing Stability: Low Risk  (5/14/2025)    Housing Stability Vital Sign     Unable to Pay for Housing in the Last Year: No     Number of Times Moved in the Last Year: 0     Homeless in the Last Year: No   Utilities: Not At Risk (5/14/2025)    [facility] Utilities     Threatened with loss of utilities: No     No results found.    Objective   /78 (BP Location: Left arm, Patient Position: Sitting, Cuff Size: Standard)   Pulse 63   Temp 97.7 °F (36.5 °C) (Temporal)   Resp 18   Ht 5' 4\" (1.626 m)   Wt 90.7 kg (200 lb)   SpO2 98%   BMI 34.33 kg/m²     Physical Exam  Vitals and nursing note reviewed.   Constitutional:       Appearance: Normal appearance. She is well-developed.   HENT:      Head: Normocephalic and atraumatic.     Cardiovascular:      Rate and Rhythm: Normal rate and regular rhythm.   Pulmonary:      " Effort: Pulmonary effort is normal.      Breath sounds: Normal breath sounds.   Abdominal:      General: Bowel sounds are normal.      Palpations: Abdomen is soft.     Musculoskeletal:      Cervical back: Normal range of motion.     Skin:     General: Skin is warm.     Neurological:      General: No focal deficit present.      Mental Status: She is alert.     Psychiatric:         Mood and Affect: Mood normal.         Speech: Speech normal.

## 2025-06-10 DIAGNOSIS — G47.62 NOCTURNAL LEG CRAMPS: ICD-10-CM

## 2025-06-11 RX ORDER — GARLIC EXTRACT 500 MG
CAPSULE ORAL
Qty: 90 CAPSULE | Refills: 1 | Status: SHIPPED | OUTPATIENT
Start: 2025-06-11

## 2025-07-23 DIAGNOSIS — E78.49 OTHER HYPERLIPIDEMIA: ICD-10-CM

## 2025-07-24 RX ORDER — ATORVASTATIN CALCIUM 40 MG/1
40 TABLET, FILM COATED ORAL DAILY
Qty: 90 TABLET | Refills: 1 | Status: SHIPPED | OUTPATIENT
Start: 2025-07-24

## (undated) DEVICE — VIAL DECANTER

## (undated) DEVICE — CUFF TOURNIQUET 18 X 4 IN QUICK CONNECT DISP 1 BLADDER

## (undated) DEVICE — GLOVE SRG BIOGEL ECLIPSE 7

## (undated) DEVICE — GLOVE SRG BIOGEL 6.5

## (undated) DEVICE — PADDING CAST 4 IN  COTTON STRL

## (undated) DEVICE — CHLORAPREP HI-LITE 26ML ORANGE

## (undated) DEVICE — GLOVE INDICATOR PI UNDERGLOVE SZ 7 BLUE

## (undated) DEVICE — SPONGE SCRUB 4 PCT CHLORHEXIDINE

## (undated) DEVICE — STRL UNIVERSAL MINOR GENERAL: Brand: CARDINAL HEALTH

## (undated) DEVICE — SUT MONOCRYL 4-0 PS-2 18 IN Y496G

## (undated) DEVICE — STERILE BETHLEHEM PLASTIC HAND: Brand: CARDINAL HEALTH

## (undated) DEVICE — VIOLET BRAIDED (POLYGLACTIN 910), SYNTHETIC ABSORBABLE SUTURE: Brand: COATED VICRYL

## (undated) DEVICE — LIGHT HANDLE COVER SLEEVE DISP BLUE STELLAR

## (undated) DEVICE — GAUZE SPONGES,16 PLY: Brand: CURITY

## (undated) DEVICE — INTENDED FOR TISSUE SEPARATION, AND OTHER PROCEDURES THAT REQUIRE A SHARP SURGICAL BLADE TO PUNCTURE OR CUT.: Brand: BARD-PARKER ® CARBON RIB-BACK BLADES

## (undated) DEVICE — NEEDLE HYPO 22G X 1-1/2 IN

## (undated) DEVICE — REM POLYHESIVE ADULT PATIENT RETURN ELECTRODE: Brand: VALLEYLAB

## (undated) DEVICE — ACE WRAP 4 IN STERILE

## (undated) DEVICE — INTENDED FOR TISSUE SEPARATION, AND OTHER PROCEDURES THAT REQUIRE A SHARP SURGICAL BLADE TO PUNCTURE OR CUT.: Brand: BARD-PARKER SAFETY BLADES SIZE 15, STERILE

## (undated) DEVICE — SUT VICRYL 1 CT-1 27 IN J261H

## (undated) DEVICE — STRETCH BANDAGE: Brand: CURITY

## (undated) DEVICE — SYSTEM ENDOSCOPIC CARPAL TUNNEL RELEASE DISP

## (undated) DEVICE — ANTI-FOG SOLUTION WITH FOAM PAD: Brand: DEVON

## (undated) DEVICE — SUT VICRYL 2-0 REEL 54 IN J286G

## (undated) DEVICE — ADHESIVE SKN CLSR HISTOACRYL FLEX 0.5ML LF

## (undated) DEVICE — PAD CAST 4 IN COTTON NON STERILE

## (undated) DEVICE — TOWEL SET X-RAY

## (undated) DEVICE — ACE WRAP 3 IN UNSTERILE

## (undated) DEVICE — OCCLUSIVE GAUZE STRIP,3% BISMUTH TRIBROMOPHENATE IN PETROLATUM BLEND: Brand: XEROFORM

## (undated) DEVICE — SUT CHROMIC 4-0 P-3 18 MM 1654G

## (undated) DEVICE — NEEDLE 25GA X 1 IN SAFETY GLIDE